# Patient Record
Sex: MALE | Race: WHITE | NOT HISPANIC OR LATINO | Employment: OTHER | URBAN - METROPOLITAN AREA
[De-identification: names, ages, dates, MRNs, and addresses within clinical notes are randomized per-mention and may not be internally consistent; named-entity substitution may affect disease eponyms.]

---

## 2017-05-02 ENCOUNTER — LAB (OUTPATIENT)
Dept: FAMILY MEDICINE CLINIC | Facility: CLINIC | Age: 82
End: 2017-05-02

## 2017-05-02 DIAGNOSIS — I67.82 TEMPORARY CEREBRAL VASCULAR DYSFUNCTION: ICD-10-CM

## 2017-05-02 DIAGNOSIS — K22.70 BARRETT'S ESOPHAGUS WITHOUT DYSPLASIA: ICD-10-CM

## 2017-05-02 DIAGNOSIS — E03.9 ACQUIRED HYPOTHYROIDISM: ICD-10-CM

## 2017-05-02 DIAGNOSIS — R32 URINARY INCONTINENCE, UNSPECIFIED TYPE: ICD-10-CM

## 2017-05-02 DIAGNOSIS — I65.29 OBSTRUCTION OF CAROTID ARTERY, UNSPECIFIED LATERALITY: Primary | ICD-10-CM

## 2017-05-02 DIAGNOSIS — I73.9 PAD (PERIPHERAL ARTERY DISEASE) (HCC): ICD-10-CM

## 2017-05-02 DIAGNOSIS — Z12.5 SCREENING FOR PROSTATE CANCER: ICD-10-CM

## 2017-05-02 DIAGNOSIS — E78.2 MIXED HYPERLIPIDEMIA: ICD-10-CM

## 2017-05-02 DIAGNOSIS — K21.9 GASTROESOPHAGEAL REFLUX DISEASE WITHOUT ESOPHAGITIS: ICD-10-CM

## 2017-05-02 DIAGNOSIS — I65.29 STENOSIS OF CAROTID ARTERY, UNSPECIFIED LATERALITY: ICD-10-CM

## 2017-05-02 LAB
ALBUMIN SERPL-MCNC: 3.9 G/DL (ref 3.5–5.2)
ALBUMIN/GLOB SERPL: 1.3 G/DL
ALP SERPL-CCNC: 47 U/L (ref 40–129)
ALT SERPL-CCNC: 18 U/L (ref 5–41)
AST SERPL-CCNC: 21 U/L (ref 5–40)
BASOPHILS # BLD AUTO: 0.04 10*3/MM3 (ref 0–0.2)
BASOPHILS NFR BLD AUTO: 0.5 % (ref 0–2)
BILIRUB SERPL-MCNC: 0.4 MG/DL (ref 0.2–1.2)
BUN SERPL-MCNC: 18 MG/DL (ref 8–23)
BUN/CREAT SERPL: 19.8 (ref 7–25)
CALCIUM SERPL-MCNC: 9 MG/DL (ref 8.8–10.5)
CHLORIDE SERPL-SCNC: 97 MMOL/L (ref 98–107)
CHOLEST SERPL-MCNC: 137 MG/DL (ref 0–200)
CO2 SERPL-SCNC: 28.2 MMOL/L (ref 22–29)
CREAT SERPL-MCNC: 0.91 MG/DL (ref 0.76–1.27)
EOSINOPHIL # BLD AUTO: 0.43 10*3/MM3 (ref 0.1–0.3)
EOSINOPHIL NFR BLD AUTO: 5.7 % (ref 0–4)
ERYTHROCYTE [DISTWIDTH] IN BLOOD BY AUTOMATED COUNT: 13.9 % (ref 11.5–14.5)
GLOBULIN SER CALC-MCNC: 2.9 GM/DL
GLUCOSE SERPL-MCNC: 91 MG/DL (ref 65–99)
HCT VFR BLD AUTO: 41.8 % (ref 42–52)
HDLC SERPL-MCNC: 42 MG/DL (ref 40–60)
HGB BLD-MCNC: 13.5 G/DL (ref 14–18)
IMM GRANULOCYTES # BLD: 0.02 10*3/MM3 (ref 0–0.03)
IMM GRANULOCYTES NFR BLD: 0.3 % (ref 0–0.5)
LDLC SERPL CALC-MCNC: 68 MG/DL (ref 0–100)
LYMPHOCYTES # BLD AUTO: 1.84 10*3/MM3 (ref 0.6–4.8)
LYMPHOCYTES NFR BLD AUTO: 24.6 % (ref 20–45)
MCH RBC QN AUTO: 30.1 PG (ref 27–31)
MCHC RBC AUTO-ENTMCNC: 32.3 G/DL (ref 31–37)
MCV RBC AUTO: 93.3 FL (ref 80–94)
MONOCYTES # BLD AUTO: 0.6 10*3/MM3 (ref 0–1)
MONOCYTES NFR BLD AUTO: 8 % (ref 3–8)
NEUTROPHILS # BLD AUTO: 4.56 10*3/MM3 (ref 1.5–8.3)
NEUTROPHILS NFR BLD AUTO: 60.9 % (ref 45–70)
NRBC BLD AUTO-RTO: 0 /100 WBC (ref 0–0)
PLATELET # BLD AUTO: 230 10*3/MM3 (ref 140–500)
POTASSIUM SERPL-SCNC: 4.6 MMOL/L (ref 3.5–5.2)
PROT SERPL-MCNC: 6.8 G/DL (ref 6–8.5)
PSA SERPL-MCNC: <0.014 NG/ML (ref 0–4)
RBC # BLD AUTO: 4.48 10*6/MM3 (ref 4.7–6.1)
SODIUM SERPL-SCNC: 138 MMOL/L (ref 136–145)
TRIGL SERPL-MCNC: 137 MG/DL (ref 0–150)
TSH SERPL DL<=0.005 MIU/L-ACNC: 3.82 MIU/ML (ref 0.27–4.2)
VLDLC SERPL CALC-MCNC: 27.4 MG/DL (ref 8–32)
WBC # BLD AUTO: 7.49 10*3/MM3 (ref 4.8–10.8)

## 2017-05-05 RX ORDER — LEVOTHYROXINE SODIUM 0.05 MG/1
TABLET ORAL
Qty: 90 TABLET | Refills: 0 | Status: SHIPPED | OUTPATIENT
Start: 2017-05-05 | End: 2017-05-11 | Stop reason: SDUPTHER

## 2017-05-11 ENCOUNTER — OFFICE VISIT (OUTPATIENT)
Dept: FAMILY MEDICINE CLINIC | Facility: CLINIC | Age: 82
End: 2017-05-11

## 2017-05-11 VITALS
OXYGEN SATURATION: 90 % | WEIGHT: 195.7 LBS | DIASTOLIC BLOOD PRESSURE: 60 MMHG | TEMPERATURE: 98.6 F | HEART RATE: 85 BPM | BODY MASS INDEX: 28.99 KG/M2 | SYSTOLIC BLOOD PRESSURE: 110 MMHG | HEIGHT: 69 IN

## 2017-05-11 DIAGNOSIS — E78.2 MIXED HYPERLIPIDEMIA: ICD-10-CM

## 2017-05-11 DIAGNOSIS — Z00.00 MEDICARE ANNUAL WELLNESS VISIT, SUBSEQUENT: Primary | ICD-10-CM

## 2017-05-11 DIAGNOSIS — K22.70 BARRETT'S ESOPHAGUS WITHOUT DYSPLASIA: ICD-10-CM

## 2017-05-11 DIAGNOSIS — E03.9 ACQUIRED HYPOTHYROIDISM: ICD-10-CM

## 2017-05-11 DIAGNOSIS — J40 BRONCHITIS: ICD-10-CM

## 2017-05-11 DIAGNOSIS — F02.80 DEMENTIA ASSOCIATED WITH OTHER UNDERLYING DISEASE WITHOUT BEHAVIORAL DISTURBANCE (HCC): ICD-10-CM

## 2017-05-11 PROCEDURE — G0439 PPPS, SUBSEQ VISIT: HCPCS | Performed by: FAMILY MEDICINE

## 2017-05-11 PROCEDURE — 99213 OFFICE O/P EST LOW 20 MIN: CPT | Performed by: FAMILY MEDICINE

## 2017-05-11 RX ORDER — LEVOTHYROXINE SODIUM 0.05 MG/1
50 TABLET ORAL DAILY
Qty: 90 TABLET | Refills: 3 | Status: SHIPPED | OUTPATIENT
Start: 2017-05-11 | End: 2018-05-17 | Stop reason: SDUPTHER

## 2017-05-11 RX ORDER — PANTOPRAZOLE SODIUM 40 MG/1
40 TABLET, DELAYED RELEASE ORAL DAILY
Qty: 90 TABLET | Refills: 3 | Status: SHIPPED | OUTPATIENT
Start: 2017-05-11 | End: 2018-05-10 | Stop reason: SDUPTHER

## 2017-05-11 RX ORDER — AZITHROMYCIN 250 MG/1
TABLET, FILM COATED ORAL
Qty: 6 TABLET | Refills: 0 | Status: SHIPPED | OUTPATIENT
Start: 2017-05-11 | End: 2017-10-06

## 2017-05-11 RX ORDER — ALBUTEROL SULFATE 90 UG/1
2 AEROSOL, METERED RESPIRATORY (INHALATION) EVERY 4 HOURS PRN
Qty: 1 INHALER | Refills: 0 | Status: SHIPPED | OUTPATIENT
Start: 2017-05-11 | End: 2017-08-15 | Stop reason: SDUPTHER

## 2017-05-18 ENCOUNTER — TELEPHONE (OUTPATIENT)
Dept: FAMILY MEDICINE CLINIC | Facility: CLINIC | Age: 82
End: 2017-05-18

## 2017-08-10 ENCOUNTER — TRANSCRIBE ORDERS (OUTPATIENT)
Dept: ADMINISTRATIVE | Facility: HOSPITAL | Age: 82
End: 2017-08-10

## 2017-08-10 ENCOUNTER — LAB (OUTPATIENT)
Dept: LAB | Facility: HOSPITAL | Age: 82
End: 2017-08-10

## 2017-08-10 ENCOUNTER — HOSPITAL ENCOUNTER (OUTPATIENT)
Dept: CARDIOLOGY | Facility: HOSPITAL | Age: 82
Discharge: HOME | End: 2017-08-10
Admitting: OPHTHALMOLOGY

## 2017-08-10 DIAGNOSIS — I10 ESSENTIAL HYPERTENSION: Primary | ICD-10-CM

## 2017-08-10 DIAGNOSIS — I10 ESSENTIAL HYPERTENSION: ICD-10-CM

## 2017-08-10 LAB
ANION GAP SERPL CALCULATED.3IONS-SCNC: 10.7 MMOL/L
BUN BLD-MCNC: 20 MG/DL (ref 8–23)
BUN/CREAT SERPL: 20.6 (ref 7–25)
CALCIUM SPEC-SCNC: 8.9 MG/DL (ref 8.8–10.5)
CHLORIDE SERPL-SCNC: 99 MMOL/L (ref 98–107)
CO2 SERPL-SCNC: 26.3 MMOL/L (ref 22–29)
CREAT BLD-MCNC: 0.97 MG/DL (ref 0.76–1.27)
GFR SERPL CREATININE-BSD FRML MDRD: 74 ML/MIN/1.73
GLUCOSE BLD-MCNC: 98 MG/DL (ref 65–99)
POTASSIUM BLD-SCNC: 4.5 MMOL/L (ref 3.5–5.2)
SODIUM BLD-SCNC: 136 MMOL/L (ref 136–145)

## 2017-08-10 PROCEDURE — 93005 ELECTROCARDIOGRAM TRACING: CPT | Performed by: OPHTHALMOLOGY

## 2017-08-10 PROCEDURE — 93010 ELECTROCARDIOGRAM REPORT: CPT | Performed by: INTERNAL MEDICINE

## 2017-08-10 PROCEDURE — 36415 COLL VENOUS BLD VENIPUNCTURE: CPT

## 2017-08-10 PROCEDURE — 80048 BASIC METABOLIC PNL TOTAL CA: CPT

## 2017-08-15 DIAGNOSIS — J40 BRONCHITIS: ICD-10-CM

## 2017-08-15 RX ORDER — ALBUTEROL SULFATE 90 UG/1
AEROSOL, METERED RESPIRATORY (INHALATION)
Qty: 1 INHALER | Refills: 0 | Status: SHIPPED | OUTPATIENT
Start: 2017-08-15 | End: 2018-01-25 | Stop reason: SDUPTHER

## 2017-10-06 ENCOUNTER — OFFICE VISIT (OUTPATIENT)
Dept: FAMILY MEDICINE CLINIC | Facility: CLINIC | Age: 82
End: 2017-10-06

## 2017-10-06 VITALS
TEMPERATURE: 97.5 F | OXYGEN SATURATION: 92 % | HEIGHT: 69 IN | BODY MASS INDEX: 28.66 KG/M2 | WEIGHT: 193.5 LBS | HEART RATE: 94 BPM | SYSTOLIC BLOOD PRESSURE: 140 MMHG | DIASTOLIC BLOOD PRESSURE: 88 MMHG

## 2017-10-06 DIAGNOSIS — Z51.81 MEDICATION MONITORING ENCOUNTER: ICD-10-CM

## 2017-10-06 DIAGNOSIS — E03.9 ACQUIRED HYPOTHYROIDISM: ICD-10-CM

## 2017-10-06 DIAGNOSIS — I10 ESSENTIAL HYPERTENSION: ICD-10-CM

## 2017-10-06 DIAGNOSIS — Z23 NEED FOR INFLUENZA VACCINATION: Primary | ICD-10-CM

## 2017-10-06 DIAGNOSIS — I73.9 PAD (PERIPHERAL ARTERY DISEASE) (HCC): ICD-10-CM

## 2017-10-06 DIAGNOSIS — R41.3 MEMORY LOSS: ICD-10-CM

## 2017-10-06 DIAGNOSIS — E78.2 MIXED HYPERLIPIDEMIA: ICD-10-CM

## 2017-10-06 DIAGNOSIS — F02.80 DEMENTIA ASSOCIATED WITH OTHER UNDERLYING DISEASE WITHOUT BEHAVIORAL DISTURBANCE (HCC): ICD-10-CM

## 2017-10-06 DIAGNOSIS — C61 PROSTATE CANCER (HCC): ICD-10-CM

## 2017-10-06 PROCEDURE — 99213 OFFICE O/P EST LOW 20 MIN: CPT | Performed by: FAMILY MEDICINE

## 2017-10-06 PROCEDURE — 90662 IIV NO PRSV INCREASED AG IM: CPT | Performed by: FAMILY MEDICINE

## 2017-10-06 PROCEDURE — G0008 ADMIN INFLUENZA VIRUS VAC: HCPCS | Performed by: FAMILY MEDICINE

## 2017-10-06 RX ORDER — SILDENAFIL CITRATE 20 MG/1
20 TABLET ORAL DAILY PRN
Qty: 30 TABLET | Refills: 5 | Status: SHIPPED | OUTPATIENT
Start: 2017-10-06 | End: 2018-09-24

## 2017-10-06 RX ORDER — MELOXICAM 15 MG/1
15 TABLET ORAL DAILY
COMMUNITY
End: 2018-04-11

## 2017-10-06 RX ORDER — LOSARTAN POTASSIUM 50 MG/1
50 TABLET ORAL DAILY
Qty: 90 TABLET | Refills: 3 | Status: SHIPPED | OUTPATIENT
Start: 2017-10-06 | End: 2018-10-04 | Stop reason: SDUPTHER

## 2017-10-06 RX ORDER — ATORVASTATIN CALCIUM 20 MG/1
20 TABLET, FILM COATED ORAL DAILY
Qty: 90 TABLET | Refills: 3 | Status: SHIPPED | OUTPATIENT
Start: 2017-10-06 | End: 2018-10-04 | Stop reason: SDUPTHER

## 2017-10-06 RX ORDER — RIVASTIGMINE 9.5 MG/24H
1 PATCH, EXTENDED RELEASE TRANSDERMAL DAILY
Qty: 90 PATCH | Refills: 3 | Status: SHIPPED | OUTPATIENT
Start: 2017-10-06 | End: 2018-10-04 | Stop reason: SDUPTHER

## 2017-10-06 NOTE — PROGRESS NOTES
Chief Complaint   Patient presents with   • Follow-up   • Hyperlipidemia   • Hypertension   • Hypothyroidism       Subjective     Patient here for follow-up of elevated blood pressure.    He is not exercising and is adherent to a low-salt diet.    Blood pressure is well controlled at home.   Cardiac symptoms: none.   Patient denies: chest pressure/discomfort, claudication, cough, dyspnea, exertional chest pressure/discomfort, fatigue, irregular heart beat, lower extremity edema and near-syncope.   Cardiovascular risk factors: advanced age (older than 55 for men, 65 for women), dyslipidemia, hypertension and male gender.   Use of agents associated with hypertension: none.   History of target organ damage: none.  Patient is taking prescribed hypertension medications as prescribed without side effects.    Dementia is stable  Utd on labs and these were reviewed    chronic ED since prostate cancer  Needs refill on Viagra    The following portions of the patient's history were reviewed and updated as appropriate: allergies, current medications, past medical history, past social history, past surgical history and problem list.    Review of Systems  A comprehensive review of systems was negative except for: Genitourinary: positive for sexual problems  Neurological: positive for memory problems    Results for orders placed or performed in visit on 08/10/17   Basic Metabolic Panel   Result Value Ref Range    Glucose 98 65 - 99 mg/dL    BUN 20 8 - 23 mg/dL    Creatinine 0.97 0.76 - 1.27 mg/dL    Sodium 136 136 - 145 mmol/L    Potassium 4.5 3.5 - 5.2 mmol/L    Chloride 99 98 - 107 mmol/L    CO2 26.3 22.0 - 29.0 mmol/L    Calcium 8.9 8.8 - 10.5 mg/dL    eGFR Non African Amer 74 >60 mL/min/1.73    BUN/Creatinine Ratio 20.6 7.0 - 25.0    Anion Gap 10.7 mmol/L        Vitals:    10/06/17 1051   BP: 140/88   BP Location: Left arm   Patient Position: Sitting   Pulse: 94   Temp: 97.5 °F (36.4 °C)   SpO2: 92%   Weight: 193 lb 8 oz  "(87.8 kg)   Height: 68.5\" (174 cm)     Objective    Gen: alert, pleasant.  HEENT: PERRL, EOMI intact, lids ok, ear canals clear, TMs normal, throat clear, nostrils normal  Neck: no bruit, no enlarged thyroid  Lungs: CTA  Heart: RR no murmur  ABD: soft , + BS  Pulses: intact  Mood: stable     Assessment/Plan   Hypertension, prehypertension Evidence of target organ damage: none.    Pawel was seen today for follow-up, hyperlipidemia, hypertension and hypothyroidism.    Diagnoses and all orders for this visit:    Need for influenza vaccination  -     Flu Vaccine High Dose PF 65YR+    Acquired hypothyroidism  -     TSH; Future    Mixed hyperlipidemia  -     atorvastatin (LIPITOR) 20 MG tablet; Take 1 tablet by mouth Daily for 90 days.  -     Lipid Panel; Future    Dementia associated with other underlying disease without behavioral disturbance    Essential hypertension  -     Basic Metabolic Panel; Future  -     CBC & Differential; Future    Prostate cancer  -     sildenafil (REVATIO) 20 MG tablet; Take 1 tablet by mouth Daily As Needed (ED).  -     PSA; Future    PAD (peripheral artery disease)  -     losartan (COZAAR) 50 MG tablet; Take 1 tablet by mouth Daily for 90 days. Indications: High Blood Pressure Disorder  -     atorvastatin (LIPITOR) 20 MG tablet; Take 1 tablet by mouth Daily for 90 days.    Memory loss  -     rivastigmine (EXELON) 9.5 MG/24HR patch; Place 1 patch on the skin Daily for 90 days. Indications: Alzheimer's Disease    Medication monitoring encounter  -     ALT; Future      Medication: no change.  Follow up: 6 months and as needed.    There are no Patient Instructions on file for this visit.  Medications Discontinued During This Encounter   Medication Reason   • azithromycin (ZITHROMAX) 250 MG tablet Therapy completed   • losartan (COZAAR) 50 MG tablet Reorder   • atorvastatin (LIPITOR) 20 MG tablet Reorder   • rivastigmine (EXELON) 9.5 MG/24HR patch Reorder        Return in about 6 months (around " 4/6/2018) for Medicare Wellness visit.    Limit salt  Limit alcoholic drinks to 1 a day  Limit caffeine to 1-2 servings a day    Dr. Charlie Trinh MD  [place], [place]  [facility].

## 2017-11-07 ENCOUNTER — OFFICE VISIT (OUTPATIENT)
Dept: FAMILY MEDICINE CLINIC | Facility: CLINIC | Age: 82
End: 2017-11-07

## 2017-11-07 VITALS
HEIGHT: 69 IN | DIASTOLIC BLOOD PRESSURE: 100 MMHG | WEIGHT: 196 LBS | SYSTOLIC BLOOD PRESSURE: 154 MMHG | OXYGEN SATURATION: 92 % | TEMPERATURE: 97.5 F | HEART RATE: 100 BPM | BODY MASS INDEX: 29.03 KG/M2

## 2017-11-07 DIAGNOSIS — Z85.46 HISTORY OF PROSTATE CANCER: Primary | ICD-10-CM

## 2017-11-07 LAB
BILIRUB BLD-MCNC: ABNORMAL MG/DL
CLARITY, POC: CLEAR
COLOR UR: YELLOW
GLUCOSE UR STRIP-MCNC: NEGATIVE MG/DL
KETONES UR QL: NEGATIVE
LEUKOCYTE EST, POC: NEGATIVE
NITRITE UR-MCNC: NEGATIVE MG/ML
PH UR: 6 [PH] (ref 5–8)
PROT UR STRIP-MCNC: ABNORMAL MG/DL
RBC # UR STRIP: NEGATIVE /UL
SP GR UR: 1.02 (ref 1–1.03)
UROBILINOGEN UR QL: NORMAL

## 2017-11-07 PROCEDURE — 99213 OFFICE O/P EST LOW 20 MIN: CPT | Performed by: FAMILY MEDICINE

## 2017-11-07 PROCEDURE — 81002 URINALYSIS NONAUTO W/O SCOPE: CPT | Performed by: FAMILY MEDICINE

## 2017-11-07 RX ORDER — TOLTERODINE 4 MG/1
4 CAPSULE, EXTENDED RELEASE ORAL
Qty: 30 CAPSULE | Refills: 5 | Status: SHIPPED | OUTPATIENT
Start: 2017-11-07 | End: 2017-11-27 | Stop reason: SDUPTHER

## 2017-11-07 NOTE — PROGRESS NOTES
Subjective   Pawel Goldberg is a 84 y.o. male who is here for   Chief Complaint   Patient presents with   • Urinary Incontinence     x 1 month    • Bladder Problem   .     History of Present Illness   Urinary Tract Infection: Patient complains of frequency, inability to void, incomplete bladder emptying, nocturia and urgency He has had symptoms for several years. Patient also complains of none. Patient denies back pain and fever. Patient does not have a history of recurrent UTI.  Patient does not have a history of pyelonephritis.   Is a prostate cancer survivor. Has an artificial urine sphincter.  Gets up every 2 h at night to urinate  PSA is 0.  Pepe worked great but denied by insurance.    Also hit left elbow last week, has a swollen olecranon bursa.    Daily productive cough.      The following portions of the patient's history were reviewed and updated as appropriate: allergies, current medications, past family history, past medical history, past social history, past surgical history and problem list.    Review of Systems    Objective   Physical Exam   Cardiovascular: Normal rate.    Pulmonary/Chest: Effort normal.   Musculoskeletal:        Left elbow: He exhibits swelling. Tenderness found. Olecranon process tenderness noted.   Nursing note and vitals reviewed.    Results for orders placed or performed in visit on 11/07/17   POCT urinalysis dipstick, manual   Result Value Ref Range    Color Yellow Yellow, Straw, Dark Yellow, Laurie    Clarity, UA Clear Clear    Glucose, UA Negative Negative, 1000 mg/dL (3+) mg/dL    Bilirubin Small (1+) (A) Negative    Ketones, UA Negative Negative    Specific Gravity  1.020 1.005 - 1.030    Blood, UA Negative Negative    pH, Urine 6.0 5.0 - 8.0    Protein, POC Trace (A) Negative mg/dL    Urobilinogen, UA Normal Normal    Leukocytes Negative Negative    Nitrite, UA Negative Negative     Results for orders placed or performed in visit on 11/07/17   POCT urinalysis  dipstick, manual   Result Value Ref Range    Color Yellow Yellow, Straw, Dark Yellow, Laurie    Clarity, UA Clear Clear    Glucose, UA Negative Negative, 1000 mg/dL (3+) mg/dL    Bilirubin Small (1+) (A) Negative    Ketones, UA Negative Negative    Specific Gravity  1.020 1.005 - 1.030    Blood, UA Negative Negative    pH, Urine 6.0 5.0 - 8.0    Protein, POC Trace (A) Negative mg/dL    Urobilinogen, UA Normal Normal    Leukocytes Negative Negative    Nitrite, UA Negative Negative       Assessment/Plan   Pawel was seen today for urinary incontinence and bladder problem.    Diagnoses and all orders for this visit:    History of prostate cancer  -     tolterodine LA (DETROL LA) 4 MG 24 hr capsule; Take 1 capsule by mouth every night at bedtime. Indications: Overactive Bladder  -     POCT urinalysis dipstick, manual      Patient Instructions   Ice the elbow.  Keep coughing up the mucous  No UTI  Will try Detrol for OAB.  Could try to PA the Myrebetic in future.        There are no discontinued medications.     No Follow-up on file.    Dr. Charlie Trinh  [place], [place]

## 2017-11-07 NOTE — PATIENT INSTRUCTIONS
Ice the elbow.  Keep coughing up the mucous  No UTI  Will try Detrol for OAB.  Could try to PA the Myrebetic in future.

## 2017-11-27 DIAGNOSIS — Z85.46 HISTORY OF PROSTATE CANCER: ICD-10-CM

## 2017-11-27 RX ORDER — TOLTERODINE 4 MG/1
4 CAPSULE, EXTENDED RELEASE ORAL
Qty: 90 CAPSULE | Refills: 3 | Status: SHIPPED | OUTPATIENT
Start: 2017-11-27 | End: 2018-04-11

## 2018-01-25 DIAGNOSIS — J40 BRONCHITIS: ICD-10-CM

## 2018-01-25 RX ORDER — ALBUTEROL SULFATE 90 UG/1
AEROSOL, METERED RESPIRATORY (INHALATION)
Qty: 18 G | Refills: 0 | Status: SHIPPED | OUTPATIENT
Start: 2018-01-25 | End: 2020-03-31 | Stop reason: SDUPTHER

## 2018-04-05 DIAGNOSIS — J40 BRONCHITIS: ICD-10-CM

## 2018-04-06 ENCOUNTER — RESULTS ENCOUNTER (OUTPATIENT)
Dept: FAMILY MEDICINE CLINIC | Facility: CLINIC | Age: 83
End: 2018-04-06

## 2018-04-06 DIAGNOSIS — I10 ESSENTIAL HYPERTENSION: ICD-10-CM

## 2018-04-06 DIAGNOSIS — E03.9 ACQUIRED HYPOTHYROIDISM: ICD-10-CM

## 2018-04-06 DIAGNOSIS — Z51.81 MEDICATION MONITORING ENCOUNTER: ICD-10-CM

## 2018-04-06 DIAGNOSIS — C61 PROSTATE CANCER (HCC): ICD-10-CM

## 2018-04-06 DIAGNOSIS — E78.2 MIXED HYPERLIPIDEMIA: ICD-10-CM

## 2018-04-06 RX ORDER — ALBUTEROL SULFATE 90 UG/1
AEROSOL, METERED RESPIRATORY (INHALATION)
Qty: 16 G | Refills: 0 | Status: SHIPPED | OUTPATIENT
Start: 2018-04-06 | End: 2018-04-11 | Stop reason: SDUPTHER

## 2018-04-11 ENCOUNTER — HOSPITAL ENCOUNTER (OUTPATIENT)
Dept: GENERAL RADIOLOGY | Facility: HOSPITAL | Age: 83
Discharge: HOME | End: 2018-04-11
Attending: FAMILY MEDICINE | Admitting: FAMILY MEDICINE

## 2018-04-11 ENCOUNTER — OFFICE VISIT (OUTPATIENT)
Dept: FAMILY MEDICINE CLINIC | Facility: CLINIC | Age: 83
End: 2018-04-11

## 2018-04-11 VITALS
BODY MASS INDEX: 29.62 KG/M2 | HEIGHT: 69 IN | WEIGHT: 200 LBS | SYSTOLIC BLOOD PRESSURE: 140 MMHG | HEART RATE: 77 BPM | TEMPERATURE: 97.9 F | OXYGEN SATURATION: 92 % | DIASTOLIC BLOOD PRESSURE: 86 MMHG

## 2018-04-11 DIAGNOSIS — R06.02 SHORTNESS OF BREATH: Primary | ICD-10-CM

## 2018-04-11 DIAGNOSIS — R06.02 SHORTNESS OF BREATH: ICD-10-CM

## 2018-04-11 PROCEDURE — 71046 X-RAY EXAM CHEST 2 VIEWS: CPT

## 2018-04-11 PROCEDURE — 99213 OFFICE O/P EST LOW 20 MIN: CPT | Performed by: FAMILY MEDICINE

## 2018-04-11 RX ORDER — FLUTICASONE PROPIONATE 110 UG/1
1 AEROSOL, METERED RESPIRATORY (INHALATION) 2 TIMES DAILY
Qty: 1 INHALER | Refills: 11 | Status: SHIPPED | OUTPATIENT
Start: 2018-04-11 | End: 2019-05-22

## 2018-04-11 NOTE — PROGRESS NOTES
"  Chief Complaint   Patient presents with   • Wheezing   • Cough       Upper Respiratory Infection: Patient complains of wheezing. Symptoms include congestion. Onset of symptoms was 3 months ago, unchanged since that time. He also c/o congestion, shortness of breath and wheezing for the past 3 months .  He is drinking plenty of fluids. Evaluation to date: none. Treatment to date: none.  Ill contacts at home  Discussed.  No fever.  No CP  Non productive cough  Non smoker  No h/o asthma nor copd  Ventolin becoming unhelpful      Vitals:    04/11/18 1407   BP: 140/86   BP Location: Left arm   Patient Position: Sitting   Pulse: 77   Temp: 97.9 °F (36.6 °C)   SpO2: 92%   Weight: 90.7 kg (200 lb)   Height: 174 cm (68.5\")     Gen: mildly ill appearing, alert  Ears:  without redness  Nose:  Clear  Throat:  without exudate, some drainage, tonsils okay  Neck: no LAD  Lung: mild rales, limited air movement, regular RR, wheezing with forced expiration  Heart: RR without murmur  Skin: no rash.      Assessment/Plan   Pawel was seen today for wheezing and cough.    Diagnoses and all orders for this visit:    Shortness of breath  -     fluticasone (FLOVENT HFA) 110 MCG/ACT inhaler; Inhale 1 puff 2 (Two) Times a Day.  -     XR Chest 2 View; Future             There are no Patient Instructions on file for this visit.    Dr. Charlie Trinh MD  [place], [place]  [facility]  "

## 2018-04-16 ENCOUNTER — TELEPHONE (OUTPATIENT)
Dept: FAMILY MEDICINE CLINIC | Facility: CLINIC | Age: 83
End: 2018-04-16

## 2018-04-16 DIAGNOSIS — R06.02 SHORTNESS OF BREATH: Primary | ICD-10-CM

## 2018-04-16 NOTE — TELEPHONE ENCOUNTER
Daughter called and wants to know since his CXR was normal, should pt be referred to a Lung specialist? His inhaler isnt working very well.

## 2018-04-16 NOTE — TELEPHONE ENCOUNTER
Ok  I made referral to pulm MD  I also went ahead and ordered the Ct of lungs  And PFTs  To speed up eval.    Charlie Trinh MD

## 2018-04-19 ENCOUNTER — HOSPITAL ENCOUNTER (OUTPATIENT)
Dept: CT IMAGING | Facility: HOSPITAL | Age: 83
Discharge: HOME | End: 2018-04-19
Attending: FAMILY MEDICINE

## 2018-04-19 ENCOUNTER — HOSPITAL ENCOUNTER (OUTPATIENT)
Dept: PULMONOLOGY | Facility: HOSPITAL | Age: 83
Discharge: HOME | End: 2018-04-19
Attending: FAMILY MEDICINE | Admitting: FAMILY MEDICINE

## 2018-04-19 DIAGNOSIS — R06.02 SHORTNESS OF BREATH: ICD-10-CM

## 2018-04-19 PROCEDURE — 0 IOPAMIDOL PER 1 ML: Performed by: FAMILY MEDICINE

## 2018-04-19 PROCEDURE — 94729 DIFFUSING CAPACITY: CPT

## 2018-04-19 PROCEDURE — 71260 CT THORAX DX C+: CPT

## 2018-04-19 PROCEDURE — 94726 PLETHYSMOGRAPHY LUNG VOLUMES: CPT

## 2018-04-19 PROCEDURE — 94060 EVALUATION OF WHEEZING: CPT

## 2018-04-19 RX ORDER — ALBUTEROL SULFATE 2.5 MG/3ML
2.5 SOLUTION RESPIRATORY (INHALATION) ONCE
Status: COMPLETED | OUTPATIENT
Start: 2018-04-19 | End: 2018-04-19

## 2018-04-19 RX ADMIN — ALBUTEROL SULFATE 2.5 MG: 2.5 SOLUTION RESPIRATORY (INHALATION) at 13:44

## 2018-04-19 RX ADMIN — IOPAMIDOL 100 ML: 755 INJECTION, SOLUTION INTRAVENOUS at 15:00

## 2018-05-09 ENCOUNTER — TRANSCRIBE ORDERS (OUTPATIENT)
Dept: ADMINISTRATIVE | Facility: HOSPITAL | Age: 83
End: 2018-05-09

## 2018-05-09 DIAGNOSIS — R06.00 DYSPNEA, UNSPECIFIED TYPE: Primary | ICD-10-CM

## 2018-05-09 DIAGNOSIS — J84.9 ILD (INTERSTITIAL LUNG DISEASE) (HCC): Primary | ICD-10-CM

## 2018-05-09 DIAGNOSIS — R06.00 DYSPNEA, UNSPECIFIED TYPE: ICD-10-CM

## 2018-05-09 DIAGNOSIS — R91.8 ABNORMAL CT LUNG SCREENING: ICD-10-CM

## 2018-05-10 DIAGNOSIS — K22.70 BARRETT'S ESOPHAGUS WITHOUT DYSPLASIA: ICD-10-CM

## 2018-05-10 LAB
ALT SERPL-CCNC: 16 U/L (ref 5–41)
BASOPHILS # BLD AUTO: 0.04 10*3/MM3 (ref 0–0.2)
BASOPHILS NFR BLD AUTO: 0.5 % (ref 0–2)
BUN SERPL-MCNC: 19 MG/DL (ref 8–23)
BUN/CREAT SERPL: 23.5 (ref 7–25)
CALCIUM SERPL-MCNC: 9.4 MG/DL (ref 8.8–10.5)
CHLORIDE SERPL-SCNC: 97 MMOL/L (ref 98–107)
CHOLEST SERPL-MCNC: 112 MG/DL (ref 0–200)
CO2 SERPL-SCNC: 26.6 MMOL/L (ref 22–29)
CREAT SERPL-MCNC: 0.81 MG/DL (ref 0.76–1.27)
EOSINOPHIL # BLD AUTO: 0.34 10*3/MM3 (ref 0.1–0.3)
EOSINOPHIL NFR BLD AUTO: 4.4 % (ref 0–4)
ERYTHROCYTE [DISTWIDTH] IN BLOOD BY AUTOMATED COUNT: 13.5 % (ref 11.5–14.5)
GFR SERPLBLD CREATININE-BSD FMLA CKD-EPI: 110 ML/MIN/1.73
GFR SERPLBLD CREATININE-BSD FMLA CKD-EPI: 91 ML/MIN/1.73
GLUCOSE SERPL-MCNC: 95 MG/DL (ref 65–99)
HCT VFR BLD AUTO: 40.4 % (ref 42–52)
HDLC SERPL-MCNC: 45 MG/DL (ref 40–60)
HGB BLD-MCNC: 13.5 G/DL (ref 14–18)
IMM GRANULOCYTES # BLD: 0.02 10*3/MM3 (ref 0–0.03)
IMM GRANULOCYTES NFR BLD: 0.3 % (ref 0–0.5)
LDLC SERPL CALC-MCNC: 46 MG/DL (ref 0–100)
LYMPHOCYTES # BLD AUTO: 1.61 10*3/MM3 (ref 0.6–4.8)
LYMPHOCYTES NFR BLD AUTO: 21 % (ref 20–45)
MCH RBC QN AUTO: 31.8 PG (ref 27–31)
MCHC RBC AUTO-ENTMCNC: 33.4 G/DL (ref 31–37)
MCV RBC AUTO: 95.1 FL (ref 80–94)
MONOCYTES # BLD AUTO: 0.7 10*3/MM3 (ref 0–1)
MONOCYTES NFR BLD AUTO: 9.2 % (ref 3–8)
NEUTROPHILS # BLD AUTO: 4.94 10*3/MM3 (ref 1.5–8.3)
NEUTROPHILS NFR BLD AUTO: 64.6 % (ref 45–70)
NRBC BLD AUTO-RTO: 0 /100 WBC (ref 0–0)
PLATELET # BLD AUTO: 229 10*3/MM3 (ref 140–500)
POTASSIUM SERPL-SCNC: 4.4 MMOL/L (ref 3.5–5.2)
PSA SERPL-MCNC: <0.014 NG/ML (ref 0–4)
RBC # BLD AUTO: 4.25 10*6/MM3 (ref 4.7–6.1)
SODIUM SERPL-SCNC: 135 MMOL/L (ref 136–145)
TRIGL SERPL-MCNC: 105 MG/DL (ref 0–150)
TSH SERPL DL<=0.005 MIU/L-ACNC: 2.23 MIU/ML (ref 0.27–4.2)
VLDLC SERPL CALC-MCNC: 21 MG/DL (ref 8–32)
WBC # BLD AUTO: 7.65 10*3/MM3 (ref 4.8–10.8)

## 2018-05-10 RX ORDER — PANTOPRAZOLE SODIUM 40 MG/1
TABLET, DELAYED RELEASE ORAL
Qty: 90 TABLET | Refills: 3 | Status: SHIPPED | OUTPATIENT
Start: 2018-05-10 | End: 2018-10-15 | Stop reason: SDUPTHER

## 2018-05-15 ENCOUNTER — HOSPITAL ENCOUNTER (OUTPATIENT)
Dept: CARDIOLOGY | Facility: HOSPITAL | Age: 83
Discharge: HOME | End: 2018-05-15
Attending: INTERNAL MEDICINE | Admitting: INTERNAL MEDICINE

## 2018-05-15 VITALS
OXYGEN SATURATION: 94 % | WEIGHT: 193 LBS | HEIGHT: 67 IN | SYSTOLIC BLOOD PRESSURE: 118 MMHG | HEART RATE: 70 BPM | DIASTOLIC BLOOD PRESSURE: 65 MMHG | BODY MASS INDEX: 30.29 KG/M2

## 2018-05-15 DIAGNOSIS — R06.00 DYSPNEA, UNSPECIFIED TYPE: ICD-10-CM

## 2018-05-15 LAB
AORTIC DIMENSIONLESS INDEX: 1 (DI)
BH CV ECHO MEAS - ACS: 1.8 CM
BH CV ECHO MEAS - AI DEC SLOPE: 131 CM/SEC^2
BH CV ECHO MEAS - AI MAX PG: 28.5 MMHG
BH CV ECHO MEAS - AI MAX VEL: 267 CM/SEC
BH CV ECHO MEAS - AI P1/2T: 597 MSEC
BH CV ECHO MEAS - AO MAX PG (FULL): 0.39 MMHG
BH CV ECHO MEAS - AO MAX PG: 6.3 MMHG
BH CV ECHO MEAS - AO MEAN PG (FULL): 0 MMHG
BH CV ECHO MEAS - AO MEAN PG: 3 MMHG
BH CV ECHO MEAS - AO ROOT AREA (BSA CORRECTED): 1.9
BH CV ECHO MEAS - AO ROOT AREA: 10.8 CM^2
BH CV ECHO MEAS - AO ROOT DIAM: 3.7 CM
BH CV ECHO MEAS - AO V2 MAX: 125 CM/SEC
BH CV ECHO MEAS - AO V2 MEAN: 75.4 CM/SEC
BH CV ECHO MEAS - AO V2 VTI: 22.5 CM
BH CV ECHO MEAS - AVA(I,A): 3.1 CM^2
BH CV ECHO MEAS - AVA(I,D): 3.1 CM^2
BH CV ECHO MEAS - AVA(V,A): 2.7 CM^2
BH CV ECHO MEAS - AVA(V,D): 2.7 CM^2
BH CV ECHO MEAS - BSA(HAYCOCK): 2.1 M^2
BH CV ECHO MEAS - BSA: 2 M^2
BH CV ECHO MEAS - BZI_BMI: 30.2 KILOGRAMS/M^2
BH CV ECHO MEAS - BZI_METRIC_HEIGHT: 170.2 CM
BH CV ECHO MEAS - BZI_METRIC_WEIGHT: 87.5 KG
BH CV ECHO MEAS - CONTRAST EF (2CH): 61.9 ML/M^2
BH CV ECHO MEAS - CONTRAST EF 4CH: 64.1 ML/M^2
BH CV ECHO MEAS - EDV(CUBED): 98.6 ML
BH CV ECHO MEAS - EDV(MOD-SP2): 118 ML
BH CV ECHO MEAS - EDV(MOD-SP4): 114 ML
BH CV ECHO MEAS - EDV(TEICH): 98.3 ML
BH CV ECHO MEAS - EF(CUBED): 58 %
BH CV ECHO MEAS - EF(MOD-BP): 61 %
BH CV ECHO MEAS - EF(MOD-SP2): 61.9 %
BH CV ECHO MEAS - EF(MOD-SP4): 64.1 %
BH CV ECHO MEAS - EF(TEICH): 49.7 %
BH CV ECHO MEAS - ESV(CUBED): 41.4 ML
BH CV ECHO MEAS - ESV(MOD-SP2): 44.9 ML
BH CV ECHO MEAS - ESV(MOD-SP4): 40.9 ML
BH CV ECHO MEAS - ESV(TEICH): 49.5 ML
BH CV ECHO MEAS - FS: 25.1 %
BH CV ECHO MEAS - IVS/LVPW: 1.2
BH CV ECHO MEAS - IVSD: 1.1 CM
BH CV ECHO MEAS - LA DIMENSION: 3 CM
BH CV ECHO MEAS - LA/AO: 0.81
BH CV ECHO MEAS - LAT PEAK E' VEL: 11 CM/SEC
BH CV ECHO MEAS - LV DIASTOLIC VOL/BSA (35-75): 57.2 ML/M^2
BH CV ECHO MEAS - LV MASS(C)D: 156.8 GRAMS
BH CV ECHO MEAS - LV MASS(C)DI: 78.7 GRAMS/M^2
BH CV ECHO MEAS - LV MAX PG: 5.9 MMHG
BH CV ECHO MEAS - LV MEAN PG: 3 MMHG
BH CV ECHO MEAS - LV SYSTOLIC VOL/BSA (12-30): 20.5 ML/M^2
BH CV ECHO MEAS - LV V1 MAX: 121 CM/SEC
BH CV ECHO MEAS - LV V1 MEAN: 79 CM/SEC
BH CV ECHO MEAS - LV V1 VTI: 24.3 CM
BH CV ECHO MEAS - LVIDD: 4.6 CM
BH CV ECHO MEAS - LVIDS: 3.5 CM
BH CV ECHO MEAS - LVLD AP2: 8.9 CM
BH CV ECHO MEAS - LVLD AP4: 8.7 CM
BH CV ECHO MEAS - LVLS AP2: 8.1 CM
BH CV ECHO MEAS - LVLS AP4: 7.2 CM
BH CV ECHO MEAS - LVOT AREA (M): 2.8 CM^2
BH CV ECHO MEAS - LVOT AREA: 2.8 CM^2
BH CV ECHO MEAS - LVOT DIAM: 1.9 CM
BH CV ECHO MEAS - LVPWD: 0.9 CM
BH CV ECHO MEAS - MED PEAK E' VEL: 7 CM/SEC
BH CV ECHO MEAS - MV A DUR: 0.17 SEC
BH CV ECHO MEAS - MV A MAX VEL: 93.1 CM/SEC
BH CV ECHO MEAS - MV DEC SLOPE: 270 CM/SEC^2
BH CV ECHO MEAS - MV DEC TIME: 0.3 SEC
BH CV ECHO MEAS - MV E MAX VEL: 59.7 CM/SEC
BH CV ECHO MEAS - MV E/A: 0.64
BH CV ECHO MEAS - MV MAX PG: 4.2 MMHG
BH CV ECHO MEAS - MV MEAN PG: 1 MMHG
BH CV ECHO MEAS - MV P1/2T MAX VEL: 72.4 CM/SEC
BH CV ECHO MEAS - MV P1/2T: 78.5 MSEC
BH CV ECHO MEAS - MV V2 MAX: 102 CM/SEC
BH CV ECHO MEAS - MV V2 MEAN: 51.5 CM/SEC
BH CV ECHO MEAS - MV V2 VTI: 28.4 CM
BH CV ECHO MEAS - MVA P1/2T LCG: 3 CM^2
BH CV ECHO MEAS - MVA(P1/2T): 2.8 CM^2
BH CV ECHO MEAS - MVA(VTI): 2.4 CM^2
BH CV ECHO MEAS - PA ACC TIME: 0.11 SEC
BH CV ECHO MEAS - PA MAX PG (FULL): 1.4 MMHG
BH CV ECHO MEAS - PA MAX PG: 2.8 MMHG
BH CV ECHO MEAS - PA PR(ACCEL): 28.2 MMHG
BH CV ECHO MEAS - PA V2 MAX: 84.3 CM/SEC
BH CV ECHO MEAS - PULM A REVS DUR: 0.18 SEC
BH CV ECHO MEAS - PULM A REVS VEL: 38.3 CM/SEC
BH CV ECHO MEAS - PULM DIAS VEL: 27.5 CM/SEC
BH CV ECHO MEAS - PULM S/D: 2
BH CV ECHO MEAS - PULM SYS VEL: 54.8 CM/SEC
BH CV ECHO MEAS - PVA(V,A): 2.7 CM^2
BH CV ECHO MEAS - PVA(V,D): 2.7 CM^2
BH CV ECHO MEAS - QP/QS: 0.72
BH CV ECHO MEAS - RV MAX PG: 1.5 MMHG
BH CV ECHO MEAS - RV MEAN PG: 1 MMHG
BH CV ECHO MEAS - RV V1 MAX: 60.6 CM/SEC
BH CV ECHO MEAS - RV V1 MEAN: 39.9 CM/SEC
BH CV ECHO MEAS - RV V1 VTI: 13 CM
BH CV ECHO MEAS - RVOT AREA: 3.8 CM^2
BH CV ECHO MEAS - RVOT DIAM: 2.2 CM
BH CV ECHO MEAS - SI(AO): 121.5 ML/M^2
BH CV ECHO MEAS - SI(CUBED): 28.7 ML/M^2
BH CV ECHO MEAS - SI(LVOT): 34.6 ML/M^2
BH CV ECHO MEAS - SI(MOD-SP2): 36.7 ML/M^2
BH CV ECHO MEAS - SI(MOD-SP4): 36.7 ML/M^2
BH CV ECHO MEAS - SI(TEICH): 24.5 ML/M^2
BH CV ECHO MEAS - SV(AO): 241.9 ML
BH CV ECHO MEAS - SV(CUBED): 57.2 ML
BH CV ECHO MEAS - SV(LVOT): 68.9 ML
BH CV ECHO MEAS - SV(MOD-SP2): 73.1 ML
BH CV ECHO MEAS - SV(MOD-SP4): 73.1 ML
BH CV ECHO MEAS - SV(RVOT): 49.4 ML
BH CV ECHO MEAS - SV(TEICH): 48.9 ML
BH CV ECHO MEAS - TAPSE (>1.6): 1.9 CM2
BH CV ECHO MEASUREMENTS AVERAGE E/E' RATIO: 6.63
BH CV VAS BP RIGHT ARM: NORMAL MMHG
BH CV XLRA - RV BASE: 2.6 CM
BH CV XLRA - TDI S': 11 CM/SEC
LEFT ATRIUM VOLUME INDEX: 20 ML/M2
LEFT ATRIUM VOLUME: 40 CM3
MAXIMAL PREDICTED HEART RATE: 135 BPM
STRESS TARGET HR: 115 BPM

## 2018-05-15 PROCEDURE — 93306 TTE W/DOPPLER COMPLETE: CPT

## 2018-05-15 PROCEDURE — 93306 TTE W/DOPPLER COMPLETE: CPT | Performed by: INTERNAL MEDICINE

## 2018-05-17 ENCOUNTER — OFFICE VISIT (OUTPATIENT)
Dept: FAMILY MEDICINE CLINIC | Facility: CLINIC | Age: 83
End: 2018-05-17

## 2018-05-17 VITALS
OXYGEN SATURATION: 92 % | DIASTOLIC BLOOD PRESSURE: 72 MMHG | BODY MASS INDEX: 30.13 KG/M2 | TEMPERATURE: 97.7 F | HEART RATE: 68 BPM | SYSTOLIC BLOOD PRESSURE: 138 MMHG | WEIGHT: 192 LBS | HEIGHT: 67 IN

## 2018-05-17 DIAGNOSIS — F02.80 DEMENTIA ASSOCIATED WITH OTHER UNDERLYING DISEASE WITHOUT BEHAVIORAL DISTURBANCE (HCC): ICD-10-CM

## 2018-05-17 DIAGNOSIS — Z85.46 HISTORY OF PROSTATE CANCER: ICD-10-CM

## 2018-05-17 DIAGNOSIS — Z00.00 MEDICARE ANNUAL WELLNESS VISIT, SUBSEQUENT: ICD-10-CM

## 2018-05-17 DIAGNOSIS — K22.70 BARRETT'S ESOPHAGUS WITHOUT DYSPLASIA: ICD-10-CM

## 2018-05-17 DIAGNOSIS — E78.2 MIXED HYPERLIPIDEMIA: ICD-10-CM

## 2018-05-17 DIAGNOSIS — E03.9 ACQUIRED HYPOTHYROIDISM: Primary | ICD-10-CM

## 2018-05-17 PROBLEM — R06.02 SHORTNESS OF BREATH: Status: RESOLVED | Noted: 2018-04-11 | Resolved: 2018-05-17

## 2018-05-17 PROCEDURE — G0439 PPPS, SUBSEQ VISIT: HCPCS | Performed by: FAMILY MEDICINE

## 2018-05-17 RX ORDER — MONTELUKAST SODIUM 10 MG/1
TABLET ORAL
COMMUNITY
Start: 2018-05-09 | End: 2018-10-15

## 2018-05-17 RX ORDER — TOLTERODINE 4 MG/1
CAPSULE, EXTENDED RELEASE ORAL
COMMUNITY
Start: 2018-05-12 | End: 2019-05-22

## 2018-05-17 RX ORDER — LEVOTHYROXINE SODIUM 0.05 MG/1
50 TABLET ORAL DAILY
Qty: 90 TABLET | Refills: 3 | Status: SHIPPED | OUTPATIENT
Start: 2018-05-17 | End: 2019-05-22 | Stop reason: SDUPTHER

## 2018-05-17 NOTE — PROGRESS NOTES
"QUICK REFERENCE INFORMATION:  The ABCs of the Annual Wellness Visit    Subsequent Medicare Wellness Visit    HEALTH RISK ASSESSMENT    12/31/1932    Recent Hospitalizations:  No hospitalization(s) within the last year..    \"well Doc , Im just glad to be here and alive.\"      Current Medical Providers:  Patient Care Team:  Charlie Trinh MD as PCP - General (Family Medicine)  Charlie Trinh MD as PCP - Claims Attributed  Hardik Senior MD as Consulting Physician (Urology)  MAURO Peters MD as Consulting Physician (Ophthalmology)  Brant Polanco MD as Consulting Physician (Pulmonary Disease)        Smoking Status:  History   Smoking Status   • Never Smoker   Smokeless Tobacco   • Never Used       Alcohol Consumption:  History   Alcohol Use No       Depression Screen:   PHQ-2/PHQ-9 Depression Screening 5/17/2018   Little interest or pleasure in doing things 0   Feeling down, depressed, or hopeless 0   Trouble falling or staying asleep, or sleeping too much -   Feeling tired or having little energy -   Poor appetite or overeating -   Feeling bad about yourself - or that you are a failure or have let yourself or your family down -   Trouble concentrating on things, such as reading the newspaper or watching television -   Moving or speaking so slowly that other people could have noticed. Or the opposite - being so fidgety or restless that you have been moving around a lot more than usual -   Thoughts that you would be better off dead, or of hurting yourself in some way -   Total Score 0   If you checked off any problems, how difficult have these problems made it for you to do your work, take care of things at home, or get along with other people? -       Health Habits and Functional and Cognitive Screening:  Functional & Cognitive Status 5/17/2018   Do you have difficulty preparing food and eating? No   Do you have difficulty bathing yourself, getting dressed or grooming yourself? No   Do you have " difficulty using the toilet? No   Do you have difficulty moving around from place to place? No   Do you have trouble with steps or getting out of a bed or a chair? No   In the past year have you fallen or experienced a near fall? Yes   Current Diet Well Balanced Diet   Dental Exam Not up to date   Eye Exam Not up to date   Exercise (times per week) 4 times per week   Current Exercise Activities Include Walking   Do you need help using the phone?  No   Are you deaf or do you have serious difficulty hearing?  Yes   Do you need help with transportation? No   Do you need help shopping? No   Do you need help preparing meals?  No   Do you need help with housework?  No   Do you need help with laundry? No   Do you need help taking your medications? Yes   Do you need help managing money? No   Do you ever drive or ride in a car without wearing a seat belt? No   Have you felt unusual stress, anger or loneliness in the last month? No   Who do you live with? Spouse   If you need help, do you have trouble finding someone available to you? No   Have you been bothered in the last four weeks by sexual problems? No   Do you have difficulty concentrating, remembering or making decisions? Yes           Does the patient have evidence of cognitive impairment? Yes    Aspirin use counseling: Taking ASA appropriately as indicated      Recent Lab Results:  CMP:  Lab Results   Component Value Date    GLU 95 05/10/2018    BUN 19 05/10/2018    CREATININE 0.81 05/10/2018    EGFRIFNONA 91 05/10/2018    EGFRIFAFRI 110 05/10/2018    BCR 23.5 05/10/2018     (L) 05/10/2018    K 4.4 05/10/2018    CO2 26.6 05/10/2018    CALCIUM 9.4 05/10/2018    PROTENTOTREF 6.8 05/02/2017    ALBUMIN 3.90 05/02/2017    LABGLOBREF 2.9 05/02/2017    LABIL2 1.3 05/02/2017    BILITOT 0.4 05/02/2017    ALKPHOS 47 05/02/2017    AST 21 05/02/2017    ALT 16 05/10/2018     Lipid Panel:  Lab Results   Component Value Date    CHOL 125 04/15/2016    TRIG 105 05/10/2018     HDL 45 05/10/2018    VLDL 21 05/10/2018    LDLHDL 1.20 04/15/2016     HbA1c:       Visual Acuity:  No exam data present    Age-appropriate Screening Schedule:  Refer to the list below for future screening recommendations based on patient's age, sex and/or medical conditions. Orders for these recommended tests are listed in the plan section. The patient has been provided with a written plan.    Health Maintenance   Topic Date Due   • TDAP/TD VACCINES (1 - Tdap) 12/31/1951   • ZOSTER VACCINE (2 of 3) 07/06/2016   • INFLUENZA VACCINE  08/01/2018   • LIPID PANEL  05/10/2019   • PNEUMOCOCCAL VACCINES (65+ LOW/MEDIUM RISK)  Completed        Subjective   History of Present Illness    Pawel Goldberg is a 85 y.o. male who presents for an Subsequent Wellness Visit.    The following portions of the patient's history were reviewed and updated as appropriate: allergies, current medications, past medical history, past social history, past surgical history and problem list.    Outpatient Medications Prior to Visit   Medication Sig Dispense Refill   • aspirin  MG tablet Take 325 mg by mouth Every 6 (Six) Hours As Needed.     • atorvastatin (LIPITOR) 20 MG tablet Take 1 tablet by mouth Daily for 90 days. 90 tablet 3   • Cholecalciferol (VITAMIN D3) 2000 UNITS tablet Take  by mouth.     • Chondroitin Sulfate 400 MG capsule Take 1,200 mg by mouth.     • Flaxseed, Linseed, (FLAXSEED OIL) 1000 MG capsule Take  by mouth.     • fluticasone (FLOVENT HFA) 110 MCG/ACT inhaler Inhale 1 puff 2 (Two) Times a Day. 1 inhaler 11   • Glucosamine HCl 1000 MG tablet Take 2,000 mg by mouth.     • losartan (COZAAR) 50 MG tablet Take 1 tablet by mouth Daily for 90 days. Indications: High Blood Pressure Disorder 90 tablet 3   • Magnesium 250 MG tablet Take  by mouth.     • Mirabegron ER (MYRBETRIQ) 25 MG tablet sustained-release 24 hour 24 hr tablet Take 25 mg by mouth Daily.     • Multiple Vitamins-Minerals (MULTIVITAMIN ADULT PO) Take  by  mouth.     • Omega-3 Fatty Acids (FISH OIL) 1000 MG capsule capsule Take 2,000 mg by mouth Daily With Breakfast.     • pantoprazole (PROTONIX) 40 MG EC tablet TAKE 1 TABLET DAILY FOR BARRETTS 90 tablet 3   • Probiotic Product (PROBIOTIC COLON SUPPORT PO) Take  by mouth.     • rivastigmine (EXELON) 9.5 MG/24HR patch Place 1 patch on the skin Daily for 90 days. Indications: Alzheimer's Disease 90 patch 3   • sildenafil (REVATIO) 20 MG tablet Take 1 tablet by mouth Daily As Needed (ED). 30 tablet 5   • VENTOLIN  (90 Base) MCG/ACT inhaler INHALE TWO PUFFS BY MOUTH EVERY 4 HOURS AS NEEDED FOR WHEEZING 18 g 0   • levothyroxine (SYNTHROID, LEVOTHROID) 50 MCG tablet Take 1 tablet by mouth Daily. Indications: Underactive Thyroid 90 tablet 3     No facility-administered medications prior to visit.        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   • Obstruction of carotid artery   • Stenosis of carotid artery   • Intracranial swelling   • EEG abnormality without seizure   • Hereditary and idiopathic peripheral neuropathy   • Mixed hyperlipidemia   • Acquired hypothyroidism   • Low back pain with sciatica   • Lower urinary tract infectious disease   • Lumbar canal stenosis   • Lumbar radiculopathy   • Degenerative arthritis of lumbar spine   • Osteoarthritis of hip   • History of repair of hip joint   • Syncope   • Temporary cerebral vascular dysfunction   • History of prostate cancer   • Dementia 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    Review of Systems    Compared to one year ago, the patient feels his physical health is worse.  Compared to one year ago, the patient feels his mental health is worse.    Objective     Physical Exam   Constitutional: He appears well-developed and well-nourished.   Cardiovascular: Normal rate.    Pulmonary/Chest: Effort normal.   Psychiatric: His behavior is normal. Cognition and memory are impaired. He exhibits abnormal recent memory. He exhibits normal remote memory.   Nursing note and vitals reviewed.      Vitals:    05/17/18 1043   BP: 138/72   BP Location: Left arm   Patient Position: Sitting   Pulse: 68   Temp: 97.7 °F (36.5 °C)   SpO2: 92%   Weight: 87.1 kg (192 lb)   Height: 170.2 cm (67\")   PainSc: 0-No pain       Patient's Body mass index is 30.07 kg/m². BMI is within normal parameters. No follow-up required.      Assessment/Plan   Patient Self-Management and Personalized Health Advice  The patient has been provided with information about: fall prevention and mental health concerns and preventive services including:   · Diabetes screening, see lab orders, Exercise counseling provided, Nutrition counseling provided, Prostate cancer screening discussed.    Visit Diagnoses:    ICD-10-CM ICD-9-CM   1. Acquired hypothyroidism E03.9 244.9   2. 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 Medicare annual wellness visit, subsequent Z00.00 V70.0   4. Mixed hyperlipidemia E78.2 272.2   5. 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 History of prostate cancer Z85.46 V10.46       No orders of the defined types were placed in this encounter.      Outpatient Encounter Prescriptions as of 5/17/2018   Medication Sig Dispense Refill   • aspirin  MG tablet Take 325 mg by mouth Every 6 (Six) Hours As Needed.     • atorvastatin (LIPITOR) 20 MG tablet Take 1 tablet " by mouth Daily for 90 days. 90 tablet 3   • Cholecalciferol (VITAMIN D3) 2000 UNITS tablet Take  by mouth.     • Chondroitin Sulfate 400 MG capsule Take 1,200 mg by mouth.     • Flaxseed, Linseed, (FLAXSEED OIL) 1000 MG capsule Take  by mouth.     • fluticasone (FLOVENT HFA) 110 MCG/ACT inhaler Inhale 1 puff 2 (Two) Times a Day. 1 inhaler 11   • Glucosamine HCl 1000 MG tablet Take 2,000 mg by mouth.     • levothyroxine (SYNTHROID, LEVOTHROID) 50 MCG tablet Take 1 tablet by mouth Daily. 90 tablet 3   • losartan (COZAAR) 50 MG tablet Take 1 tablet by mouth Daily for 90 days. Indications: High Blood Pressure Disorder 90 tablet 3   • Magnesium 250 MG tablet Take  by mouth.     • Mirabegron ER (MYRBETRIQ) 25 MG tablet sustained-release 24 hour 24 hr tablet Take 25 mg by mouth Daily.     • montelukast (SINGULAIR) 10 MG tablet      • Multiple Vitamins-Minerals (MULTIVITAMIN ADULT PO) Take  by mouth.     • Omega-3 Fatty Acids (FISH OIL) 1000 MG capsule capsule Take 2,000 mg by mouth Daily With Breakfast.     • pantoprazole (PROTONIX) 40 MG EC tablet TAKE 1 TABLET DAILY FOR BARRETTS 90 tablet 3   • Probiotic Product (PROBIOTIC COLON SUPPORT PO) Take  by mouth.     • rivastigmine (EXELON) 9.5 MG/24HR patch Place 1 patch on the skin Daily for 90 days. Indications: Alzheimer's Disease 90 patch 3   • sildenafil (REVATIO) 20 MG tablet Take 1 tablet by mouth Daily As Needed (ED). 30 tablet 5   • tolterodine LA (DETROL LA) 4 MG 24 hr capsule      • VENTOLIN  (90 Base) MCG/ACT inhaler INHALE TWO PUFFS BY MOUTH EVERY 4 HOURS AS NEEDED FOR WHEEZING 18 g 0   • [DISCONTINUED] levothyroxine (SYNTHROID, LEVOTHROID) 50 MCG tablet Take 1 tablet by mouth Daily. Indications: Underactive Thyroid 90 tablet 3     No facility-administered encounter medications on file as of 5/17/2018.        Reviewed use of high risk medication in the elderly: yes  Reviewed for potential of harmful drug interactions in the elderly: yes   Here today with  "wife and daughter  Dementia is stable  Reviewed med list  Went over labs  Double check on Detrol vs Myrbetriq at home  UTD or has aged out\" of screening measures.    Follow Up:  Return in about 6 months (around 11/17/2018) for blood pressure.     An After Visit Summary and PPPS with all of these plans were given to the patient.         "

## 2018-06-15 ENCOUNTER — TELEPHONE (OUTPATIENT)
Dept: FAMILY MEDICINE CLINIC | Facility: CLINIC | Age: 83
End: 2018-06-15

## 2018-06-15 NOTE — TELEPHONE ENCOUNTER
Pts daughter called and said that their dermatologist was going to put pt on a antifungal for his fungus on toes. She Is wanting to know if he can get this med from you so they can do the lab work f/u here instead at the derm office?

## 2018-07-20 DIAGNOSIS — J40 BRONCHITIS: ICD-10-CM

## 2018-07-20 RX ORDER — ALBUTEROL SULFATE 90 UG/1
AEROSOL, METERED RESPIRATORY (INHALATION)
Qty: 18 G | Refills: 0 | Status: SHIPPED | OUTPATIENT
Start: 2018-07-20 | End: 2018-09-24

## 2018-07-30 ENCOUNTER — HOSPITAL ENCOUNTER (OUTPATIENT)
Dept: CT IMAGING | Facility: HOSPITAL | Age: 83
Discharge: HOME | End: 2018-07-30
Attending: INTERNAL MEDICINE | Admitting: INTERNAL MEDICINE

## 2018-07-30 DIAGNOSIS — J84.9 ILD (INTERSTITIAL LUNG DISEASE) (HCC): ICD-10-CM

## 2018-07-30 DIAGNOSIS — R91.8 ABNORMAL CT LUNG SCREENING: ICD-10-CM

## 2018-07-30 DIAGNOSIS — R06.00 DYSPNEA, UNSPECIFIED TYPE: ICD-10-CM

## 2018-07-30 PROCEDURE — 71250 CT THORAX DX C-: CPT

## 2018-08-14 ENCOUNTER — TELEPHONE (OUTPATIENT)
Dept: FAMILY MEDICINE CLINIC | Facility: CLINIC | Age: 83
End: 2018-08-14

## 2018-08-14 NOTE — TELEPHONE ENCOUNTER
Pts daughter called and said they lost pts SS card. They are trying to get another card but they are needing a letter on letter head saying he is who he says he is. Is this ok?

## 2018-08-15 NOTE — TELEPHONE ENCOUNTER
Not sure  Rosario can you call his family and ask why a letter from me is needed.  May I suggest a notarized letter from the court house/drivers license dept/county ?    Charlie Trinh MD

## 2018-09-19 ENCOUNTER — OFFICE VISIT (OUTPATIENT)
Dept: CARDIOLOGY | Facility: CLINIC | Age: 83
End: 2018-09-19

## 2018-09-19 VITALS
DIASTOLIC BLOOD PRESSURE: 58 MMHG | HEART RATE: 80 BPM | BODY MASS INDEX: 28.88 KG/M2 | WEIGHT: 184 LBS | HEIGHT: 67 IN | SYSTOLIC BLOOD PRESSURE: 110 MMHG

## 2018-09-19 DIAGNOSIS — I25.10 CORONARY ARTERY CALCIFICATION SEEN ON CAT SCAN: ICD-10-CM

## 2018-09-19 DIAGNOSIS — R06.02 SHORTNESS OF BREATH: Primary | ICD-10-CM

## 2018-09-19 PROCEDURE — 99204 OFFICE O/P NEW MOD 45 MIN: CPT | Performed by: INTERNAL MEDICINE

## 2018-09-19 PROCEDURE — 93000 ELECTROCARDIOGRAM COMPLETE: CPT | Performed by: INTERNAL MEDICINE

## 2018-09-24 ENCOUNTER — OFFICE VISIT (OUTPATIENT)
Dept: FAMILY MEDICINE CLINIC | Facility: CLINIC | Age: 83
End: 2018-09-24

## 2018-09-24 VITALS
SYSTOLIC BLOOD PRESSURE: 130 MMHG | OXYGEN SATURATION: 90 % | HEIGHT: 67 IN | DIASTOLIC BLOOD PRESSURE: 82 MMHG | BODY MASS INDEX: 30.76 KG/M2 | WEIGHT: 196 LBS | HEART RATE: 81 BPM

## 2018-09-24 DIAGNOSIS — N30.00 ACUTE CYSTITIS WITHOUT HEMATURIA: ICD-10-CM

## 2018-09-24 DIAGNOSIS — Z96.0 STATUS POST IMPLANTATION OF ARTIFICIAL URINARY SPHINCTER: ICD-10-CM

## 2018-09-24 DIAGNOSIS — N39.0 LOWER URINARY TRACT INFECTIOUS DISEASE: ICD-10-CM

## 2018-09-24 DIAGNOSIS — R30.0 DYSURIA: Primary | ICD-10-CM

## 2018-09-24 PROCEDURE — 99213 OFFICE O/P EST LOW 20 MIN: CPT | Performed by: FAMILY MEDICINE

## 2018-09-24 RX ORDER — CIPROFLOXACIN 500 MG/1
500 TABLET, FILM COATED ORAL EVERY 12 HOURS SCHEDULED
Qty: 14 TABLET | Refills: 0 | Status: SHIPPED | OUTPATIENT
Start: 2018-09-24 | End: 2018-10-15

## 2018-09-24 NOTE — PROGRESS NOTES
"Chief Complaint   Patient presents with   • Difficulty Urinating     x 4-5 days        Urinary Tract Infection: Patient complains of burning with urination He has had symptoms for several days. Patient also complains of x. Patient denies back pain and fever. Patient does have a history of recurrent UTI.  Patient does not have a history of pyelonephritis.     Vitals:    09/24/18 1614   BP: 130/82   BP Location: Left arm   Patient Position: Sitting   Pulse: 81   SpO2: 90%   Weight: 88.9 kg (196 lb)   Height: 170.2 cm (67\")     Gen: well appearing, alert  HEENT: WNL  Lung: regular RR, + audible wheeze  Heart: RR without murmur  Skin: no rash.  Abd: non tender  Flank: no CVA, no rash    In office urine dipstick results:  None today  orange from [facility]    Will treat and send for culture    Assessment/Plan   Pawel was seen today for difficulty urinating.    Diagnoses and all orders for this visit:    Dysuria  -     Cancel: POCT urinalysis dipstick, manual    Lower urinary tract infectious disease    Acute cystitis without hematuria  -     ciprofloxacin (CIPRO) 500 MG tablet; Take 1 tablet by mouth Every 12 (Twelve) Hours.  -     Cancel: Urine Culture - Urine, Urine, Clean Catch  -     Urine Culture - Urine, Urine, Clean Catch    Status post implantation of artificial urinary sphincter             Dr. Charlie Trinh MD  [place], [place]  [facility]  "

## 2018-09-26 LAB
BACTERIA UR CULT: NO GROWTH
BACTERIA UR CULT: NORMAL

## 2018-10-02 ENCOUNTER — HOSPITAL ENCOUNTER (OUTPATIENT)
Dept: CARDIOLOGY | Facility: HOSPITAL | Age: 83
Discharge: HOME | End: 2018-10-02
Attending: INTERNAL MEDICINE | Admitting: INTERNAL MEDICINE

## 2018-10-02 VITALS — WEIGHT: 196 LBS | HEIGHT: 67 IN | BODY MASS INDEX: 30.76 KG/M2

## 2018-10-02 DIAGNOSIS — R06.02 SHORTNESS OF BREATH: ICD-10-CM

## 2018-10-02 DIAGNOSIS — I25.10 CORONARY ARTERY CALCIFICATION SEEN ON CAT SCAN: ICD-10-CM

## 2018-10-02 LAB
BH CV NUCLEAR PRIOR STUDY: 2
BH CV STRESS BP STAGE 1: NORMAL
BH CV STRESS COMMENTS STAGE 1: NORMAL
BH CV STRESS DOSE REGADENOSON STAGE 1: 0.4
BH CV STRESS DURATION MIN STAGE 1: 0
BH CV STRESS DURATION SEC STAGE 1: 10
BH CV STRESS HR STAGE 1: 126
BH CV STRESS PROTOCOL 1: NORMAL
BH CV STRESS RECOVERY BP: NORMAL MMHG
BH CV STRESS RECOVERY HR: 99 BPM
BH CV STRESS STAGE 1: 1
LV EF NUC BP: 64 %
MAXIMAL PREDICTED HEART RATE: 135 BPM
PERCENT MAX PREDICTED HR: 93.33 %
STRESS BASELINE BP: NORMAL MMHG
STRESS BASELINE HR: 76 BPM
STRESS PERCENT HR: 110 %
STRESS POST EXERCISE DUR SEC: 10 SEC
STRESS POST PEAK BP: NORMAL MMHG
STRESS POST PEAK HR: 126 BPM
STRESS TARGET HR: 115 BPM

## 2018-10-02 PROCEDURE — A9502 TC99M TETROFOSMIN: HCPCS | Performed by: INTERNAL MEDICINE

## 2018-10-02 PROCEDURE — 78452 HT MUSCLE IMAGE SPECT MULT: CPT

## 2018-10-02 PROCEDURE — 78452 HT MUSCLE IMAGE SPECT MULT: CPT | Performed by: INTERNAL MEDICINE

## 2018-10-02 PROCEDURE — 0 TECHNETIUM TETROFOSMIN KIT: Performed by: INTERNAL MEDICINE

## 2018-10-02 PROCEDURE — 93016 CV STRESS TEST SUPVJ ONLY: CPT | Performed by: INTERNAL MEDICINE

## 2018-10-02 PROCEDURE — 25010000002 REGADENOSON 0.4 MG/5ML SOLUTION: Performed by: INTERNAL MEDICINE

## 2018-10-02 PROCEDURE — 93018 CV STRESS TEST I&R ONLY: CPT | Performed by: INTERNAL MEDICINE

## 2018-10-02 PROCEDURE — 93017 CV STRESS TEST TRACING ONLY: CPT

## 2018-10-02 RX ADMIN — TETROFOSMIN 1 DOSE: 1.38 INJECTION, POWDER, LYOPHILIZED, FOR SOLUTION INTRAVENOUS at 12:40

## 2018-10-02 RX ADMIN — TETROFOSMIN 1 DOSE: 1.38 INJECTION, POWDER, LYOPHILIZED, FOR SOLUTION INTRAVENOUS at 11:37

## 2018-10-02 RX ADMIN — REGADENOSON 0.4 MG: 0.08 INJECTION, SOLUTION INTRAVENOUS at 12:40

## 2018-10-04 DIAGNOSIS — I73.9 PAD (PERIPHERAL ARTERY DISEASE) (HCC): ICD-10-CM

## 2018-10-04 DIAGNOSIS — E78.2 MIXED HYPERLIPIDEMIA: ICD-10-CM

## 2018-10-04 DIAGNOSIS — R41.3 MEMORY LOSS: ICD-10-CM

## 2018-10-04 RX ORDER — RIVASTIGMINE 9.5 MG/24H
PATCH, EXTENDED RELEASE TRANSDERMAL
Qty: 90 PATCH | Refills: 0 | Status: SHIPPED | OUTPATIENT
Start: 2018-10-04 | End: 2019-01-03 | Stop reason: SDUPTHER

## 2018-10-04 RX ORDER — ATORVASTATIN CALCIUM 20 MG/1
TABLET, FILM COATED ORAL
Qty: 90 TABLET | Refills: 0 | Status: SHIPPED | OUTPATIENT
Start: 2018-10-04 | End: 2019-01-02 | Stop reason: SDUPTHER

## 2018-10-04 RX ORDER — LOSARTAN POTASSIUM 50 MG/1
TABLET ORAL
Qty: 90 TABLET | Refills: 0 | Status: SHIPPED | OUTPATIENT
Start: 2018-10-04 | End: 2018-12-10

## 2018-10-06 NOTE — PROGRESS NOTES
Date of Office Visit: 2018  Encounter Provider: Rowdy Moore MD  Place of Service: [facility] CARDIOLOGY  Patient Name: Pawel Goldberg  :1932    Chief complaint: Shortness of breath, coronary artery disease noted on CT scan.    History of Present Illness:    I had the pleasure of seeing the patient in cardiology office on 2018.  He is a very   pleasant 85 year-old white male with a past history significant for carotid artery disease,   mild dementia, interstitial lung disease, and prior stroke who presents for evaluation.    The patient sees Dr. Polanco in pulmonology.  He has had worsening shortness of breath   and fatigue recently over the last year, but more so in the last several months.  A CT   scan of his chest with contrast on 2018 showed no evidence of pulmonary emboli,   although there was possible interstitial lung disease.  He was also incidentally noted to   have coronary artery disease and atherosclerotic disease of the aorta.  A high resolution   CT scan of the chest on 2018 showed mild fibrosis in the left mid and lower lobe,   as well as mild fibrotic scarring and bronchiectasis in the right middle lobe, lingula, and   central left lower lobe.  He did have an echocardiogram performed on 5/15/2018 which   showed an ejection fraction of 61%, grade 1 diastolic dysfunction, and no significant   valvular disease.  He has not had any chest discomfort.  He is here mainly as Dr. Polanco   has felt that the degree of his shortness of breath is not consistent with the degree of   his lung disease.  Of note, he has had a stroke on 3/29/2014.  An MRI of his brain at   that time showed multiple small infarcts in the right MCA distribution.  He was found to   have right carotid artery stenosis, and underwent a right carotid endarterectomy on   2014.    Past Medical History:   Diagnosis Date   • Allergic     Seasonal   • Allergic rhinitis    •  Arthritis    • Asthma    • Brito esophagus    • Benign prostatic hyperplasia    • Carotid arterial disease (CMS/HCC)    • Dementia    • Dyspnea    • Erectile dysfunction    • GERD (gastroesophageal reflux disease)    • HL (hearing loss)    • Hyperlipidemia    • Hypertension    • Hypothyroidism    • Low back pain    • Memory loss    • PAD (peripheral artery disease) (CMS/HCC)    • Prostate cancer (CMS/HCC)    • Stroke (CMS/HCC)    • Urinary tract infection        Past Surgical History:   Procedure Laterality Date   • CAROTID ARTERY ANGIOPLASTY  04/01/2014   • COLONOSCOPY  2009   • ENDOSCOPY  2009    2001,2003,2006   • FRACTURE SURGERY     • HERNIA REPAIR  2000   • HERNIA REPAIR  2009   • INGUINAL HERNIA REPAIR     • PARTIAL HIP ARTHROPLASTY Left 07/02/2014   • PROSTATECTOMY  2008   • SHOULDER SURGERY  2005   • SHOULDER SURGERY  2006   • SHOULDER SURGERY  2008       Current Outpatient Prescriptions on File Prior to Visit   Medication Sig Dispense Refill   • aspirin  MG tablet Take 325 mg by mouth Every 6 (Six) Hours As Needed.     • Cholecalciferol (VITAMIN D3) 2000 UNITS tablet Take  by mouth.     • Chondroitin Sulfate 400 MG capsule Take 1,200 mg by mouth.     • Flaxseed, Linseed, (FLAXSEED OIL) 1000 MG capsule Take  by mouth.     • fluticasone (FLOVENT HFA) 110 MCG/ACT inhaler Inhale 1 puff 2 (Two) Times a Day. 1 inhaler 11   • Glucosamine HCl 1000 MG tablet Take 2,000 mg by mouth.     • levothyroxine (SYNTHROID, LEVOTHROID) 50 MCG tablet Take 1 tablet by mouth Daily. 90 tablet 3   • Magnesium 250 MG tablet Take  by mouth.     • Mirabegron ER (MYRBETRIQ) 25 MG tablet sustained-release 24 hour 24 hr tablet Take 25 mg by mouth Daily.     • montelukast (SINGULAIR) 10 MG tablet      • Multiple Vitamins-Minerals (MULTIVITAMIN ADULT PO) Take  by mouth.     • Omega-3 Fatty Acids (FISH OIL) 1000 MG capsule capsule Take 2,000 mg by mouth Daily With Breakfast.     • pantoprazole (PROTONIX) 40 MG EC tablet TAKE 1  "TABLET DAILY FOR BARRETTS 90 tablet 3   • Probiotic Product (PROBIOTIC COLON SUPPORT PO) Take  by mouth.     • tolterodine LA (DETROL LA) 4 MG 24 hr capsule      • VENTOLIN  (90 Base) MCG/ACT inhaler INHALE TWO PUFFS BY MOUTH EVERY 4 HOURS AS NEEDED FOR WHEEZING 18 g 0     No current facility-administered medications on file prior to visit.      Allergies as of 09/19/2018   • (No Known Allergies)     Social History     Social History   • Marital status:      Spouse name: N/A   • Number of children: 3   • Years of education: N/A     Occupational History   • Retired-      Social History Main Topics   • Smoking status: Former Smoker     Packs/day: 0.50     Years: 15.00   • Smokeless tobacco: Never Used      Comment: quit 1960's   1ppd for 15 years     • Alcohol use No   • Drug use: No   • Sexual activity: Not Currently     Partners: Female     Birth control/ protection: None     Other Topics Concern   • Not on file     Social History Narrative   • No narrative on file     Family History   Problem Relation Age of Onset   • Hypertension Sister    • Thyroid disease Sister    • Stroke Sister    • Depression Sister    • Hypertension Brother    • Lung cancer Brother    • COPD Brother    • Alcohol abuse Brother        Review of Systems   Constitution: Positive for malaise/fatigue.   Eyes: Positive for pain.   Cardiovascular: Positive for dyspnea on exertion.   Respiratory: Positive for cough and wheezing.    Neurological: Positive for excessive daytime sleepiness and headaches.   All other systems reviewed and are negative.     Objective:     Vitals:    09/19/18 1207   BP: 110/58   Pulse: 80   Weight: 83.5 kg (184 lb)   Height: 170.2 cm (67.01\")     Body mass index is 28.81 kg/m².    Physical Exam   Constitutional: He is oriented to person, place, and time. He appears well-developed and well-nourished.   HENT:   Head: Normocephalic and atraumatic.   Eyes: Conjunctivae are normal.   Neck: Neck supple. "   Cardiovascular: Normal rate and regular rhythm.  Exam reveals no gallop and no friction rub.    No murmur heard.  Pulmonary/Chest: Effort normal. He has decreased breath sounds.   Abdominal: Soft. There is no tenderness.   Musculoskeletal: He exhibits no edema.   Neurological: He is alert and oriented to person, place, and time.   Skin: Skin is warm.   Psychiatric: He has a normal mood and affect. His behavior is normal.     Lab Review:     ECG 12 Lead  Date/Time: 9/19/2018 12:09 PM  Performed by: ANTIONETTE PORTILLO  Authorized by: ANTIONETTE PORTILLO   Comparison: compared with previous ECG from 10/10/2017  Similar to previous ECG  Rhythm: sinus rhythm  Rate: normal  BPM: 80  Clinical impression: normal ECG          Cardiac Procedures:  1.  Echocardiogram on 5/15/2018: Ejection fraction was 61%.  There was mild LVH.    There was grade 1 diastolic dysfunction.  Right ventricle was normal.  There was mild   aortic insufficiency.  There was moderate mitral annular calcification.  2.  CT scan of the chest with contrast on 4/19/2018: There was questionable interstitial   lung disease.  There was coronary artery disease and atherosclerotic disease of the   aorta noted.    Assessment:       Diagnosis Plan   1. Shortness of breath  Stress Test With Myocardial Perfusion One Day   2. Coronary artery calcification seen on CAT scan  Stress Test With Myocardial Perfusion One Day     Plan:       Again, the patient does have lung disease as noted on his high resolution CT scan of the   chest.  However, it is felt that his degree of shortness of breath is out of proportion to his   degree of lung disease.  He has had a right carotid endarterectomy on 4/1/2014, and had   a stroke related to carotid stenosis prior to that.  He also has coronary artery disease and   atherosclerosis of the aorta noted on a CT scan recently.  He has not had any chest   discomfort.  He also had an echocardiogram on 5/15/2018 which showed no  significant   issues other than grade 1 diastolic dysfunction.    For now, I am going to recommend a Lexiscan Myoview stress test to evaluate for any   potential significant ischemia.  I did discuss this in detail with the patient and his family   today, and they are in agreement.  He already takes aspirin 325 mg per day, along with   Lipitor 20 mg per day.  His blood pressure is excellent today at 110/58.  For now, I will   make further plans after the results of his nuclear stress test are known.

## 2018-10-15 ENCOUNTER — OFFICE VISIT (OUTPATIENT)
Dept: FAMILY MEDICINE CLINIC | Facility: CLINIC | Age: 83
End: 2018-10-15

## 2018-10-15 VITALS
BODY MASS INDEX: 30.45 KG/M2 | WEIGHT: 194 LBS | SYSTOLIC BLOOD PRESSURE: 124 MMHG | HEART RATE: 88 BPM | DIASTOLIC BLOOD PRESSURE: 78 MMHG | HEIGHT: 67 IN | OXYGEN SATURATION: 90 %

## 2018-10-15 DIAGNOSIS — E03.9 ACQUIRED HYPOTHYROIDISM: Primary | ICD-10-CM

## 2018-10-15 DIAGNOSIS — F02.80 DEMENTIA ASSOCIATED WITH OTHER UNDERLYING DISEASE WITHOUT BEHAVIORAL DISTURBANCE (HCC): ICD-10-CM

## 2018-10-15 DIAGNOSIS — I10 ESSENTIAL HYPERTENSION: ICD-10-CM

## 2018-10-15 DIAGNOSIS — E78.2 MIXED HYPERLIPIDEMIA: ICD-10-CM

## 2018-10-15 DIAGNOSIS — K22.70 BARRETT'S ESOPHAGUS WITHOUT DYSPLASIA: ICD-10-CM

## 2018-10-15 PROCEDURE — 99213 OFFICE O/P EST LOW 20 MIN: CPT | Performed by: FAMILY MEDICINE

## 2018-10-15 RX ORDER — PANTOPRAZOLE SODIUM 40 MG/1
40 TABLET, DELAYED RELEASE ORAL 2 TIMES DAILY
Qty: 180 TABLET | Refills: 3 | Status: SHIPPED | OUTPATIENT
Start: 2018-10-15 | End: 2019-10-11 | Stop reason: SDUPTHER

## 2018-10-15 RX ORDER — ASPIRIN 325 MG
325 TABLET, DELAYED RELEASE (ENTERIC COATED) ORAL DAILY
Start: 2018-10-15 | End: 2019-09-30

## 2018-10-15 NOTE — PROGRESS NOTES
"  Chief Complaint   Patient presents with   • Follow-up   • Hypothyroidism   • Hypertension   • Hyperlipidemia       Subjective     Patient here for follow-up of elevated blood pressure.    He is not exercising and is adherent to a low-salt diet.    Blood pressure is well controlled at home.   Cardiac symptoms: cough, dyspnea and fatigue.   Patient denies: chest pain.   Cardiovascular risk factors: advanced age (older than 55 for men, 65 for women), dyslipidemia, family history of premature cardiovascular disease, hypertension, male gender, obesity (BMI >= 30 kg/m2) and sedentary lifestyle.   Use of agents associated with hypertension: none.   History of target organ damage: peripheral artery disease.  Patient is taking prescribed hypertension medications as prescribed without side effects.  Seeing Pullionel PATEL now. On nebulizer, makes him cough, i told him this is to be expected and to keep using it to help clear his lungs for mucous.      The following portions of the patient's history were reviewed and updated as appropriate: allergies, current medications, past medical history, past social history, past surgical history and problem list.    Review of Systems  A comprehensive review of systems was negative except for: Respiratory: positive for chronic bronchitis, cough, dyspnea on exertion and wheezing  Gastrointestinal: positive for reflux symptoms  Musculoskeletal: positive for arthralgias and stiff joints         Vitals:    10/15/18 1037   BP: 124/78   BP Location: Left arm   Patient Position: Sitting   Pulse: 88   SpO2: 90%   Weight: 88 kg (194 lb)   Height: 170.2 cm (67\")     BP Readings from Last 3 Encounters:   10/15/18 124/78   09/24/18 130/82   09/19/18 110/58     Objective      Gen: alert, pleasant.  HEENT: PERRL, EOMI intact, lids ok, ear canals clear, TMs normal, throat clear, nostrils normal  Neck: no bruit, no enlarged thyroid  Lungs: wheeze  Heart: RR no murmur  ABD: soft , + BS  Pulses: intact  Mood: " stable     Assessment/Plan   Hypertension, normal blood pressure Evidence of target organ damage: peripheral artery disease.    Pawel was seen today for follow-up, hypothyroidism, hypertension and hyperlipidemia.    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    Other orders  -     aspirin  MG tablet; Take 1 tablet by mouth Daily.      Medication: no change.  Follow up: 6 months and as needed.    There are no Patient Instructions on file for this visit.  Medications Discontinued During This Encounter   Medication Reason   • ciprofloxacin (CIPRO) 500 MG tablet *Therapy completed   • Mirabegron ER (MYRBETRIQ) 25 MG tablet sustained-release 24 hour 24 hr tablet *Therapy completed   • montelukast (SINGULAIR) 10 MG tablet *Therapy completed   • aspirin  MG tablet    • pantoprazole (PROTONIX) 40 MG EC tablet Reorder        No Follow-up on file.    Limit salt  Limit alcoholic drinks to 1 a day  Limit caffeine to 1-2 servings a day    Dr. Charlie Trinh MD  [place], [place]  [facility].

## 2018-10-16 ENCOUNTER — TELEPHONE (OUTPATIENT)
Dept: FAMILY MEDICINE CLINIC | Facility: CLINIC | Age: 83
End: 2018-10-16

## 2018-10-16 DIAGNOSIS — F02.80 DEMENTIA ASSOCIATED WITH OTHER UNDERLYING DISEASE WITHOUT BEHAVIORAL DISTURBANCE (HCC): Primary | ICD-10-CM

## 2018-10-16 DIAGNOSIS — I67.82 TEMPORARY CEREBRAL VASCULAR DYSFUNCTION: ICD-10-CM

## 2018-10-16 DIAGNOSIS — G60.9 HEREDITARY AND IDIOPATHIC PERIPHERAL NEUROPATHY: ICD-10-CM

## 2018-10-16 DIAGNOSIS — R94.01 EEG ABNORMALITY WITHOUT SEIZURE: ICD-10-CM

## 2018-10-16 NOTE — TELEPHONE ENCOUNTER
Pt had an apt with neurologist yesterday when they got their they wouldn't take his insurance. So he needs a referral to the [facility] neurologist since he hasn't been there in 3 years.     Ok new neuro referral has been made    Charlie Trinh MD

## 2018-11-27 RX ORDER — IPRATROPIUM BROMIDE AND ALBUTEROL SULFATE 2.5; .5 MG/3ML; MG/3ML
3 SOLUTION RESPIRATORY (INHALATION)
Qty: 300 ML | Refills: 2 | Status: SHIPPED | OUTPATIENT
Start: 2018-11-27 | End: 2019-01-25 | Stop reason: SDUPTHER

## 2018-11-27 RX ORDER — IPRATROPIUM BROMIDE AND ALBUTEROL SULFATE 2.5; .5 MG/3ML; MG/3ML
3 SOLUTION RESPIRATORY (INHALATION)
COMMUNITY
Start: 2018-11-23 | End: 2018-11-27 | Stop reason: SDUPTHER

## 2018-12-10 ENCOUNTER — OFFICE VISIT (OUTPATIENT)
Dept: FAMILY MEDICINE CLINIC | Facility: CLINIC | Age: 83
End: 2018-12-10

## 2018-12-10 VITALS
DIASTOLIC BLOOD PRESSURE: 80 MMHG | HEART RATE: 78 BPM | OXYGEN SATURATION: 93 % | SYSTOLIC BLOOD PRESSURE: 128 MMHG | BODY MASS INDEX: 30.76 KG/M2 | HEIGHT: 67 IN | WEIGHT: 196 LBS

## 2018-12-10 DIAGNOSIS — I73.9 PAD (PERIPHERAL ARTERY DISEASE) (HCC): ICD-10-CM

## 2018-12-10 DIAGNOSIS — I10 ESSENTIAL HYPERTENSION: ICD-10-CM

## 2018-12-10 DIAGNOSIS — E03.9 ACQUIRED HYPOTHYROIDISM: Primary | ICD-10-CM

## 2018-12-10 DIAGNOSIS — I63.542 CEREBROVASCULAR ACCIDENT (CVA) DUE TO OCCLUSION OF LEFT CEREBELLAR ARTERY (HCC): ICD-10-CM

## 2018-12-10 PROBLEM — F01.50 VASCULAR DEMENTIA WITHOUT BEHAVIORAL DISTURBANCE: Status: ACTIVE | Noted: 2018-11-23

## 2018-12-10 PROBLEM — J45.901 MODERATE ASTHMA WITH ACUTE EXACERBATION: Status: ACTIVE | Noted: 2018-11-21

## 2018-12-10 PROCEDURE — 99214 OFFICE O/P EST MOD 30 MIN: CPT | Performed by: FAMILY MEDICINE

## 2018-12-10 RX ORDER — MONTELUKAST SODIUM 10 MG/1
TABLET ORAL
COMMUNITY
Start: 2018-10-29 | End: 2019-05-22

## 2018-12-10 RX ORDER — CLOPIDOGREL BISULFATE 75 MG/1
75 TABLET ORAL DAILY
Qty: 90 TABLET | Refills: 3 | Status: SHIPPED | OUTPATIENT
Start: 2018-12-10 | End: 2019-11-19 | Stop reason: SDUPTHER

## 2018-12-10 RX ORDER — LOSARTAN POTASSIUM 50 MG/1
TABLET ORAL
Qty: 90 TABLET | Refills: 0
Start: 2018-12-10 | End: 2019-01-02 | Stop reason: SDUPTHER

## 2018-12-10 RX ORDER — CLOPIDOGREL BISULFATE 75 MG/1
TABLET ORAL
COMMUNITY
Start: 2018-11-23 | End: 2018-12-10 | Stop reason: SDUPTHER

## 2018-12-10 NOTE — PROGRESS NOTES
"  Chief Complaint   Patient presents with   • Follow-up   • Hypertension       Subjective     Patient here for follow-up of elevated blood pressure.    He is not exercising and is adherent to a low-salt diet.    Blood pressure is well controlled at home.   Cardiac symptoms: fatigue.   Patient denies: chest pain.   Cardiovascular risk factors: advanced age (older than 55 for men, 65 for women) and hypertension.   Use of agents associated with hypertension: none.   History of target organ damage: stroke.  Patient is taking prescribed hypertension medications as prescribed without side effects.    Recently admitted to [facility]  Sub acute left cerebellar CVA  Plavix added  No facial or extremity weakness  Driving privileges have been suspended for now.    The following portions of the patient's history were reviewed and updated as appropriate: allergies, current medications, past medical history, past social history, past surgical history and problem list.    Review of Systems  Pertinent items are noted in HPI.         Vitals:    12/10/18 1254 12/10/18 1315   BP: 132/78 128/80   BP Location: Left arm Right arm   Patient Position: Sitting Sitting   Cuff Size:  Adult   Pulse: 78    SpO2: 93%    Weight: 88.9 kg (196 lb)    Height: 170.2 cm (67\")      BP Readings from Last 3 Encounters:   12/10/18 128/80   10/15/18 124/78   09/24/18 130/82     Objective      Gen: alert, pleasant.  HEENT: PERRL, EOMI intact, lids ok, ear canals clear, TMs normal, throat clear, nostrils normal  Neck: no bruit, no enlarged thyroid  Lungs: CTA  Heart: RR no murmur  ABD: soft , + BS  Pulses: intact  Mood: stable     Assessment/Plan   Hypertension, normal blood pressure Evidence of target organ damage: stroke and transient ischemic attack.    Pawel was seen today for follow-up and hypertension.    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    PAD (peripheral artery disease) (CMS/HCC)  -     losartan (COZAAR) 50 MG tablet; 25 mg or 50 mg a day    BP has been running low 100-120 at home.    Medication: dosage change losartan to either 25 or 50 mg a day to target -140.  Follow up: 6 months and as needed.    There are no Patient Instructions on file for this visit.  Medications Discontinued During This Encounter   Medication Reason   • Cholecalciferol (VITAMIN D3) 2000 UNITS tablet *Therapy completed   • Chondroitin Sulfate 400 MG capsule *Therapy completed   • Glucosamine HCl 1000 MG tablet *Therapy completed   • losartan (COZAAR) 50 MG tablet    • clopidogrel (PLAVIX) 75 MG tablet Reorder        Return in about 4 months (around 4/10/2019) for blood pressure.    They are headed to their [place] home for a few months.  Limit salt  Limit alcoholic drinks to 1 a day  Limit caffeine to 1-2 servings a day    Dr. Charlie Trinh MD  [place], [place]  [facility].

## 2019-01-02 DIAGNOSIS — I73.9 PAD (PERIPHERAL ARTERY DISEASE) (HCC): ICD-10-CM

## 2019-01-02 DIAGNOSIS — E78.2 MIXED HYPERLIPIDEMIA: ICD-10-CM

## 2019-01-02 RX ORDER — LOSARTAN POTASSIUM 50 MG/1
TABLET ORAL
Qty: 90 TABLET | Refills: 0 | Status: SHIPPED | OUTPATIENT
Start: 2019-01-02 | End: 2019-04-02 | Stop reason: SDUPTHER

## 2019-01-02 RX ORDER — ATORVASTATIN CALCIUM 20 MG/1
TABLET, FILM COATED ORAL
Qty: 90 TABLET | Refills: 0 | Status: SHIPPED | OUTPATIENT
Start: 2019-01-02 | End: 2019-04-02 | Stop reason: SDUPTHER

## 2019-01-03 DIAGNOSIS — R41.3 MEMORY LOSS: ICD-10-CM

## 2019-01-03 RX ORDER — RIVASTIGMINE 9.5 MG/24H
PATCH, EXTENDED RELEASE TRANSDERMAL
Qty: 30 EACH | Refills: 0 | Status: SHIPPED | OUTPATIENT
Start: 2019-01-03 | End: 2019-05-22 | Stop reason: SDUPTHER

## 2019-01-25 RX ORDER — IPRATROPIUM BROMIDE AND ALBUTEROL SULFATE 2.5; .5 MG/3ML; MG/3ML
3 SOLUTION RESPIRATORY (INHALATION)
Qty: 120 VIAL | Refills: 0 | Status: SHIPPED | OUTPATIENT
Start: 2019-01-25 | End: 2019-05-22

## 2019-02-17 DIAGNOSIS — Z85.46 HISTORY OF PROSTATE CANCER: ICD-10-CM

## 2019-02-18 RX ORDER — TOLTERODINE 4 MG/1
CAPSULE, EXTENDED RELEASE ORAL
Qty: 90 CAPSULE | Refills: 0 | Status: SHIPPED | OUTPATIENT
Start: 2019-02-18 | End: 2019-05-22

## 2019-04-02 DIAGNOSIS — E78.2 MIXED HYPERLIPIDEMIA: ICD-10-CM

## 2019-04-02 DIAGNOSIS — I73.9 PAD (PERIPHERAL ARTERY DISEASE) (HCC): ICD-10-CM

## 2019-04-02 RX ORDER — LOSARTAN POTASSIUM 50 MG/1
TABLET ORAL
Qty: 90 TABLET | Refills: 0 | Status: SHIPPED | OUTPATIENT
Start: 2019-04-02 | End: 2019-05-22 | Stop reason: SINTOL

## 2019-04-02 RX ORDER — ATORVASTATIN CALCIUM 20 MG/1
TABLET, FILM COATED ORAL
Qty: 90 TABLET | Refills: 0 | Status: SHIPPED | OUTPATIENT
Start: 2019-04-02 | End: 2019-05-22 | Stop reason: SDUPTHER

## 2019-04-17 ENCOUNTER — OFFICE VISIT (OUTPATIENT)
Dept: CARDIOLOGY | Facility: CLINIC | Age: 84
End: 2019-04-17

## 2019-04-17 VITALS
WEIGHT: 193.4 LBS | DIASTOLIC BLOOD PRESSURE: 62 MMHG | HEIGHT: 67 IN | OXYGEN SATURATION: 95 % | HEART RATE: 88 BPM | SYSTOLIC BLOOD PRESSURE: 98 MMHG | BODY MASS INDEX: 30.35 KG/M2

## 2019-04-17 DIAGNOSIS — R06.02 SHORTNESS OF BREATH: ICD-10-CM

## 2019-04-17 DIAGNOSIS — I95.9 HYPOTENSION, UNSPECIFIED HYPOTENSION TYPE: Primary | ICD-10-CM

## 2019-04-17 DIAGNOSIS — I25.10 CORONARY ARTERY CALCIFICATION SEEN ON CT SCAN: ICD-10-CM

## 2019-04-17 PROCEDURE — 99214 OFFICE O/P EST MOD 30 MIN: CPT | Performed by: INTERNAL MEDICINE

## 2019-04-17 RX ORDER — MIDODRINE HYDROCHLORIDE 2.5 MG/1
2.5 TABLET ORAL EVERY 12 HOURS
Qty: 60 TABLET | Refills: 6 | Status: SHIPPED | OUTPATIENT
Start: 2019-04-17 | End: 2019-09-30

## 2019-05-05 NOTE — PROGRESS NOTES
Date of Office Visit: 2019  Encounter Provider: Rowdy Moore MD  Place of Service: [facility] CARDIOLOGY  Patient Name: Pawel Goldberg  :1932    Chief complaint: Follow-up for shortness of breath, coronary artery calcification   on CT scan.  Hypotension.     History of Present Illness:    I again had the pleasure of seeing the patient in cardiology office on 2019.  He is a very pleasant 86 year-old white male with a history of carotid artery disease, mild dementia, interstitial lung disease, prior stroke, and coronary artery disease on CT scan who presents for follow-up.  I initially saw the patient in the office on 2018.  He had seen Dr. Polanco in pulmonology, and had been getting more short of breath over the previous several months.  A CT scan of his chest with contrast on 2018 showed no evidence of pulmonary emboli, although there was possible interstitial lung disease.  He was also incidentally noted to have coronary artery disease and atherosclerotic disease of the aorta.  A high-resolution CT scan of the chest on 2018 showed mild fibrosis in the left mid and lower lobe, as well as mild fibrotic scarring and bronchiectasis in the right middle lobe, lingula, and central left lower lobe.  He did have an echocardiogram performed on 5/15/2018, which showed an ejection fraction of 61%, grade 1 diastolic dysfunction, and no significant valvular disease.  He subsequently underwent a Lexiscan nuclear stress test on 10/2/2018 which was normal.  Of note, he has had a stroke on 3/29/2014.  An MRI of his brain at that time showed multiple small infarcts in the right MCA distribution.  He was found to have right carotid artery stenosis, and underwent a right carotid endarterectomy on 2014.    The patient presents today for follow-up.  He has been placed on a new inhaler, and he does state that he has been slightly off balance for the past 2  weeks.  His blood pressure has actually been running low, and he is at 98/62 currently.  He only takes losartan if his blood pressure is greater than 150 systolic.  He is only done this once in the last 3 months.  He has been lightheaded on multiple occasions.  His shortness of breath is about the same, although he does state that the new inhaler may be helping minimally.  He has had no chest pain.    Past Medical History:   Diagnosis Date   • Allergic     Seasonal   • Allergic rhinitis    • Arthritis    • Asthma    • Brito esophagus    • Benign prostatic hyperplasia    • Carotid arterial disease (CMS/HCC)     Status post right CEA 4/1/14   • Dementia    • Dyspnea    • Erectile dysfunction    • GERD (gastroesophageal reflux disease)    • HL (hearing loss)    • Hyperlipidemia    • Hypertension    • Hypothyroidism    • Low back pain    • Memory loss    • PAD (peripheral artery disease) (CMS/HCC)    • Prostate cancer (CMS/HCC)    • Stroke (CMS/HCC)     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   • Urinary tract infection        Past Surgical History:   Procedure Laterality Date   • CAROTID ARTERY ANGIOPLASTY  04/01/2014   • COLONOSCOPY  2009   • ENDOSCOPY  2009 2001,2003,2006   • FRACTURE SURGERY     • HERNIA REPAIR  2000   • HERNIA REPAIR  2009   • INGUINAL HERNIA REPAIR     • PARTIAL HIP ARTHROPLASTY Left 07/02/2014   • PROSTATECTOMY  2008   • SHOULDER SURGERY  2005   • SHOULDER SURGERY  2006   • SHOULDER SURGERY  2008       Current Outpatient Medications on File Prior to Visit   Medication Sig Dispense Refill   • aspirin  MG tablet Take 1 tablet by mouth Daily.     • atorvastatin (LIPITOR) 20 MG tablet TAKE 1 TABLET DAILY 90 tablet 0   • clopidogrel (PLAVIX) 75 MG tablet Take 1 tablet by mouth Daily. 90 tablet 3   • Flaxseed, Linseed, (FLAXSEED OIL) 1000 MG capsule Take  by mouth.     • fluticasone (FLOVENT HFA) 110 MCG/ACT inhaler Inhale 1 puff 2 (Two) Times a Day. 1 inhaler 11   •  Fluticasone-Umeclidin-Vilant (TRELEGY ELLIPTA) 100-62.5-25 MCG/INH aerosol powder  Inhale.     • ipratropium-albuterol (DUO-NEB) 0.5-2.5 mg/3 ml nebulizer Take 3 mL by nebulization 4 (Four) Times a Day. DX code: J44.9 120 vial 0   • levothyroxine (SYNTHROID, LEVOTHROID) 50 MCG tablet Take 1 tablet by mouth Daily. 90 tablet 3   • losartan (COZAAR) 50 MG tablet TAKE 1 TABLET DAILY FOR HIGH BLOOD PRESSURE DISORDER 90 tablet 0   • Magnesium 250 MG tablet Take  by mouth.     • montelukast (SINGULAIR) 10 MG tablet      • Multiple Vitamins-Minerals (MULTIVITAMIN ADULT PO) Take  by mouth.     • Omega-3 Fatty Acids (FISH OIL) 1000 MG capsule capsule Take 2,000 mg by mouth Daily With Breakfast.     • pantoprazole (PROTONIX) 40 MG EC tablet Take 1 tablet by mouth 2 (Two) Times a Day. 180 tablet 3   • Probiotic Product (PROBIOTIC COLON SUPPORT PO) Take  by mouth.     • rivastigmine (EXELON) 9.5 MG/24HR patch APPLY 1 PATCH ON THE SKIN DAILY (FOR ALZHEIMERS DISEASE) 30 each 0   • tolterodine LA (DETROL LA) 4 MG 24 hr capsule      • tolterodine LA (DETROL LA) 4 MG 24 hr capsule TAKE 1 CAPSULE EVERY NIGHT AT BEDTIME FOR OVERACTIVE BLADDER 90 capsule 0   • VENTOLIN  (90 Base) MCG/ACT inhaler INHALE TWO PUFFS BY MOUTH EVERY 4 HOURS AS NEEDED FOR WHEEZING 18 g 0     No current facility-administered medications on file prior to visit.      Allergies as of 04/17/2019   • (Not on File)     Social History     Socioeconomic History   • Marital status:      Spouse name: Not on file   • Number of children: 3   • Years of education: Not on file   • Highest education level: Not on file   Occupational History   • Occupation: Retired-GM   Tobacco Use   • Smoking status: Former Smoker     Packs/day: 0.50     Years: 15.00     Pack years: 7.50   • Smokeless tobacco: Never Used   • Tobacco comment: Quit 1960's   1 ppd for 15 years     Substance and Sexual Activity   • Alcohol use: No   • Drug use: No   • Sexual activity: Not Currently  "    Partners: Female     Birth control/protection: None     Family History   Problem Relation Age of Onset   • Hypertension Sister    • Thyroid disease Sister    • Stroke Sister    • Depression Sister    • Heart failure Sister    • Hypertension Brother    • Lung cancer Brother    • COPD Brother    • Alcohol abuse Brother    • Coronary artery disease Son         Son  from MI at age 59        Review of Systems   Constitution: Positive for malaise/fatigue.   HENT: Positive for hearing loss.    Cardiovascular: Positive for dyspnea on exertion.   Respiratory: Positive for cough, shortness of breath and wheezing.    Genitourinary: Positive for decreased libido, frequency and hesitancy.   Neurological: Positive for dizziness and light-headedness.   All other systems reviewed and are negative.     Objective:     Vitals:    19 1155   BP: 98/62   Pulse: 88   SpO2: 95%   Weight: 87.7 kg (193 lb 6.4 oz)   Height: 170.2 cm (67.01\")     Body mass index is 30.28 kg/m².    Physical Exam   Constitutional: He is oriented to person, place, and time. He appears well-developed and well-nourished.   HENT:   Head: Normocephalic and atraumatic.   Eyes: Conjunctivae are normal.   Neck: Neck supple.   Cardiovascular: Normal rate and regular rhythm. Exam reveals no gallop and no friction rub.   No murmur heard.  Pulmonary/Chest: Effort normal. He has decreased breath sounds.   Abdominal: Soft. There is no tenderness.   Musculoskeletal: He exhibits no edema.   Neurological: He is alert and oriented to person, place, and time.   Skin: Skin is warm.   Psychiatric: He has a normal mood and affect. His behavior is normal.     Lab Review:   Procedures    Cardiac Procedures:  1.  Echocardiogram on 5/15/2018: Ejection fraction was 61%.  There was mild LVH.    There was grade 1 diastolic dysfunction.  Right ventricle was normal.  There was mild   aortic insufficiency.  There was moderate mitral annular calcification.  2.  CT scan of the " chest with contrast on 4/19/2018: There was questionable interstitial   lung disease.  There was coronary artery disease and atherosclerotic disease of the   aorta noted.  3.  Lexiscan Myoview stress test on 10/2/2018: There was no ischemia.  The ejection   fraction was 64%.    Assessment:       Diagnosis Plan   1. Hypotension, unspecified hypotension type     2. Shortness of breath     3. Coronary artery calcification seen on CT scan       Plan:       The patient has had multiple episodes of lightheadedness, and he has had some low   blood pressure.  Today, he is a 98/62.  I have suggested that he might want to try low   dose midodrine at 2.5 mg bid to see if this helps his blood pressure.  His family can   watch his blood pressure at home.  His echocardiogram and Myoview stress test both   showed no significant issues.  He is on Plavix for his history of stroke, and has not had   any bleeding issues.  He will continue on the Lipitor given his history of stroke and   possible coronary artery disease noted on the CT scan of the chest.  For now, I will plan   on seeing him back in the office in 6 months.

## 2019-05-08 DIAGNOSIS — E03.9 ACQUIRED HYPOTHYROIDISM: Primary | ICD-10-CM

## 2019-05-08 DIAGNOSIS — I10 BENIGN HYPERTENSION: ICD-10-CM

## 2019-05-08 DIAGNOSIS — Z51.81 ENCOUNTER FOR MEDICATION MONITORING: ICD-10-CM

## 2019-05-08 DIAGNOSIS — E78.2 MIXED HYPERLIPIDEMIA: ICD-10-CM

## 2019-05-09 LAB
ALT SERPL-CCNC: 18 U/L (ref 1–41)
BUN SERPL-MCNC: 21 MG/DL (ref 8–23)
BUN/CREAT SERPL: 21 (ref 7–25)
CALCIUM SERPL-MCNC: 9.8 MG/DL (ref 8.6–10.5)
CHLORIDE SERPL-SCNC: 99 MMOL/L (ref 98–107)
CHOLEST SERPL-MCNC: 139 MG/DL (ref 0–200)
CO2 SERPL-SCNC: 27.2 MMOL/L (ref 22–29)
CREAT SERPL-MCNC: 1 MG/DL (ref 0.76–1.27)
ERYTHROCYTE [DISTWIDTH] IN BLOOD BY AUTOMATED COUNT: 14.4 % (ref 12.3–15.4)
GLUCOSE SERPL-MCNC: 100 MG/DL (ref 65–99)
HCT VFR BLD AUTO: 42.7 % (ref 37.5–51)
HDLC SERPL-MCNC: 47 MG/DL (ref 40–60)
HGB BLD-MCNC: 13.6 G/DL (ref 13–17.7)
LDLC SERPL CALC-MCNC: 72 MG/DL (ref 0–100)
MCH RBC QN AUTO: 30.2 PG (ref 26.6–33)
MCHC RBC AUTO-ENTMCNC: 31.9 G/DL (ref 31.5–35.7)
MCV RBC AUTO: 94.7 FL (ref 79–97)
PLATELET # BLD AUTO: 244 10*3/MM3 (ref 140–450)
POTASSIUM SERPL-SCNC: 4.5 MMOL/L (ref 3.5–5.2)
RBC # BLD AUTO: 4.51 10*6/MM3 (ref 4.14–5.8)
SODIUM SERPL-SCNC: 138 MMOL/L (ref 136–145)
TRIGL SERPL-MCNC: 99 MG/DL (ref 0–150)
TSH SERPL DL<=0.005 MIU/L-ACNC: 3.49 MIU/ML (ref 0.27–4.2)
VLDLC SERPL CALC-MCNC: 19.8 MG/DL
WBC # BLD AUTO: 7.41 10*3/MM3 (ref 3.4–10.8)

## 2019-05-20 RX ORDER — TOLTERODINE 4 MG/1
CAPSULE, EXTENDED RELEASE ORAL
Qty: 90 CAPSULE | Refills: 0 | Status: SHIPPED | OUTPATIENT
Start: 2019-05-20 | End: 2019-08-17 | Stop reason: SDUPTHER

## 2019-05-22 ENCOUNTER — OFFICE VISIT (OUTPATIENT)
Dept: FAMILY MEDICINE CLINIC | Facility: CLINIC | Age: 84
End: 2019-05-22

## 2019-05-22 VITALS
TEMPERATURE: 98.1 F | HEIGHT: 67 IN | BODY MASS INDEX: 29.98 KG/M2 | WEIGHT: 191 LBS | DIASTOLIC BLOOD PRESSURE: 76 MMHG | OXYGEN SATURATION: 94 % | HEART RATE: 60 BPM | SYSTOLIC BLOOD PRESSURE: 114 MMHG

## 2019-05-22 DIAGNOSIS — Z00.00 MEDICARE ANNUAL WELLNESS VISIT, SUBSEQUENT: ICD-10-CM

## 2019-05-22 DIAGNOSIS — E03.9 ACQUIRED HYPOTHYROIDISM: Primary | ICD-10-CM

## 2019-05-22 DIAGNOSIS — K22.70 BARRETT'S ESOPHAGUS WITHOUT DYSPLASIA: ICD-10-CM

## 2019-05-22 DIAGNOSIS — I10 ESSENTIAL HYPERTENSION: ICD-10-CM

## 2019-05-22 DIAGNOSIS — I73.9 PAD (PERIPHERAL ARTERY DISEASE) (HCC): ICD-10-CM

## 2019-05-22 DIAGNOSIS — R41.3 MEMORY LOSS: ICD-10-CM

## 2019-05-22 DIAGNOSIS — E78.2 MIXED HYPERLIPIDEMIA: ICD-10-CM

## 2019-05-22 DIAGNOSIS — F01.50 VASCULAR DEMENTIA WITHOUT BEHAVIORAL DISTURBANCE (HCC): ICD-10-CM

## 2019-05-22 DIAGNOSIS — I63.542 CEREBROVASCULAR ACCIDENT (CVA) DUE TO OCCLUSION OF LEFT CEREBELLAR ARTERY (HCC): ICD-10-CM

## 2019-05-22 DIAGNOSIS — Z51.81 MEDICATION MONITORING ENCOUNTER: ICD-10-CM

## 2019-05-22 PROBLEM — Z86.73 HISTORY OF STROKE: Status: RESOLVED | Noted: 2018-12-12 | Resolved: 2019-05-22

## 2019-05-22 PROBLEM — Z86.73 HISTORY OF STROKE: Status: ACTIVE | Noted: 2018-12-12

## 2019-05-22 PROCEDURE — G0439 PPPS, SUBSEQ VISIT: HCPCS | Performed by: FAMILY MEDICINE

## 2019-05-22 RX ORDER — LEVOTHYROXINE SODIUM 0.05 MG/1
50 TABLET ORAL DAILY
Qty: 90 TABLET | Refills: 3 | Status: SHIPPED | OUTPATIENT
Start: 2019-05-22 | End: 2020-05-18

## 2019-05-22 RX ORDER — ATORVASTATIN CALCIUM 20 MG/1
20 TABLET, FILM COATED ORAL DAILY
Qty: 90 TABLET | Refills: 3 | Status: SHIPPED | OUTPATIENT
Start: 2019-05-22 | End: 2020-05-27 | Stop reason: SDUPTHER

## 2019-05-22 RX ORDER — RIVASTIGMINE 9.5 MG/24H
1 PATCH, EXTENDED RELEASE TRANSDERMAL DAILY
Qty: 90 PATCH | Refills: 3 | Status: SHIPPED | OUTPATIENT
Start: 2019-05-22 | End: 2020-05-26

## 2019-05-22 NOTE — PROGRESS NOTES
"QUICK REFERENCE INFORMATION:  The ABCs of the Annual Wellness Visit    Subsequent Medicare Wellness Visit     HEALTH RISK ASSESSMENT    : 1932    Recent Hospitalizations:  {Hospitalization history:[id number]::\"No hospitalization(s) within the last year.\"}.  ccc      Current Medical Providers:  Patient Care Team:  Charlie Trinh MD as PCP - General (Family Medicine)  Charlie Trinh MD as PCP - Claims Attributed  Hardik Senior MD as Consulting Physician (Urology)  MAURO Peters MD as Consulting Physician (Ophthalmology)  Brant Polanco MD as Consulting Physician (Pulmonary Disease)  Charlie Goldstein DO as Consulting Physician (Neurology)        Smoking Status:  Social History     Tobacco Use   Smoking Status Former Smoker   • Packs/day: 0.50   • Years: 15.00   • Pack years: 7.50   Smokeless Tobacco Never Used   Tobacco Comment    Quit 's   1 ppd for 15 years         Alcohol Consumption:  Social History     Substance and Sexual Activity   Alcohol Use No       Depression Screen:   PHQ-2/PHQ-9 Depression Screening 2019   Little interest or pleasure in doing things 0   Feeling down, depressed, or hopeless 0   Trouble falling or staying asleep, or sleeping too much -   Feeling tired or having little energy -   Poor appetite or overeating -   Feeling bad about yourself - or that you are a failure or have let yourself or your family down -   Trouble concentrating on things, such as reading the newspaper or watching television -   Moving or speaking so slowly that other people could have noticed. Or the opposite - being so fidgety or restless that you have been moving around a lot more than usual -   Thoughts that you would be better off dead, or of hurting yourself in some way -   Total Score 0   If you checked off any problems, how difficult have these problems made it for you to do your work, take care of things at home, or get along with other people? -       Health Habits and " "Functional and Cognitive Screening:  Functional & Cognitive Status 5/17/2018   Do you have difficulty preparing food and eating? No   Do you have difficulty bathing yourself, getting dressed or grooming yourself? No   Do you have difficulty using the toilet? No   Do you have difficulty moving around from place to place? No   Do you have trouble with steps or getting out of a bed or a chair? No   In the past year have you fallen or experienced a near fall? Yes   Current Diet Well Balanced Diet   Dental Exam Not up to date   Eye Exam Not up to date   Exercise (times per week) 4 times per week   Current Exercise Activities Include Walking   Do you need help using the phone?  No   Are you deaf or do you have serious difficulty hearing?  Yes   Do you need help with transportation? No   Do you need help shopping? No   Do you need help preparing meals?  No   Do you need help with housework?  No   Do you need help with laundry? No   Do you need help taking your medications? Yes   Do you need help managing money? No   Do you ever drive or ride in a car without wearing a seat belt? No   Have you felt unusual stress, anger or loneliness in the last month? No   Who do you live with? Spouse   If you need help, do you have trouble finding someone available to you? No   Have you been bothered in the last four weeks by sexual problems? No   Do you have difficulty concentrating, remembering or making decisions? Yes           Does the patient have evidence of cognitive impairment? {Yes/No w/ pre-defaulted No:02479::\"No\"}    Asiprin use counseling: {Aspirin :38221}      Recent Lab Results:    Lab Results   Component Value Date     (H) 05/08/2019        Lab Results   Component Value Date    CHOL 125 04/15/2016    TRIG 99 05/08/2019    HDL 47 05/08/2019    VLDL 19.8 05/08/2019    LDLHDL 1.20 04/15/2016           Age-appropriate Screening Schedule:  Refer to the list below for future screening recommendations based on " "patient's age, sex and/or medical conditions. Orders for these recommended tests are listed in the plan section. The patient has been provided with a written plan.    Health Maintenance   Topic Date Due   • TDAP/TD VACCINES (1 - Tdap) 12/31/1951   • ZOSTER VACCINE (2 of 3) 07/06/2016   • INFLUENZA VACCINE  08/01/2019   • LIPID PANEL  05/08/2020   • PNEUMOCOCCAL VACCINES (65+ LOW/MEDIUM RISK)  Completed        Subjective   History of Present Illness    Pawel Goldberg is a 86 y.o. male who presents for an Annual Wellness Visit.    The following portions of the patient's history were reviewed and updated as appropriate: {history reviewed:20406::\"allergies\",\"current medications\",\"past family history\",\"past medical history\",\"past social history\",\"past surgical history\",\"problem list\"}.    Outpatient Medications Prior to Visit   Medication Sig Dispense Refill   • aspirin  MG tablet Take 1 tablet by mouth Daily.     • atorvastatin (LIPITOR) 20 MG tablet TAKE 1 TABLET DAILY 90 tablet 0   • clopidogrel (PLAVIX) 75 MG tablet Take 1 tablet by mouth Daily. 90 tablet 3   • Flaxseed, Linseed, (FLAXSEED OIL) 1000 MG capsule Take  by mouth.     • levothyroxine (SYNTHROID, LEVOTHROID) 50 MCG tablet Take 1 tablet by mouth Daily. 90 tablet 3   • losartan (COZAAR) 50 MG tablet TAKE 1 TABLET DAILY FOR HIGH BLOOD PRESSURE DISORDER 90 tablet 0   • Magnesium 250 MG tablet Take  by mouth.     • midodrine (PROAMATINE) 2.5 MG tablet Take 1 tablet by mouth Every 12 (Twelve) Hours. 60 tablet 6   • Multiple Vitamins-Minerals (MULTIVITAMIN ADULT PO) Take  by mouth.     • Omega-3 Fatty Acids (FISH OIL) 1000 MG capsule capsule Take 2,000 mg by mouth Daily With Breakfast.     • pantoprazole (PROTONIX) 40 MG EC tablet Take 1 tablet by mouth 2 (Two) Times a Day. 180 tablet 3   • Probiotic Product (PROBIOTIC COLON SUPPORT PO) Take  by mouth.     • rivastigmine (EXELON) 9.5 MG/24HR patch APPLY 1 PATCH ON THE SKIN DAILY (FOR ALZHEIMERS " DISEASE) 30 each 0   • tolterodine LA (DETROL LA) 4 MG 24 hr capsule TAKE 1 CAPSULE EVERY NIGHT AT BEDTIME FOR OVERACTIVE BLADDER 90 capsule 0   • VENTOLIN  (90 Base) MCG/ACT inhaler INHALE TWO PUFFS BY MOUTH EVERY 4 HOURS AS NEEDED FOR WHEEZING 18 g 0   • fluticasone (FLOVENT HFA) 110 MCG/ACT inhaler Inhale 1 puff 2 (Two) Times a Day. 1 inhaler 11   • Fluticasone-Umeclidin-Vilant (TRELEGY ELLIPTA) 100-62.5-25 MCG/INH aerosol powder  Inhale.     • ipratropium-albuterol (DUO-NEB) 0.5-2.5 mg/3 ml nebulizer Take 3 mL by nebulization 4 (Four) Times a Day. DX code: J44.9 120 vial 0   • montelukast (SINGULAIR) 10 MG tablet      • tolterodine LA (DETROL LA) 4 MG 24 hr capsule      • tolterodine LA (DETROL LA) 4 MG 24 hr capsule TAKE 1 CAPSULE EVERY NIGHT AT BEDTIME FOR OVERACTIVE BLADDER 90 capsule 0     No facility-administered medications prior to visit.        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   • Obstruction of carotid artery   • Stenosis of carotid artery   • EEG abnormality without seizure   • Hereditary and idiopathic peripheral neuropathy   • Mixed hyperlipidemia   • Acquired hypothyroidism   • Low back pain with sciatica   • Lower urinary tract infectious disease   • Lumbar canal stenosis   • Lumbar radiculopathy   • Degenerative arthritis of lumbar spine   • Osteoarthritis of hip   • History of repair of hip joint   • Syncope   • Temporary cerebral vascular dysfunction   • History of prostate cancer   • Dementia associated with other underlying disease without behavioral disturbance   • Arthritis   • PAD (peripheral artery disease) (CMS/[facility])   • Routine adult health maintenance   • Medicare annual wellness visit, subsequent   • Essential hypertension   • Medication monitoring encounter   • Status post implantation of artificial urinary sphincter   • Paralysis (CMS/[facility])   • Moderate asthma with acute exacerbation   • Vascular dementia without behavioral  "disturbance   • 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    Review of Systems    Compared to one year ago, the patient feels his physical health is {better worse same:03749}.  Compared to one year ago, the patient feels his mental health is {better worse same:80630}.    Objective     Physical Exam    Vitals:    05/22/19 1519   BP: 114/76   Pulse: 60   Temp: 98.1 °F (36.7 °C)   TempSrc: Oral   SpO2: 94%   Weight: 86.6 kg (191 lb)   Height: 170.2 cm (67\")       Patient's Body mass index is 29.91 kg/m². BMI is {BMI range:86240}.      Assessment/Plan   Patient Self-Management and Personalized Health Advice  The patient has been provided with information about: {; PERSONALIZED HEALTH ADVICE:46587} and preventive services including:   · {plan:76088}.    Visit Diagnoses:  No diagnosis found.    No orders of the defined types were placed in this encounter.      Outpatient Encounter Medications as of 5/22/2019   Medication Sig Dispense Refill   • aspirin  MG tablet Take 1 tablet by mouth Daily.     • atorvastatin (LIPITOR) 20 MG tablet TAKE 1 TABLET DAILY 90 tablet 0   • clopidogrel (PLAVIX) 75 MG tablet Take 1 tablet by mouth Daily. 90 tablet 3   • Flaxseed, Linseed, (FLAXSEED OIL) 1000 MG capsule Take  by mouth.     • levothyroxine (SYNTHROID, LEVOTHROID) 50 MCG tablet Take 1 tablet by mouth Daily. 90 tablet 3   • losartan (COZAAR) 50 MG tablet TAKE 1 TABLET DAILY FOR HIGH BLOOD PRESSURE DISORDER 90 tablet 0   • Magnesium 250 MG tablet Take  by mouth.     • midodrine (PROAMATINE) 2.5 MG tablet Take 1 tablet by mouth Every 12 (Twelve) Hours. 60 tablet 6   • Multiple Vitamins-Minerals (MULTIVITAMIN ADULT PO) Take  by mouth.     • Omega-3 Fatty Acids (FISH OIL) 1000 MG capsule capsule Take 2,000 mg by mouth Daily With " Breakfast.     • pantoprazole (PROTONIX) 40 MG EC tablet Take 1 tablet by mouth 2 (Two) Times a Day. 180 tablet 3   • Probiotic Product (PROBIOTIC COLON SUPPORT PO) Take  by mouth.     • rivastigmine (EXELON) 9.5 MG/24HR patch APPLY 1 PATCH ON THE SKIN DAILY (FOR ALZHEIMERS DISEASE) 30 each 0   • tolterodine LA (DETROL LA) 4 MG 24 hr capsule TAKE 1 CAPSULE EVERY NIGHT AT BEDTIME FOR OVERACTIVE BLADDER 90 capsule 0   • VENTOLIN  (90 Base) MCG/ACT inhaler INHALE TWO PUFFS BY MOUTH EVERY 4 HOURS AS NEEDED FOR WHEEZING 18 g 0   • [DISCONTINUED] fluticasone (FLOVENT HFA) 110 MCG/ACT inhaler Inhale 1 puff 2 (Two) Times a Day. 1 inhaler 11   • [DISCONTINUED] Fluticasone-Umeclidin-Vilant (TRELEGY ELLIPTA) 100-62.5-25 MCG/INH aerosol powder  Inhale.     • [DISCONTINUED] ipratropium-albuterol (DUO-NEB) 0.5-2.5 mg/3 ml nebulizer Take 3 mL by nebulization 4 (Four) Times a Day. DX code: J44.9 120 vial 0   • [DISCONTINUED] montelukast (SINGULAIR) 10 MG tablet      • [DISCONTINUED] tolterodine LA (DETROL LA) 4 MG 24 hr capsule      • [DISCONTINUED] tolterodine LA (DETROL LA) 4 MG 24 hr capsule TAKE 1 CAPSULE EVERY NIGHT AT BEDTIME FOR OVERACTIVE BLADDER 90 capsule 0     No facility-administered encounter medications on file as of 5/22/2019.        Reviewed use of high risk medication in the elderly: {Response; yes/no/na:63}  Reviewed for potential of harmful drug interactions in the elderly: {Response; yes/no/na:63}    Follow Up:  No Follow-up on file.     An After Visit Summary and PPPS with all of these plans were given to the patient.

## 2019-05-22 NOTE — PROGRESS NOTES
QUICK REFERENCE INFORMATION:  The ABCs of the Annual Wellness Visit    Subsequent Medicare Wellness Visit     HEALTH RISK ASSESSMENT    : 1932    Recent Hospitalizations:  Recently treated at the following:  Other: [facility] for cellebellar cva.  [facility]      Current Medical Providers:  Patient Care Team:  Charlie Trinh MD as PCP - General (Family Medicine)  Charlie Trinh MD as PCP - Claims Attributed  Hardik Senior MD as Consulting Physician (Urology)  MAURO Peters MD as Consulting Physician (Ophthalmology)  Brant Polanco MD as Consulting Physician (Pulmonary Disease)  Charlie Goldstein DO as Consulting Physician (Neurology)        Smoking Status:  Social History     Tobacco Use   Smoking Status Former Smoker   • Packs/day: 0.50   • Years: 15.00   • Pack years: 7.50   Smokeless Tobacco Never Used   Tobacco Comment    Quit 's   1 ppd for 15 years         Alcohol Consumption:  Social History     Substance and Sexual Activity   Alcohol Use No       Depression Screen:   PHQ-2/PHQ-9 Depression Screening 2019   Little interest or pleasure in doing things 0   Feeling down, depressed, or hopeless 0   Trouble falling or staying asleep, or sleeping too much -   Feeling tired or having little energy -   Poor appetite or overeating -   Feeling bad about yourself - or that you are a failure or have let yourself or your family down -   Trouble concentrating on things, such as reading the newspaper or watching television -   Moving or speaking so slowly that other people could have noticed. Or the opposite - being so fidgety or restless that you have been moving around a lot more than usual -   Thoughts that you would be better off dead, or of hurting yourself in some way -   Total Score 0   If you checked off any problems, how difficult have these problems made it for you to do your work, take care of things at home, or get along with other people? -       Health Habits and Functional and  Cognitive Screening:  Functional & Cognitive Status 5/17/2018   Do you have difficulty preparing food and eating? No   Do you have difficulty bathing yourself, getting dressed or grooming yourself? No   Do you have difficulty using the toilet? No   Do you have difficulty moving around from place to place? No   Do you have trouble with steps or getting out of a bed or a chair? No   In the past year have you fallen or experienced a near fall? Yes   Current Diet Well Balanced Diet   Dental Exam Not up to date   Eye Exam Not up to date   Exercise (times per week) 4 times per week   Current Exercise Activities Include Walking   Do you need help using the phone?  No   Are you deaf or do you have serious difficulty hearing?  Yes   Do you need help with transportation? No   Do you need help shopping? No   Do you need help preparing meals?  No   Do you need help with housework?  No   Do you need help with laundry? No   Do you need help taking your medications? Yes   Do you need help managing money? No   Do you ever drive or ride in a car without wearing a seat belt? No   Have you felt unusual stress, anger or loneliness in the last month? No   Who do you live with? Spouse   If you need help, do you have trouble finding someone available to you? No   Have you been bothered in the last four weeks by sexual problems? No   Do you have difficulty concentrating, remembering or making decisions? Yes           Does the patient have evidence of cognitive impairment? Yes    Asiprin use counseling: Taking ASA appropriately as indicated      Recent Lab Results:    Lab Results   Component Value Date     (H) 05/08/2019        Lab Results   Component Value Date    CHOL 125 04/15/2016    TRIG 99 05/08/2019    HDL 47 05/08/2019    VLDL 19.8 05/08/2019    LDLHDL 1.20 04/15/2016           Age-appropriate Screening Schedule:  Refer to the list below for future screening recommendations based on patient's age, sex and/or medical  conditions. Orders for these recommended tests are listed in the plan section. The patient has been provided with a written plan.    Health Maintenance   Topic Date Due   • TDAP/TD VACCINES (1 - Tdap) 12/31/1951   • ZOSTER VACCINE (2 of 3) 07/06/2016   • INFLUENZA VACCINE  08/01/2019   • LIPID PANEL  05/08/2020   • PNEUMOCOCCAL VACCINES (65+ LOW/MEDIUM RISK)  Completed        Subjective   History of Present Illness    Pawel Goldberg is a 86 y.o. male who presents for an Annual Wellness Visit.    The following portions of the patient's history were reviewed and updated as appropriate: allergies, current medications, past family history, past medical history, past social history, past surgical history and problem list.    Outpatient Medications Prior to Visit   Medication Sig Dispense Refill   • aspirin  MG tablet Take 1 tablet by mouth Daily.     • clopidogrel (PLAVIX) 75 MG tablet Take 1 tablet by mouth Daily. 90 tablet 3   • Magnesium 250 MG tablet Take  by mouth.     • midodrine (PROAMATINE) 2.5 MG tablet Take 1 tablet by mouth Every 12 (Twelve) Hours. 60 tablet 6   • Multiple Vitamins-Minerals (MULTIVITAMIN ADULT PO) Take  by mouth.     • pantoprazole (PROTONIX) 40 MG EC tablet Take 1 tablet by mouth 2 (Two) Times a Day. 180 tablet 3   • Probiotic Product (PROBIOTIC COLON SUPPORT PO) Take  by mouth.     • tolterodine LA (DETROL LA) 4 MG 24 hr capsule TAKE 1 CAPSULE EVERY NIGHT AT BEDTIME FOR OVERACTIVE BLADDER 90 capsule 0   • VENTOLIN  (90 Base) MCG/ACT inhaler INHALE TWO PUFFS BY MOUTH EVERY 4 HOURS AS NEEDED FOR WHEEZING 18 g 0   • atorvastatin (LIPITOR) 20 MG tablet TAKE 1 TABLET DAILY 90 tablet 0   • Flaxseed, Linseed, (FLAXSEED OIL) 1000 MG capsule Take  by mouth.     • levothyroxine (SYNTHROID, LEVOTHROID) 50 MCG tablet Take 1 tablet by mouth Daily. 90 tablet 3   • losartan (COZAAR) 50 MG tablet TAKE 1 TABLET DAILY FOR HIGH BLOOD PRESSURE DISORDER 90 tablet 0   • Omega-3 Fatty Acids (FISH  OIL) 1000 MG capsule capsule Take 2,000 mg by mouth Daily With Breakfast.     • rivastigmine (EXELON) 9.5 MG/24HR patch APPLY 1 PATCH ON THE SKIN DAILY (FOR ALZHEIMERS DISEASE) 30 each 0   • fluticasone (FLOVENT HFA) 110 MCG/ACT inhaler Inhale 1 puff 2 (Two) Times a Day. 1 inhaler 11   • Fluticasone-Umeclidin-Vilant (TRELEGY ELLIPTA) 100-62.5-25 MCG/INH aerosol powder  Inhale.     • ipratropium-albuterol (DUO-NEB) 0.5-2.5 mg/3 ml nebulizer Take 3 mL by nebulization 4 (Four) Times a Day. DX code: J44.9 120 vial 0   • montelukast (SINGULAIR) 10 MG tablet      • tolterodine LA (DETROL LA) 4 MG 24 hr capsule      • tolterodine LA (DETROL LA) 4 MG 24 hr capsule TAKE 1 CAPSULE EVERY NIGHT AT BEDTIME FOR OVERACTIVE BLADDER 90 capsule 0     No facility-administered medications prior to visit.        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   • Obstruction of carotid artery   • Stenosis of carotid artery   • EEG abnormality without seizure   • Hereditary and idiopathic peripheral neuropathy   • Mixed hyperlipidemia   • Acquired hypothyroidism   • Low back pain with sciatica   • Lumbar canal stenosis   • Lumbar radiculopathy   • Degenerative arthritis of lumbar spine   • Osteoarthritis of hip   • History of repair of hip joint   • Syncope   • Temporary cerebral vascular dysfunction   • History of prostate cancer   • Dementia associated with other underlying disease without behavioral disturbance   • Arthritis   • PAD (peripheral artery disease) (CMS/[facility])   • Routine adult health maintenance   • Medicare annual wellness visit, subsequent   • Essential hypertension   • Medication monitoring encounter   • Status post implantation of artificial urinary sphincter   • Paralysis (CMS/[facility])   • Moderate asthma with acute exacerbation   • Vascular dementia without behavioral disturbance   • 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 Have asked the patient to send this to us to add to record    Identification of Risk Factors:  Risk factors include: cardiovascular risk, increased fall risk, cognitive impairment and polypharmacy.    Review of Systems    Compared to one year ago, the patient feels his physical health is worse.  Compared to one year ago, the patient feels his mental health is worse.    Objective     Physical Exam   Constitutional: No distress.   Cardiovascular: Normal rate.   Pulmonary/Chest: Effort normal.   Neurological: Coordination abnormal.   Psychiatric: He exhibits abnormal recent memory.   Nursing note and vitals reviewed.      Vitals:    05/22/19 1519   BP: 114/76   Pulse: 60   Temp: 98.1 °F (36.7 °C)   TempSrc: Oral   SpO2: 94%   Weight: 86.6 kg (191 lb)   Height: 170.2 cm (67\")       Patient's Body mass index is 29.91 kg/m². BMI is within normal parameters. No follow-up required..      Assessment/Plan   Patient Self-Management and Personalized Health Advice  The patient has been provided with information about: fall prevention and preventive services including:   · Diabetes screening, see lab orders, Fall Risk assessment done.    Visit Diagnoses:    ICD-10-CM ICD-9-CM   1. Acquired hypothyroidism E03.9 244.9   2. 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 Essential hypertension I10 401.9   5. Medicare annual wellness visit, subsequent Z00.00 V70.0   6. Medication monitoring encounter Z51.81 V58.83   7. Vascular dementia without behavioral disturbance F01.50 290.40   8. Mixed hyperlipidemia E78.2 272.2   9. PAD (peripheral artery disease) (CMS/[facility]) I73.9 443.9   10. Memory loss R41.3 780.93       No orders of the defined types were placed in this encounter.      Outpatient Encounter Medications as of 5/22/2019   Medication Sig " Dispense Refill   • aspirin  MG tablet Take 1 tablet by mouth Daily.     • atorvastatin (LIPITOR) 20 MG tablet Take 1 tablet by mouth Daily. 90 tablet 3   • clopidogrel (PLAVIX) 75 MG tablet Take 1 tablet by mouth Daily. 90 tablet 3   • levothyroxine (SYNTHROID, LEVOTHROID) 50 MCG tablet Take 1 tablet by mouth Daily. 90 tablet 3   • Magnesium 250 MG tablet Take  by mouth.     • midodrine (PROAMATINE) 2.5 MG tablet Take 1 tablet by mouth Every 12 (Twelve) Hours. 60 tablet 6   • Multiple Vitamins-Minerals (MULTIVITAMIN ADULT PO) Take  by mouth.     • pantoprazole (PROTONIX) 40 MG EC tablet Take 1 tablet by mouth 2 (Two) Times a Day. 180 tablet 3   • Probiotic Product (PROBIOTIC COLON SUPPORT PO) Take  by mouth.     • rivastigmine (EXELON) 9.5 MG/24HR patch Place 1 patch on the skin as directed by provider Daily. 90 patch 3   • tolterodine LA (DETROL LA) 4 MG 24 hr capsule TAKE 1 CAPSULE EVERY NIGHT AT BEDTIME FOR OVERACTIVE BLADDER 90 capsule 0   • VENTOLIN  (90 Base) MCG/ACT inhaler INHALE TWO PUFFS BY MOUTH EVERY 4 HOURS AS NEEDED FOR WHEEZING 18 g 0   • [DISCONTINUED] atorvastatin (LIPITOR) 20 MG tablet TAKE 1 TABLET DAILY 90 tablet 0   • [DISCONTINUED] Flaxseed, Linseed, (FLAXSEED OIL) 1000 MG capsule Take  by mouth.     • [DISCONTINUED] levothyroxine (SYNTHROID, LEVOTHROID) 50 MCG tablet Take 1 tablet by mouth Daily. 90 tablet 3   • [DISCONTINUED] losartan (COZAAR) 50 MG tablet TAKE 1 TABLET DAILY FOR HIGH BLOOD PRESSURE DISORDER 90 tablet 0   • [DISCONTINUED] Omega-3 Fatty Acids (FISH OIL) 1000 MG capsule capsule Take 2,000 mg by mouth Daily With Breakfast.     • [DISCONTINUED] rivastigmine (EXELON) 9.5 MG/24HR patch APPLY 1 PATCH ON THE SKIN DAILY (FOR ALZHEIMERS DISEASE) 30 each 0   • [DISCONTINUED] fluticasone (FLOVENT HFA) 110 MCG/ACT inhaler Inhale 1 puff 2 (Two) Times a Day. 1 inhaler 11   • [DISCONTINUED] Fluticasone-Umeclidin-Vilant (TRELEGY ELLIPTA) 100-62.5-25 MCG/INH aerosol powder   Inhale.     • [DISCONTINUED] ipratropium-albuterol (DUO-NEB) 0.5-2.5 mg/3 ml nebulizer Take 3 mL by nebulization 4 (Four) Times a Day. DX code: J44.9 120 vial 0   • [DISCONTINUED] montelukast (SINGULAIR) 10 MG tablet      • [DISCONTINUED] tolterodine LA (DETROL LA) 4 MG 24 hr capsule      • [DISCONTINUED] tolterodine LA (DETROL LA) 4 MG 24 hr capsule TAKE 1 CAPSULE EVERY NIGHT AT BEDTIME FOR OVERACTIVE BLADDER 90 capsule 0     No facility-administered encounter medications on file as of 5/22/2019.        Reviewed use of high risk medication in the elderly: yes  Reviewed for potential of harmful drug interactions in the elderly: yes     Lets just stop the losartan for bp  As he has been having presyncope and syncope spells  Dr sanchez has already placed him on Proamatine for hypotension  Labs ok      Follow Up:  Return in about 6 months (around 11/22/2019).     An After Visit Summary and PPPS with all of these plans were given to the patient.

## 2019-07-01 RX ORDER — LOSARTAN POTASSIUM 50 MG/1
TABLET ORAL
Qty: 90 TABLET | Refills: 0 | Status: SHIPPED | OUTPATIENT
Start: 2019-07-01 | End: 2019-09-30

## 2019-07-10 ENCOUNTER — HOSPITAL ENCOUNTER (OUTPATIENT)
Dept: PHYSICAL THERAPY | Facility: HOSPITAL | Age: 84
Setting detail: THERAPIES SERIES
Discharge: HOME | End: 2019-07-10

## 2019-07-10 DIAGNOSIS — F01.50 VASCULAR DEMENTIA WITHOUT BEHAVIORAL DISTURBANCE (HCC): Primary | ICD-10-CM

## 2019-07-10 DIAGNOSIS — R29.6 FALLS FREQUENTLY: ICD-10-CM

## 2019-07-10 DIAGNOSIS — R55 SYNCOPE AND COLLAPSE: ICD-10-CM

## 2019-07-10 DIAGNOSIS — Z86.73 HISTORY OF CVA (CEREBROVASCULAR ACCIDENT): ICD-10-CM

## 2019-07-10 PROCEDURE — 97162 PT EVAL MOD COMPLEX 30 MIN: CPT

## 2019-07-10 NOTE — THERAPY EVALUATION
.Outpatient Physical Therapy Neuro Initial Evaluation  [facility]     Patient Name: Pawel Goldberg  : 1932  MRN: [id number]  Today's Date: 7/10/2019      Visit Date: 07/10/2019    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   • Obstruction of carotid artery   • Stenosis of carotid artery   • EEG abnormality without seizure   • Hereditary and idiopathic peripheral neuropathy   • Mixed hyperlipidemia   • Acquired hypothyroidism   • Low back pain with sciatica   • Lumbar canal stenosis   • Lumbar radiculopathy   • Degenerative arthritis of lumbar spine   • Osteoarthritis of hip   • History of repair of hip joint   • Syncope   • Temporary cerebral vascular dysfunction   • History of prostate cancer   • Dementia associated with other underlying disease without behavioral disturbance   • Arthritis   • PAD (peripheral artery disease) (CMS/HCC)   • Routine adult health maintenance   • Medicare annual wellness visit, subsequent   • Essential hypertension   • Medication monitoring encounter   • Status post implantation of artificial urinary sphincter   • Moderate asthma with acute exacerbation   • Vascular dementia without behavioral disturbance   • 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 (CMS/HCC)        Past Medical History:   Diagnosis Date   • Allergic     Seasonal   • Allergic rhinitis    • Arthritis    • Asthma    • Brito esophagus    • Benign prostatic hyperplasia    • Carotid arterial disease (CMS/HCC)     Status post right CEA 14   • Dementia    • Dyspnea    • Erectile dysfunction    • GERD (gastroesophageal reflux disease)    • HL (hearing loss)    • Hyperlipidemia    • Hypertension    • Hypothyroidism    • Low back pain    • Memory loss    • PAD (peripheral artery disease) (CMS/HCC)    • Prostate cancer (CMS/HCC)    • Stroke (CMS/HCC)     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   • Urinary tract  "infection         Past Surgical History:   Procedure Laterality Date   • CAROTID ARTERY ANGIOPLASTY  04/01/2014   • COLONOSCOPY  2009   • ENDOSCOPY  2009    2001,2003,2006   • FRACTURE SURGERY     • HERNIA REPAIR  2000   • HERNIA REPAIR  2009   • INGUINAL HERNIA REPAIR     • PARTIAL HIP ARTHROPLASTY Left 07/02/2014   • PROSTATECTOMY  2008   • SHOULDER SURGERY  2005   • SHOULDER SURGERY  2006   • SHOULDER SURGERY  2008         Visit Dx:     ICD-10-CM ICD-9-CM   1. Vascular dementia without behavioral disturbance F01.50 290.40   2. Falls frequently R29.6 V15.88   3. Syncope and collapse R55 780.2   4. History of CVA (cerebrovascular accident) Z86.73 V12.54       Patient History     Row Name 07/10/19 1600             History    Chief Complaint  Balance Problems;Falls/history of falls  -      Date Current Problem(s) Began  01/10/19 \"at least 6 months ago\"  -      Brief Description of Current Complaint  Patient and daughter present, providing history.  Patient has been experiencing increased falls over last 6-7 months, with several caused by passing out.  Daughter reports he has been to cardiologist and has been told to avoid hot showers.  Does have history of multiple TIA/CVA's, not all symptomatic, some only identified by MRI.  Patient reports he does occasionally trip on items.  They are monitoring BP and trying to keep it higher (130-140 per daughter).  Patient rides mower and cares for 2 acre yard, enjoys walking to daughters house down the yard but is scared of falling.  -      Patient/Caregiver Goals  Improve mobility  -      Patient's Rating of General Health  Good  -      Hand Dominance  right-handed  -      Occupation/sports/leisure activities  retired  worker  -         Pain     Pain Location  -- reports no pain  -         Fall Risk Assessment    Any falls in the past year:  Yes  -      Number of falls reported in the last 12 months  6-7  -      Factors that contributed to the fall:  " Tripped;Other (comment) passed out  -      Does patient have a fear of falling  Yes (comment)  -         Daily Activities    Primary Language  English  -      How does patient learn best?  Listening  -      Teaching needs identified  Home Exercise Program;Falls Prevention  -      Patient is concerned about/has problems with  Walking  -      Does patient have problems with the following?  None  -      Barriers to learning  Cognitive history of dementia  -      Action taken for identified issues  family involvement  -      Pt Participated in POC and Goals  Yes  -         Safety    Are you being hurt, hit, or frightened by anyone at home or in your life?  No  -LH      Are you being neglected by a caregiver  No  -        User Key  (r) = Recorded By, (t) = Taken By, (c) = Cosigned By    Initials Name Provider Type     Carine Crowder, PT Physical Therapist              PT Neuro     Row Name 07/10/19 1600             Precautions and Contraindications    Contraindications  not allowed to ride bicycle, per daughter  -         Home Living    Living Arrangements  house  -      Home Accessibility  stairs to enter home  -      Home Equipment  Rolling walker;Cane but does not use  -      Living Environment Comment  lives with wife.  3-4 steps to enter with rail which patient reports are no issue.  -         Cognition    Memory  Decreased short term memory per daughter - history of dementia  -      Orientation Level  Oriented to place;Oriented to situation;Oriented to person  -      Safety Judgment  Decreased awareness of need for safety  -      Deficits  Fully aware of deficits  -      Comments  Patient declined PT recommendations to remove throw rugs, use of life alert or keeping cell phone with him at all times.  -         Sensation    Sensation WNL?  WNL per patient no numbness/tingling  -      Light Touch  No apparent deficits  -         Posture/Observations    Alignment  Options  Forward head;Rounded shoulders  -LH      Forward Head  Moderate  -LH      Rounded Shoulders  Bilateral:;Moderate  -LH      Posture/Observations Comments  preference for wide base of support, verbal cues to look forward, not down.  -LH         General ROM    GENERAL ROM COMMENTS  AROM B LE WFL, no deficits noted  -         MMT (Manual Muscle Testing)    Rt Lower Ext  Rt Hip Flexion;Rt Hip Extension;Rt Hip ABduction;Rt Hip ADduction;Rt Knee Extension;Rt Knee Flexion;Rt Ankle Plantarflexion;Rt Ankle Dorsiflexion  -LH      Lt Lower Ext  Lt Hip Flexion;Lt Hip Extension;Lt Hip ABduction;Lt Hip ADduction;Lt Knee Extension;Lt Knee Flexion;Lt Ankle Plantarflexion;Lt Ankle Dorsiflexion  -         MMT Right Lower Ext    Rt Hip Flexion MMT, Gross Movement  (4+/5) good plus  -LH      Rt Hip Extension MMT, Gross Movement  (4+/5) good plus  -LH      Rt Hip ABduction MMT, Gross Movement  (4+/5) good plus  -LH      Rt Hip ADduction MMT, Gross Movement  (5/5) normal  -LH      Rt Knee Extension MMT, Gross Movement  (5/5) normal  -LH      Rt Knee Flexion MMT, Gross Movement  (4+/5) good plus  -LH      Rt Ankle Plantarflexion MMT, Gross Movement  (4-/5) good minus  -LH      Rt Ankle Dorsiflexion MMT, Gross Movement  (5/5) normal  -         MMT Left Lower Ext    Lt Hip Flexion MMT, Gross Movement  (4+/5) good plus  -LH      Lt Hip Extension MMT, Gross Movement  (4+/5) good plus  -LH      Lt Hip ABduction MMT, Gross Movement  (4+/5) good plus  -LH      Lt Hip ADduction MMT, Gross Movement  (5/5) normal  -LH      Lt Knee Extension MMT, Gross Movement  (5/5) normal  -LH      Lt Knee Flexion MMT, Gross Movement  (4+/5) good plus  -LH      Lt Ankle Plantarflexion MMT, Gross Movement  (4-/5) good minus  -LH      Lt Ankle Dorsiflexion MMT, Gross Movement  (5/5) normal  -         Transfers    Sit-Stand [place] (Transfers)  independent  -      Stand-Sit [place] (Transfers)  independent  -      Comment (Transfers)   Patient performed sit to stand quickly with no UE support - daughter surprised by this performance, reporting patient normally rocks for momentum.  -         Gait/Stairs Assessment/Training    [place] Level (Gait)  supervision  -      Deviations/Abnormal Patterns (Gait)  base of support, wide;gait speed decreased  -      Bilateral Gait Deviations  forward flexed posture  -         Balance Skills Training    SLS  7 seconds right LE, 9 seconds left LE  -      [place]  feet together on firm surface:  eyes open x 60 seconds, eyes closed x 30 seconds.  Foam pad:  eyes open x 60 seconds, eyes closed x 13 seconds with LOB posterior  -      Sharpened [place]  UE assist to get in position, maintained 15 seconds  -        User Key  (r) = Recorded By, (t) = Taken By, (c) = Cosigned By    Initials Name Provider Type     Carine Crowder, PT Physical Therapist                  Therapy Education  Education Details: Issued written HEP for heel raises, standing hip abduction, and supported squats.  To perform 20 reps, twice daily.  Given: HEP  Program: New  How Provided: Verbal, Demonstration, Written  Provided to: Patient, Caregiver  Level of Understanding: Teach back education performed, Verbalized, Demonstrated    PT OP Goals     Row Name 07/10/19 1600          PT Short Term Goals    STG Date to Achieve  07/24/19  -     STG 1  Patient/caregiver to be independent with HEP in order to maintain and improve upon gains made in therapy.  -     STG 1 Progress  New  [facility]     STG 2  Patient will perform 10 consecutive sit to stand transfers from standard height chair to demonstrate improved LE strength and safety with transfers.  -     STG 2 Progress  New  [facility]        Long Term Goals    LTG Date to Achieve  08/07/19  -     LTG 1  Patient will complete 6 minute walk test with no rest break to demonstrate adequate activity tolerance and safety with ambulation.  -     LTG 1 Progress  New  [facility]     LTG 2   Patient will improve score on Tinetti by at least 3 points to demonstrate improved safety with gait and balance activities.  -     LTG 2 Progress  New  -     LTG 3  Patient will ambulate over level surfaces, stepping over and around obstacles with no  loss of balance to demonstrate improved safety in the home and community.  -     LTG 3 Progress  New  -        Time Calculation    PT Goal Re-Cert Due Date  08/07/19  -       User Key  (r) = Recorded By, (t) = Taken By, (c) = Cosigned By    Initials Name Provider Type     Carine Crowder, PT Physical Therapist          PT Assessment/Plan     Row Name 07/10/19 1600          PT Assessment    Functional Limitations  Decreased safety during functional activities;Limitation in home management;Limitations in community activities  -     Impairments  Balance;Gait;Muscle strength  -     Assessment Comments  Mr. Goldberg presents today with c/o frequent falls.  He demonstrates mild LE weakness and scored within normal limits for his age in the Butcher Balance Test and the Timed Up and Go.  Due to patient and family reports of recent decline over last 6 months in balance, recommend skilled PT to develop and implement HEP, improve balance and safety with gait in order to decrease fall risk.  -     Please refer to paper survey for additional self-reported information  Yes  -     Rehab Potential  Good  -     Patient/caregiver participated in establishment of treatment plan and goals  Yes  -     Patient would benefit from skilled therapy intervention  Yes  -        PT Plan    PT Frequency  2x/week Patient requesting quick transition to HEP  -     Predicted Duration of Therapy Intervention (Therapy Eval)  4 weeks  -     Planned CPT's?  PT THER PROC EA 15 MIN: 20603;PT NEUROMUSC RE-EDUCATION EA 15 MIN: 98837;PT GAIT TRAINING EA 15 MIN: 29058  -     Physical Therapy Interventions (Optional Details)  balance training;gait training;home exercise  program;patient/family education;neuromuscular re-education;strengthening  -     PT Plan Comments  Patient to begin with HEP twice daily.  -       User Key  (r) = Recorded By, (t) = Taken By, (c) = Cosigned By    Initials Name Provider Type    Carine Beyer, PT Physical Therapist             Exercises     Row Name 07/10/19 1600             Exercise 1    Exercise Name 1  Heel raises with B UE support  -      Cueing 1  Verbal;Demo  -      Reps 1  20  -LH         Exercise 2    Exercise Name 2  Standing hip abduction with B UE support.  -      Cueing 2  Verbal;Demo  -      Reps 2  20 bilateral  -LH         Exercise 3    Exercise Name 3  squats with B UE support  -      Cueing 3  Verbal;Demo  -      Reps 3  20  -LH        User Key  (r) = Recorded By, (t) = Taken By, (c) = Cosigned By    Initials Name Provider Type     Carine Crowder, PT Physical Therapist          Outcome Measure Options: Butcher Balance, Tinetti, Timed Up and Go (TUG)  Butcher Balance Scale  Sitting to Standing: able to stand without using hands and stabilize independently  Standing Unsupported: able to stand 2 minutes with supervision  Sitting with Back Unsupported but Feet Supported on Floor or on Stool: able to sit safely and securely for 2 minutes  Standing to Sitting: sits safely with minimal use of hands  Transfers: able to transfer safely with minor use of hands  Standing Unsupported with Eyes Closed: able to stand 10 seconds with supervision  Standing Unsupported with Feet Together: able to place feet together independently and stand 1 minute with supervision  Reaching Forward with Outstretched Arm While Standing: can reach forward 12 cm (5 inches)   Object From the Floor From a Standing Position: able to  object but needs supervision  Turning to Look Behind Over Left and Right Shoulders While Standing: looks behind one side only other side shows less weight shift  Turn 360 Degrees: able to turn 360 degrees  "safely one side only 4 seconds or less  Place Alternate Foot on Step or Stool While Standing Unsupported: able to complete 4 steps without aid with supervision  Standing Unsupported with One Foot in Front: needs help to step but can hold 15 seconds  Standing on One Leg: able to lift leg independently and hold 5-10 seconds  Butcher Total Score: 43  Tinetti Assessment  Tinetti Assessment: yes  Sitting Balance: Steady,safe  Arises: Able in 1 attempt  Attempts to Rise: Able in 1 attempt  Immediate Standing Balance (first 5 sec): Steady without support  Standing Balance: Steady, stance > 4 inch ANDREW & requires support  Sternal Nudge (feet close together): Stagger, grabs, catches self  Eyes Closed (feet close together): Unsteady  Turning 360 Degrees- Steps: Discontinuous steps  Turning 360 Degrees- Steadiness: Unsteady (staggers, grabs)  Sitting Down: Safe, smooth motion  Tinetti Balance Score: 11  Gait Initiation (immediate after told \"go\"): No hesitancy  Step Length- Right Swing: Right swing foot passes Left stance leg  Step Length- Left Swing: Left swing foot passes Right  Foot Clearance- Right Foot: Right foot completely clears floor  Foot Clearance- Left Foot: Left foot completely clears floor  Step Symmetry: Right and Left step length equal  Step Continuity: Steps appear continuous  Path (excursion): Straight without device  Trunk: No sway but knee or trunk flexion or spread arms while walking  Base of Support: Heels apart  Gait Score: 10  Tinetti Total Score: 21  Timed Up and Go (TUG)  TUG Test 1: 12.45 seconds  TUG Test 2: 10.59 seconds    Time Calculation:   Start Time: 1404  Stop Time: 1500  Time Calculation (min): 56 min   Therapy Charges for Today     Code Description Service Date Service Provider Modifiers Qty    85216138833  PT EVAL MOD COMPLEXITY 4 7/10/2019 Carine Crowder, PT GP 1          PT G-Codes  Outcome Measure Options: Butcher Balance, Tinetti, Timed Up and Go (TUG)  Butcher Total Score: 43  Tinetti " Total Score: 21  TUG Test 1: 12.45 seconds  TUG Test 2: 10.59 seconds         Carine Crowder, PT  7/10/2019

## 2019-07-15 ENCOUNTER — HOSPITAL ENCOUNTER (OUTPATIENT)
Dept: PHYSICAL THERAPY | Facility: HOSPITAL | Age: 84
Setting detail: THERAPIES SERIES
Discharge: HOME | End: 2019-07-15

## 2019-07-15 DIAGNOSIS — F01.50 VASCULAR DEMENTIA WITHOUT BEHAVIORAL DISTURBANCE (HCC): Primary | ICD-10-CM

## 2019-07-15 DIAGNOSIS — R29.6 FALLS FREQUENTLY: ICD-10-CM

## 2019-07-15 DIAGNOSIS — R55 SYNCOPE AND COLLAPSE: ICD-10-CM

## 2019-07-15 DIAGNOSIS — Z86.73 HISTORY OF CVA (CEREBROVASCULAR ACCIDENT): ICD-10-CM

## 2019-07-15 PROCEDURE — 97110 THERAPEUTIC EXERCISES: CPT

## 2019-07-17 ENCOUNTER — HOSPITAL ENCOUNTER (OUTPATIENT)
Dept: PHYSICAL THERAPY | Facility: HOSPITAL | Age: 84
Setting detail: THERAPIES SERIES
Discharge: HOME | End: 2019-07-17

## 2019-07-17 DIAGNOSIS — F01.50 VASCULAR DEMENTIA WITHOUT BEHAVIORAL DISTURBANCE (HCC): Primary | ICD-10-CM

## 2019-07-17 DIAGNOSIS — R29.6 FALLS FREQUENTLY: ICD-10-CM

## 2019-07-17 PROCEDURE — 97110 THERAPEUTIC EXERCISES: CPT

## 2019-07-17 NOTE — THERAPY TREATMENT NOTE
Outpatient Physical Therapy Ortho Treatment Note  [facility]     Patient Name: Pawel Goldberg  : 1932  MRN: [id number]  Today's Date: 2019      Visit Date: 2019    Visit Dx:    ICD-10-CM ICD-9-CM   1. Vascular dementia without behavioral disturbance F01.50 290.40   2. Falls frequently R29.6 V15.88       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   • Obstruction of carotid artery   • Stenosis of carotid artery   • EEG abnormality without seizure   • Hereditary and idiopathic peripheral neuropathy   • Mixed hyperlipidemia   • Acquired hypothyroidism   • Low back pain with sciatica   • Lumbar canal stenosis   • Lumbar radiculopathy   • Degenerative arthritis of lumbar spine   • Osteoarthritis of hip   • History of repair of hip joint   • Syncope   • Temporary cerebral vascular dysfunction   • History of prostate cancer   • Dementia associated with other underlying disease without behavioral disturbance   • Arthritis   • PAD (peripheral artery disease) (CMS/HCC)   • Routine adult health maintenance   • Medicare annual wellness visit, subsequent   • Essential hypertension   • Medication monitoring encounter   • Status post implantation of artificial urinary sphincter   • Moderate asthma with acute exacerbation   • Vascular dementia without behavioral disturbance   • 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 (CMS/HCC)        Past Medical History:   Diagnosis Date   • Allergic     Seasonal   • Allergic rhinitis    • Arthritis    • Asthma    • Brito esophagus    • Benign prostatic hyperplasia    • Carotid arterial disease (CMS/HCC)     Status post right CEA 14   • Dementia    • Dyspnea    • Erectile dysfunction    • GERD (gastroesophageal reflux disease)    • HL (hearing loss)    • Hyperlipidemia    • Hypertension    • Hypothyroidism    • Low back pain    • Memory loss    • PAD (peripheral artery disease) (CMS/HCC)     • Prostate cancer (CMS/HCC)    • Stroke (CMS/HCC)     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   • Urinary tract infection         Past Surgical History:   Procedure Laterality Date   • CAROTID ARTERY ANGIOPLASTY  04/01/2014   • COLONOSCOPY  2009   • ENDOSCOPY  2009    2001,2003,2006   • FRACTURE SURGERY     • HERNIA REPAIR  2000   • HERNIA REPAIR  2009   • INGUINAL HERNIA REPAIR     • PARTIAL HIP ARTHROPLASTY Left 07/02/2014   • PROSTATECTOMY  2008   • SHOULDER SURGERY  2005   • SHOULDER SURGERY  2006   • SHOULDER SURGERY  2008       PT Ortho     Row Name 07/17/19 8088       Subjective Comments    Subjective Comments  pt reports he has been performing HEP.  States he is used to doing everything and can't do it now.  Reports he last fell in garage approx 3 weeks ago.  Is afraid he will fall again  -KM       Subjective Pain    Able to rate subjective pain?  yes  -KM    Pre-Treatment Pain Level  -- did not c/o pain  -KM      User Key  (r) = Recorded By, (t) = Taken By, (c) = Cosigned By    Initials Name Provider Type    Huong Sanchez PTA Physical Therapy Assistant                      PT Assessment/Plan     Row Name 07/17/19 2266          PT Assessment    Functional Limitations  Impaired gait;Impaired locomotion;Limitation in home management  -KM     Impairments  Balance;Gait;Impaired flexibility;Muscle strength  -KM     Assessment Comments  -- pt required rest breaks due to shortness of air.  Did  have inhaler with him but declined to use.  Rest periods in between ex.  Pt reports he liked cane with ambulation- is going to go home and look for his cane.  Pt to bring cane next visit if he finds  -KM       User Key  (r) = Recorded By, (t) = Taken By, (c) = Cosigned By    Initials Name Provider Type    Huong Sanchez PTA Physical Therapy Assistant            Exercises     Row Name 07/17/19 9203             Subjective Comments    Subjective Comments  pt reports he has been performing HEP.   States he is used to doing everything and can't do it now.  Reports he last fell in garage approx 3 weeks ago.  Is afraid he will fall again  -KM         Subjective Pain    Able to rate subjective pain?  yes  -KM      Pre-Treatment Pain Level  -- did not c/o pain  -KM         Exercise 1    Exercise Name 1  Heel raises with no UE support, CGA.  One loss of balance, stepping anterior to regain.  -KM      Cueing 1  Verbal;Demo  -KM      Reps 1  25  -KM         Exercise 2    Exercise Name 2  Standing hip abduction with one/two UE support.  -KM      Cueing 2  Verbal;Demo  -KM      Reps 2  30 bilateral  -KM      Additional Comments  2.5 #  -KM         Exercise 3    Exercise Name 3  squats with  UE support as needed- pt trying to squat low  -KM      Cueing 3  Verbal;Demo  -KM      Reps 3  20  -KM         Exercise 4    Exercise Name 4  sit to/from stand - no UE support.  SBA  -KM      Cueing 4  Verbal;Demo  -KM      Sets 4  2  -KM      Reps 4  10  -KM      Additional Comments  -- rest period due to wheezing  -KM         Exercise 5    Exercise Name 5  seated hip abduction vs. tband  -KM      Cueing 5  Verbal;Demo  -KM      Reps 5  30  -KM      Additional Comments  black  -KM         Exercise 6    Exercise Name 6  HS vs. tband - seated, bilateral  -KM      Cueing 6  Verbal;Tactile;Demo  -KM      Reps 6  30 each leg  -KM      Additional Comments  cues to slow down  -KM         Exercise 7    Exercise Name 7  Foam pad balance - eyes closed with CGA for balance  -KM      Cueing 7  Verbal;Tactile  -KM      Time 7  52 seconds then 47 seconds- after seated rest  -KM         Exercise 8    Exercise Name 8  foam pad balance:  march in place with min A.  Tendency to step forward.  -KM      Cueing 8  Verbal;Tactile;Demo  -KM      Time 8  54  -KM         Exercise 9    Exercise Name 9  -- pt agreed to use cane for 6 minute walk test  -KM        User Key  (r) = Recorded By, (t) = Taken By, (c) = Cosigned By    Initials Name Provider Type     Huong Sanchez PTA Physical Therapy Assistant            Pt states he is feeling better since he stopped using his inhaler for a couple of days.  States he found out he was doing more than he should and that is why he did not feel well.  Is going to restart it in a day or two.  Discussed pros and cons of using a walker/cane.  Pt reports he thinks it is pride not letting him do it.  Education provided and pt agreed to try cane with 6 minute walk test.  Pt during ambulation states he actually liked cane.  Discussed use of cane with pt and his daughter.  Pt thinks he has cane at home and is to bring it in next time if he finds it.  If not, explained to them MD can order cane or can possibly get on from [facility].                  Therapy Education  Education Details: (lengthy discussion about benefits of using an assistive device.  Encouraged pt to use cane for longer distances if won't use all the time)  Given: HEP, Symptoms/condition management, Fall prevention and home safety, Mobility training  Program: Reinforced, Progressed  How Provided: Verbal  Provided to: Patient  Level of Understanding: Teach back education performed, Verbalized    Outcome Measure Options: 6 Minute Walk Test  6 Minute Walk Test  Distance: 890  6 Minute Walk Comments: SOA but did with cane- pt liked cane he says         Time Calculation:   Start Time: 1357  Stop Time: 1455  Time Calculation (min): 58 min  Therapy Charges for Today     Code Description Service Date Service Provider Modifiers Qty    40626419461 HC PT THER PROC EA 15 MIN 7/17/2019 Huong Hernandez PTA GP 3          PT G-Codes  Outcome Measure Options: 6 Minute Walk Test         Huong Hernandez PTA  7/17/2019

## 2019-07-24 ENCOUNTER — HOSPITAL ENCOUNTER (OUTPATIENT)
Dept: PHYSICAL THERAPY | Facility: HOSPITAL | Age: 84
Setting detail: THERAPIES SERIES
Discharge: HOME | End: 2019-07-24

## 2019-07-24 DIAGNOSIS — R55 SYNCOPE AND COLLAPSE: ICD-10-CM

## 2019-07-24 DIAGNOSIS — Z86.73 HISTORY OF CVA (CEREBROVASCULAR ACCIDENT): ICD-10-CM

## 2019-07-24 DIAGNOSIS — F01.50 VASCULAR DEMENTIA WITHOUT BEHAVIORAL DISTURBANCE (HCC): Primary | ICD-10-CM

## 2019-07-24 DIAGNOSIS — R29.6 FALLS FREQUENTLY: ICD-10-CM

## 2019-07-24 PROCEDURE — 97110 THERAPEUTIC EXERCISES: CPT

## 2019-07-24 NOTE — THERAPY TREATMENT NOTE
Outpatient Physical Therapy Neuro Treatment Note  CORTNEY Childers     Patient Name: Pawel Goldberg  : 1932  MRN: [id number]  Today's Date: 2019      Visit Date: 2019    Visit Dx:    ICD-10-CM ICD-9-CM   1. Vascular dementia without behavioral disturbance F01.50 290.40   2. Falls frequently R29.6 V15.88   3. Syncope and collapse R55 780.2   4. History of CVA (cerebrovascular accident) Z86.73 V12.54       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   • Obstruction of carotid artery   • Stenosis of carotid artery   • EEG abnormality without seizure   • Hereditary and idiopathic peripheral neuropathy   • Mixed hyperlipidemia   • Acquired hypothyroidism   • Low back pain with sciatica   • Lumbar canal stenosis   • Lumbar radiculopathy   • Degenerative arthritis of lumbar spine   • Osteoarthritis of hip   • History of repair of hip joint   • Syncope   • Temporary cerebral vascular dysfunction   • History of prostate cancer   • Dementia associated with other underlying disease without behavioral disturbance   • Arthritis   • PAD (peripheral artery disease) (CMS/[facility])   • Routine adult health maintenance   • Medicare annual wellness visit, subsequent   • Essential hypertension   • Medication monitoring encounter   • Status post implantation of artificial urinary sphincter   • Moderate asthma with acute exacerbation   • Vascular dementia without behavioral disturbance   • 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  Good safety with floor to stand transfers with UE support however patient resistant to PT recommendations for LifeAlert or keeping phone with him to call for him in event of another fall.  -        PT Plan    PT Plan Comments  Returns Friday -  "repeat 6 minute walk test.  -       User Key  (r) = Recorded By, (t) = Taken By, (c) = Cosigned By    Initials Name Provider Type     Carine Crowder, PT Physical Therapist             Exercises     Row Name 07/24/19 1515 07/24/19 1400          Subjective Comments    Subjective Comments  --  Patient  reports he has been consistent with HEP, \"I need a longer band for home.\"  Patient expresses concern over being able to get off of floor if he ever falls again.  Feels like his legs are strong.    Arrived with cane - reports he feels safer when using it.  -        Total Minutes    65075 - PT Therapeutic Exercise Minutes  53  -LH  --        Exercise 1    Exercise Name 1  --  Heel raises with no UE support, CGA.  No loss of balance  -     Cueing 1  --  Verbal;Demo  -LH     Reps 1  --  30  -LH        Exercise 2    Exercise Name 2  --  Standing hip abduction with one/two UE support.  -LH     Cueing 2  --  Verbal;Demo  -LH     Reps 2  --  30 bilateral  -LH     Additional Comments  --  2.5#  -LH        Exercise 3    Exercise Name 3  --  squats with  no UE support  -LH     Cueing 3  --  Verbal;Demo  -LH     Reps 3  --  30  -LH        Exercise 4    Exercise Name 4  --  sit to/from stand - no UE support.  SBA  -LH     Cueing 4  --  Verbal;Demo  -LH     Sets 4  --  2  -LH     Reps 4  --  10  -LH        Exercise 5    Exercise Name 5  --  seated hip abduction vs. tband  -LH     Cueing 5  --  Verbal;Demo  -LH     Reps 5  --  30  -LH     Additional Comments  --  silver - issued silver band for HEP  -LH        Exercise 6    Exercise Name 6  --  HS vs. tband - seated, bilateral  -LH     Cueing 6  --  Verbal;Tactile;Demo  -LH     Reps 6  --  30 each leg  -LH     Additional Comments  --  silver tband  -LH        Exercise 7    Exercise Name 7  --  Foam pad balance - eyes closed with CGA for balance  -LH     Cueing 7  --  Verbal;Tactile  -LH     Time 7  --  60 seconds - increased sway in all directions but able to regain balance " without external assistance  -        Exercise 8    Exercise Name 8  --  foam pad balance:  march in place with CGA.  Tendency to step forward.  -     Cueing 8  --  Verbal;Tactile;Demo  -     Time 8  --  60 seconds  -        Exercise 9    Exercise Name 9  --  stepping over obstacles - 5 obstacles ranging in size from 3 inches tall x 3 inches wide, up to 6 inches tall, widest option foam pad - 18 inches.  Steps over obstacles with good speed and stability, no loss of balance or change in gait speed.  -        Exercise 10    Exercise Name 10  --  floor to/from stand transfers: performed 3x stand to floor via half kneel with UE support on mat table with CGA/SBA.  4th attempt, patient lying completely flat in prone on floor, manual cues at pelvis to push back onto hands/knees, then turned to face mat table to complete transfer half kneel to stand with UE support.  Daughter present.  -     Cueing 10  --  Verbal;Tactile;Demo  -        Exercise 11    Exercise Name 11  --  O2 sats:  intermittent wheezing noted with activity - O2 sats and HR checked - 93% and 89 bpm, 95% and 112 bpm  -        Exercise 12    Exercise Name 12  --  Cane fit assessed per daughter request - cane at appropriate height for patient and patient using appropriately over level surfaces.  -       User Key  (r) = Recorded By, (t) = Taken By, (c) = Cosigned By    Initials Name Provider Type     Carine Crowder, PT Physical Therapist        Therapy Education  Education Details: issued silver band for HEP.  Patient and daughter educated on technique for floor to stand transfers.  PT recommends use of life alert for safety due to history of falling with no family around and remaining in place on ground for 2 hours.  Patient refuses recommendation.  Discussed keeping phone in pocket - patient declines, daughter reports he would likely be unable to use it correctly.  Given: Fall prevention and home safety  Program: New  How Provided:  Verbal, Demonstration  Provided to: Patient, Caregiver  Level of Understanding: Verbalized    Time Calculation:   Start Time: 1400  Stop Time: 1453  Time Calculation (min): 53 min   Therapy Charges for Today     Code Description Service Date Service Provider Modifiers Qty    32097275107 HC PT THER PROC EA 15 MIN 7/24/2019 Carine Crowder, PT GP 4          Carine Crowder, PT  7/24/2019

## 2019-07-26 ENCOUNTER — HOSPITAL ENCOUNTER (OUTPATIENT)
Dept: PHYSICAL THERAPY | Facility: HOSPITAL | Age: 84
Setting detail: THERAPIES SERIES
Discharge: HOME | End: 2019-07-26

## 2019-07-26 DIAGNOSIS — R55 SYNCOPE AND COLLAPSE: ICD-10-CM

## 2019-07-26 DIAGNOSIS — F01.50 VASCULAR DEMENTIA WITHOUT BEHAVIORAL DISTURBANCE (HCC): Primary | ICD-10-CM

## 2019-07-26 DIAGNOSIS — Z86.73 HISTORY OF CVA (CEREBROVASCULAR ACCIDENT): ICD-10-CM

## 2019-07-26 DIAGNOSIS — R29.6 FALLS FREQUENTLY: ICD-10-CM

## 2019-07-26 PROCEDURE — 97110 THERAPEUTIC EXERCISES: CPT

## 2019-07-26 NOTE — THERAPY TREATMENT NOTE
Outpatient Physical Therapy Neuro Treatment Note  CORTNEY Childers     Patient Name: Pawel Goldberg  : 1932  MRN: [id number]  Today's Date: 2019      Visit Date: 2019    Visit Dx:    ICD-10-CM ICD-9-CM   1. Vascular dementia without behavioral disturbance F01.50 290.40   2. Falls frequently R29.6 V15.88   3. Syncope and collapse R55 780.2   4. History of CVA (cerebrovascular accident) Z86.73 V12.54       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   • Obstruction of carotid artery   • Stenosis of carotid artery   • EEG abnormality without seizure   • Hereditary and idiopathic peripheral neuropathy   • Mixed hyperlipidemia   • Acquired hypothyroidism   • Low back pain with sciatica   • Lumbar canal stenosis   • Lumbar radiculopathy   • Degenerative arthritis of lumbar spine   • Osteoarthritis of hip   • History of repair of hip joint   • Syncope   • Temporary cerebral vascular dysfunction   • History of prostate cancer   • Dementia associated with other underlying disease without behavioral disturbance   • Arthritis   • PAD (peripheral artery disease) (CMS/[facility])   • Routine adult health maintenance   • Medicare annual wellness visit, subsequent   • Essential hypertension   • Medication monitoring encounter   • Status post implantation of artificial urinary sphincter   • Moderate asthma with acute exacerbation   • Vascular dementia without behavioral disturbance   • 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 (CMS/[facility])     PT Assessment/Plan     Row Name 19 1100          PT Assessment    Assessment Comments  Patient demonstrates improved balance/gait based on improved Tinetti scores.  He is limited primarily by shortness of breath.  Good safety/stability with floor to stand transfers with UE support and with navigating obstacles in gait.  Patient has met all goals.    -        PT Plan    PT Plan Comments  Patient  "declines further PT at this time, \"I can do my exercises at home.  I don't need to come in here.\"  Will hold on officially discharging chart for 2 weeks to ensure no patient/family concers - no family present today.  Patient in agreement with plan.  Encouraged to call PT if any questions/concerns arise.  -       User Key  (r) = Recorded By, (t) = Taken By, (c) = Cosigned By    Initials Name Provider Type     Carine Crowder, PT Physical Therapist             Exercises     Row Name 07/26/19 1144 07/26/19 1100          Subjective Comments    Subjective Comments  --  Patient reports he is consistent with HEP and is getting back to walking.  \"I may menchaca and puff, but I can keep going.\"  -        Total Minutes    42112 - PT Therapeutic Exercise Minutes  39  -LH  --        Exercise 1    Exercise Name 1  --  Heel raises with no UE support, CGA.  No loss of balance  -     Cueing 1  --  Verbal;Demo  -LH     Reps 1  --  30  -LH     Additional Comments  --  on foam pad today for added balance challenge - good stability  -        Exercise 2    Exercise Name 2  --  Standing hip abduction with one/two UE support.  -     Cueing 2  --  Verbal;Demo  -LH     Reps 2  --  30 bilateral  -LH     Additional Comments  --  3#  -LH        Exercise 3    Exercise Name 3  --  squats with  no UE support  -LH     Cueing 3  --  Verbal;Demo  -LH     Reps 3  --  30  -LH        Exercise 4    Exercise Name 4  --  sit to/from stand - no UE support.  SBA  -LH     Cueing 4  --  Verbal;Demo  -LH     Reps 4  --  20  -LH        Exercise 5    Exercise Name 5  --  seated hip abduction vs. tband  -LH     Cueing 5  --  Verbal;Demo  -LH     Reps 5  --  30  -LH     Additional Comments  --  silver tband  -LH        Exercise 6    Exercise Name 6  --  HS vs. tband - seated, bilateral  -LH     Cueing 6  --  Verbal;Tactile;Demo  -     Reps 6  --  30 each leg  -LH     Additional Comments  --  silver tband  -LH        Exercise 7    Exercise Name 7  --  " Foam pad balance - eyes closed with CGA for balance  -     Cueing 7  --  Verbal;Tactile  -     Time 7  --  60 seconds - increased sway in all directions but able to regain balance without external assistance  -        Exercise 8    Exercise Name 8  --  foam pad balance:  march in place with CGA.  Tendency to step forward.  -     Cueing 8  --  Verbal;Tactile;Demo  -LH     Time 8  --  60 seconds  -        Exercise 10    Exercise Name 10  --  floor to/from stand transfers via half kneel with UE support on mat table, supervision  -     Cueing 10  --  Demo  -LH     Reps 10  --  4  -       User Key  (r) = Recorded By, (t) = Taken By, (c) = Cosigned By    Initials Name Provider Type     Carine Crowder, PT Physical Therapist        PT OP Goals     Row Name 07/26/19 1100          PT Short Term Goals    STG Date to Achieve  07/24/19  -     STG 1  Patient/caregiver to be independent with HEP in order to maintain and improve upon gains made in therapy.  -     STG 1 Progress  Met  -     STG 2  Patient will perform 10 consecutive sit to stand transfers from standard height chair to demonstrate improved LE strength and safety with transfers.  -     STG 2 Progress  Met  -        Long Term Goals    LTG Date to Achieve  08/07/19  -     LTG 1  Patient will complete 6 minute walk test with no rest break to demonstrate adequate activity tolerance and safety with ambulation.  -     LTG 1 Progress  Met  -     LTG 2  Patient will improve score on Tinetti by at least 3 points to demonstrate improved safety with gait and balance activities.  -     LTG 2 Progress  Met  -     LTG 3  Patient will ambulate over level surfaces, stepping over and around obstacles with no  loss of balance to demonstrate improved safety in the home and community.  -     LTG 3 Progress  Met  -       User Key  (r) = Recorded By, (t) = Taken By, (c) = Cosigned By    Initials Name Provider Type    Carine Beyer, PT  "Physical Therapist               Outcome Measure Options: 6 Minute Walk Test, Tinetti  6 Minute Walk Test  Distance: 1000  6 Minute Walk Comments: peristant shortness of air - patient declines rest breaks and declines use of inhaler  Tinetti Assessment  Tinetti Assessment: yes  Sitting Balance: Steady,safe  Arises: Able in 1 attempt  Attempts to Rise: Able in 1 attempt  Immediate Standing Balance (first 5 sec): Steady without support  Standing Balance: Narrow stance, without support  Sternal Nudge (feet close together): Steady  Eyes Closed (feet close together): Steady  Turning 360 Degrees- Steps: Continuous steps  Turning 360 Degrees- Steadiness: Steady  Sitting Down: Safe, smooth motion  Tinetti Balance Score: 16  Gait Initiation (immediate after told \"go\"): No hesitancy  Step Length- Right Swing: Right swing foot passes Left stance leg  Step Length- Left Swing: Left swing foot passes Right  Foot Clearance- Right Foot: Right foot completely clears floor  Foot Clearance- Left Foot: Left foot completely clears floor  Step Symmetry: Right and Left step length equal  Step Continuity: Steps appear continuous  Path (excursion): Straight without device  Trunk: No sway but knee or trunk flexion or spread arms while walking  Base of Support: Heels apart  Gait Score: 10  Tinetti Total Score: 26      Time Calculation:   Start Time: 1101  Stop Time: 1140  Time Calculation (min): 39 min   Therapy Charges for Today     Code Description Service Date Service Provider Modifiers Qty    23601501892 HC PT THER PROC EA 15 MIN 7/26/2019 Carine Crowder, PT GP 3          PT G-Codes  Outcome Measure Options: 6 Minute Walk Test, Tinetti  Tinetti Total Score: 26         Carine Crowder, PT  7/26/2019     "

## 2019-08-16 ENCOUNTER — OFFICE VISIT (OUTPATIENT)
Dept: FAMILY MEDICINE CLINIC | Facility: CLINIC | Age: 84
End: 2019-08-16

## 2019-08-16 VITALS
HEART RATE: 84 BPM | DIASTOLIC BLOOD PRESSURE: 88 MMHG | WEIGHT: 193.8 LBS | BODY MASS INDEX: 30.42 KG/M2 | OXYGEN SATURATION: 94 % | HEIGHT: 67 IN | SYSTOLIC BLOOD PRESSURE: 150 MMHG

## 2019-08-16 DIAGNOSIS — I73.9 PAD (PERIPHERAL ARTERY DISEASE) (HCC): Primary | ICD-10-CM

## 2019-08-16 DIAGNOSIS — R20.9 COLD HANDS AND FEET: ICD-10-CM

## 2019-08-16 PROCEDURE — 99213 OFFICE O/P EST LOW 20 MIN: CPT | Performed by: FAMILY MEDICINE

## 2019-08-16 NOTE — PROGRESS NOTES
Subjective   Pawel Goldberg is a 86 y.o. male who is here for   Chief Complaint   Patient presents with   • Cyanosis     hands and feet- 4 days ago    .     History of Present Illness   Brought in by daughter  His hands and feet appeared blue this week  Back to normal y'day  Normal today  Feet > hands  Has not taken Proamatine.  No decongestants  Had no pain  No skin break down  chronic 1+ edema      The following portions of the patient's history were reviewed and updated as appropriate: allergies, current medications, past family history, past medical history, past social history, past surgical history and problem list.    Review of Systems   Cardiovascular: Positive for leg swelling. Negative for chest pain and palpitations.   Skin: Positive for color change.       Objective   Physical Exam   Constitutional: He appears well-developed and well-nourished.   Cardiovascular: Normal rate and intact distal pulses.   Pulmonary/Chest: Effort normal.   Musculoskeletal: He exhibits no edema.        Right foot: There is normal range of motion, no tenderness, no swelling and normal capillary refill.        Left foot: There is normal range of motion, no tenderness, no bony tenderness, no swelling and normal capillary refill.   Nursing note and vitals reviewed.      Assessment/Plan   Pawel was seen today for cyanosis.    Diagnoses and all orders for this visit:    PAD (peripheral artery disease) (CMS/[facility])    Cold hands and feet    obs for now  Drop up feet  Warm soaks if bluish  Call if painful or skin breakdown    There are no Patient Instructions on file for this visit.    There are no discontinued medications.     No Follow-up on file.    Dr. Charlie Trinh  [place], [place]

## 2019-08-19 RX ORDER — TOLTERODINE 4 MG/1
CAPSULE, EXTENDED RELEASE ORAL
Qty: 90 CAPSULE | Refills: 4 | Status: SHIPPED | OUTPATIENT
Start: 2019-08-19 | End: 2021-05-21 | Stop reason: ALTCHOICE

## 2019-09-30 ENCOUNTER — APPOINTMENT (OUTPATIENT)
Dept: GENERAL RADIOLOGY | Facility: HOSPITAL | Age: 84
End: 2019-09-30

## 2019-09-30 ENCOUNTER — HOSPITAL ENCOUNTER (EMERGENCY)
Facility: HOSPITAL | Age: 84
Discharge: HOME | End: 2019-09-30
Attending: EMERGENCY MEDICINE | Admitting: EMERGENCY MEDICINE

## 2019-09-30 VITALS
HEIGHT: 69 IN | BODY MASS INDEX: 28.88 KG/M2 | DIASTOLIC BLOOD PRESSURE: 97 MMHG | TEMPERATURE: 97.8 F | SYSTOLIC BLOOD PRESSURE: 121 MMHG | HEART RATE: 79 BPM | WEIGHT: 195 LBS | RESPIRATION RATE: 20 BRPM | OXYGEN SATURATION: 93 %

## 2019-09-30 DIAGNOSIS — J44.1 COPD WITH ACUTE EXACERBATION (HCC): Primary | ICD-10-CM

## 2019-09-30 LAB
ALBUMIN SERPL-MCNC: 3.7 G/DL (ref 3.5–5.2)
ALBUMIN/GLOB SERPL: 1.4 G/DL
ALP SERPL-CCNC: 47 U/L (ref 39–117)
ALT SERPL W P-5'-P-CCNC: 20 U/L (ref 1–41)
ANION GAP SERPL CALCULATED.3IONS-SCNC: 10.1 MMOL/L (ref 5–15)
AST SERPL-CCNC: 21 U/L (ref 1–40)
BASOPHILS # BLD AUTO: 0.05 10*3/MM3 (ref 0–0.2)
BASOPHILS NFR BLD AUTO: 0.6 % (ref 0–1.5)
BILIRUB SERPL-MCNC: 0.4 MG/DL (ref 0.2–1.2)
BUN BLD-MCNC: 18 MG/DL (ref 8–23)
BUN/CREAT SERPL: 20 (ref 7–25)
CALCIUM SPEC-SCNC: 9.3 MG/DL (ref 8.6–10.5)
CHLORIDE SERPL-SCNC: 98 MMOL/L (ref 98–107)
CO2 SERPL-SCNC: 27.9 MMOL/L (ref 22–29)
CREAT BLD-MCNC: 0.9 MG/DL (ref 0.76–1.27)
DEPRECATED RDW RBC AUTO: 50.2 FL (ref 37–54)
EOSINOPHIL # BLD AUTO: 0.38 10*3/MM3 (ref 0–0.4)
EOSINOPHIL NFR BLD AUTO: 4.9 % (ref 0.3–6.2)
ERYTHROCYTE [DISTWIDTH] IN BLOOD BY AUTOMATED COUNT: 14.2 % (ref 12.3–15.4)
GFR SERPL CREATININE-BSD FRML MDRD: 80 ML/MIN/1.73
GLOBULIN UR ELPH-MCNC: 2.7 GM/DL
GLUCOSE BLD-MCNC: 103 MG/DL (ref 65–99)
HCT VFR BLD AUTO: 42.1 % (ref 37.5–51)
HGB BLD-MCNC: 13.5 G/DL (ref 13–17.7)
IMM GRANULOCYTES # BLD AUTO: 0.03 10*3/MM3 (ref 0–0.05)
IMM GRANULOCYTES NFR BLD AUTO: 0.4 % (ref 0–0.5)
LYMPHOCYTES # BLD AUTO: 1.75 10*3/MM3 (ref 0.7–3.1)
LYMPHOCYTES NFR BLD AUTO: 22.4 % (ref 19.6–45.3)
MCH RBC QN AUTO: 30.7 PG (ref 26.6–33)
MCHC RBC AUTO-ENTMCNC: 32.1 G/DL (ref 31.5–35.7)
MCV RBC AUTO: 95.7 FL (ref 79–97)
MONOCYTES # BLD AUTO: 0.86 10*3/MM3 (ref 0.1–0.9)
MONOCYTES NFR BLD AUTO: 11 % (ref 5–12)
NEUTROPHILS # BLD AUTO: 4.74 10*3/MM3 (ref 1.7–7)
NEUTROPHILS NFR BLD AUTO: 60.7 % (ref 42.7–76)
NRBC BLD AUTO-RTO: 0 /100 WBC (ref 0–0.2)
PLATELET # BLD AUTO: 225 10*3/MM3 (ref 140–450)
PMV BLD AUTO: 10.1 FL (ref 6–12)
POTASSIUM BLD-SCNC: 4.6 MMOL/L (ref 3.5–5.2)
PROT SERPL-MCNC: 6.4 G/DL (ref 6–8.5)
RBC # BLD AUTO: 4.4 10*6/MM3 (ref 4.14–5.8)
SODIUM BLD-SCNC: 136 MMOL/L (ref 136–145)
TROPONIN T SERPL-MCNC: <0.01 NG/ML (ref 0–0.03)
WBC NRBC COR # BLD: 7.81 10*3/MM3 (ref 3.4–10.8)

## 2019-09-30 PROCEDURE — 25010000002 METHYLPREDNISOLONE PER 125 MG: Performed by: EMERGENCY MEDICINE

## 2019-09-30 PROCEDURE — 94799 UNLISTED PULMONARY SVC/PX: CPT

## 2019-09-30 PROCEDURE — 85025 COMPLETE CBC W/AUTO DIFF WBC: CPT | Performed by: EMERGENCY MEDICINE

## 2019-09-30 PROCEDURE — 84484 ASSAY OF TROPONIN QUANT: CPT | Performed by: EMERGENCY MEDICINE

## 2019-09-30 PROCEDURE — 80053 COMPREHEN METABOLIC PANEL: CPT | Performed by: EMERGENCY MEDICINE

## 2019-09-30 PROCEDURE — 25010000002 ONDANSETRON PER 1 MG: Performed by: EMERGENCY MEDICINE

## 2019-09-30 PROCEDURE — 96375 TX/PRO/DX INJ NEW DRUG ADDON: CPT

## 2019-09-30 PROCEDURE — 99284 EMERGENCY DEPT VISIT MOD MDM: CPT | Performed by: EMERGENCY MEDICINE

## 2019-09-30 PROCEDURE — 94640 AIRWAY INHALATION TREATMENT: CPT

## 2019-09-30 PROCEDURE — 96374 THER/PROPH/DIAG INJ IV PUSH: CPT

## 2019-09-30 PROCEDURE — 93005 ELECTROCARDIOGRAM TRACING: CPT | Performed by: EMERGENCY MEDICINE

## 2019-09-30 PROCEDURE — 99284 EMERGENCY DEPT VISIT MOD MDM: CPT

## 2019-09-30 PROCEDURE — 71046 X-RAY EXAM CHEST 2 VIEWS: CPT

## 2019-09-30 PROCEDURE — 93010 ELECTROCARDIOGRAM REPORT: CPT | Performed by: INTERNAL MEDICINE

## 2019-09-30 RX ORDER — METHYLPREDNISOLONE 4 MG/1
TABLET ORAL
Qty: 21 TABLET | Refills: 0 | Status: SHIPPED | OUTPATIENT
Start: 2019-09-30 | End: 2019-11-17

## 2019-09-30 RX ORDER — METHYLPREDNISOLONE SODIUM SUCCINATE 125 MG/2ML
125 INJECTION, POWDER, LYOPHILIZED, FOR SOLUTION INTRAMUSCULAR; INTRAVENOUS ONCE
Status: COMPLETED | OUTPATIENT
Start: 2019-09-30 | End: 2019-09-30

## 2019-09-30 RX ORDER — ONDANSETRON 2 MG/ML
4 INJECTION INTRAMUSCULAR; INTRAVENOUS ONCE
Status: COMPLETED | OUTPATIENT
Start: 2019-09-30 | End: 2019-09-30

## 2019-09-30 RX ORDER — SODIUM CHLORIDE 0.9 % (FLUSH) 0.9 %
10 SYRINGE (ML) INJECTION AS NEEDED
Status: DISCONTINUED | OUTPATIENT
Start: 2019-09-30 | End: 2019-09-30 | Stop reason: HOSPADM

## 2019-09-30 RX ORDER — ALBUTEROL SULFATE 2.5 MG/3ML
2.5 SOLUTION RESPIRATORY (INHALATION)
Status: COMPLETED | OUTPATIENT
Start: 2019-09-30 | End: 2019-09-30

## 2019-09-30 RX ORDER — IPRATROPIUM BROMIDE AND ALBUTEROL SULFATE 2.5; .5 MG/3ML; MG/3ML
3 SOLUTION RESPIRATORY (INHALATION) ONCE
Status: COMPLETED | OUTPATIENT
Start: 2019-09-30 | End: 2019-09-30

## 2019-09-30 RX ADMIN — ONDANSETRON 4 MG: 2 INJECTION, SOLUTION INTRAMUSCULAR; INTRAVENOUS at 14:08

## 2019-09-30 RX ADMIN — ALBUTEROL SULFATE 2.5 MG: 2.5 SOLUTION RESPIRATORY (INHALATION) at 14:35

## 2019-09-30 RX ADMIN — METHYLPREDNISOLONE SODIUM SUCCINATE 125 MG: 125 INJECTION, POWDER, FOR SOLUTION INTRAMUSCULAR; INTRAVENOUS at 13:56

## 2019-09-30 RX ADMIN — ALBUTEROL SULFATE 2.5 MG: 2.5 SOLUTION RESPIRATORY (INHALATION) at 14:28

## 2019-09-30 RX ADMIN — IPRATROPIUM BROMIDE AND ALBUTEROL SULFATE 3 ML: .5; 3 SOLUTION RESPIRATORY (INHALATION) at 13:59

## 2019-10-11 DIAGNOSIS — K22.70 BARRETT'S ESOPHAGUS WITHOUT DYSPLASIA: ICD-10-CM

## 2019-10-11 RX ORDER — PANTOPRAZOLE SODIUM 40 MG/1
TABLET, DELAYED RELEASE ORAL
Qty: 180 TABLET | Refills: 0 | Status: SHIPPED | OUTPATIENT
Start: 2019-10-11 | End: 2020-01-09

## 2019-10-16 ENCOUNTER — FLU SHOT (OUTPATIENT)
Dept: FAMILY MEDICINE CLINIC | Facility: CLINIC | Age: 84
End: 2019-10-16

## 2019-10-16 DIAGNOSIS — Z23 IMMUNIZATION, PNEUMOCOCCUS AND INFLUENZA: ICD-10-CM

## 2019-10-16 PROCEDURE — G0008 ADMIN INFLUENZA VIRUS VAC: HCPCS | Performed by: FAMILY MEDICINE

## 2019-10-16 PROCEDURE — 90653 IIV ADJUVANT VACCINE IM: CPT | Performed by: FAMILY MEDICINE

## 2019-11-11 ENCOUNTER — DOCUMENTATION (OUTPATIENT)
Dept: PHYSICAL THERAPY | Facility: HOSPITAL | Age: 84
End: 2019-11-11

## 2019-11-11 DIAGNOSIS — F01.50 VASCULAR DEMENTIA WITHOUT BEHAVIORAL DISTURBANCE (HCC): Primary | ICD-10-CM

## 2019-11-11 DIAGNOSIS — Z86.73 HISTORY OF CVA (CEREBROVASCULAR ACCIDENT): ICD-10-CM

## 2019-11-11 DIAGNOSIS — R29.6 FALLS FREQUENTLY: ICD-10-CM

## 2019-11-11 NOTE — THERAPY DISCHARGE NOTE
Outpatient Physical Therapy Discharge Summary         Patient Name: Pawel Goldberg  : 1932  MRN: [id number]    Today's Date: 2019    Visit Dx:    ICD-10-CM ICD-9-CM   1. Vascular dementia without behavioral disturbance (CMS/HCC) F01.50 290.40   2. Falls frequently R29.6 V15.88   3. History of CVA (cerebrovascular accident) Z86.73 V12.54       PT OP Goals     Row Name 19 0800          PT Short Term Goals    STG Date to Achieve  19  -     STG 1  Patient/caregiver to be independent with HEP in order to maintain and improve upon gains made in therapy.  -     STG 1 Progress  Met  -     STG 2  Patient will perform 10 consecutive sit to stand transfers from standard height chair to demonstrate improved LE strength and safety with transfers.  -     STG 2 Progress  Met  -        Long Term Goals    LTG Date to Achieve  19  -     LTG 1  Patient will complete 6 minute walk test with no rest break to demonstrate adequate activity tolerance and safety with ambulation.  -     LTG 1 Progress  Met  -     LTG 2  Patient will improve score on Tinetti by at least 3 points to demonstrate improved safety with gait and balance activities.  -     LTG 2 Progress  Met  [facility]     LTG 3  Patient will ambulate over level surfaces, stepping over and around obstacles with no  loss of balance to demonstrate improved safety in the home and community.  -     LTG 3 Progress  Met  [facility]       User Key  (r) = Recorded By, (t) = Taken By, (c) = Cosigned By    Initials Name Provider Type     Carine Crowder, PT Physical Therapist          OP PT Discharge Summary  Date of Discharge: 19  Reason for Discharge: All goals achieved  Outcomes Achieved: Able to achieve all goals within established timeline  Discharge Destination: Home with home program        Carine Crowder, PT  2019

## 2019-11-15 ENCOUNTER — OFFICE VISIT (OUTPATIENT)
Dept: FAMILY MEDICINE CLINIC | Facility: CLINIC | Age: 84
End: 2019-11-15

## 2019-11-15 VITALS
DIASTOLIC BLOOD PRESSURE: 74 MMHG | HEART RATE: 97 BPM | BODY MASS INDEX: 28.88 KG/M2 | WEIGHT: 195 LBS | HEIGHT: 69 IN | SYSTOLIC BLOOD PRESSURE: 126 MMHG | OXYGEN SATURATION: 96 %

## 2019-11-15 DIAGNOSIS — Z96.0 STATUS POST IMPLANTATION OF ARTIFICIAL URINARY SPHINCTER: Primary | ICD-10-CM

## 2019-11-15 DIAGNOSIS — N30.01 ACUTE CYSTITIS WITH HEMATURIA: ICD-10-CM

## 2019-11-15 PROBLEM — R20.9 COLD HANDS AND FEET: Status: RESOLVED | Noted: 2019-08-16 | Resolved: 2019-11-15

## 2019-11-15 PROCEDURE — 99213 OFFICE O/P EST LOW 20 MIN: CPT | Performed by: FAMILY MEDICINE

## 2019-11-15 RX ORDER — CIPROFLOXACIN 500 MG/1
500 TABLET, FILM COATED ORAL 2 TIMES DAILY
Qty: 14 TABLET | Refills: 0 | Status: SHIPPED | OUTPATIENT
Start: 2019-11-15 | End: 2020-01-22 | Stop reason: SDUPTHER

## 2019-11-15 NOTE — PROGRESS NOTES
Destinee Goldberg is a 86 y.o. male who is here for   Chief Complaint   Patient presents with   • Urinary Frequency     urge to go, some burning x 2-3 days    .     History of Present Illness     {Common H&P Review Areas:96796}    Review of Systems    Objective   Physical Exam    Assessment/Plan   {Assess/PlanSmartLinks:01865}  There are no Patient Instructions on file for this visit.    There are no discontinued medications.     No Follow-up on file.    Dr. Charlie Trinh  [facility] [place], [place]

## 2019-11-18 NOTE — PROGRESS NOTES
"Chief Complaint   Patient presents with   • Urinary Frequency     urge to go, some burning x 2-3 days        Urinary Tract Infection: Patient complains of frequency and hematuria He has had symptoms for a few days. Patient also complains of fever. Patient denies back pain. Patient does have a history of recurrent UTI.  Patient does have a history of pyelonephritis.   Blood in the urine this am  Prostate cancer history with a artificial urine sphincter.      Vitals:    11/15/19 1556   BP: 126/74   BP Location: Left arm   Patient Position: Sitting   Cuff Size: Adult   Pulse: 97   SpO2: 96%   Weight: 88.5 kg (195 lb)   Height: 175 cm (68.9\")     Gen: mildly ill appearing, alert  HEENT: WNL  Lung: regular RR, no audible wheeze  Heart: RR without murmur  Skin: no rash.  Abd: non tender  Flank: no CVA, no rash    In office urine dipstick results:    Urine is orange due to Azo.      Assessment/Plan   Pawel was seen today for urinary frequency.    Diagnoses and all orders for this visit:    Status post implantation of artificial urinary sphincter  -     ciprofloxacin (CIPRO) 500 MG tablet; Take 1 tablet by mouth 2 (Two) Times a Day.    Acute cystitis with hematuria  -     ciprofloxacin (CIPRO) 500 MG tablet; Take 1 tablet by mouth 2 (Two) Times a Day.             Tylenol or Advil as needed for pain, fever  Plenty of fluids  OTC Azo ok  Off work or school note given if needed.  Pros and cons of antibiotic use discussed    Dr. Charlie Trinh MD  [place], [place]  [facility]  "

## 2019-11-19 DIAGNOSIS — I63.542 CEREBROVASCULAR ACCIDENT (CVA) DUE TO OCCLUSION OF LEFT CEREBELLAR ARTERY (HCC): ICD-10-CM

## 2019-11-19 RX ORDER — CLOPIDOGREL BISULFATE 75 MG/1
TABLET ORAL
Qty: 90 TABLET | Refills: 0 | Status: SHIPPED | OUTPATIENT
Start: 2019-11-19 | End: 2020-02-17

## 2019-11-20 ENCOUNTER — HOSPITAL ENCOUNTER (OUTPATIENT)
Dept: GENERAL RADIOLOGY | Facility: HOSPITAL | Age: 84
Discharge: HOME | End: 2019-11-20
Admitting: FAMILY MEDICINE

## 2019-11-20 ENCOUNTER — OFFICE VISIT (OUTPATIENT)
Dept: FAMILY MEDICINE CLINIC | Facility: CLINIC | Age: 84
End: 2019-11-20

## 2019-11-20 VITALS
HEIGHT: 69 IN | HEART RATE: 63 BPM | DIASTOLIC BLOOD PRESSURE: 80 MMHG | OXYGEN SATURATION: 96 % | BODY MASS INDEX: 28.63 KG/M2 | WEIGHT: 193.3 LBS | SYSTOLIC BLOOD PRESSURE: 124 MMHG

## 2019-11-20 DIAGNOSIS — M25.562 ACUTE PAIN OF LEFT KNEE: ICD-10-CM

## 2019-11-20 DIAGNOSIS — F02.80 DEMENTIA ASSOCIATED WITH OTHER UNDERLYING DISEASE WITHOUT BEHAVIORAL DISTURBANCE (HCC): ICD-10-CM

## 2019-11-20 DIAGNOSIS — E03.9 ACQUIRED HYPOTHYROIDISM: Primary | ICD-10-CM

## 2019-11-20 PROCEDURE — 73562 X-RAY EXAM OF KNEE 3: CPT

## 2019-11-20 PROCEDURE — 99213 OFFICE O/P EST LOW 20 MIN: CPT | Performed by: FAMILY MEDICINE

## 2019-11-20 NOTE — PROGRESS NOTES
"Chief Complaint   Patient presents with   • Hypothyroidism   • Hyperlipidemia   • Knee Pain     left knee- when wakes up has hard time standing, feels like bone on bone        Hypothyroidism: Patient presents for evaluation of thyroid function. Symptoms consist of denies fatigue, weight changes, heat/cold intolerance, bowel/skin changes or CVS symptoms. Symptoms have present for several years. The symptoms are mild.  The problem has been controlled.  Previous thyroid studies include TSH. The hypothyroidism is due to hypothyroidism.    uti from last week feels much better    Dementia is stable    New non injury left knee pain.  No swelling      Vitals:    11/20/19 1431   BP: 124/80   BP Location: Left arm   Patient Position: Sitting   Cuff Size: Adult   Pulse: 63   SpO2: 96%   Weight: 87.7 kg (193 lb 4.8 oz)   Height: 175 cm (68.9\")     Gen: well appearing, alert  Eyes: EOMI, no eye bulge  Neck: no LAD. Thyroid normal size, no nodules, no tenderness  Lung: good air movement, regular RR  Heart: RR without murmur  Skin: no rash.    Lab Results   Component Value Date    TSH 3.490 05/08/2019           Assessment/Plan   Pawel was seen today for hypothyroidism, hyperlipidemia and knee pain.    Diagnoses and all orders for this visit:    Acquired hypothyroidism    Dementia associated with other underlying disease without behavioral disturbance (CMS/HCC)    Acute pain of left knee  -     XR Knee 3 View Left; Future    otc topicals or tylenol ok for knee             There are no Patient Instructions on file for this visit.    Continue to take thyroid medication on a regular basis, same time of day, on an empty stomach. Repeat thyroid labs in six months.    Dr. Charlie Trinh MD  [place], [place]  [facility]  "

## 2020-01-09 DIAGNOSIS — K22.70 BARRETT'S ESOPHAGUS WITHOUT DYSPLASIA: ICD-10-CM

## 2020-01-09 RX ORDER — PANTOPRAZOLE SODIUM 40 MG/1
TABLET, DELAYED RELEASE ORAL
Qty: 180 TABLET | Refills: 0 | Status: SHIPPED | OUTPATIENT
Start: 2020-01-09 | End: 2020-04-08

## 2020-01-22 ENCOUNTER — OFFICE VISIT (OUTPATIENT)
Dept: FAMILY MEDICINE CLINIC | Facility: CLINIC | Age: 85
End: 2020-01-22

## 2020-01-22 VITALS
BODY MASS INDEX: 29.33 KG/M2 | DIASTOLIC BLOOD PRESSURE: 88 MMHG | HEIGHT: 69 IN | SYSTOLIC BLOOD PRESSURE: 148 MMHG | OXYGEN SATURATION: 96 % | HEART RATE: 78 BPM | WEIGHT: 198 LBS

## 2020-01-22 DIAGNOSIS — Z96.0 STATUS POST IMPLANTATION OF ARTIFICIAL URINARY SPHINCTER: ICD-10-CM

## 2020-01-22 DIAGNOSIS — N30.01 ACUTE CYSTITIS WITH HEMATURIA: ICD-10-CM

## 2020-01-22 PROBLEM — M25.562 ACUTE PAIN OF LEFT KNEE: Status: RESOLVED | Noted: 2019-11-20 | Resolved: 2020-01-22

## 2020-01-22 PROBLEM — Z91.81 AT HIGH RISK FOR FALLS: Status: ACTIVE | Noted: 2019-12-18

## 2020-01-22 LAB
BILIRUB BLD-MCNC: NEGATIVE MG/DL
CLARITY, POC: CLEAR
COLOR UR: YELLOW
GLUCOSE UR STRIP-MCNC: NEGATIVE MG/DL
KETONES UR QL: NEGATIVE
LEUKOCYTE EST, POC: ABNORMAL
NITRITE UR-MCNC: POSITIVE MG/ML
PH UR: 5 [PH] (ref 5–8)
PROT UR STRIP-MCNC: NEGATIVE MG/DL
RBC # UR STRIP: ABNORMAL /UL
SP GR UR: 1.01 (ref 1–1.03)
UROBILINOGEN UR QL: NORMAL

## 2020-01-22 PROCEDURE — 99213 OFFICE O/P EST LOW 20 MIN: CPT | Performed by: FAMILY MEDICINE

## 2020-01-22 PROCEDURE — 81003 URINALYSIS AUTO W/O SCOPE: CPT | Performed by: FAMILY MEDICINE

## 2020-01-22 RX ORDER — CIPROFLOXACIN 500 MG/1
500 TABLET, FILM COATED ORAL 2 TIMES DAILY
Qty: 14 TABLET | Refills: 0 | OUTPATIENT
Start: 2020-01-22 | End: 2020-02-19

## 2020-01-22 NOTE — PROGRESS NOTES
"Chief Complaint   Patient presents with   • Urinary Retention     burning sensation while sitting, urge to go        Urinary Tract Infection: Patient complains of hesitancy, inability to void and incomplete bladder emptying He has had symptoms for a few days. Patient also complains of none. Patient denies back pain and fever. Patient does have a history of recurrent UTI.  Patient does not have a history of pyelonephritis.   Has artificial urine sphincter.        Vitals:    01/22/20 1433   BP: 148/88   BP Location: Left arm   Patient Position: Sitting   Cuff Size: Adult   Pulse: 78   SpO2: 96%   Weight: 89.8 kg (198 lb)   Height: 175 cm (68.9\")     Gen: mildly ill appearing, alert  HEENT: WNL  Lung: regular RR, no audible wheeze  Heart: RR without murmur  Skin: no rash.  Abd: non tender  Flank: no CVA, no rash    In office urine dipstick results:  Brief Urine Lab Results  (Last result in the past 365 days)      Color   Clarity   Blood   Leuk Est   Nitrite   Protein   CREAT   Urine HCG        01/22/20 1455 Yellow Clear Moderate Moderate (2+) Positive Negative                   Assessment/Plan   Pawel was seen today for urinary retention.    Diagnoses and all orders for this visit:    Status post implantation of artificial urinary sphincter  -     ciprofloxacin (CIPRO) 500 MG tablet; Take 1 tablet by mouth 2 (Two) Times a Day.  -     POCT urinalysis dipstick, automated  -     Urine Culture - Urine, Urine, Clean Catch    Acute cystitis with hematuria  -     ciprofloxacin (CIPRO) 500 MG tablet; Take 1 tablet by mouth 2 (Two) Times a Day.  -     Urine Culture - Urine, Urine, Clean Catch    will call with urine cx results       Tylenol or Advil as needed for pain, fever  Plenty of fluids  OTC Azo ok  Off work or school note given if needed.  Pros and cons of antibiotic use discussed    Dr. Charlie Trinh MD  Family [place], [place]  [facility]  "

## 2020-01-24 ENCOUNTER — TELEPHONE (OUTPATIENT)
Dept: FAMILY MEDICINE CLINIC | Facility: CLINIC | Age: 85
End: 2020-01-24

## 2020-01-24 RX ORDER — METHENAMINE, SODIUM PHOSPHATE, MONOBASIC, MONOHYDRATE, PHENYL SALICYLATE, METHYLENE BLUE, AND HYOSCYAMINE SULFATE 120; 40.8; 36; 10; .12 MG/1; MG/1; MG/1; MG/1; MG/1
1 CAPSULE ORAL 3 TIMES DAILY PRN
Qty: 20 CAPSULE | Refills: 1 | Status: SHIPPED | OUTPATIENT
Start: 2020-01-24 | End: 2021-05-21

## 2020-01-24 NOTE — TELEPHONE ENCOUNTER
Pt daughter in law called stating patient is not getting any better. The urine culture has not came back yet, any suggestions for the weekend?     I will send in Uribel medicine  To help bladder pain  Will turn urine blue green

## 2020-01-25 LAB
BACTERIA UR CULT: ABNORMAL
BACTERIA UR CULT: ABNORMAL
OTHER ANTIBIOTIC SUSC ISLT: ABNORMAL

## 2020-01-27 RX ORDER — SULFAMETHOXAZOLE AND TRIMETHOPRIM 800; 160 MG/1; MG/1
1 TABLET ORAL 2 TIMES DAILY
Qty: 20 TABLET | Refills: 0 | Status: SHIPPED | OUTPATIENT
Start: 2020-01-27 | End: 2020-02-19 | Stop reason: SDUPTHER

## 2020-02-17 DIAGNOSIS — I63.542 CEREBROVASCULAR ACCIDENT (CVA) DUE TO OCCLUSION OF LEFT CEREBELLAR ARTERY (HCC): ICD-10-CM

## 2020-02-17 RX ORDER — CLOPIDOGREL BISULFATE 75 MG/1
TABLET ORAL
Qty: 90 TABLET | Refills: 0 | Status: SHIPPED | OUTPATIENT
Start: 2020-02-17 | End: 2020-05-18

## 2020-02-19 ENCOUNTER — HOSPITAL ENCOUNTER (EMERGENCY)
Facility: HOSPITAL | Age: 85
Discharge: HOME | End: 2020-02-19
Attending: EMERGENCY MEDICINE | Admitting: EMERGENCY MEDICINE

## 2020-02-19 VITALS
TEMPERATURE: 98.1 F | HEART RATE: 98 BPM | OXYGEN SATURATION: 93 % | HEIGHT: 68 IN | RESPIRATION RATE: 25 BRPM | BODY MASS INDEX: 29.95 KG/M2 | WEIGHT: 197.6 LBS | DIASTOLIC BLOOD PRESSURE: 108 MMHG | SYSTOLIC BLOOD PRESSURE: 158 MMHG

## 2020-02-19 DIAGNOSIS — R33.8 ACUTE URINARY RETENTION: Primary | ICD-10-CM

## 2020-02-19 LAB
BACTERIA UR QL AUTO: ABNORMAL /HPF
BILIRUB UR QL STRIP: NEGATIVE
CLARITY UR: CLEAR
COLOR UR: YELLOW
GLUCOSE UR STRIP-MCNC: NEGATIVE MG/DL
HGB UR QL STRIP.AUTO: ABNORMAL
HYALINE CASTS UR QL AUTO: ABNORMAL /LPF
KETONES UR QL STRIP: NEGATIVE
LEUKOCYTE ESTERASE UR QL STRIP.AUTO: NEGATIVE
NITRITE UR QL STRIP: NEGATIVE
PH UR STRIP.AUTO: <=5 [PH] (ref 4.5–8)
PROT UR QL STRIP: NEGATIVE
RBC # UR: ABNORMAL /HPF
REF LAB TEST METHOD: ABNORMAL
SP GR UR STRIP: 1.02 (ref 1–1.03)
SQUAMOUS #/AREA URNS HPF: ABNORMAL /HPF
UROBILINOGEN UR QL STRIP: ABNORMAL
WBC UR QL AUTO: ABNORMAL /HPF

## 2020-02-19 PROCEDURE — 81001 URINALYSIS AUTO W/SCOPE: CPT | Performed by: EMERGENCY MEDICINE

## 2020-02-19 PROCEDURE — 51798 US URINE CAPACITY MEASURE: CPT

## 2020-02-19 PROCEDURE — 99283 EMERGENCY DEPT VISIT LOW MDM: CPT

## 2020-02-19 PROCEDURE — 99284 EMERGENCY DEPT VISIT MOD MDM: CPT | Performed by: EMERGENCY MEDICINE

## 2020-02-19 RX ORDER — HYDROCODONE BITARTRATE AND ACETAMINOPHEN 5; 325 MG/1; MG/1
1 TABLET ORAL ONCE
Status: COMPLETED | OUTPATIENT
Start: 2020-02-19 | End: 2020-02-19

## 2020-02-19 RX ORDER — SULFAMETHOXAZOLE AND TRIMETHOPRIM 800; 160 MG/1; MG/1
1 TABLET ORAL 2 TIMES DAILY
Qty: 20 TABLET | Refills: 0 | Status: SHIPPED | OUTPATIENT
Start: 2020-02-19 | End: 2020-05-27

## 2020-02-19 RX ORDER — SULFAMETHOXAZOLE AND TRIMETHOPRIM 800; 160 MG/1; MG/1
1 TABLET ORAL ONCE
Status: COMPLETED | OUTPATIENT
Start: 2020-02-19 | End: 2020-02-19

## 2020-02-19 RX ADMIN — HYDROCODONE BITARTRATE AND ACETAMINOPHEN 1 TABLET: 5; 325 TABLET ORAL at 05:47

## 2020-02-19 RX ADMIN — SULFAMETHOXAZOLE AND TRIMETHOPRIM 160 MG: 800; 160 TABLET ORAL at 06:42

## 2020-02-19 NOTE — ED NOTES
The pt arrived to the ED ambulatory. The pt c/o urinary retention. The pt is unsure of when his last urination was. The pt reports that he has an artificial urinary sphincter.     Liliam Pappas RN  02/19/20 4514

## 2020-02-19 NOTE — ED PROVIDER NOTES
"Subjective     History provided by:  Patient    History of Present Illness    · Chief complaint: Inability to urinate    · Location: Bladder    · Quality/Severity: Patient states he is been unable to urinate since yesterday afternoon and is in severe pain.    · Timing/Onset: He states he is amenable to urinate as yesterday afternoon.  He has had difficulty urinating for about a month.    · Modifying Factors: The patient has a artificial bladder sphincter a \"\" that was placed by Dr. Senior 7 years ago per the patient.    · Associated symptoms: He states he has chills \"going through his body\".    · Narrative: The patient is a 87-year-old white male who had an artificial sphincter () placed 7 years ago by Dr. Senior.  He started having trouble urinating about a month ago.  He was seen by his PCP Dr. Trinh on 1/22/2020 and his urine grew E. coli.  The patient was placed on Cipro, but cultures came back resistant to Cipro.  The patient states he has been unable to urinate since yesterday afternoon.  The valve in his scrotum is \"rock hard\" and warm compress to allow him to urinate.  He complains of severe pain in his bladder.    Review of Systems   Constitutional: Positive for chills. Negative for activity change, appetite change, diaphoresis and fever.   HENT: Negative for congestion, ear pain, rhinorrhea and sore throat.    Eyes: Negative for pain and visual disturbance.   Respiratory: Negative for cough and shortness of breath.    Cardiovascular: Negative for chest pain.   Gastrointestinal: Positive for abdominal pain ( Bladder pain). Negative for nausea and vomiting.   Genitourinary: Positive for difficulty urinating. Negative for flank pain, penile pain and testicular pain.   Musculoskeletal: Negative for back pain, neck pain and neck stiffness.   Skin: Negative for color change and rash.   Neurological: Negative for dizziness, weakness, numbness and headaches.   Psychiatric/Behavioral: Negative for " "confusion.     Past Medical History:   Diagnosis Date   • Allergic     Seasonal   • Allergic rhinitis    • Arthritis    • Asthma    • Brito esophagus    • Benign prostatic hyperplasia    • Carotid arterial disease (CMS/HCC)     Status post right CEA 4/1/14   • COPD (chronic obstructive pulmonary disease) (CMS/HCC)    • Coronary artery disease    • Dementia (CMS/HCC)    • Dyspnea    • Erectile dysfunction    • GERD (gastroesophageal reflux disease)    • HL (hearing loss)    • Hyperlipidemia    • Hypertension    • Hypothyroidism    • Low back pain    • Memory loss    • PAD (peripheral artery disease) (CMS/HCC)    • Prostate cancer (CMS/HCC)    • Stroke (CMS/HCC)     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   • Urinary tract infection      BP (!) 158/108   Pulse 98   Temp 98.1 °F (36.7 °C) (Oral)   Resp 25   Ht 172.7 cm (68\")   Wt 89.6 kg (197 lb 9.6 oz)   SpO2 93%   BMI 30.04 kg/m²     Past Medical History:   Diagnosis Date   • Allergic     Seasonal   • Allergic rhinitis    • Arthritis    • Asthma    • Brito esophagus    • Benign prostatic hyperplasia    • Carotid arterial disease (CMS/HCC)     Status post right CEA 4/1/14   • COPD (chronic obstructive pulmonary disease) (CMS/HCC)    • Coronary artery disease    • Dementia (CMS/HCC)    • Dyspnea    • Erectile dysfunction    • GERD (gastroesophageal reflux disease)    • HL (hearing loss)    • Hyperlipidemia    • Hypertension    • Hypothyroidism    • Low back pain    • Memory loss    • PAD (peripheral artery disease) (CMS/HCC)    • Prostate cancer (CMS/HCC)    • Stroke (CMS/HCC)     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   • Urinary tract infection        No Known Allergies    Past Surgical History:   Procedure Laterality Date   • CAROTID ARTERY ANGIOPLASTY  04/01/2014   • COLONOSCOPY  2009   • ENDOSCOPY  2009 2001,2003,2006   • FRACTURE SURGERY     • HERNIA REPAIR  2000   • HERNIA REPAIR  2009   • INGUINAL HERNIA REPAIR   "   • PARTIAL HIP ARTHROPLASTY Left 2014   • PROSTATECTOMY     • SHOULDER SURGERY     • SHOULDER SURGERY  2006   • SHOULDER SURGERY  2008       Family History   Problem Relation Age of Onset   • Hypertension Sister    • Thyroid disease Sister    • Stroke Sister    • Depression Sister    • Heart failure Sister    • Hypertension Brother    • Lung cancer Brother    • COPD Brother    • Alcohol abuse Brother    • Coronary artery disease Son         Son  from [place] at age 59        Social History     Socioeconomic History   • Marital status:      Spouse name: Not on file   • Number of children: 3   • Years of education: Not on file   • Highest education level: Not on file   Occupational History   • Occupation: Retired-[employer]   Tobacco Use   • Smoking status: Former Smoker     Packs/day: 0.50     Years: 15.00     Pack years: 7.50     Types: Cigarettes   • Smokeless tobacco: Never Used   • Tobacco comment: Quit 's   1 ppd for 15 years     Substance and Sexual Activity   • Alcohol use: No   • Drug use: No   • Sexual activity: Not Currently     Partners: Female     Birth control/protection: None           Objective   Physical Exam   Constitutional: He is oriented to person, place, and time. He appears well-developed and well-nourished. No distress.   The patient complains of pain.  He does not appear ill or toxic.  Review of his vital signs: He is afebrile with a temperature 98.1, he is tachypnea with a respiratory rate of 25 with a normal oxygen saturation of 94% on room air, mildly tachycardic with a heart rate of 98, blood pressure elevated 169/123.   HENT:   Head: Normocephalic and atraumatic.   Nose: Nose normal.   Mouth/Throat: Oropharynx is clear and moist. No oropharyngeal exudate.   Eyes: Pupils are equal, round, and reactive to light. EOM are normal. Right eye exhibits no discharge. Left eye exhibits no discharge. No scleral icterus.   Neck: Normal range of motion. Neck supple. No JVD  present. No thyromegaly present.   Cardiovascular: Normal rate, regular rhythm and normal heart sounds.   No murmur heard.  Pulmonary/Chest: Effort normal and breath sounds normal. He has no wheezes. He has no rales. He exhibits no tenderness.   Abdominal: Soft. Bowel sounds are normal. He exhibits no distension. There is tenderness ( Subjective suprapubic). There is no rebound and no guarding.   Musculoskeletal: Normal range of motion. He exhibits no edema, tenderness or deformity.   Lymphadenopathy:     He has no cervical adenopathy.   Neurological: He is alert and oriented to person, place, and time. No cranial nerve deficit. Coordination normal.   No focal motor sensory deficit   Skin: Skin is warm and dry. Capillary refill takes less than 2 seconds. No rash noted. He is not diaphoretic.   Psychiatric: He has a normal mood and affect. His behavior is normal. Judgment and thought content normal.   Nursing note and vitals reviewed.      Procedures           ED Course  ED Course as of Feb 19 0712   Wed Feb 19, 2020   0545 Bladder scan of the patient's bladder showed 345 cc of urine.    [TP]   0545 05:00 the patient was discussed with Dr. Cooper, urology on call covering for Dr. Senior, who requested that I attempt to gently pass a 14 French coudé catheter or a 12 French catheter.    [TP]   0546 I attempted to gently pass a 14 French coudé catheter, then a 12 French catheter, then a 5 French catheter but met tight resistance.  The pump/valve in his scrotum is very hard and will not compress for me.    [TP]   0547 05:30 I spoke to Dr. Cooper again he stated he would contact Dr. Senior.    [TP]   0547 05:40 the patient was discussed with his urologist Dr. Senior, who stated he would come to the emergency department to evaluate the patient.    [TP]   0637 06:30 the patient was seen by Dr. Senior in the ER he was able to open the patient's artificial bladder sphincter and the patient passed 350 cc of urine.  The  sphincter will be left open until he sees Dr. Senior in the office.  Dr. Senior stated he would have his office work him and this coming Tuesday.    [TP]   0638 Urine sample was sent for culture.  The patient is urine culture obtained last month on 1/22/2019 was positive for E. coli that was sensitive to Bactrim DS.  The patient will be discharged with prescription for the same.    [TP]      ED Course User Index  [TP] Baldomero Barnes MD                                           MDM  Number of Diagnoses or Management Options  Acute urinary retention: new and requires workup     Amount and/or Complexity of Data Reviewed  Clinical lab tests: ordered  Discuss the patient with other providers: yes    Risk of Complications, Morbidity, and/or Mortality  Presenting problems: high  Diagnostic procedures: low  Management options: moderate    Patient Progress  Patient progress: improved      Final diagnoses:   Acute urinary retention           Labs Reviewed   URINALYSIS W/ CULTURE IF INDICATED - Abnormal; Notable for the following components:       Result Value    Blood, UA Large (3+) (*)     All other components within normal limits   URINALYSIS, MICROSCOPIC ONLY     No orders to display          Medication List      Stop    ciprofloxacin 500 MG tablet  Commonly known as:  Baldomero Luevano MD  02/19/20 0792

## 2020-02-19 NOTE — CONSULTS
FIRST UROLOGY CONSULT      Patient Identification:  NAME:  Pawel Goldberg  Age:  87 y.o.   Sex:  male   :  1932   MRN:  [id number]       Chief complaint: Unable to urinate    History of present illness: This is a very pleasant 87-year-old gentleman I have inherited.  He has a history of prostate cancer with a previous robotic prostatectomy.  He developed severe incontinence.  Had an artificial sphincter placed here locally and then had a revised up at [facility] back in .  He has had some intermittent confusion about the pump and had explained to him several occasions that he cannot be manipulating the shot all valve.  He is been unable to void since last night.  They try to catheterize him without success.  He has a dual cuff prosthesis and.  His control pump is unable to manipulated it is hard and firm.      Past medical history:  Past Medical History:   Diagnosis Date   • Allergic     Seasonal   • Allergic rhinitis    • Arthritis    • Asthma    • Brito esophagus    • Benign prostatic hyperplasia    • Carotid arterial disease (CMS/HCC)     Status post right CEA 14   • COPD (chronic obstructive pulmonary disease) (CMS/HCC)    • Coronary artery disease    • Dementia (CMS/HCC)    • Dyspnea    • Erectile dysfunction    • GERD (gastroesophageal reflux disease)    • HL (hearing loss)    • Hyperlipidemia    • Hypertension    • Hypothyroidism    • Low back pain    • Memory loss    • PAD (peripheral artery disease) (CMS/HCC)    • Prostate cancer (CMS/HCC)    • Stroke (CMS/HCC)     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   • Urinary tract infection        Past surgical history:  Past Surgical History:   Procedure Laterality Date   • CAROTID ARTERY ANGIOPLASTY  2014   • COLONOSCOPY     • ENDOSCOPY  2009,,   • FRACTURE SURGERY     • HERNIA REPAIR     • HERNIA REPAIR     • INGUINAL HERNIA REPAIR     • PARTIAL HIP  ARTHROPLASTY Left 2014   • PROSTATECTOMY     • SHOULDER SURGERY     • SHOULDER SURGERY     • SHOULDER SURGERY         Allergies:  Patient has no known allergies.    Home medications:    (Not in a hospital admission)     Hospital medications:      No current facility-administered medications for this encounter.       Family history:  Family History   Problem Relation Age of Onset   • Hypertension Sister    • Thyroid disease Sister    • Stroke Sister    • Depression Sister    • Heart failure Sister    • Hypertension Brother    • Lung cancer Brother    • COPD Brother    • Alcohol abuse Brother    • Coronary artery disease Son         Son  from [place] at age 59        Social history:  Social History     Tobacco Use   • Smoking status: Former Smoker     Packs/day: 0.50     Years: 15.00     Pack years: 7.50     Types: Cigarettes   • Smokeless tobacco: Never Used   • Tobacco comment: Quit 1960's   1 ppd for 15 years     Substance Use Topics   • Alcohol use: No   • Drug use: No       Review of systems:    Negative 12-system ROS except for the following: Dribbling.  Incontinence is pretty controlled outside of that.  Recent UTI treated by his primary care physician.  No gross hematuria.  No defecation issues.      Objective:  TMax 24 hours:   Temp (24hrs), Av.1 °F (36.7 °C), Min:98.1 °F (36.7 °C), Max:98.1 °F (36.7 °C)      Vitals Ranges:   Temp:  [98.1 °F (36.7 °C)] 98.1 °F (36.7 °C)  Heart Rate:  [98] 98  Resp:  [25] 25  BP: (158-169)/(108-123) 158/108    Intake/Output Last 3 shifts:  No intake/output data recorded.     Physical Exam:       General Appearance:    Alert, cooperative, in no acute distress   Head:    Normocephalic, without obvious abnormality, atraumatic   Eyes:          PERRL, conjunctivae and corneas clear   Ears:    Normal external inspection   Throat:   No oral lesions, oral mucosa moist   Neck:   Supple, no LAD, trachea midline   Back:     No CVA tenderness   Lungs:      Respirations unlabored, symmetric excursion    Heart:    RRR, intact peripheral pulses   Abdomen:    Control pump in the right hemiscrotum shows no cellulitis.  It is firm and somewhat rigid.   :   Anus normal scrotum normal no orchitis   MELODIE:  Extremities:  Deferred.  No edema, no deformity   Skin:   No bleeding, bruising or rashes   Neuro/Psych:   Orientation intact, mood/affect pleasant, no focal findings       Results review:   I reviewed the patient's new clinical results.    Data review:  Lab Results (last 24 hours)     ** No results found for the last 24 hours. **           Imaging:  Imaging Results (Last 24 Hours)     ** No results found for the last 24 hours. **             Assessment:       * No active hospital problems. *    Urinary retention likely secondary to inadvertent shutoff valve being activated by the patient.    Plan:     Was able to cycle his pump and reactivated and drain his bladder.  I then deactivated the pump in the open cycle.  We will have him come to our office next week to reactivated.  Suggested Dr. Barnes that he put him on antibiotics.    Hardik Senior MD  02/19/20  2:00 PM

## 2020-02-19 NOTE — NURSING NOTE
In and out cath attempted as instructed by urologist. Unable to insert a 14 English coude. Resistance met  When attempting to inset catheter

## 2020-03-30 ENCOUNTER — PATIENT MESSAGE (OUTPATIENT)
Dept: FAMILY MEDICINE CLINIC | Facility: CLINIC | Age: 85
End: 2020-03-30

## 2020-03-30 DIAGNOSIS — J40 BRONCHITIS: ICD-10-CM

## 2020-03-31 RX ORDER — ALBUTEROL SULFATE 90 UG/1
2 AEROSOL, METERED RESPIRATORY (INHALATION) EVERY 4 HOURS PRN
Qty: 1 INHALER | Refills: 1 | Status: SHIPPED | OUTPATIENT
Start: 2020-03-31 | End: 2022-12-21

## 2020-03-31 NOTE — TELEPHONE ENCOUNTER
From: Pawel Goldberg  To: Charlie Trinh MD  Sent: 3/30/2020 2:37 PM EDT  Subject: Prescription Question    I tried to get a refill on the Ventolin  (90 Base) MCG ACT Inhaler but could not do it through [employer] and we could not find the original box for the prescription number. Pawel Goldberg uses this as his rescue inhaler. If this is still what you think would work best for him, could you please call us in a prescription?    Thank you,  Jyotsna Clements (His daughter)

## 2020-04-08 DIAGNOSIS — K22.70 BARRETT'S ESOPHAGUS WITHOUT DYSPLASIA: ICD-10-CM

## 2020-04-08 RX ORDER — PANTOPRAZOLE SODIUM 40 MG/1
TABLET, DELAYED RELEASE ORAL
Qty: 180 TABLET | Refills: 3 | Status: SHIPPED | OUTPATIENT
Start: 2020-04-08 | End: 2021-05-21 | Stop reason: SDUPTHER

## 2020-05-16 DIAGNOSIS — E03.9 ACQUIRED HYPOTHYROIDISM: ICD-10-CM

## 2020-05-17 DIAGNOSIS — I63.542 CEREBROVASCULAR ACCIDENT (CVA) DUE TO OCCLUSION OF LEFT CEREBELLAR ARTERY (HCC): ICD-10-CM

## 2020-05-18 RX ORDER — CLOPIDOGREL BISULFATE 75 MG/1
TABLET ORAL
Qty: 90 TABLET | Refills: 0 | Status: SHIPPED | OUTPATIENT
Start: 2020-05-18 | End: 2020-05-27 | Stop reason: SDUPTHER

## 2020-05-18 RX ORDER — LEVOTHYROXINE SODIUM 0.05 MG/1
TABLET ORAL
Qty: 90 TABLET | Refills: 0 | Status: SHIPPED | OUTPATIENT
Start: 2020-05-18 | End: 2020-05-27 | Stop reason: SDUPTHER

## 2020-05-20 ENCOUNTER — TELEPHONE (OUTPATIENT)
Dept: FAMILY MEDICINE CLINIC | Facility: CLINIC | Age: 85
End: 2020-05-20

## 2020-05-20 DIAGNOSIS — E78.2 MIXED HYPERLIPIDEMIA: ICD-10-CM

## 2020-05-20 DIAGNOSIS — E78.2 MIXED HYPERLIPIDEMIA: Primary | ICD-10-CM

## 2020-05-20 DIAGNOSIS — E03.9 ACQUIRED HYPOTHYROIDISM: ICD-10-CM

## 2020-05-20 DIAGNOSIS — I10 ESSENTIAL HYPERTENSION: ICD-10-CM

## 2020-05-22 LAB
ALBUMIN SERPL-MCNC: 4.1 G/DL (ref 3.5–5.2)
ALBUMIN/GLOB SERPL: 1.4 G/DL
ALP SERPL-CCNC: 50 U/L (ref 39–117)
ALT SERPL-CCNC: 18 U/L (ref 1–41)
APPEARANCE UR: CLEAR
AST SERPL-CCNC: 28 U/L (ref 1–40)
BACTERIA #/AREA URNS HPF: ABNORMAL /HPF
BILIRUB SERPL-MCNC: 0.6 MG/DL (ref 0.2–1.2)
BILIRUB UR QL STRIP: NEGATIVE
BUN SERPL-MCNC: 17 MG/DL (ref 8–23)
BUN/CREAT SERPL: 18.5 (ref 7–25)
CALCIUM SERPL-MCNC: 9.6 MG/DL (ref 8.6–10.5)
CASTS URNS MICRO: ABNORMAL
CHLORIDE SERPL-SCNC: 95 MMOL/L (ref 98–107)
CHOLEST SERPL-MCNC: 124 MG/DL (ref 0–200)
CO2 SERPL-SCNC: 30 MMOL/L (ref 22–29)
COLOR UR: YELLOW
CREAT SERPL-MCNC: 0.92 MG/DL (ref 0.76–1.27)
EPI CELLS #/AREA URNS HPF: ABNORMAL /HPF
ERYTHROCYTE [DISTWIDTH] IN BLOOD BY AUTOMATED COUNT: 13.1 % (ref 12.3–15.4)
GLOBULIN SER CALC-MCNC: 2.9 GM/DL
GLUCOSE SERPL-MCNC: 99 MG/DL (ref 65–99)
GLUCOSE UR QL: NEGATIVE
HCT VFR BLD AUTO: 39.5 % (ref 37.5–51)
HDLC SERPL-MCNC: 47 MG/DL (ref 40–60)
HGB BLD-MCNC: 13.6 G/DL (ref 13–17.7)
HGB UR QL STRIP: NEGATIVE
KETONES UR QL STRIP: NEGATIVE
LDLC SERPL CALC-MCNC: 57 MG/DL (ref 0–100)
LEUKOCYTE ESTERASE UR QL STRIP: ABNORMAL
MCH RBC QN AUTO: 30.9 PG (ref 26.6–33)
MCHC RBC AUTO-ENTMCNC: 34.4 G/DL (ref 31.5–35.7)
MCV RBC AUTO: 89.8 FL (ref 79–97)
MUCOUS THREADS URNS QL MICRO: ABNORMAL /HPF
NITRITE UR QL STRIP: NEGATIVE
PH UR STRIP: 7.5 [PH] (ref 5–8)
PLATELET # BLD AUTO: 229 10*3/MM3 (ref 140–450)
POTASSIUM SERPL-SCNC: 4.6 MMOL/L (ref 3.5–5.2)
PROT SERPL-MCNC: 7 G/DL (ref 6–8.5)
PROT UR QL STRIP: NEGATIVE
PSA SERPL-MCNC: <0.014 NG/ML (ref 0–4)
RBC # BLD AUTO: 4.4 10*6/MM3 (ref 4.14–5.8)
RBC #/AREA URNS HPF: ABNORMAL /HPF
SODIUM SERPL-SCNC: 133 MMOL/L (ref 136–145)
SP GR UR: 1.02 (ref 1–1.03)
T4 FREE SERPL-MCNC: 1.35 NG/DL (ref 0.93–1.7)
TRIGL SERPL-MCNC: 100 MG/DL (ref 0–150)
TSH SERPL DL<=0.005 MIU/L-ACNC: 5.15 UIU/ML (ref 0.27–4.2)
UROBILINOGEN UR STRIP-MCNC: ABNORMAL MG/DL
VLDLC SERPL CALC-MCNC: 20 MG/DL (ref 5–40)
WBC # BLD AUTO: 7.75 10*3/MM3 (ref 3.4–10.8)
WBC #/AREA URNS HPF: ABNORMAL /HPF

## 2020-05-25 DIAGNOSIS — R41.3 MEMORY LOSS: ICD-10-CM

## 2020-05-26 RX ORDER — RIVASTIGMINE 9.5 MG/24H
1 PATCH, EXTENDED RELEASE TRANSDERMAL DAILY
Qty: 90 EACH | Refills: 3 | Status: SHIPPED | OUTPATIENT
Start: 2020-05-26 | End: 2021-05-21 | Stop reason: SDUPTHER

## 2020-05-27 ENCOUNTER — OFFICE VISIT (OUTPATIENT)
Dept: FAMILY MEDICINE CLINIC | Facility: CLINIC | Age: 85
End: 2020-05-27

## 2020-05-27 VITALS
HEART RATE: 64 BPM | BODY MASS INDEX: 29.86 KG/M2 | SYSTOLIC BLOOD PRESSURE: 142 MMHG | HEIGHT: 68 IN | WEIGHT: 197 LBS | DIASTOLIC BLOOD PRESSURE: 100 MMHG | OXYGEN SATURATION: 93 % | TEMPERATURE: 98.2 F

## 2020-05-27 DIAGNOSIS — I63.542 CEREBROVASCULAR ACCIDENT (CVA) DUE TO OCCLUSION OF LEFT CEREBELLAR ARTERY (HCC): ICD-10-CM

## 2020-05-27 DIAGNOSIS — E03.9 ACQUIRED HYPOTHYROIDISM: ICD-10-CM

## 2020-05-27 DIAGNOSIS — E78.2 MIXED HYPERLIPIDEMIA: ICD-10-CM

## 2020-05-27 DIAGNOSIS — Z00.00 MEDICARE ANNUAL WELLNESS VISIT, SUBSEQUENT: Primary | ICD-10-CM

## 2020-05-27 DIAGNOSIS — F01.50 VASCULAR DEMENTIA WITHOUT BEHAVIORAL DISTURBANCE (HCC): ICD-10-CM

## 2020-05-27 DIAGNOSIS — I73.9 PAD (PERIPHERAL ARTERY DISEASE) (HCC): ICD-10-CM

## 2020-05-27 PROCEDURE — G0439 PPPS, SUBSEQ VISIT: HCPCS | Performed by: FAMILY MEDICINE

## 2020-05-27 RX ORDER — CLOPIDOGREL BISULFATE 75 MG/1
75 TABLET ORAL DAILY
Qty: 90 TABLET | Refills: 3 | Status: SHIPPED | OUTPATIENT
Start: 2020-05-27 | End: 2021-05-21 | Stop reason: SDUPTHER

## 2020-05-27 RX ORDER — LEVOTHYROXINE SODIUM 0.05 MG/1
50 TABLET ORAL DAILY
Qty: 90 TABLET | Refills: 1 | Status: SHIPPED | OUTPATIENT
Start: 2020-05-27 | End: 2020-11-16 | Stop reason: SDUPTHER

## 2020-05-27 RX ORDER — ATORVASTATIN CALCIUM 20 MG/1
20 TABLET, FILM COATED ORAL DAILY
Qty: 90 TABLET | Refills: 3 | Status: SHIPPED | OUTPATIENT
Start: 2020-05-27 | End: 2021-05-21 | Stop reason: SDUPTHER

## 2020-05-27 NOTE — PROGRESS NOTES
The ABCs of the Annual Wellness Visit  Subsequent Medicare Wellness Visit    Chief Complaint   Patient presents with   • Wellness     medicare        Subjective   History of Present Illness:  Pawel Goldberg is a 87 y.o. male who presents for a Subsequent Medicare Wellness Visit.    Wife reports he is overall decline.      HEALTH RISK ASSESSMENT    Recent Hospitalizations:  No hospitalization(s) within the last year.    Current Medical Providers:  Patient Care Team:  Charlie Trinh MD as PCP - General (Family Medicine)  Sabino Perdomo MD as PCP - Claims Attributed  Hardik Senior MD as Consulting Physician (Urology)  MAURO Peters MD as Consulting Physician (Ophthalmology)  Brant Polanco MD as Consulting Physician (Pulmonary Disease)  Charlie Goldstein DO as Consulting Physician (Neurology)    Smoking Status:  Social History     Tobacco Use   Smoking Status Former Smoker   • Packs/day: 0.50   • Years: 15.00   • Pack years: 7.50   • Types: Cigarettes   Smokeless Tobacco Never Used   Tobacco Comment    Quit 1960's   1 ppd for 15 years         Alcohol Consumption:  Social History     Substance and Sexual Activity   Alcohol Use No       Depression Screen:   PHQ-2/PHQ-9 Depression Screening 5/27/2020   Little interest or pleasure in doing things 0   Feeling down, depressed, or hopeless 0   Trouble falling or staying asleep, or sleeping too much -   Feeling tired or having little energy -   Poor appetite or overeating -   Feeling bad about yourself - or that you are a failure or have let yourself or your family down -   Trouble concentrating on things, such as reading the newspaper or watching television -   Moving or speaking so slowly that other people could have noticed. Or the opposite - being so fidgety or restless that you have been moving around a lot more than usual -   Thoughts that you would be better off dead, or of hurting yourself in some way -   Total Score 0   If you checked off any  problems, how difficult have these problems made it for you to do your work, take care of things at home, or get along with other people? -       Fall Risk Screen:  ASHLEY Fall Risk Assessment was completed, and patient is at HIGH risk for falls. Assessment completed on:5/27/2020    Health Habits and Functional and Cognitive Screening:  Functional & Cognitive Status 5/17/2018   Do you have difficulty preparing food and eating? No   Do you have difficulty bathing yourself, getting dressed or grooming yourself? No   Do you have difficulty using the toilet? No   Do you have difficulty moving around from place to place? No   Do you have trouble with steps or getting out of a bed or a chair? No   Current Diet Well Balanced Diet   Dental Exam Not up to date   Eye Exam Not up to date   Exercise (times per week) 4 times per week   Current Exercise Activities Include Walking   Do you need help using the phone?  No   Are you deaf or do you have serious difficulty hearing?  Yes   Do you need help with transportation? No   Do you need help shopping? No   Do you need help preparing meals?  No   Do you need help with housework?  No   Do you need help with laundry? No   Do you need help taking your medications? Yes   Do you need help managing money? No   Do you ever drive or ride in a car without wearing a seat belt? No   Have you felt unusual stress, anger or loneliness in the last month? No   Who do you live with? Spouse   If you need help, do you have trouble finding someone available to you? No   Have you been bothered in the last four weeks by sexual problems? No   Do you have difficulty concentrating, remembering or making decisions? Yes         Does the patient have evidence of cognitive impairment? Yes    Asprin use counseling:On clopidrogel as an alternative (due to ASA contraindication)    Age-appropriate Screening Schedule:  Refer to the list below for future screening recommendations based on patient's age, sex and/or  medical conditions. Orders for these recommended tests are listed in the plan section. The patient has been provided with a written plan.    Health Maintenance   Topic Date Due   • ZOSTER VACCINE (2 of 3) 07/06/2016   • TDAP/TD VACCINES (2 - Td) 07/20/2020   • INFLUENZA VACCINE  08/01/2020   • LIPID PANEL  05/21/2021          The following portions of the patient's history were reviewed and updated as appropriate: allergies, current medications, past family history, past medical history, past social history, past surgical history and problem list.    Outpatient Medications Prior to Visit   Medication Sig Dispense Refill   • albuterol sulfate HFA (Ventolin HFA) 108 (90 Base) MCG/ACT inhaler Inhale 2 puffs Every 4 (Four) Hours As Needed for Wheezing or Shortness of Air. 1 inhaler 1   • Magnesium 250 MG tablet Take  by mouth.     • Multiple Vitamins-Minerals (MULTIVITAMIN ADULT PO) Take  by mouth.     • pantoprazole (PROTONIX) 40 MG EC tablet TAKE 1 TABLET TWICE A  tablet 3   • Probiotic Product (PROBIOTIC COLON SUPPORT PO) Take  by mouth.     • rivastigmine (EXELON) 9.5 MG/24HR patch PLACE 1 PATCH ON THE SKIN AS DIRECTED BY PROVIDER DAILY 90 each 3   • tiotropium bromide-olodaterol (STIOLTO RESPIMAT) 2.5-2.5 MCG/ACT aerosol solution inhaler Inhale Daily.     • tolterodine LA (DETROL LA) 4 MG 24 hr capsule TAKE 1 CAPSULE EVERY NIGHT AT BEDTIME FOR OVERACTIVE BLADDER 90 capsule 4   • uribel (URO-MP) 118 MG capsule capsule Take 1 capsule by mouth 3 (Three) Times a Day As Needed (bladder urine irritation). 20 capsule 1   • atorvastatin (LIPITOR) 20 MG tablet Take 1 tablet by mouth Daily. 90 tablet 3   • clopidogrel (PLAVIX) 75 MG tablet TAKE 1 TABLET DAILY 90 tablet 0   • levothyroxine (SYNTHROID, LEVOTHROID) 50 MCG tablet TAKE 1 TABLET DAILY 90 tablet 0   • ANORO ELLIPTA 62.5-25 MCG/INH aerosol powder  inhaler Inhale 1 puff Daily.  1   • sulfamethoxazole-trimethoprim (BACTRIM DS) 800-160 MG per tablet Take 1  tablet by mouth 2 (Two) Times a Day. 20 tablet 0     No facility-administered medications prior to visit.        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   • Obstruction of carotid artery   • Stenosis of carotid artery   • EEG abnormality without seizure   • Hereditary and idiopathic peripheral neuropathy   • Mixed hyperlipidemia   • Acquired hypothyroidism   • Low back pain with sciatica   • Lumbar canal stenosis   • Degenerative arthritis of lumbar spine   • Osteoarthritis of hip   • History of repair of hip joint   • Syncope   • Temporary cerebral vascular dysfunction   • History of prostate cancer   • Dementia associated with other underlying disease without behavioral disturbance (CMS/[facility])   • Arthritis   • PAD (peripheral artery disease) (CMS/HCC)   • Routine adult health maintenance   • Medicare annual wellness visit, subsequent   • Essential hypertension   • Medication monitoring encounter   • Status post implantation of artificial urinary sphincter   • Moderate asthma with acute exacerbation   • Vascular dementia without behavioral disturbance (CMS/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)   • At high risk for falls       Advanced Care Planning:  ACP discussion was declined by the patient. Patient has an advance directive (not in EMR), copy requested.    Review of Systems   Constitutional: Positive for fatigue.   Respiratory: Positive for shortness of breath.    Genitourinary: Positive for urgency.   Musculoskeletal: Positive for arthralgias.   Neurological: Positive for tremors.   Hematological: Bruises/bleeds easily.   Psychiatric/Behavioral: Positive for sleep disturbance.       Compared to one year ago, the patient feels his physical health is worse.  Compared to one year ago, the patient feels his mental health is worse.    Reviewed chart for potential of high risk medication in the elderly: yes  Reviewed chart  "for potential of harmful drug interactions in the elderly:yes    Objective         Vitals:    05/27/20 1423   BP: 142/100   Pulse: 64   Temp: 98.2 °F (36.8 °C)   TempSrc: Oral   SpO2: 93%   Weight: 89.4 kg (197 lb)   Height: 172.7 cm (68\")       Body mass index is 29.95 kg/m².  Discussed the patient's BMI with him. The BMI is in the acceptable range.    Physical Exam   Constitutional: No distress.   HENT:   Mouth/Throat: Oropharynx is clear and moist.   Cardiovascular: Normal rate.   Pulmonary/Chest: Effort normal.   Neurological: He is alert. He displays atrophy and tremor. No cranial nerve deficit. Gait abnormal.   Psychiatric: He exhibits abnormal recent memory.   Nursing note and vitals reviewed.      Lab Results   Component Value Date    GLU 99 05/21/2020    CHLPL 124 05/21/2020    TRIG 100 05/21/2020    HDL 47 05/21/2020    LDL 57 05/21/2020    VLDL 20 05/21/2020        Assessment/Plan   Medicare Risks and Personalized Health Plan  CMS Preventative Services Quick Reference  Advance Directive Discussion  Cardiovascular risk  Dementia/Memory   Diabetic Lab Screening   Fall Risk  Hearing Problem  Inadequate Social Support, Isolation, Loneliness, Lack of Transportation, Financial Difficulties, or Caregiver Stress   Polypharmacy    The above risks/problems have been discussed with the patient.  Pertinent information has been shared with the patient in the After Visit Summary.  Follow up plans and orders are seen below in the Assessment/Plan Section.    Diagnoses and all orders for this visit:    1. Medicare annual wellness visit, subsequent (Primary)    2. Vascular dementia without behavioral disturbance (CMS/[facility])  -     Basic Metabolic Panel; Future    3. Mixed hyperlipidemia  -     atorvastatin (LIPITOR) 20 MG tablet; Take 1 tablet by mouth Daily. Indications: Cerebrovascular Accident or Stroke  Dispense: 90 tablet; Refill: 3    4. PAD (peripheral artery disease) (CMS/[facility])  -     atorvastatin (LIPITOR) 20 MG " tablet; Take 1 tablet by mouth Daily. Indications: Cerebrovascular Accident or Stroke  Dispense: 90 tablet; Refill: 3    5. 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  Dispense: 90 tablet; Refill: 3    6. Acquired hypothyroidism  -     levothyroxine (SYNTHROID, LEVOTHROID) 50 MCG tablet; Take 1 tablet by mouth Daily.  Dispense: 90 tablet; Refill: 1  -     TSH; Future  -     T4, Free; Future  -     T3; Future    spoke about safety at home, regarding him using his power ride on lawn mower  Suggest he give that up, high risk of injury    Follow Up:  Return in about 6 months (around 11/27/2020).     An After Visit Summary and PPPS were given to the patient.

## 2020-10-16 ENCOUNTER — FLU SHOT (OUTPATIENT)
Dept: FAMILY MEDICINE CLINIC | Facility: CLINIC | Age: 85
End: 2020-10-16

## 2020-10-16 DIAGNOSIS — Z23 NEED FOR INFLUENZA VACCINATION: ICD-10-CM

## 2020-10-16 PROCEDURE — G0008 ADMIN INFLUENZA VIRUS VAC: HCPCS | Performed by: FAMILY MEDICINE

## 2020-10-16 PROCEDURE — 90694 VACC AIIV4 NO PRSRV 0.5ML IM: CPT | Performed by: FAMILY MEDICINE

## 2020-10-27 ENCOUNTER — RESULTS ENCOUNTER (OUTPATIENT)
Dept: FAMILY MEDICINE CLINIC | Facility: CLINIC | Age: 85
End: 2020-10-27

## 2020-10-27 DIAGNOSIS — E03.9 ACQUIRED HYPOTHYROIDISM: ICD-10-CM

## 2020-10-27 DIAGNOSIS — F01.50 VASCULAR DEMENTIA WITHOUT BEHAVIORAL DISTURBANCE (HCC): ICD-10-CM

## 2020-11-10 LAB
BUN SERPL-MCNC: 16 MG/DL (ref 8–27)
BUN/CREAT SERPL: 18 (ref 10–24)
CALCIUM SERPL-MCNC: 9.3 MG/DL (ref 8.6–10.2)
CHLORIDE SERPL-SCNC: 95 MMOL/L (ref 96–106)
CO2 SERPL-SCNC: 25 MMOL/L (ref 20–29)
CREAT SERPL-MCNC: 0.91 MG/DL (ref 0.76–1.27)
GLUCOSE SERPL-MCNC: 89 MG/DL (ref 65–99)
POTASSIUM SERPL-SCNC: 4.4 MMOL/L (ref 3.5–5.2)
SODIUM SERPL-SCNC: 136 MMOL/L (ref 134–144)
T3 SERPL-MCNC: 104 NG/DL (ref 71–180)
T4 FREE SERPL-MCNC: 1.31 NG/DL (ref 0.82–1.77)
TSH SERPL DL<=0.005 MIU/L-ACNC: 3.21 UIU/ML (ref 0.45–4.5)

## 2020-11-16 ENCOUNTER — OFFICE VISIT (OUTPATIENT)
Dept: FAMILY MEDICINE CLINIC | Facility: CLINIC | Age: 85
End: 2020-11-16

## 2020-11-16 VITALS
DIASTOLIC BLOOD PRESSURE: 76 MMHG | OXYGEN SATURATION: 94 % | HEART RATE: 61 BPM | BODY MASS INDEX: 29.86 KG/M2 | WEIGHT: 197 LBS | HEIGHT: 68 IN | SYSTOLIC BLOOD PRESSURE: 130 MMHG

## 2020-11-16 DIAGNOSIS — G47.01 INSOMNIA DUE TO MEDICAL CONDITION: Primary | ICD-10-CM

## 2020-11-16 DIAGNOSIS — F01.50 VASCULAR DEMENTIA WITHOUT BEHAVIORAL DISTURBANCE (HCC): ICD-10-CM

## 2020-11-16 DIAGNOSIS — E03.9 ACQUIRED HYPOTHYROIDISM: ICD-10-CM

## 2020-11-16 PROCEDURE — 99213 OFFICE O/P EST LOW 20 MIN: CPT | Performed by: FAMILY MEDICINE

## 2020-11-16 RX ORDER — LEVOTHYROXINE SODIUM 0.05 MG/1
50 TABLET ORAL DAILY
Qty: 90 TABLET | Refills: 1 | Status: SHIPPED | OUTPATIENT
Start: 2020-11-16 | End: 2021-05-21 | Stop reason: SDUPTHER

## 2020-11-16 RX ORDER — ZOLPIDEM TARTRATE 5 MG/1
5 TABLET ORAL NIGHTLY PRN
Qty: 30 TABLET | Refills: 0
Start: 2020-11-16 | End: 2020-11-25 | Stop reason: SDUPTHER

## 2020-11-16 NOTE — PROGRESS NOTES
"Chief Complaint   Patient presents with   • Hypothyroidism   • Hypertension   • Insomnia     unable to sleep all during the night        Hypothyroidism: Patient presents for evaluation of thyroid function. Symptoms consist of fatigue, tremulousness, depression. Symptoms have present for several years. The symptoms are mild.  The problem has been unchanged.  Previous thyroid studies include TSH. The hypothyroidism is due to hypothyroidism.    Not doing well  Weak   And falls  In the ER back in sept.  Not sleeping well. Has to sleep on back due to urine leakage if on his stomach  Used wifes Ambien 5mg, she is ok for him to use the 5 mg as needed. As he is easily confused and is a fall risk.    Vitals:    11/16/20 1327   BP: 130/76   BP Location: Left arm   Patient Position: Sitting   Cuff Size: Adult   Pulse: 61   SpO2: 94%   Weight: 89.4 kg (197 lb)   Height: 172.7 cm (68\")     Gen: well appearing, alert  Eyes: EOMI, no eye bulge  Neck: no LAD. Thyroid normal size, no nodules, no tenderness  Lung: good air movement, regular RR  Heart: RR without murmur  Skin: no rash.    Lab Results   Component Value Date    TSH 3.210 11/09/2020           Assessment/Plan   Diagnoses and all orders for this visit:    1. 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  Dispense: 30 tablet; Refill: 0    2. Acquired hypothyroidism  -     levothyroxine (SYNTHROID, LEVOTHROID) 50 MCG tablet; Take 1 tablet by mouth Daily.  Dispense: 90 tablet; Refill: 1    3. Vascular dementia without behavioral disturbance (CMS/HCC)             There are no Patient Instructions on file for this visit.    Continue to take thyroid medication on a regular basis, same time of day, on an empty stomach. Repeat thyroid labs in six months.    Dr. Charlie Trinh MD  [place], [place]  [facility]  "

## 2020-11-25 DIAGNOSIS — G47.01 INSOMNIA DUE TO MEDICAL CONDITION: ICD-10-CM

## 2020-11-25 RX ORDER — ZOLPIDEM TARTRATE 5 MG/1
5 TABLET ORAL NIGHTLY PRN
Qty: 30 TABLET | Refills: 5 | Status: SHIPPED | OUTPATIENT
Start: 2020-11-25 | End: 2021-05-21

## 2020-11-25 NOTE — TELEPHONE ENCOUNTER
PATIENT REQUESTED A REFILL ON zolpidem (Ambien) 5 MG tablet    PATIENT CAN BE REACHED ON:[phone]    PHARMACY PREFERRED:[id number] - [place] - [address] [phone] [facility] [phone] [place]

## 2021-02-01 ENCOUNTER — IMMUNIZATION (OUTPATIENT)
Dept: VACCINE CLINIC | Facility: HOSPITAL | Age: 86
End: 2021-02-01

## 2021-02-01 PROCEDURE — 0001A: CPT | Performed by: INTERNAL MEDICINE

## 2021-02-01 PROCEDURE — 91300 HC SARSCOV02 VAC 30MCG/0.3ML IM: CPT | Performed by: INTERNAL MEDICINE

## 2021-02-22 ENCOUNTER — IMMUNIZATION (OUTPATIENT)
Dept: VACCINE CLINIC | Facility: HOSPITAL | Age: 86
End: 2021-02-22

## 2021-02-22 PROCEDURE — 0002A: CPT | Performed by: INTERNAL MEDICINE

## 2021-02-22 PROCEDURE — 91300 HC SARSCOV02 VAC 30MCG/0.3ML IM: CPT | Performed by: INTERNAL MEDICINE

## 2021-05-13 DIAGNOSIS — I10 ESSENTIAL HYPERTENSION: ICD-10-CM

## 2021-05-13 DIAGNOSIS — Z79.899 HIGH RISK MEDICATION USE: ICD-10-CM

## 2021-05-13 DIAGNOSIS — E78.2 MIXED HYPERLIPIDEMIA: ICD-10-CM

## 2021-05-13 DIAGNOSIS — E03.9 ACQUIRED HYPOTHYROIDISM: Primary | ICD-10-CM

## 2021-05-13 DIAGNOSIS — Z51.81 MEDICATION MONITORING ENCOUNTER: ICD-10-CM

## 2021-05-13 DIAGNOSIS — E03.9 ACQUIRED HYPOTHYROIDISM: ICD-10-CM

## 2021-05-13 DIAGNOSIS — Z12.5 SCREENING FOR PROSTATE CANCER: ICD-10-CM

## 2021-05-21 ENCOUNTER — OFFICE VISIT (OUTPATIENT)
Dept: FAMILY MEDICINE CLINIC | Facility: CLINIC | Age: 86
End: 2021-05-21

## 2021-05-21 VITALS
TEMPERATURE: 97.3 F | BODY MASS INDEX: 30.16 KG/M2 | WEIGHT: 199 LBS | HEART RATE: 68 BPM | SYSTOLIC BLOOD PRESSURE: 124 MMHG | OXYGEN SATURATION: 95 % | DIASTOLIC BLOOD PRESSURE: 80 MMHG | HEIGHT: 68 IN

## 2021-05-21 DIAGNOSIS — I63.542 CEREBROVASCULAR ACCIDENT (CVA) DUE TO OCCLUSION OF LEFT CEREBELLAR ARTERY (HCC): ICD-10-CM

## 2021-05-21 DIAGNOSIS — Z00.00 MEDICARE ANNUAL WELLNESS VISIT, SUBSEQUENT: Primary | ICD-10-CM

## 2021-05-21 DIAGNOSIS — R41.3 MEMORY LOSS: ICD-10-CM

## 2021-05-21 DIAGNOSIS — E03.9 ACQUIRED HYPOTHYROIDISM: ICD-10-CM

## 2021-05-21 DIAGNOSIS — I73.9 PAD (PERIPHERAL ARTERY DISEASE) (HCC): ICD-10-CM

## 2021-05-21 DIAGNOSIS — E78.2 MIXED HYPERLIPIDEMIA: ICD-10-CM

## 2021-05-21 DIAGNOSIS — K22.70 BARRETT'S ESOPHAGUS WITHOUT DYSPLASIA: ICD-10-CM

## 2021-05-21 LAB — DRUGS UR: NORMAL

## 2021-05-21 PROCEDURE — G0439 PPPS, SUBSEQ VISIT: HCPCS | Performed by: FAMILY MEDICINE

## 2021-05-21 RX ORDER — ATORVASTATIN CALCIUM 20 MG/1
20 TABLET, FILM COATED ORAL DAILY
Qty: 90 TABLET | Refills: 3 | Status: SHIPPED | OUTPATIENT
Start: 2021-05-21 | End: 2022-05-16

## 2021-05-21 RX ORDER — LIFITEGRAST 50 MG/ML
SOLUTION/ DROPS OPHTHALMIC
COMMUNITY
Start: 2021-02-19 | End: 2021-12-01 | Stop reason: ALTCHOICE

## 2021-05-21 RX ORDER — LEVOTHYROXINE SODIUM 0.05 MG/1
50 TABLET ORAL DAILY
Qty: 90 TABLET | Refills: 1 | Status: SHIPPED | OUTPATIENT
Start: 2021-05-21 | End: 2021-12-01 | Stop reason: SDUPTHER

## 2021-05-21 RX ORDER — RIVASTIGMINE 9.5 MG/24H
1 PATCH, EXTENDED RELEASE TRANSDERMAL DAILY
Qty: 90 EACH | Refills: 3 | Status: SHIPPED | OUTPATIENT
Start: 2021-05-21 | End: 2022-05-16

## 2021-05-21 RX ORDER — CLOPIDOGREL BISULFATE 75 MG/1
75 TABLET ORAL DAILY
Qty: 90 TABLET | Refills: 3 | Status: SHIPPED | OUTPATIENT
Start: 2021-05-21 | End: 2021-07-13

## 2021-05-21 RX ORDER — PANTOPRAZOLE SODIUM 40 MG/1
40 TABLET, DELAYED RELEASE ORAL DAILY
Qty: 90 TABLET | Refills: 3 | Status: SHIPPED | OUTPATIENT
Start: 2021-05-21 | End: 2022-04-27

## 2021-05-21 NOTE — PROGRESS NOTES
The ABCs of the Annual Wellness Visit  Subsequent Medicare Wellness Visit    Chief Complaint   Patient presents with   • Medicare Wellness-subsequent       Subjective   History of Present Illness:  Pawel Goldberg is a 88 y.o. male who presents for a Subsequent Medicare Wellness Visit.    HEALTH RISK ASSESSMENT    Recent Hospitalizations:  No hospitalization(s) within the last year.    Current Medical Providers:  Patient Care Team:  Charlie Trinh MD as PCP - General (Family Medicine)  Hardik Senior MD as Consulting Physician (Urology)  MAURO Peters MD as Consulting Physician (Ophthalmology)  Brant Polanco MD as Consulting Physician (Pulmonary Disease)  Charlie Goldstein DO as Consulting Physician (Neurology)    Smoking Status:  Social History     Tobacco Use   Smoking Status Former Smoker   • Packs/day: 0.50   • Years: 15.00   • Pack years: 7.50   • Types: Cigarettes   Smokeless Tobacco Never Used   Tobacco Comment    Quit 1960's   1 ppd for 15 years         Alcohol Consumption:  Social History     Substance and Sexual Activity   Alcohol Use No       Depression Screen:   PHQ-2/PHQ-9 Depression Screening 5/21/2021   Little interest or pleasure in doing things 0   Feeling down, depressed, or hopeless 0   Trouble falling or staying asleep, or sleeping too much 0   Feeling tired or having little energy 0   Poor appetite or overeating 0   Feeling bad about yourself - or that you are a failure or have let yourself or your family down 0   Trouble concentrating on things, such as reading the newspaper or watching television 0   Moving or speaking so slowly that other people could have noticed. Or the opposite - being so fidgety or restless that you have been moving around a lot more than usual 0   Thoughts that you would be better off dead, or of hurting yourself in some way 0   Total Score 0   If you checked off any problems, how difficult have these problems made it for you to do your work, take  care of things at home, or get along with other people? -       Fall Risk Screen:  ASHLEY Fall Risk Assessment has not been completed.    Health Habits and Functional and Cognitive Screening:  Functional & Cognitive Status 5/21/2021   Do you have difficulty preparing food and eating? No   Do you have difficulty bathing yourself, getting dressed or grooming yourself? No   Do you have difficulty using the toilet? No   Do you have difficulty moving around from place to place? No   Do you have trouble with steps or getting out of a bed or a chair? No   Current Diet Unhealthy Diet   Dental Exam Up to date   Eye Exam Up to date        Eye Exam Comment -   Exercise (times per week) 0 times per week   Current Exercises Include No Regular Exercise   Current Exercise Activities Include -   Do you need help using the phone?  No   Are you deaf or do you have serious difficulty hearing?  Yes   Do you need help with transportation? No   Do you need help shopping? No   Do you need help preparing meals?  No   Do you need help with housework?  No   Do you need help with laundry? No   Do you need help taking your medications? No   Do you need help managing money? No   Do you ever drive or ride in a car without wearing a seat belt? No   Have you felt unusual stress, anger or loneliness in the last month? No   Who do you live with? Spouse   If you need help, do you have trouble finding someone available to you? No   Have you been bothered in the last four weeks by sexual problems? No   Do you have difficulty concentrating, remembering or making decisions? No         Does the patient have evidence of cognitive impairment? Yes    Asprin use counseling:On clopidrogel as an alternative (due to ASA contraindication)    Age-appropriate Screening Schedule:  Refer to the list below for future screening recommendations based on patient's age, sex and/or medical conditions. Orders for these recommended tests are listed in the plan section. The  patient has been provided with a written plan.    Health Maintenance   Topic Date Due   • ZOSTER VACCINE (2 of 3) 07/06/2016   • TDAP/TD VACCINES (3 - Td or Tdap) 07/20/2020   • INFLUENZA VACCINE  08/01/2021   • LIPID PANEL  05/14/2022          The following portions of the patient's history were reviewed and updated as appropriate: allergies, current medications, past medical history, past social history, past surgical history and problem list.    Outpatient Medications Prior to Visit   Medication Sig Dispense Refill   • albuterol sulfate HFA (Ventolin HFA) 108 (90 Base) MCG/ACT inhaler Inhale 2 puffs Every 4 (Four) Hours As Needed for Wheezing or Shortness of Air. 1 inhaler 1   • Magnesium 250 MG tablet Take  by mouth.     • Multiple Vitamins-Minerals (MULTIVITAMIN ADULT PO) Take  by mouth.     • Probiotic Product (PROBIOTIC COLON SUPPORT PO) Take  by mouth.     • Trelegy Ellipta 100-62.5-25 MCG/INH inhaler Inhale 1 puff Daily.     • Xiidra 5 % ophthalmic solution      • atorvastatin (LIPITOR) 20 MG tablet Take 1 tablet by mouth Daily. Indications: Cerebrovascular Accident or Stroke 90 tablet 3   • clopidogrel (PLAVIX) 75 MG tablet Take 1 tablet by mouth Daily. 90 tablet 3   • levothyroxine (SYNTHROID, LEVOTHROID) 50 MCG tablet Take 1 tablet by mouth Daily. 90 tablet 1   • pantoprazole (PROTONIX) 40 MG EC tablet TAKE 1 TABLET TWICE A  tablet 3   • rivastigmine (EXELON) 9.5 MG/24HR patch PLACE 1 PATCH ON THE SKIN AS DIRECTED BY PROVIDER DAILY 90 each 3   • tolterodine LA (DETROL LA) 4 MG 24 hr capsule TAKE 1 CAPSULE EVERY NIGHT AT BEDTIME FOR OVERACTIVE BLADDER 90 capsule 4   • zolpidem (Ambien) 5 MG tablet Take 1 tablet by mouth At Night As Needed for Sleep. 30 tablet 5   • tiotropium bromide-olodaterol (STIOLTO RESPIMAT) 2.5-2.5 MCG/ACT aerosol solution inhaler Inhale Daily.     • ANORO ELLIPTA 62.5-25 MCG/INH aerosol powder  inhaler Inhale 1 puff Daily.  1   • uribel (URO-MP) 118 MG capsule capsule Take 1  capsule by mouth 3 (Three) Times a Day As Needed (bladder urine irritation). 20 capsule 1     No facility-administered medications prior to visit.       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   • Obstruction of carotid artery   • Stenosis of carotid artery   • EEG abnormality without seizure   • Hereditary and idiopathic peripheral neuropathy   • Mixed hyperlipidemia   • Acquired hypothyroidism   • Low back pain with sciatica   • Lumbar canal stenosis   • Degenerative arthritis of lumbar spine   • Osteoarthritis of hip   • History of repair of hip joint   • Syncope   • Temporary cerebral vascular dysfunction   • History of prostate cancer   • Dementia associated with other underlying disease without behavioral disturbance (CMS/HCC)   • Arthritis   • PAD (peripheral artery disease) (CMS/HCC)   • Routine adult health maintenance   • Medicare annual wellness visit, subsequent   • Essential hypertension   • Medication monitoring encounter   • Status post implantation of artificial urinary sphincter   • Moderate asthma with acute exacerbation   • Vascular dementia without behavioral disturbance (CMS/HCC)   • 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 (CMS/HCC)   • At high risk for falls   • Insomnia due to medical condition       Advanced Care Planning:  ACP discussion was held with the patient during this visit. Patient has an advance directive (not in EMR), copy requested.    Review of Systems    Compared to one year ago, the patient feels his physical health is worse.  Compared to one year ago, the patient feels his mental health is worse.    Reviewed chart for potential of high risk medication in the elderly: yes  Reviewed chart for potential of harmful drug interactions in the elderly:yes    Objective         Vitals:    05/21/21 1347   BP: 124/80   BP Location: Left arm   Patient Position: Sitting   Cuff Size: Adult   Pulse: 68   Temp: 97.3 °F  "(36.3 °C)   TempSrc: Temporal   SpO2: 95%   Weight: 90.3 kg (199 lb)   Height: 172.7 cm (68\")       Body mass index is 30.26 kg/m².  Discussed the patient's BMI with him. The BMI is in the acceptable range.    Physical Exam  Cardiovascular:      Rate and Rhythm: Normal rate.   Pulmonary:      Effort: No respiratory distress.   Neurological:      Mental Status: He is alert.   Psychiatric:         Cognition and Memory: Cognition is impaired. Memory is impaired.         Lab Results   Component Value Date    GLU 94 05/14/2021    CHLPL 127 05/14/2021    TRIG 88 05/14/2021    HDL 51 05/14/2021    LDL 59 05/14/2021    VLDL 17 05/14/2021        Assessment/Plan   Medicare Risks and Personalized Health Plan  CMS Preventative Services Quick Reference  Dementia/Memory     The above risks/problems have been discussed with the patient.  Pertinent information has been shared with the patient in the After Visit Summary.  Follow up plans and orders are seen below in the Assessment/Plan Section.    Diagnoses and all orders for this visit:    1. Medicare annual wellness visit, subsequent (Primary)    2. Mixed hyperlipidemia  -     atorvastatin (LIPITOR) 20 MG tablet; Take 1 tablet by mouth Daily. Indications: Cerebrovascular Accident or Stroke  Dispense: 90 tablet; Refill: 3    3. PAD (peripheral artery disease) (CMS/[facility])  -     atorvastatin (LIPITOR) 20 MG tablet; Take 1 tablet by mouth Daily. Indications: Cerebrovascular Accident or Stroke  Dispense: 90 tablet; Refill: 3    4. 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 (CMS/[facility])  -     clopidogrel (PLAVIX) 75 MG tablet; Take 1 tablet by mouth Daily.  Dispense: 90 tablet; Refill: 3    5. Acquired hypothyroidism  -     levothyroxine (SYNTHROID, LEVOTHROID) 50 MCG tablet; Take 1 tablet by mouth Daily.  Dispense: 90 tablet; Refill: 1    6. Brito's esophagus without dysplasia  -     pantoprazole (PROTONIX) 40 MG EC tablet; Take 1 tablet by mouth Daily.  " Dispense: 90 tablet; Refill: 3    7. Memory loss  -     rivastigmine (EXELON) 9.5 MG/24HR patch; Place 1 patch on the skin as directed by provider Daily. Indications: vascular dementia/ CVA  Dispense: 90 each; Refill: 3      Overall Mr. Goldberg is doing okay.  His dementia is slowly worsening.  His wife has to take care of him 24 hours a day.    Had a recent urinary tract infections seen by urology.  Apparently had a rash with Bactrim.  Levaquin was called in.  Wife had questions about the side effects she was reading about the antibiotic.  Urged to go ahead and try the Levaquin for the urine infection    Follow Up:  No follow-ups on file.     An After Visit Summary and PPPS were given to the patient.

## 2021-07-13 ENCOUNTER — HOSPITAL ENCOUNTER (OUTPATIENT)
Dept: GENERAL RADIOLOGY | Facility: HOSPITAL | Age: 86
Discharge: HOME | End: 2021-07-13

## 2021-07-13 ENCOUNTER — OFFICE VISIT (OUTPATIENT)
Dept: FAMILY MEDICINE CLINIC | Facility: CLINIC | Age: 86
End: 2021-07-13

## 2021-07-13 VITALS
TEMPERATURE: 97.7 F | DIASTOLIC BLOOD PRESSURE: 80 MMHG | BODY MASS INDEX: 29.4 KG/M2 | HEART RATE: 110 BPM | WEIGHT: 194 LBS | SYSTOLIC BLOOD PRESSURE: 140 MMHG | HEIGHT: 68 IN | OXYGEN SATURATION: 97 %
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
## 2021-07-13 DIAGNOSIS — S49.91XS INJURY OF RIGHT SHOULDER, SEQUELA: ICD-10-CM

## 2021-07-13 DIAGNOSIS — S69.91XS INJURY OF RIGHT THUMB, SEQUELA: ICD-10-CM

## 2021-07-13 DIAGNOSIS — S00.03XS HEMATOMA OF FRONTAL SCALP, SEQUELA: ICD-10-CM

## 2021-07-13 DIAGNOSIS — S09.90XS CLOSED HEAD INJURY, SEQUELA: ICD-10-CM

## 2021-07-13 DIAGNOSIS — I63.542 CEREBROVASCULAR ACCIDENT (CVA) DUE TO OCCLUSION OF LEFT CEREBELLAR ARTERY (HCC): ICD-10-CM

## 2021-07-13 PROBLEM — S00.03XA HEMATOMA OF FRONTAL SCALP: Status: ACTIVE | Noted: 2021-07-08
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
## 2021-07-13 LAB
BILIRUB BLD-MCNC: NEGATIVE MG/DL
CLARITY, POC: CLEAR
COLOR UR: ABNORMAL
GLUCOSE UR STRIP-MCNC: NEGATIVE MG/DL
KETONES UR QL: NEGATIVE
LEUKOCYTE EST, POC: NEGATIVE
NITRITE UR-MCNC: NEGATIVE MG/ML
PH UR: 8.5 [PH] (ref 5–8)
PROT UR STRIP-MCNC: NEGATIVE MG/DL
RBC # UR STRIP: NEGATIVE /UL
SP GR UR: 1 (ref 1–1.03)
UROBILINOGEN UR QL: NORMAL

## 2021-07-13 PROCEDURE — 73030 X-RAY EXAM OF SHOULDER: CPT

## 2021-07-13 PROCEDURE — 73140 X-RAY EXAM OF FINGER(S): CPT

## 2021-07-13 PROCEDURE — 81002 URINALYSIS NONAUTO W/O SCOPE: CPT | Performed by: FAMILY MEDICINE

## 2021-07-13 PROCEDURE — 99214 OFFICE O/P EST MOD 30 MIN: CPT | Performed by: FAMILY MEDICINE

## 2021-07-13 RX ORDER — PHENOL 1.4 %
1 AEROSOL, SPRAY (ML) MUCOUS MEMBRANE
COMMUNITY
End: 2021-08-13

## 2021-07-13 NOTE — PROGRESS NOTES
"  Destinee   Pawel Goldberg is a 88 y.o. male who is here for   Chief Complaint   Patient presents with   • Hospital Follow Up Visit     fell at home, hit head on concrete - is experiencing right side weakness    .     History of Present Illness   Patient comes in after hospital follow-up.  He was admitted overnight observation due to trauma.  Pascual fell in his driveway yet again.  Landing on his left face.  He has a large left facial hematoma and soft tissue swelling.  He has got a hematoma purple size of a chicken egg on his left brow.  Black eyes on both sides.  Blood tracking down into his neck.  He had CAT scans of head and neck at [facility] which did not reveal any fractures or intracranial blood.  He may have a broken right thumb.  He was walking with a cane his right hand that may be the mechanism of the thumb fracture.  He is now home.  Did not want in-home therapy.  They plan on take him to [facility] outpatient therapy in the next week or 2 when he is feeling better.  We will probably have to hold his Plavix for now as he is having continued large hematoma on the face.    Daughter brings him in today states that he is having some more headaches.  His memory is about the same.  Having limited range of motion with the right shoulder.      The following portions of the patient's history were reviewed and updated as appropriate: allergies, current medications, past family history, past medical history, past social history, past surgical history and problem list.    Review of Systems    Objective   Vitals:    07/13/21 1308   BP: 140/80   BP Location: Left arm   Patient Position: Sitting   Cuff Size: Adult   Pulse: 110   Temp: 97.7 °F (36.5 °C)   TempSrc: Temporal   SpO2: 97%   Weight: 88 kg (194 lb)   Height: 172.7 cm (68\")      Physical Exam  HENT:      Head: Raccoon eyes, contusion, right periorbital erythema and left periorbital erythema present.      Jaw: There is normal jaw occlusion.      " Nose: Nose normal.   Cardiovascular:      Rate and Rhythm: Normal rate.   Pulmonary:      Breath sounds: Wheezing present.   Musculoskeletal:      Cervical back: Rigidity and crepitus present. Pain with movement, spinous process tenderness and muscular tenderness present. Decreased range of motion.   Neurological:      Mental Status: He is alert.      Motor: Weakness present.      Coordination: Coordination abnormal.      Gait: Gait abnormal.     Right thumb is swollen and purple.  Right shoulder has decreased weakness for abduction and abduction.  Has a painless passive range of motion.    Results for orders placed or performed in visit on 07/13/21   POCT urinalysis dipstick, manual    Specimen: Urine   Result Value Ref Range    Color Dark Yellow Yellow, Straw, Dark Yellow, Laurie    Clarity, UA Clear Clear    Glucose, UA Negative Negative, 1000 mg/dL (3+) mg/dL    Bilirubin Negative Negative    Ketones, UA Negative Negative    Specific Gravity  1.005 1.005 - 1.030    Blood, UA Negative Negative    pH, Urine 8.5 (A) 5.0 - 8.0    Protein, POC Negative Negative mg/dL    Urobilinogen, UA Normal Normal    Leukocytes Negative Negative    Nitrite, UA Negative Negative       Assessment/Plan   Diagnoses and all orders for this visit:    1. Fall, sequela (Primary)  -     MRI Brain Without Contrast; Future  -     POCT urinalysis dipstick, manual    2. Hematoma of frontal scalp, sequela    3. 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 Injury of right shoulder, sequela  -     XR Shoulder 2+ View Right; Future    5. Injury of right thumb, sequela  -     XR finger 2+ vw right; Future    6. Closed head injury, sequela        Current outpatient and discharge medications have been reconciled for the patient.  Reviewed by: Charlie Trinh MD      We will recheck a brain MRI to make sure the fall was not a stroke.  Gave the daughter's diagnosis  of an obvious closed head injury.  Try to wear the thumb splint.   ice pack to the thumb twice a day for the next week or 2.  Tylenol is okay for pain.  Asked him to use his Rollator for ambulation.  Outpatient physical therapy on hold for now.  Will x-rays right shoulder.  X-ray right thumb.  Due to his artificial urinary sphincter family asked that a urinalysis be checked.  It is normal.  We will hold the Plavix for now due to recent trauma  We will see him back in 1 week  There are no Patient Instructions on file for this visit.    Medications Discontinued During This Encounter   Medication Reason   • clopidogrel (PLAVIX) 75 MG tablet         Return in about 1 week (around 7/20/2021).    Dr. Charlie Trinh  [place], [place]

## 2021-07-14 ENCOUNTER — TELEPHONE (OUTPATIENT)
Dept: FAMILY MEDICINE CLINIC | Facility: CLINIC | Age: 86
End: 2021-07-14

## 2021-07-14 NOTE — TELEPHONE ENCOUNTER
Caller: ERNESTO PEARSON    Relationship:DAUGHTER    Best call back number: [phone]    What orders are you requesting (i.e. lab or imaging): MRI ON HEAD     In what timeframe would the patient need to come in: ASAP       PLEASE CALL

## 2021-07-19 ENCOUNTER — OFFICE VISIT (OUTPATIENT)
Dept: FAMILY MEDICINE CLINIC | Facility: CLINIC | Age: 86
End: 2021-07-19

## 2021-07-19 VITALS
TEMPERATURE: 97.5 F | OXYGEN SATURATION: 94 % | WEIGHT: 191 LBS | HEIGHT: 68 IN | BODY MASS INDEX: 28.95 KG/M2 | HEART RATE: 75 BPM | DIASTOLIC BLOOD PRESSURE: 80 MMHG | SYSTOLIC BLOOD PRESSURE: 140 MMHG
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
## 2021-07-19 DIAGNOSIS — Z91.81 AT HIGH RISK FOR FALLS: ICD-10-CM

## 2021-07-19 DIAGNOSIS — Z87.81 HISTORY OF THUMB FRACTURE: ICD-10-CM

## 2021-07-19 DIAGNOSIS — S00.03XS HEMATOMA OF FRONTAL SCALP, SEQUELA: ICD-10-CM

## 2021-07-19 PROCEDURE — 99214 OFFICE O/P EST MOD 30 MIN: CPT | Performed by: FAMILY MEDICINE

## 2021-07-19 RX ORDER — ERYTHROMYCIN 5 MG/G
OINTMENT OPHTHALMIC
COMMUNITY
Start: 2021-05-25 | End: 2021-07-19

## 2021-07-19 NOTE — PROGRESS NOTES
"  Subjective   Pawel Goldberg is a 88 y.o. male who is here for   Chief Complaint   Patient presents with   • Fall     f/u mri and x rays    .     History of Present Illness   Pawel is brought in by his daughter today follow-up for his fall.  We able to get his brain MRI done at [facility].  Good news shows no new stroke tumor blood or fracture.  Does have extensive old evidence of stroke and scarring.  Explained this to daughter.  He said no new falls at home.  His large left facial hematoma slowly improving.  His right periorbital edema is getting better.  Still has significant amount over the left.  Most of his left face is purple    We also reviewed his x-rays of right shoulder which she fell on no fracture there but advanced arthritis    We reviewed his x-rays of his right thumb.  Already has a dislocation and fracture.  He is right-handed this is causing significant disability for him.  Especially since he has a artificial urinary sphincter he has to manipulate with his right thumb and right index finger.  Get him in to see hand surgery for follow-up.    We will hold his anticoagulation for now.  He still not healed from his trauma.      The following portions of the patient's history were reviewed and updated as appropriate: allergies, current medications, past family history, past medical history, past social history, past surgical history and problem list.    Review of Systems    Objective   Vitals:    07/19/21 1532   BP: 140/80   BP Location: Left arm   Patient Position: Sitting   Cuff Size: Adult   Pulse: 75   Temp: 97.5 °F (36.4 °C)   TempSrc: Temporal   SpO2: 94%   Weight: 86.6 kg (191 lb)   Height: 172.7 cm (68\")      Physical Exam  Cardiovascular:      Rate and Rhythm: Normal rate.   Pulmonary:      Breath sounds: No wheezing.   Neurological:      Mental Status: He is alert.      Motor: Weakness present.      Gait: Gait abnormal.   Psychiatric:         Mood and Affect: Mood normal. "       XR Finger 2+ View Right (07/13/2021 14:25)  XR Shoulder 2+ View Right (07/13/2021 14:26)      Assessment/Plan   Diagnoses and all orders for this visit:    1. Fall, sequela (Primary)  -     Ambulatory Referral to Hand Surgery    2. At high risk for falls    3. Hematoma of frontal scalp, sequela    4. History of thumb fracture  -     Ambulatory Referral to Hand Surgery    They have an appointment to start his outpatient physical therapy next week.  For gait training.  Encouraged use his Rollator when ambulatory outside of the home.    There are no Patient Instructions on file for this visit.    Medications Discontinued During This Encounter   Medication Reason   • erythromycin (ROMYCIN) 5 MG/GM ophthalmic ointment *Therapy completed        Return in about 2 months (around 9/19/2021).    Dr. Charlie Trinh  [place], [place]

## 2021-07-29 ENCOUNTER — HOSPITAL ENCOUNTER (OUTPATIENT)
Dept: PHYSICAL THERAPY | Facility: HOSPITAL | Age: 86
Setting detail: THERAPIES SERIES
Discharge: HOME | End: 2021-07-29

## 2021-07-29 DIAGNOSIS — R29.6 FREQUENT FALLS: Primary | ICD-10-CM

## 2021-07-29 PROCEDURE — 97161 PT EVAL LOW COMPLEX 20 MIN: CPT

## 2021-08-11 ENCOUNTER — HOSPITAL ENCOUNTER (OUTPATIENT)
Dept: PHYSICAL THERAPY | Facility: HOSPITAL | Age: 86
Setting detail: THERAPIES SERIES
Discharge: HOME | End: 2021-08-11

## 2021-08-11 DIAGNOSIS — R29.6 FREQUENT FALLS: Primary | ICD-10-CM

## 2021-08-11 PROCEDURE — 97112 NEUROMUSCULAR REEDUCATION: CPT

## 2021-08-11 PROCEDURE — 97530 THERAPEUTIC ACTIVITIES: CPT

## 2021-08-11 NOTE — THERAPY TREATMENT NOTE
Outpatient Physical Therapy Neuro Treatment Note  [facility]     Patient Name: Pawel Goldberg  : 1932  MRN: [id number]  Today's Date: 2021      Visit Date: 2021    Visit Dx:    ICD-10-CM ICD-9-CM   1. 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   • Obstruction of carotid artery   • Stenosis of carotid artery   • EEG abnormality without seizure   • Hereditary and idiopathic peripheral neuropathy   • Mixed hyperlipidemia   • Acquired hypothyroidism   • Low back pain with sciatica   • Lumbar canal stenosis   • Degenerative arthritis of lumbar spine   • Osteoarthritis of hip   • History of repair of hip joint   • Syncope   • Temporary cerebral vascular dysfunction   • History of prostate cancer   • Dementia associated with other underlying disease without behavioral disturbance (CMS/HCC)   • Arthritis   • PAD (peripheral artery disease) (CMS/HCC)   • Routine adult health maintenance   • Medicare annual wellness visit, subsequent   • Essential hypertension   • Medication monitoring encounter   • Status post implantation of artificial urinary sphincter   • Moderate asthma with acute exacerbation   • Vascular dementia without behavioral disturbance (CMS/HCC)   • 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   -JV         Precautions and Contraindications    Precautions/Limitations  fall precautions;hip precautions- left  -JV         Subjective Pain    Able to rate subjective pain?  yes  -JV      Pre-Treatment Pain Level  0  -JV      Post-Treatment Pain Level  0  -JV        User Key  (r) = Recorded By, (t) =  Taken By, (c) = Cosigned By    Initials Name Provider Type    Liliam Thomas PT Physical Therapist                  PT Assessment/Plan     Row Name 08/11/21 1549          PT Assessment    Functional Limitations  Decreased safety during functional activities;Impaired gait;Limitation in home management;Performance in leisure activities;Performance in self-care ADL  -JV     Impairments  Balance;Gait;Coordination;Endurance  -JV     Assessment Comments  Pt presents this date for tx, reports no new falls and appt with Cardiology on Friday. Pt amb this date with st cane and states he does not like to use a walker. Pt husam balance activities very well and able to maintain balance in normal and narrow Rodríguez with eyes open, moving head up/down and side to side. Pt also able to maintain balance on compliant surface with eyes open in normal and narrow Rodríguez.  -JV     Rehab Potential  Good  -JV        PT Plan    PT Plan Comments  Cont plan to progress toward goals.  -JV       User Key  (r) = Recorded By, (t) = Taken By, (c) = Cosigned By    Initials Name Provider Type    Liliam Thomas PT Physical Therapist             OP Exercises     Row Name 08/11/21 1100             Precautions    Existing Precautions/Restrictions  fall  -JV         Subjective Comments    Subjective Comments  Pt reports he has not been walking much because he is afraid of falling.   -JV         Subjective Pain    Able to rate subjective pain?  yes  -JV      Pre-Treatment Pain Level  0  -JV      Post-Treatment Pain Level  0  -JV         Exercise 1    Exercise Name 1  Balance progression in corner with chair in front for safety. Pt able to hold balance with normal and narrow Rodríguez with eyes open and head mov't up/down and side to side.  -JV      Cueing 1  Verbal;Demo  -JV         Exercise 2    Exercise Name 2  Balance activities on blue foam pad with normal and narrow Rodríguez  -JV      Cueing 2  Verbal;Demo  -JV        User Key  (r) = Recorded By, (t) = Taken By,  (c) = Cosigned By    Initials Name Provider Type    Liliam Thomas, PT Physical Therapist                          Therapy Education  Education Details: HEP  Given: HEP  Program: New  How Provided: Verbal, Demonstration, Written  Provided to: Patient, Caregiver  Level of Understanding: Teach back education performed, Verbalized, Demonstrated              Time Calculation:   Start Time: 1100  Stop Time: 1200  Time Calculation (min): 60 min   Therapy Charges for Today     Code Description Service Date Service Provider Modifiers Qty    [id number]  PT NEUROMUSC RE EDUCATION EA 15 MIN 8/11/2021 Liliam De La O, PT GP 2    [id number]  PT THERAPEUTIC ACT EA 15 MIN 8/11/2021 Liliam De La O, PT GP 2                    Liliam De La O, PT  8/11/2021

## 2021-08-13 ENCOUNTER — OFFICE VISIT (OUTPATIENT)
Dept: CARDIOLOGY | Facility: CLINIC | Age: 86
End: 2021-08-13

## 2021-08-13 VITALS
DIASTOLIC BLOOD PRESSURE: 82 MMHG | HEART RATE: 57 BPM | WEIGHT: 198 LBS | BODY MASS INDEX: 30.01 KG/M2 | SYSTOLIC BLOOD PRESSURE: 140 MMHG | HEIGHT: 68 IN

## 2021-08-13 DIAGNOSIS — Z86.79 HISTORY OF HYPOTENSION: ICD-10-CM

## 2021-08-13 DIAGNOSIS — R06.02 SHORTNESS OF BREATH: ICD-10-CM

## 2021-08-13 DIAGNOSIS — R55 SYNCOPE AND COLLAPSE: Primary | ICD-10-CM

## 2021-08-13 DIAGNOSIS — I25.84 CORONARY ARTERY CALCIFICATION: ICD-10-CM

## 2021-08-13 DIAGNOSIS — I25.10 CORONARY ARTERY CALCIFICATION: ICD-10-CM

## 2021-08-13 PROCEDURE — 99214 OFFICE O/P EST MOD 30 MIN: CPT | Performed by: INTERNAL MEDICINE

## 2021-08-13 RX ORDER — CIPROFLOXACIN 500 MG/1
TABLET, FILM COATED ORAL
COMMUNITY
Start: 2021-08-04 | End: 2021-12-01

## 2021-08-13 RX ORDER — CLOPIDOGREL BISULFATE 75 MG/1
75 TABLET ORAL DAILY
COMMUNITY
End: 2022-06-21

## 2021-08-13 RX ORDER — CLINDAMYCIN HYDROCHLORIDE 300 MG/1
300 CAPSULE ORAL 3 TIMES DAILY
COMMUNITY
Start: 2021-08-11 | End: 2022-06-01

## 2021-08-15 NOTE — PROGRESS NOTES
Date of Office Visit: 2021  Encounter Provider: Rowdy Moore MD  Place of Service: [facility] CARDIOLOGY  Patient Name: Pawel Goldberg  :1932    Chief complaint: Follow-up for shortness of breath, coronary artery calcificaion on CT scan, history of hypotension.    History of Present Illness:    I again had the pleasure of seeing the patient in cardiology office on 2021.  He is a very pleasant 88 year-old white male with a history of carotid artery disease, mild dementia, interstitial lung disease, prior stroke, and coronary artery disease on CT scan who presents for follow-up.  I initially saw the patient in the office on 2018.  He had seen Dr. Polanco in pulmonology, and had been getting more short of breath over the previous several months.  A CT scan of his chest with contrast on 2018 showed no evidence of pulmonary emboli, although there was possible interstitial lung disease.  He was also incidentally noted to have coronary artery disease and atherosclerotic disease of the aorta.  A high-resolution CT scan of the chest on 2018 showed mild fibrosis in the left mid and lower lobe, as well as mild fibrotic scarring and bronchiectasis in the right middle lobe, lingula, and central left lower lobe.  He did have an echocardiogram performed on 5/15/2018, which showed an ejection fraction of 61%, grade 1 diastolic dysfunction, and no significant valvular disease.  He subsequently underwent a Lexiscan nuclear stress test on 10/2/2018 which was normal.  Of note, he has had a stroke on 3/29/2014.  An MRI of his brain at that time showed multiple small infarcts in the right MCA distribution.  He was found to have right carotid artery stenosis, and underwent a right carotid endarterectomy on 2014.    The patient presents today for follow-up.  He has had frequent falls recently, although he recently had an episode of falling where he may have had  syncope.  He is not clear on what happened, but evidently lost consciousness.  He was admitted to [facility] on 7/9/2021, and was diagnosed with a left scalp hematoma, but no bleeding.  His blood pressure has been labile at home, although it has mainly been high.  He still has the same baseline dyspnea, although this has not changed.  He has had no chest pain.    Past Medical History:   Diagnosis Date   • Allergic     Seasonal   • Allergic rhinitis    • Arthritis    • Asthma    • Brito esophagus    • Benign prostatic hyperplasia    • Carotid arterial disease (CMS/HCC)     Status post right CEA 4/1/14   • COPD (chronic obstructive pulmonary disease) (CMS/HCC)    • Coronary artery disease    • Dementia (CMS/[facility])    • Dyspnea    • Erectile dysfunction    • GERD (gastroesophageal reflux disease)    • HL (hearing loss)    • Low back pain    • PAD (peripheral artery disease) (CMS/[facility])    • Prostate cancer (CMS/[facility])    • Stroke (CMS/[facility])     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   • Urinary tract infection        Past Surgical History:   Procedure Laterality Date   • CAROTID ARTERY ANGIOPLASTY  04/01/2014   • COLONOSCOPY  2009   • ENDOSCOPY  2009    2001,2003,2006   • FRACTURE SURGERY     • HERNIA REPAIR  2000   • HERNIA REPAIR  2009   • INGUINAL HERNIA REPAIR     • PARTIAL HIP ARTHROPLASTY Left 07/02/2014   • PROSTATECTOMY  2008   • SHOULDER SURGERY  2005   • SHOULDER SURGERY  2006   • SHOULDER SURGERY  2008       Current Outpatient Medications on File Prior to Visit   Medication Sig Dispense Refill   • albuterol sulfate HFA (Ventolin HFA) 108 (90 Base) MCG/ACT inhaler Inhale 2 puffs Every 4 (Four) Hours As Needed for Wheezing or Shortness of Air. 1 inhaler 1   • atorvastatin (LIPITOR) 20 MG tablet Take 1 tablet by mouth Daily. Indications: Cerebrovascular Accident or Stroke 90 tablet 3   • Cetirizine HCl (ZYRTEC PO) Take 1 tablet by mouth As Needed.     • clindamycin (CLEOCIN) 300 MG  capsule Take 300 mg by mouth 3 (Three) Times a Day.     • clopidogrel (PLAVIX) 75 MG tablet Take 75 mg by mouth Daily.     • CRANBERRY PO Take 1 tablet by mouth As Needed.     • guaiFENesin (MUCUS RELIEF PO) Take 1 tablet by mouth As Needed.     • levothyroxine (SYNTHROID, LEVOTHROID) 50 MCG tablet Take 1 tablet by mouth Daily. 90 tablet 1   • Multiple Vitamins-Minerals (MULTIVITAMIN ADULT PO) Take  by mouth.     • pantoprazole (PROTONIX) 40 MG EC tablet Take 1 tablet by mouth Daily. 90 tablet 3   • Probiotic Product (PROBIOTIC COLON SUPPORT PO) Take  by mouth.     • rivastigmine (EXELON) 9.5 MG/24HR patch Place 1 patch on the skin as directed by provider Daily. Indications: vascular dementia/ CVA 90 each 3   • tiotropium bromide-olodaterol (STIOLTO RESPIMAT) 2.5-2.5 MCG/ACT aerosol solution inhaler Inhale Daily.     • Trelegy Ellipta 100-62.5-25 MCG/INH inhaler Inhale 1 puff Daily.     • umeclidinium-vilanterol (Anoro Ellipta) 62.5-25 MCG/INH aerosol powder  inhaler Inhale Daily.     • ciprofloxacin (CIPRO) 500 MG tablet TAKE 1 TABLET BY MOUTH TWICE DAILY STARTING THE DAY PRIOR TO PROCEDURE     • Magnesium 250 MG tablet Take  by mouth.     • Xiidra 5 % ophthalmic solution        No current facility-administered medications on file prior to visit.     Allergies as of 08/13/2021 - Reviewed 08/13/2021   Allergen Reaction Noted   • Bactrim [sulfamethoxazole-trimethoprim] Rash 05/21/2021     Social History     Socioeconomic History   • Marital status:      Spouse name: Not on file   • Number of children: 3   • Years of education: Not on file   • Highest education level: Not on file   Tobacco Use   • Smoking status: Former Smoker     Packs/day: 0.50     Years: 15.00     Pack years: 7.50     Types: Cigarettes   • Smokeless tobacco: Never Used   • Tobacco comment: Quit 1960's   1 ppd for 15 years     Substance and Sexual Activity   • Alcohol use: No   • Drug use: No   • Sexual activity: Not Currently     Partners:  "Female     Birth control/protection: None     Family History   Problem Relation Age of Onset   • Hypertension Sister    • Thyroid disease Sister    • Stroke Sister    • Depression Sister    • Heart failure Sister    • Hypertension Brother    • Lung cancer Brother    • COPD Brother    • Alcohol abuse Brother    • Coronary artery disease Son         Son  from MI at age 59        Review of Systems   Constitutional: Positive for malaise/fatigue.   HENT: Positive for hearing loss.    Cardiovascular: Positive for leg swelling and syncope.   Respiratory: Positive for cough.    Genitourinary: Positive for frequency and hesitancy.   All other systems reviewed and are negative.     Objective:     Vitals:    21 1331   BP: 140/82   Pulse: 57   Weight: 89.8 kg (198 lb)   Height: 172.7 cm (68\")     Body mass index is 30.11 kg/m².    Physical Exam  Constitutional:       Appearance: He is well-developed.   HENT:      Head: Normocephalic and atraumatic.   Eyes:      Conjunctiva/sclera: Conjunctivae normal.   Cardiovascular:      Rate and Rhythm: Normal rate and regular rhythm.      Heart sounds: No murmur heard.   No friction rub. No gallop.    Pulmonary:      Effort: Pulmonary effort is normal.      Breath sounds: Decreased breath sounds present.   Abdominal:      Palpations: Abdomen is soft.      Tenderness: There is no abdominal tenderness.   Musculoskeletal:      Cervical back: Neck supple.   Skin:     General: Skin is warm.   Neurological:      Mental Status: He is alert and oriented to person, place, and time.   Psychiatric:         Behavior: Behavior normal.       Lab Review:   Procedures    Cardiac Procedures:  1.  Echocardiogram on 5/15/2018: Ejection fraction was 61%.  There was mild LVH.    There was grade 1 diastolic dysfunction.  Right ventricle was normal.  There was mild   aortic insufficiency.  There was moderate mitral annular calcification.  2.  CT scan of the chest with contrast on 2018: There " was questionable interstitial   lung disease.  There was coronary artery disease and atherosclerotic disease of the   aorta noted.  3.  Lexiscan Myoview stress test on 10/2/2018: There was no ischemia.  The ejection   fraction was 64%.    Assessment:       Diagnosis Plan   1. Syncope and collapse  Holter Monitor - 24 Hour    Adult Transthoracic Echo Complete w/ Color, Spectral and Contrast if Necessary Per Protocol   2. Shortness of breath     3. Coronary artery calcification     4. History of hypotension       Plan:       Again, he fell and hit his head on 7/9/2021.  He has had multiple falls recently, mainly for gait instability.  However, on this particular episode, it was unclear as to whether he had syncope.  His blood pressure has been labile, and this certainly could contribute.  However, it mainly has been high at home (he has had hypotension in the past).  He had a left scalp hematoma, but no bleeding on his CT scan.  For now, I am going to check a 24-hour Holter monitor to start to ensure that his rhythm is stable.  His heart rate is 57 today, although he did not have any rhythm issues while hospitalized recently.  I am also going to check an echocardiogram to ensure he has not developed any significant structural heart disease that would have contributed to this.  His Plavix was briefly on hold after the fall, although he is back on this now.  He will continue on the Lipitor for his history of stroke.  Further plans will be made pending the results of the echo and Holter monitor.

## 2021-08-26 ENCOUNTER — HOSPITAL ENCOUNTER (OUTPATIENT)
Dept: CARDIOLOGY | Facility: HOSPITAL | Age: 86
Discharge: HOME | End: 2021-08-26
Admitting: INTERNAL MEDICINE

## 2021-08-26 VITALS
BODY MASS INDEX: 30.01 KG/M2 | HEART RATE: 57 BPM | WEIGHT: 198 LBS | DIASTOLIC BLOOD PRESSURE: 82 MMHG | HEIGHT: 68 IN | SYSTOLIC BLOOD PRESSURE: 140 MMHG

## 2021-08-26 DIAGNOSIS — R55 SYNCOPE AND COLLAPSE: ICD-10-CM

## 2021-08-26 PROCEDURE — 93306 TTE W/DOPPLER COMPLETE: CPT | Performed by: INTERNAL MEDICINE

## 2021-08-26 PROCEDURE — 93306 TTE W/DOPPLER COMPLETE: CPT

## 2021-08-27 ENCOUNTER — TELEPHONE (OUTPATIENT)
Dept: CARDIOLOGY | Facility: CLINIC | Age: 86
End: 2021-08-27

## 2021-08-27 LAB
AORTIC ARCH: 2.2 CM
ASCENDING AORTA: 3.1 CM
BH CV ECHO MEAS - ACS: 1.5 CM
BH CV ECHO MEAS - AO MAX PG (FULL): 4.5 MMHG
BH CV ECHO MEAS - AO MAX PG: 7.3 MMHG
BH CV ECHO MEAS - AO MEAN PG (FULL): 3 MMHG
BH CV ECHO MEAS - AO MEAN PG: 4 MMHG
BH CV ECHO MEAS - AO ROOT AREA (BSA CORRECTED): 1.7
BH CV ECHO MEAS - AO ROOT AREA: 9.1 CM^2
BH CV ECHO MEAS - AO ROOT DIAM: 3.4 CM
BH CV ECHO MEAS - AO V2 MAX: 135 CM/SEC
BH CV ECHO MEAS - AO V2 MEAN: 86.3 CM/SEC
BH CV ECHO MEAS - AO V2 VTI: 21.5 CM
BH CV ECHO MEAS - AVA(I,A): 2.3 CM^2
BH CV ECHO MEAS - AVA(I,D): 2.3 CM^2
BH CV ECHO MEAS - AVA(V,A): 2.4 CM^2
BH CV ECHO MEAS - AVA(V,D): 2.4 CM^2
BH CV ECHO MEAS - BSA(HAYCOCK): 2.1 M^2
BH CV ECHO MEAS - BSA: 2 M^2
BH CV ECHO MEAS - BZI_BMI: 30.1 KILOGRAMS/M^2
BH CV ECHO MEAS - BZI_METRIC_HEIGHT: 172.7 CM
BH CV ECHO MEAS - BZI_METRIC_WEIGHT: 89.8 KG
BH CV ECHO MEAS - EDV(CUBED): 79.5 ML
BH CV ECHO MEAS - EDV(MOD-SP2): 57 ML
BH CV ECHO MEAS - EDV(MOD-SP4): 73 ML
BH CV ECHO MEAS - EDV(TEICH): 83.1 ML
BH CV ECHO MEAS - EF(CUBED): 82.6 %
BH CV ECHO MEAS - EF(MOD-BP): 69.7 %
BH CV ECHO MEAS - EF(MOD-SP2): 70.2 %
BH CV ECHO MEAS - EF(MOD-SP4): 68.5 %
BH CV ECHO MEAS - EF(TEICH): 75.7 %
BH CV ECHO MEAS - ESV(CUBED): 13.8 ML
BH CV ECHO MEAS - ESV(MOD-SP2): 17 ML
BH CV ECHO MEAS - ESV(MOD-SP4): 23 ML
BH CV ECHO MEAS - ESV(TEICH): 20.2 ML
BH CV ECHO MEAS - FS: 44.2 %
BH CV ECHO MEAS - IVS/LVPW: 0.92
BH CV ECHO MEAS - IVSD: 1.1 CM
BH CV ECHO MEAS - LAT PEAK E' VEL: 9 CM/SEC
BH CV ECHO MEAS - LV DIASTOLIC VOL/BSA (35-75): 35.9 ML/M^2
BH CV ECHO MEAS - LV MASS(C)D: 173.6 GRAMS
BH CV ECHO MEAS - LV MASS(C)DI: 85.3 GRAMS/M^2
BH CV ECHO MEAS - LV MAX PG: 2.8 MMHG
BH CV ECHO MEAS - LV MEAN PG: 1 MMHG
BH CV ECHO MEAS - LV SYSTOLIC VOL/BSA (12-30): 11.3 ML/M^2
BH CV ECHO MEAS - LV V1 MAX: 83.6 CM/SEC
BH CV ECHO MEAS - LV V1 MEAN: 53.8 CM/SEC
BH CV ECHO MEAS - LV V1 VTI: 13.2 CM
BH CV ECHO MEAS - LVIDD: 4.3 CM
BH CV ECHO MEAS - LVIDS: 2.4 CM
BH CV ECHO MEAS - LVLD AP2: 8.5 CM
BH CV ECHO MEAS - LVLD AP4: 8.7 CM
BH CV ECHO MEAS - LVLS AP2: 6.6 CM
BH CV ECHO MEAS - LVLS AP4: 6.6 CM
BH CV ECHO MEAS - LVOT AREA (M): 3.8 CM^2
BH CV ECHO MEAS - LVOT AREA: 3.8 CM^2
BH CV ECHO MEAS - LVOT DIAM: 2.2 CM
BH CV ECHO MEAS - LVPWD: 1.2 CM
BH CV ECHO MEAS - MED PEAK E' VEL: 6.3 CM/SEC
BH CV ECHO MEAS - MV A DUR: 0.21 SEC
BH CV ECHO MEAS - MV A MAX VEL: 92.3 CM/SEC
BH CV ECHO MEAS - MV DEC SLOPE: 473 CM/SEC^2
BH CV ECHO MEAS - MV DEC TIME: 0.3 SEC
BH CV ECHO MEAS - MV E MAX VEL: 59.3 CM/SEC
BH CV ECHO MEAS - MV E/A: 0.64
BH CV ECHO MEAS - MV MAX PG: 6.4 MMHG
BH CV ECHO MEAS - MV MEAN PG: 2 MMHG
BH CV ECHO MEAS - MV P1/2T MAX VEL: 73.3 CM/SEC
BH CV ECHO MEAS - MV P1/2T: 45.4 MSEC
BH CV ECHO MEAS - MV V2 MAX: 126 CM/SEC
BH CV ECHO MEAS - MV V2 MEAN: 70.9 CM/SEC
BH CV ECHO MEAS - MV V2 VTI: 29.2 CM
BH CV ECHO MEAS - MVA P1/2T LCG: 3 CM^2
BH CV ECHO MEAS - MVA(P1/2T): 4.8 CM^2
BH CV ECHO MEAS - MVA(VTI): 1.7 CM^2
BH CV ECHO MEAS - PA ACC TIME: 0.11 SEC
BH CV ECHO MEAS - PA MAX PG (FULL): 2.3 MMHG
BH CV ECHO MEAS - PA MAX PG: 3.7 MMHG
BH CV ECHO MEAS - PA PR(ACCEL): 29.1 MMHG
BH CV ECHO MEAS - PA V2 MAX: 96.4 CM/SEC
BH CV ECHO MEAS - PULM A REVS DUR: 0.16 SEC
BH CV ECHO MEAS - PULM A REVS VEL: 27.1 CM/SEC
BH CV ECHO MEAS - PULM DIAS VEL: 30.4 CM/SEC
BH CV ECHO MEAS - PULM S/D: 1.5
BH CV ECHO MEAS - PULM SYS VEL: 46.7 CM/SEC
BH CV ECHO MEAS - RAP SYSTOLE: 3 MMHG
BH CV ECHO MEAS - RV MAX PG: 1.4 MMHG
BH CV ECHO MEAS - RV MEAN PG: 1 MMHG
BH CV ECHO MEAS - RV V1 MAX: 59.4 CM/SEC
BH CV ECHO MEAS - RV V1 MEAN: 36.8 CM/SEC
BH CV ECHO MEAS - RV V1 VTI: 10.9 CM
BH CV ECHO MEAS - SI(AO): 95.9 ML/M^2
BH CV ECHO MEAS - SI(CUBED): 32.3 ML/M^2
BH CV ECHO MEAS - SI(LVOT): 24.7 ML/M^2
BH CV ECHO MEAS - SI(MOD-SP2): 19.7 ML/M^2
BH CV ECHO MEAS - SI(MOD-SP4): 24.6 ML/M^2
BH CV ECHO MEAS - SI(TEICH): 30.9 ML/M^2
BH CV ECHO MEAS - SV(AO): 195.2 ML
BH CV ECHO MEAS - SV(CUBED): 65.7 ML
BH CV ECHO MEAS - SV(LVOT): 50.2 ML
BH CV ECHO MEAS - SV(MOD-SP2): 40 ML
BH CV ECHO MEAS - SV(MOD-SP4): 50 ML
BH CV ECHO MEAS - SV(TEICH): 62.9 ML
BH CV ECHO MEAS - TAPSE (>1.6): 1.6 CM
BH CV ECHO MEASUREMENTS AVERAGE E/E' RATIO: 7.75
BH CV XLRA - RV BASE: 2.5 CM
BH CV XLRA - RV LENGTH: 6.8 CM
BH CV XLRA - RV MID: 2 CM
BH CV XLRA - TDI S': 9.1 CM/SEC
LEFT ATRIUM VOLUME INDEX: 14 ML/M2
LV EF 2D ECHO EST: 70 %
MAXIMAL PREDICTED HEART RATE: 132 BPM
SINUS: 3.1 CM
STJ: 2.3 CM
STRESS TARGET HR: 112 BPM

## 2021-08-27 NOTE — TELEPHONE ENCOUNTER
Attempted to call both phone numbers listed in chart to give echo results.  Both phone numbers were patient's daughter, Jyotsna Clements.  I did leave a voice mail for her to call us back to receive echo results.  Also verified that Ms Clements is on patient's list of people who can receive calls and results.

## 2021-12-01 ENCOUNTER — OFFICE VISIT (OUTPATIENT)
Dept: FAMILY MEDICINE CLINIC | Facility: CLINIC | Age: 86
End: 2021-12-01

## 2021-12-01 VITALS
HEIGHT: 68 IN | WEIGHT: 194 LBS | TEMPERATURE: 97.1 F | HEART RATE: 72 BPM | DIASTOLIC BLOOD PRESSURE: 84 MMHG | OXYGEN SATURATION: 95 % | BODY MASS INDEX: 29.4 KG/M2 | SYSTOLIC BLOOD PRESSURE: 142 MMHG

## 2021-12-01 DIAGNOSIS — F02.80 DEMENTIA ASSOCIATED WITH OTHER UNDERLYING DISEASE WITHOUT BEHAVIORAL DISTURBANCE (HCC): Primary | ICD-10-CM

## 2021-12-01 DIAGNOSIS — K22.70 BARRETT'S ESOPHAGUS WITHOUT DYSPLASIA: ICD-10-CM

## 2021-12-01 DIAGNOSIS — Z91.81 AT HIGH RISK FOR FALLS: ICD-10-CM

## 2021-12-01 DIAGNOSIS — E03.9 ACQUIRED HYPOTHYROIDISM: ICD-10-CM

## 2021-12-01 PROBLEM — S00.03XA HEMATOMA OF FRONTAL SCALP: Status: RESOLVED | Noted: 2021-07-08 | Resolved: 2021-12-01

## 2021-12-01 PROCEDURE — 99214 OFFICE O/P EST MOD 30 MIN: CPT | Performed by: FAMILY MEDICINE

## 2021-12-01 RX ORDER — LEVOTHYROXINE SODIUM 0.05 MG/1
50 TABLET ORAL DAILY
Qty: 90 TABLET | Refills: 1 | Status: SHIPPED | OUTPATIENT
Start: 2021-12-01 | End: 2022-06-01 | Stop reason: SDUPTHER

## 2021-12-01 NOTE — PROGRESS NOTES
"Chief Complaint   Patient presents with   • Hyperlipidemia   • Hypothyroidism   • Memory Loss     Answers for HPI/ROS submitted by the patient on 11/29/2021  What is the primary reason for your visit?: Other  Please describe your symptoms.: This is a routine 6 month check-up to review his medications.  Have you had these symptoms before?: No  How long have you been having these symptoms?: 1-4 days  Please list any medications you are currently taking for this condition.: None  Please describe any probable cause for these symptoms. : None      Hypothyroidism: Patient presents for evaluation of thyroid function. Symptoms consist of fatigue, tremulousness. Symptoms have present for several years. The symptoms are mild.  The problem has been controlled.  Previous thyroid studies include TSH. The hypothyroidism is due to hypothyroidism.      Vitals:    12/01/21 1239   BP: 142/84   BP Location: Left arm   Patient Position: Sitting   Cuff Size: Adult   Pulse: 72   Temp: 97.1 °F (36.2 °C)   TempSrc: Temporal   SpO2: 95%   Weight: 88 kg (194 lb)   Height: 172.7 cm (68\")     Gen: well appearing, alert,  elderly and frail  Eyes: EOMI, no eye bulge  Neck: no LAD. Thyroid normal size, no nodules, no tenderness  Lung: good air movement, regular RR  Heart: RR without murmur  Skin: no rash.    Lab Results   Component Value Date    TSH 3.720 05/14/2021           Assessment/Plan   Diagnoses and all orders for this visit:    1. 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 Acquired hypothyroidism  -     levothyroxine (SYNTHROID, LEVOTHROID) 50 MCG tablet; Take 1 tablet by mouth Daily. Indications: Underactive Thyroid  Dispense: 90 tablet; Refill: 1    3. At high risk for falls    4. 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  Uses a cane and walker sometimes.  We talked about safety while driving the car.  Encouraged him to limit his driving a car.  Went to " very short trips during the daytime during good weather avoiding major highways.    His COPD has been stable.    Dementia is about the same    Daughter and wife reports that he has had his flu shot this year and is Covid booster.             There are no Patient Instructions on file for this visit.    Continue to take thyroid medication on a regular basis, same time of day, on an empty stomach. Repeat thyroid labs in six months.    Dr. Charlie Trinh MD  [place], [place]  [facility]

## 2022-03-15 ENCOUNTER — HOSPITAL ENCOUNTER (EMERGENCY)
Facility: HOSPITAL | Age: 87
Discharge: HOME | End: 2022-03-15
Attending: EMERGENCY MEDICINE | Admitting: EMERGENCY MEDICINE

## 2022-03-15 VITALS
BODY MASS INDEX: 28.14 KG/M2 | HEIGHT: 69 IN | WEIGHT: 190 LBS | HEART RATE: 66 BPM | SYSTOLIC BLOOD PRESSURE: 179 MMHG | RESPIRATION RATE: 22 BRPM | DIASTOLIC BLOOD PRESSURE: 95 MMHG | TEMPERATURE: 97.7 F | OXYGEN SATURATION: 93 %

## 2022-03-15 DIAGNOSIS — N32.89 BLADDER SPASM: Primary | ICD-10-CM

## 2022-03-15 PROCEDURE — 99283 EMERGENCY DEPT VISIT LOW MDM: CPT | Performed by: EMERGENCY MEDICINE

## 2022-03-15 PROCEDURE — 99282 EMERGENCY DEPT VISIT SF MDM: CPT

## 2022-03-16 NOTE — ED PROVIDER NOTES
Subjective   History of Present Illness  89-year-old male presents to the emergency room concerned that he is having some dysfunction of his artificial urinary sphincter pump.  He says that he feels like he needs to urinate because he is having bladder spasms.  No fevers no chills no nausea or vomiting, just the spasm sensation.  He saw his urologist today who told him that he may need to have the pump replaced sometime in the future.  He was able to urinate some at the urologist office.  Tonight he was not able to urinate any so they thought he might be having urinary retention so they came to the ER.  Denies abdominal pain no nausea no vomiting no other complaints.  When asked about the chills he mentioned at triage he says that is the bladder spasm that he is describing.  He is not actually having fevers and chills.  Review of Systems    Past Medical History:   Diagnosis Date   • Allergic     Seasonal   • Allergic rhinitis    • Arthritis    • Asthma    • Brito esophagus    • Benign prostatic hyperplasia    • Carotid arterial disease ([facility])     Status post right CEA 4/1/14   • COPD (chronic obstructive pulmonary disease) ([facility])    • Coronary artery disease    • Dementia ([facility])    • Dyspnea    • Erectile dysfunction    • GERD (gastroesophageal reflux disease)    • Hematoma of frontal scalp 7/8/2021   • HL (hearing loss)    • Low back pain    • PAD (peripheral artery disease) ([facility])    • Prostate cancer ([facility])    • Stroke ([facility])     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   • Urinary tract infection        Allergies   Allergen Reactions   • Bactrim [Sulfamethoxazole-Trimethoprim] Rash       Past Surgical History:   Procedure Laterality Date   • CAROTID ARTERY ANGIOPLASTY  04/01/2014   • COLONOSCOPY  2009   • ENDOSCOPY  2009 2001,2003,2006   • FRACTURE SURGERY     • HERNIA REPAIR  2000   • HERNIA REPAIR  2009   • INGUINAL HERNIA REPAIR     • PARTIAL HIP ARTHROPLASTY Left 07/02/2014   • PROSTATECTOMY   2008   • SHOULDER SURGERY     • SHOULDER SURGERY  2006   • SHOULDER SURGERY  2008       Family History   Problem Relation Age of Onset   • Hypertension Sister    • Thyroid disease Sister    • Stroke Sister    • Depression Sister    • Heart failure Sister    • Hypertension Brother    • Lung cancer Brother    • COPD Brother    • Alcohol abuse Brother    • Coronary artery disease Son         Son  from MI at age 59        Social History     Socioeconomic History   • Marital status:    • Number of children: 3   Tobacco Use   • Smoking status: Former Smoker     Packs/day: 0.50     Years: 15.00     Pack years: 7.50     Types: Cigarettes   • Smokeless tobacco: Never Used   • Tobacco comment: Quit 's   1 ppd for 15 years     Substance and Sexual Activity   • Alcohol use: No   • Drug use: No   • Sexual activity: Not Currently     Partners: Female     Birth control/protection: None           Objective   Physical Exam  INITIAL VITAL SIGNS: Reviewed by me.  Pulse ox normal  GENERAL: Alert and interactive. No acute distress.  HEAD: Head is normocephalic.  EYES: EOMI. PERRL. No scleral icterus. No conjunctival injection.  ENT: Moist mucous membranes.   NECK: Supple. Full range of motion.  RESPIRATORY: No tachypnea. Clear breath sounds bilaterally. No wheezing. No rales. No rhonchi.  CV: Regular rate and rhythm. No murmurs. No rubs or gallops.  ABDOMEN: Soft, nondistended, nontender.  There is no suprapubic fullness  BACK:  No obvious deformity.  EXTREMITIES: No deformity. No clubbing or cyanosis. No edema.     Procedures           ED Course  ED Course as of 03/15/22 2047   Tue Mar 15, 2022   2044 Patient was able to urinate earlier today, sounds like he is having some bladder spasms which his daughter-in-law says he has medication for at home.  I are concerned that he was having difficulty with his artificial urinary sphincter pump because he said he needed to urinate but bladder scan here he has 17 mL in  the bladder.  Encouraged him to take medication for bladder spasm and follow-up with his urologist. [RO]      ED Course User Index  [RO] Vazquez Lawrence MD                                                 [facility]    Final diagnoses:   Bladder spasm       ED Disposition  ED Disposition     ED Disposition   Discharge    Condition   Good    Comment   --             Your urologist    Call   To schedule follow-up appointment         Medication List      No changes were made to your prescriptions during this visit.          Vazquez Lawrence MD  03/15/22 2048

## 2022-03-16 NOTE — ED TRIAGE NOTES
Pt via PV from home with c/o chills, urinary rentention. Pt reports that he was seen at his dr today and told his pump was going out.   Pt reports that he last urinated today.    All triage performed with this RN wearing appropriate PPE.  Pt placed in mask upon arrival to ED.

## 2022-03-16 NOTE — DISCHARGE INSTRUCTIONS
Please follow-up with your urologist.  Return to the emergency room if you develop chest pain, shortness of breath, abdominal pain or for any other concerns.

## 2022-04-27 DIAGNOSIS — K22.70 BARRETT'S ESOPHAGUS WITHOUT DYSPLASIA: ICD-10-CM

## 2022-04-27 RX ORDER — PANTOPRAZOLE SODIUM 40 MG/1
TABLET, DELAYED RELEASE ORAL
Qty: 90 TABLET | Refills: 3 | Status: SHIPPED | OUTPATIENT
Start: 2022-04-27 | End: 2022-09-29 | Stop reason: HOSPADM

## 2022-05-16 DIAGNOSIS — I73.9 PAD (PERIPHERAL ARTERY DISEASE): ICD-10-CM

## 2022-05-16 DIAGNOSIS — R41.3 MEMORY LOSS: ICD-10-CM

## 2022-05-16 DIAGNOSIS — E78.2 MIXED HYPERLIPIDEMIA: ICD-10-CM

## 2022-05-16 RX ORDER — RIVASTIGMINE 9.5 MG/24H
PATCH, EXTENDED RELEASE TRANSDERMAL
Qty: 90 PATCH | Refills: 3 | Status: SHIPPED | OUTPATIENT
Start: 2022-05-16 | End: 2022-09-29 | Stop reason: HOSPADM

## 2022-05-16 RX ORDER — ATORVASTATIN CALCIUM 20 MG/1
TABLET, FILM COATED ORAL
Qty: 90 TABLET | Refills: 3 | Status: SHIPPED | OUTPATIENT
Start: 2022-05-16 | End: 2022-11-30

## 2022-05-20 DIAGNOSIS — E03.9 ACQUIRED HYPOTHYROIDISM: ICD-10-CM

## 2022-05-20 DIAGNOSIS — I10 ESSENTIAL HYPERTENSION: ICD-10-CM

## 2022-05-20 DIAGNOSIS — E78.2 MIXED HYPERLIPIDEMIA: Primary | ICD-10-CM

## 2022-05-20 DIAGNOSIS — E78.2 MIXED HYPERLIPIDEMIA: ICD-10-CM

## 2022-05-20 DIAGNOSIS — I63.542 CEREBROVASCULAR ACCIDENT (CVA) DUE TO OCCLUSION OF LEFT CEREBELLAR ARTERY: ICD-10-CM

## 2022-05-24 LAB
ALT SERPL-CCNC: 18 IU/L (ref 0–44)
APPEARANCE UR: ABNORMAL
BACTERIA #/AREA URNS HPF: ABNORMAL /[HPF]
BILIRUB UR QL STRIP: NEGATIVE
BUN SERPL-MCNC: 15 MG/DL (ref 8–27)
BUN/CREAT SERPL: 17 (ref 10–24)
CALCIUM SERPL-MCNC: 9.3 MG/DL (ref 8.6–10.2)
CASTS URNS QL MICRO: ABNORMAL /LPF
CHLORIDE SERPL-SCNC: 99 MMOL/L (ref 96–106)
CHOLEST SERPL-MCNC: 126 MG/DL (ref 100–199)
CO2 SERPL-SCNC: 24 MMOL/L (ref 20–29)
COLOR UR: YELLOW
CREAT SERPL-MCNC: 0.88 MG/DL (ref 0.76–1.27)
EGFRCR SERPLBLD CKD-EPI 2021: 82 ML/MIN/1.73
EPI CELLS #/AREA URNS HPF: ABNORMAL /HPF (ref 0–10)
ERYTHROCYTE [DISTWIDTH] IN BLOOD BY AUTOMATED COUNT: 13.1 % (ref 11.6–15.4)
GLUCOSE SERPL-MCNC: 93 MG/DL (ref 65–99)
GLUCOSE UR QL STRIP: NEGATIVE
HCT VFR BLD AUTO: 39.9 % (ref 37.5–51)
HDLC SERPL-MCNC: 44 MG/DL
HGB BLD-MCNC: 13.7 G/DL (ref 13–17.7)
HGB UR QL STRIP: ABNORMAL
KETONES UR QL STRIP: NEGATIVE
LDLC SERPL CALC-MCNC: 62 MG/DL (ref 0–99)
LEUKOCYTE ESTERASE UR QL STRIP: ABNORMAL
MCH RBC QN AUTO: 30.4 PG (ref 26.6–33)
MCHC RBC AUTO-ENTMCNC: 34.3 G/DL (ref 31.5–35.7)
MCV RBC AUTO: 89 FL (ref 79–97)
MICRO URNS: ABNORMAL
NITRITE UR QL STRIP: POSITIVE
PH UR STRIP: 6.5 [PH] (ref 5–7.5)
PLATELET # BLD AUTO: 252 X10E3/UL (ref 150–450)
POTASSIUM SERPL-SCNC: 4.3 MMOL/L (ref 3.5–5.2)
PROT UR QL STRIP: ABNORMAL
RBC # BLD AUTO: 4.5 X10E6/UL (ref 4.14–5.8)
RBC #/AREA URNS HPF: ABNORMAL /HPF (ref 0–2)
SODIUM SERPL-SCNC: 137 MMOL/L (ref 134–144)
SP GR UR STRIP: 1.02 (ref 1–1.03)
TRIGL SERPL-MCNC: 109 MG/DL (ref 0–149)
TSH SERPL DL<=0.005 MIU/L-ACNC: 3.2 UIU/ML (ref 0.45–4.5)
UROBILINOGEN UR STRIP-MCNC: 0.2 MG/DL (ref 0.2–1)
VLDLC SERPL CALC-MCNC: 20 MG/DL (ref 5–40)
WBC # BLD AUTO: 7.4 X10E3/UL (ref 3.4–10.8)
WBC #/AREA URNS HPF: >30 /HPF (ref 0–5)

## 2022-06-01 ENCOUNTER — OFFICE VISIT (OUTPATIENT)
Dept: FAMILY MEDICINE CLINIC | Facility: CLINIC | Age: 87
End: 2022-06-01

## 2022-06-01 VITALS
SYSTOLIC BLOOD PRESSURE: 140 MMHG | HEIGHT: 69 IN | TEMPERATURE: 98.2 F | BODY MASS INDEX: 28.14 KG/M2 | OXYGEN SATURATION: 96 % | WEIGHT: 190 LBS | HEART RATE: 69 BPM | DIASTOLIC BLOOD PRESSURE: 80 MMHG

## 2022-06-01 DIAGNOSIS — N39.0 CHRONIC UTI: ICD-10-CM

## 2022-06-01 DIAGNOSIS — Z91.81 AT HIGH RISK FOR FALLS: ICD-10-CM

## 2022-06-01 DIAGNOSIS — F02.80 DEMENTIA ASSOCIATED WITH OTHER UNDERLYING DISEASE WITHOUT BEHAVIORAL DISTURBANCE: ICD-10-CM

## 2022-06-01 DIAGNOSIS — E03.9 ACQUIRED HYPOTHYROIDISM: ICD-10-CM

## 2022-06-01 DIAGNOSIS — Z00.00 MEDICARE ANNUAL WELLNESS VISIT, SUBSEQUENT: Primary | ICD-10-CM
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
## 2022-06-01 LAB
BILIRUB BLD-MCNC: NEGATIVE MG/DL
CLARITY, POC: ABNORMAL
COLOR UR: ABNORMAL
GLUCOSE UR STRIP-MCNC: NEGATIVE MG/DL
KETONES UR QL: NEGATIVE
LEUKOCYTE EST, POC: ABNORMAL
NITRITE UR-MCNC: POSITIVE MG/ML
PH UR: 5 [PH] (ref 5–8)
PROT UR STRIP-MCNC: NEGATIVE MG/DL
RBC # UR STRIP: ABNORMAL /UL
SP GR UR: 1.01 (ref 1–1.03)
UROBILINOGEN UR QL: NORMAL

## 2022-06-01 PROCEDURE — 81002 URINALYSIS NONAUTO W/O SCOPE: CPT | Performed by: FAMILY MEDICINE

## 2022-06-01 PROCEDURE — 99213 OFFICE O/P EST LOW 20 MIN: CPT | Performed by: FAMILY MEDICINE

## 2022-06-01 PROCEDURE — 1159F MED LIST DOCD IN RCRD: CPT | Performed by: FAMILY MEDICINE

## 2022-06-01 PROCEDURE — 1170F FXNL STATUS ASSESSED: CPT | Performed by: FAMILY MEDICINE

## 2022-06-01 PROCEDURE — G0439 PPPS, SUBSEQ VISIT: HCPCS | Performed by: FAMILY MEDICINE

## 2022-06-01 RX ORDER — LEVOTHYROXINE SODIUM 0.05 MG/1
50 TABLET ORAL DAILY
Qty: 90 TABLET | Refills: 3 | Status: SHIPPED | OUTPATIENT
Start: 2022-06-01 | End: 2023-01-07 | Stop reason: SDUPTHER

## 2022-06-01 RX ORDER — CEPHALEXIN 500 MG/1
500 CAPSULE ORAL 3 TIMES DAILY
Qty: 21 CAPSULE | Refills: 0 | Status: SHIPPED | OUTPATIENT
Start: 2022-06-01 | End: 2022-07-11

## 2022-06-01 RX ORDER — VIBEGRON 75 MG/1
TABLET, FILM COATED ORAL DAILY
COMMUNITY
End: 2022-09-29 | Stop reason: HOSPADM

## 2022-06-01 RX ORDER — PHENOL 1.4 %
AEROSOL, SPRAY (ML) MUCOUS MEMBRANE NIGHTLY
COMMUNITY

## 2022-06-01 NOTE — PROGRESS NOTES
PCP: Rosy Mcelroy, last visit 9/11/2017      S:    Bianca Mcclain is also complaining of thick, brittle, painful toenails which are painful to palpation and ambulation in shoes. The nails have not responded to conservative treatment and the patient doesn't want any oral medication or laser treatment at this time.    Past Medical Hx, Medications, Allergies, Social History  I have reviewed the patient's medications and allergies, past medical, surgical, social and family history, updating these as appropriate. See Histories section of the EMR for a display of this information.      O:    Vascular: dorsalis pedis and posterior pulses are 2/4, CFT is less than 3 secs, extremities are warm.  Dermatologic:Nails are thickened, discolored, and elongated with subungual debris. no ecchymosis, no edema, no erythema, normal skin texture and turgor, no interdigital maceration  Neurologic: Monofilament testing reveals loss of protective sensation at the level of the digits. Vibratory sensation is present at the level of the 1st MPJ  Orthopedic: No gross deformities      A:    1. Onychomycosis x10 nails  2. Pain in feet bilaterally  3. 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 The patient was instructed about good foot health. I did debride nails x10 using sharp and mechanical debridement, reducing the diseased nail tissue to a level that alleviated the patient's symptoms and is medically safe for the patient once verbal consent was obtained. Patient tolerated this well. All questions were answered. Return to clinic as needed.           The ABCs of the Annual Wellness Visit  Subsequent Medicare Wellness Visit    Chief Complaint   Patient presents with   • Medicare Wellness-subsequent      Subjective    History of Present Illness:  Pawel Goldberg is a 89 y.o. male who presents for a Subsequent Medicare Wellness Visit.    The following portions of the patient's history were reviewed and   updated as appropriate: allergies, current medications, past medical history, past social history, past surgical history and problem list.    Compared to one year ago, the patient feels his physical   health is the same.    Compared to one year ago, the patient feels his mental   health is worse.    Recent Hospitalizations:  He was not admitted to the hospital during the last year.       Current Medical Providers:  Patient Care Team:  Charlie Trinh MD as PCP - General (Family Medicine)  Hardik Senior MD as Consulting Physician (Urology)  MAURO Peters MD as Consulting Physician (Ophthalmology)  Brant Polanco MD as Consulting Physician (Pulmonary Disease)  Charlie Goldstein DO as Consulting Physician (Neurology)    Outpatient Medications Prior to Visit   Medication Sig Dispense Refill   • albuterol sulfate HFA (Ventolin HFA) 108 (90 Base) MCG/ACT inhaler Inhale 2 puffs Every 4 (Four) Hours As Needed for Wheezing or Shortness of Air. 1 inhaler 1   • atorvastatin (LIPITOR) 20 MG tablet TAKE 1 TABLET DAILY FOR CEREBROVASCULAR ACCIDENT OR STROKE 90 tablet 3   • Cetirizine HCl (ZYRTEC PO) Take 1 tablet by mouth As Needed.     • clopidogrel (PLAVIX) 75 MG tablet Take 75 mg by mouth Daily.     • CRANBERRY PO Take 1 tablet by mouth As Needed.     • Melatonin 10 MG tablet Take  by mouth Every Night.     • Multiple Vitamins-Minerals (MULTIVITAMIN ADULT PO) Take  by mouth.     • pantoprazole (PROTONIX) 40 MG EC tablet TAKE 1 TABLET DAILY 90 tablet 3   • Probiotic Product (PROBIOTIC COLON SUPPORT PO) Take  by mouth.     • rivastigmine (EXELON) 9.5 MG/24HR  patch APPLY 1 PATCH ON THE SKIN AS DIRECTED BY PROVIDER DAILY FOR VASCULAR DEMENTIA/CVA 90 patch 3   • tiotropium bromide-olodaterol (STIOLTO RESPIMAT) 2.5-2.5 MCG/ACT aerosol solution inhaler Inhale Daily.     • Trelegy Ellipta 100-62.5-25 MCG/INH inhaler Inhale 1 puff Daily.     • Vibegron (Gemtesa) 75 MG tablet Take  by mouth Daily.     • levothyroxine (SYNTHROID, LEVOTHROID) 50 MCG tablet Take 1 tablet by mouth Daily. Indications: Underactive Thyroid 90 tablet 1   • guaiFENesin (MUCUS RELIEF PO) Take 1 tablet by mouth As Needed.     • clindamycin (CLEOCIN) 300 MG capsule Take 300 mg by mouth 3 (Three) Times a Day.       No facility-administered medications prior to visit.       No opioid medication identified on active medication list. I have reviewed chart for other potential  high risk medication/s and harmful drug interactions in the elderly.          Aspirin is not on active medication list.  Aspirin use is not indicated based on review of current medical condition/s. Risk of harm outweighs potential benefits.  .    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   • Obstruction of carotid artery   • Stenosis of carotid artery   • EEG abnormality without seizure   • Hereditary and idiopathic peripheral neuropathy   • Mixed hyperlipidemia   • Acquired hypothyroidism   • Low back pain with sciatica   • Lumbar canal stenosis   • Degenerative arthritis of lumbar spine   • Osteoarthritis of hip   • History of repair of hip joint   • Syncope   • Temporary cerebral vascular dysfunction   • History of prostate cancer   • Dementia associated with other underlying disease without behavioral disturbance ([facility])   • Arthritis   • PAD (peripheral artery disease) ([facility])   • Medicare annual wellness visit, subsequent   • Essential hypertension   • Medication monitoring encounter   • Status post implantation of artificial urinary sphincter   • Moderate asthma with acute exacerbation   •  "Vascular dementia without behavioral disturbance (HCC)   • 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  ACP discussion was held with the patient during this visit. Patient has an advance directive (not in EMR), copy requested.          Objective    Vitals:    06/01/22 1400   BP: 140/80   BP Location: Left arm   Patient Position: Sitting   Cuff Size: Adult   Pulse: 69   Temp: 98.2 °F (36.8 °C)   SpO2: 96%   Weight: 86.2 kg (190 lb)   Height: 175.3 cm (69\")     Body mass index is 28.06 kg/m².  BMI is >= 25 and < 30. (Overweight) The following options were offered after discussion: none (medical contraindication)    Does the patient have evidence of cognitive impairment? Yes    Physical Exam  Vitals reviewed.   Cardiovascular:      Rate and Rhythm: Normal rate.   Pulmonary:      Effort: Pulmonary effort is normal.   Neurological:      Mental Status: He is alert.      Motor: Weakness present.      Gait: Gait abnormal.   Psychiatric:         Cognition and Memory: Cognition is impaired. Memory is impaired.       Lab Results   Component Value Date    CHLPL 126 05/23/2022    TRIG 109 05/23/2022    HDL 44 05/23/2022    LDL 62 05/23/2022    VLDL 20 05/23/2022            HEALTH RISK ASSESSMENT    Smoking Status:  Social History     Tobacco Use   Smoking Status Former Smoker   • Packs/day: 0.50   • Years: 15.00   • Pack years: 7.50   • Types: Cigarettes   Smokeless Tobacco Never Used   Tobacco Comment    Quit 1960's   1 ppd for 15 years       Alcohol Consumption:  Social History     Substance and Sexual Activity   Alcohol Use No     Fall Risk Screen:    STEADI Fall Risk Assessment was completed, and patient is at LOW risk for falls.Assessment completed on:6/1/2022    Depression Screening:  PHQ-2/PHQ-9 Depression Screening 6/1/2022   Retired PHQ-9 Total " Score -   Retired Total Score -   Little Interest or Pleasure in Doing Things 0-->not at all   Feeling Down, Depressed or Hopeless 0-->not at all   PHQ-9: Brief Depression Severity Measure Score 0       Health Habits and Functional and Cognitive Screening:  Functional & Cognitive Status 6/1/2022   Do you have difficulty preparing food and eating? Yes   Do you have difficulty bathing yourself, getting dressed or grooming yourself? No   Do you have difficulty using the toilet? No   Do you have difficulty moving around from place to place? Yes   Do you have trouble with steps or getting out of a bed or a chair? Yes   Current Diet Well Balanced Diet   Dental Exam Up to date   Eye Exam Up to date        Eye Exam Comment -   Exercise (times per week) 7 times per week   Current Exercises Include Walking   Current Exercise Activities Include -   Do you need help using the phone?  Yes   Are you deaf or do you have serious difficulty hearing?  Yes   Do you need help with transportation? Yes   Do you need help shopping? No   Do you need help preparing meals?  No   Do you need help with housework?  No   Do you need help with laundry? No   Do you need help taking your medications? No   Do you need help managing money? No   Do you ever drive or ride in a car without wearing a seat belt? No   Have you felt unusual stress, anger or loneliness in the last month? No   Who do you live with? Spouse   If you need help, do you have trouble finding someone available to you? No   Have you been bothered in the last four weeks by sexual problems? No   Do you have difficulty concentrating, remembering or making decisions? Yes       Age-appropriate Screening Schedule:  Refer to the list below for future screening recommendations based on patient's age, sex and/or medical conditions. Orders for these recommended tests are listed in the plan section. The patient has been provided with a written plan.    Health Maintenance   Topic Date Due   •  ZOSTER VACCINE (2 of 3) 07/06/2016   • INFLUENZA VACCINE  08/01/2022   • LIPID PANEL  05/23/2023   • TDAP/TD VACCINES (4 - Td or Tdap) 07/08/2031              Assessment & Plan   CMS Preventative Services Quick Reference  Risk Factors Identified During Encounter  Dementia/Memory   Fall Risk-High or Moderate  Urinary Incontinence  The above risks/problems have been discussed with the patient.  Follow up actions/plans if indicated are seen below in the Assessment/Plan Section.  Pertinent information has been shared with the patient in the After Visit Summary.        Results for orders placed or performed in visit on 06/01/22   POCT urinalysis dipstick, manual    Specimen: Urine   Result Value Ref Range    Color Dark Yellow Yellow, Straw, Dark Yellow, Laurie    Clarity, UA Cloudy (A) Clear    Glucose, UA Negative Negative, 1000 mg/dL (3+) mg/dL    Bilirubin Negative Negative    Ketones, UA Negative Negative    Specific Gravity  1.015 1.005 - 1.030    Blood, UA Moderate (A) Negative    pH, Urine 5.0 5.0 - 8.0    Protein, POC Negative Negative mg/dL    Urobilinogen, UA Normal Normal    Leukocytes Large (3+) (A) Negative    Nitrite, UA Positive (A) Negative       Diagnoses and all orders for this visit:    1. Medicare annual wellness visit, subsequent (Primary)    2. Acquired hypothyroidism  -     levothyroxine (SYNTHROID, LEVOTHROID) 50 MCG tablet; Take 1 tablet by mouth Daily. Indications: Underactive Thyroid  Dispense: 90 tablet; Refill: 3    3. At high risk for falls    4. Dementia associated with other underlying disease without behavioral disturbance (HCC)    5. Chronic UTI  -     POCT urinalysis dipstick, manual  -     cephalexin (Keflex) 500 MG capsule; Take 1 capsule by mouth 3 (Three) Times a Day. Indications: Urinary Tract Infection  Dispense: 21 capsule; Refill: 0  -     Urine Culture - Urine, Urine, Clean Catch    MR brought in by daughterKiran Armas's dementia is slowly worsening.  Not eating very well.  Losing  weight.  Sleeping okay.  Gait is unsteady.  But no recent falls.  Labs reviewed.    Chronic urinary tract infection.  He just finished up a round of Macrobid from his urologist.  Repeat urine dip in the office still reveals infection.  We will send in Keflex and send for urine culture        Follow Up:   No follow-ups on file.     An After Visit Summary and PPPS were made available to the patient.

## 2022-06-05 LAB
BACTERIA UR CULT: ABNORMAL
BACTERIA UR CULT: ABNORMAL
OTHER ANTIBIOTIC SUSC ISLT: ABNORMAL

## 2022-06-09 ENCOUNTER — TELEPHONE (OUTPATIENT)
Dept: FAMILY MEDICINE CLINIC | Facility: CLINIC | Age: 87
End: 2022-06-09

## 2022-06-09 DIAGNOSIS — N39.0 URINARY TRACT INFECTION WITHOUT HEMATURIA, SITE UNSPECIFIED: ICD-10-CM

## 2022-06-09 DIAGNOSIS — N39.0 CHRONIC UTI: Primary | ICD-10-CM

## 2022-06-09 RX ORDER — LEVOFLOXACIN 250 MG/1
250 TABLET ORAL DAILY
Qty: 3 TABLET | Refills: 0 | Status: SHIPPED | OUTPATIENT
Start: 2022-06-09 | End: 2022-07-11 | Stop reason: SDUPTHER

## 2022-06-21 RX ORDER — CLOPIDOGREL BISULFATE 75 MG/1
TABLET ORAL
Qty: 90 TABLET | Refills: 3 | Status: SHIPPED | OUTPATIENT
Start: 2022-06-21

## 2022-06-22 NOTE — THERAPY TREATMENT NOTE
Outpatient Physical Therapy Neuro Treatment Note  [facility]     Patient Name: Pawel Goldberg  : 1932  MRN: [id number]  Today's Date: 7/15/2019      Visit Date: 07/15/2019    Visit Dx:    ICD-10-CM ICD-9-CM   1. Vascular dementia without behavioral disturbance F01.50 290.40   2. Falls frequently R29.6 V15.88   3. Syncope and collapse R55 780.2   4. History of CVA (cerebrovascular accident) Z86.73 V12.54       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   • Obstruction of carotid artery   • Stenosis of carotid artery   • EEG abnormality without seizure   • Hereditary and idiopathic peripheral neuropathy   • Mixed hyperlipidemia   • Acquired hypothyroidism   • Low back pain with sciatica   • Lumbar canal stenosis   • Lumbar radiculopathy   • Degenerative arthritis of lumbar spine   • Osteoarthritis of hip   • History of repair of hip joint   • Syncope   • Temporary cerebral vascular dysfunction   • History of prostate cancer   • Dementia associated with other underlying disease without behavioral disturbance   • Arthritis   • PAD (peripheral artery disease) (CMS/[facility]ly [facility])   • Routine adult health maintenance   • Medicare annual wellness visit, subsequent   • Essential hypertension   • Medication monitoring encounter   • Status post implantation of artificial urinary sphincter   • Moderate asthma with acute exacerbation   • Vascular dementia without behavioral disturbance   • 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  Shortness of breath noted with physical activity.  Patient is highly motivated.  -        PT Plan    PT Plan Comments  Patient agreeable to return for second visit this week - scheduled to return Wednesday.  Progress current exercises as tolerated.  -       User Key  (r)  "= Recorded By, (t) = Taken By, (c) = Cosigned By    Initials Name Provider Type     Adilsonleonel Carine N, PT Physical Therapist             Exercises     Row Name 07/15/19 1516 07/15/19 1500          Subjective Comments    Subjective Comments  --  Patient reports he has done exercises twice a day, every day.  Patient reports he feels \"shaky and nervous\" since starting inhaler.    -        Total Minutes    55025 - PT Therapeutic Exercise Minutes  46  -LH  --        Exercise 1    Exercise Name 1  --  Heel raises with no UE support, CGA.  One loss of balance, stepping anterior to regain.  -     Cueing 1  --  Verbal;Demo  -     Reps 1  --  20  -LH        Exercise 2    Exercise Name 2  --  Standing hip abduction with one UE support.  -     Cueing 2  --  Verbal;Demo  -LH     Reps 2  --  30 bilateral  -LH     Additional Comments  --  2.5#  -LH        Exercise 3    Exercise Name 3  --  squats with no UE support  -     Cueing 3  --  Verbal;Demo  -LH     Reps 3  --  20  -LH        Exercise 4    Exercise Name 4  --  sit to/from stand - no UE support.  SBA  -     Cueing 4  --  Verbal;Demo  -LH     Sets 4  --  2  -LH     Reps 4  --  10  -LH        Exercise 5    Exercise Name 5  --  seated hip abduction vs. tband  -LH     Cueing 5  --  Verbal;Demo  -LH     Reps 5  --  30  -LH     Additional Comments  --  black tband  -        Exercise 6    Exercise Name 6  --  HS vs. tband - seated, bilateral  -LH     Cueing 6  --  Verbal;Tactile;Demo  -LH     Reps 6  --  30 each leg  -     Additional Comments  --  black tband  -LH        Exercise 7    Exercise Name 7  --  Foam pad balance - eyes closed with CGA/min A for balance  -     Cueing 7  --  Verbal;Tactile  -     Time 7  --  30 seconds x 2  -LH        Exercise 8    Exercise Name 8  --  foam pad balance:  march in place with min A.  Tendency to step forward.  -     Cueing 8  --  Verbal;Tactile;Demo  -     Time 8  --  60 seconds  -        Exercise 9    Exercise " Name 9  --  Patient expressed fear of falling - would like to be able to walk to daughters house but fearful  of falls.  Has a cane and walker at home but does not use.  Declined PT offer/recommendation to use cane.   -       User Key  (r) = Recorded By, (t) = Taken By, (c) = Cosigned By    Initials Name Provider Type     Carine Crowder, PT Physical Therapist          Therapy Education  Education Details: issued written HEP for sit to stand, hip abd. vs band and HS v.s tband.  To add to current HEP.  Given: HEP  Program: Progressed  How Provided: Verbal, Demonstration, Written  Provided to: Patient, Caregiver  Level of Understanding: Teach back education performed, Demonstrated, Verbalized    Outcome Measure Options: 6 Minute Walk Test  6 Minute Walk Test  Distance: 1000  6 Minute Walk Comments: shortness of air noted - patient declined rest breaks.         Time Calculation:   Start Time: 1300  Stop Time: 1346  Time Calculation (min): 46 min   Therapy Charges for Today     Code Description Service Date Service Provider Modifiers Qty    35678591357 HC PT THER PROC EA 15 MIN 7/15/2019 Carine Crowder, PT GP 3          PT G-Codes  Outcome Measure Options: 6 Minute Walk Test         Carine Crowder, PT  7/15/2019      (4) no limitation

## 2022-07-11 ENCOUNTER — OFFICE VISIT (OUTPATIENT)
Dept: FAMILY MEDICINE CLINIC | Facility: CLINIC | Age: 87
End: 2022-07-11

## 2022-07-11 VITALS
SYSTOLIC BLOOD PRESSURE: 120 MMHG | BODY MASS INDEX: 26.96 KG/M2 | HEART RATE: 112 BPM | HEIGHT: 69 IN | DIASTOLIC BLOOD PRESSURE: 74 MMHG | TEMPERATURE: 98.4 F | OXYGEN SATURATION: 92 % | WEIGHT: 182 LBS

## 2022-07-11 DIAGNOSIS — N39.0 URINARY TRACT INFECTION WITHOUT HEMATURIA, SITE UNSPECIFIED: ICD-10-CM

## 2022-07-11 PROCEDURE — 99213 OFFICE O/P EST LOW 20 MIN: CPT | Performed by: FAMILY MEDICINE

## 2022-07-11 RX ORDER — LEVOFLOXACIN 250 MG/1
250 TABLET ORAL DAILY
Qty: 10 TABLET | Refills: 0 | Status: SHIPPED | OUTPATIENT
Start: 2022-07-11 | End: 2022-09-29 | Stop reason: HOSPADM

## 2022-07-14 NOTE — PROGRESS NOTES
"Chief Complaint   Patient presents with   • Headache     3 weeks in back of head where tumors are- seeing neuro in October for headaches    • burning with urination      Possible UTI        Urinary Tract Infection: Patient complains of burning with urination He has had symptoms for a few days. Patient also complains of Mental confusion. Patient denies fever. Patient does have a history of recurrent UTI.  Patient does have a history of pyelonephritis.     Also having posterior headaches.      Vitals:    07/11/22 1321   BP: 120/74   BP Location: Left arm   Patient Position: Sitting   Cuff Size: Adult   Pulse: 112   Temp: 98.4 °F (36.9 °C)   SpO2: 92%   Weight: 82.6 kg (182 lb)   Height: 175.3 cm (69\")     Gen: mildly ill appearing, alert, extremely hard of hearing.  Most history from his adult daughter.  Patient needed extensive help in the bathroom to obtain his urine specimen.  As he has an artificial urinary sphincter.    Heart: RR without murmur  Skin: no rash.  Abd: non tender  Flank: no CVA, no rash  Patient grabs the back of his neck as a source of the pain of his headache    In office urine dipstick results:  Brief Urine Lab Results  (Last result in the past 365 days)      Color   Clarity   Blood   Leuk Est   Nitrite   Protein   CREAT   Urine HCG        06/01/22 1431 Dark Yellow   Cloudy   Moderate   Large (3+)   Positive   Negative                     Assessment & Plan   Diagnoses and all orders for this visit:    1. Urinary tract infection without hematuria, site unspecified  -     levoFLOXacin (Levaquin) 250 MG tablet; Take 1 tablet by mouth Daily. Indications: Urinary Tract Infection  Dispense: 10 tablet; Refill: 0               Dr. Charlie Trinh MD  [place], [place]  [facility]  "

## 2022-09-18 ENCOUNTER — HOSPITAL ENCOUNTER (INPATIENT)
Facility: HOSPITAL | Age: 87
LOS: 10 days | Discharge: NURSING, REHAB OR LONG-TERM CARE | End: 2022-09-29
Attending: EMERGENCY MEDICINE | Admitting: STUDENT IN AN ORGANIZED HEALTH CARE EDUCATION/TRAINING PROGRAM

## 2022-09-18 ENCOUNTER — APPOINTMENT (OUTPATIENT)
Dept: CT IMAGING | Facility: HOSPITAL | Age: 87
End: 2022-09-18

## 2022-09-18 DIAGNOSIS — R29.6 FREQUENT FALLS: Primary | ICD-10-CM

## 2022-09-18 DIAGNOSIS — N39.0 URINARY TRACT INFECTION IN ELDERLY PATIENT: ICD-10-CM

## 2022-09-18 DIAGNOSIS — R29.898 WEAKNESS OF BOTH LOWER EXTREMITIES: ICD-10-CM

## 2022-09-18 DIAGNOSIS — S22.42XA CLOSED FRACTURE OF MULTIPLE RIBS OF LEFT SIDE, INITIAL ENCOUNTER: ICD-10-CM

## 2022-09-18 DIAGNOSIS — J96.01 ACUTE RESPIRATORY FAILURE WITH HYPOXIA: ICD-10-CM

## 2022-09-18 LAB
ALBUMIN SERPL-MCNC: 3.6 G/DL (ref 3.5–5.2)
ALBUMIN/GLOB SERPL: 1.1 G/DL
ALP SERPL-CCNC: 47 U/L (ref 39–117)
ALT SERPL W P-5'-P-CCNC: 16 U/L (ref 1–41)
ANION GAP SERPL CALCULATED.3IONS-SCNC: 9.5 MMOL/L (ref 5–15)
AST SERPL-CCNC: 24 U/L (ref 1–40)
BACTERIA UR QL AUTO: ABNORMAL /HPF
BASOPHILS # BLD AUTO: 0.03 10*3/MM3 (ref 0–0.2)
BASOPHILS NFR BLD AUTO: 0.4 % (ref 0–1.5)
BILIRUB SERPL-MCNC: 0.7 MG/DL (ref 0–1.2)
BILIRUB UR QL STRIP: NEGATIVE
BUN SERPL-MCNC: 22 MG/DL (ref 8–23)
BUN/CREAT SERPL: 28.2 (ref 7–25)
CALCIUM SPEC-SCNC: 9.7 MG/DL (ref 8.6–10.5)
CHLORIDE SERPL-SCNC: 99 MMOL/L (ref 98–107)
CK SERPL-CCNC: 318 U/L (ref 20–200)
CLARITY UR: CLEAR
CO2 SERPL-SCNC: 27.5 MMOL/L (ref 22–29)
COLOR UR: YELLOW
CREAT SERPL-MCNC: 0.78 MG/DL (ref 0.76–1.27)
DEPRECATED RDW RBC AUTO: 48.1 FL (ref 37–54)
EGFRCR SERPLBLD CKD-EPI 2021: 85.2 ML/MIN/1.73
EOSINOPHIL # BLD AUTO: 0.09 10*3/MM3 (ref 0–0.4)
EOSINOPHIL NFR BLD AUTO: 1.1 % (ref 0.3–6.2)
ERYTHROCYTE [DISTWIDTH] IN BLOOD BY AUTOMATED COUNT: 14.1 % (ref 12.3–15.4)
GLOBULIN UR ELPH-MCNC: 3.3 GM/DL
GLUCOSE SERPL-MCNC: 112 MG/DL (ref 65–99)
GLUCOSE UR STRIP-MCNC: NEGATIVE MG/DL
HCT VFR BLD AUTO: 40.8 % (ref 37.5–51)
HGB BLD-MCNC: 13.3 G/DL (ref 13–17.7)
HGB UR QL STRIP.AUTO: NEGATIVE
HYALINE CASTS UR QL AUTO: ABNORMAL /LPF
IMM GRANULOCYTES # BLD AUTO: 0.03 10*3/MM3 (ref 0–0.05)
IMM GRANULOCYTES NFR BLD AUTO: 0.4 % (ref 0–0.5)
KETONES UR QL STRIP: NEGATIVE
LEUKOCYTE ESTERASE UR QL STRIP.AUTO: ABNORMAL
LYMPHOCYTES # BLD AUTO: 1.25 10*3/MM3 (ref 0.7–3.1)
LYMPHOCYTES NFR BLD AUTO: 14.7 % (ref 19.6–45.3)
MCH RBC QN AUTO: 30.5 PG (ref 26.6–33)
MCHC RBC AUTO-ENTMCNC: 32.6 G/DL (ref 31.5–35.7)
MCV RBC AUTO: 93.6 FL (ref 79–97)
MONOCYTES # BLD AUTO: 0.79 10*3/MM3 (ref 0.1–0.9)
MONOCYTES NFR BLD AUTO: 9.3 % (ref 5–12)
NEUTROPHILS NFR BLD AUTO: 6.29 10*3/MM3 (ref 1.7–7)
NEUTROPHILS NFR BLD AUTO: 74.1 % (ref 42.7–76)
NITRITE UR QL STRIP: NEGATIVE
NRBC BLD AUTO-RTO: 0 /100 WBC (ref 0–0.2)
PH UR STRIP.AUTO: 6 [PH] (ref 4.5–8)
PLATELET # BLD AUTO: 254 10*3/MM3 (ref 140–450)
PMV BLD AUTO: 9.3 FL (ref 6–12)
POTASSIUM SERPL-SCNC: 4.2 MMOL/L (ref 3.5–5.2)
PROT SERPL-MCNC: 6.9 G/DL (ref 6–8.5)
PROT UR QL STRIP: NEGATIVE
RBC # BLD AUTO: 4.36 10*6/MM3 (ref 4.14–5.8)
RBC # UR STRIP: ABNORMAL /HPF
REF LAB TEST METHOD: ABNORMAL
SODIUM SERPL-SCNC: 136 MMOL/L (ref 136–145)
SP GR UR STRIP: 1.02 (ref 1–1.03)
SQUAMOUS #/AREA URNS HPF: ABNORMAL /HPF
UROBILINOGEN UR QL STRIP: ABNORMAL
WBC # UR STRIP: ABNORMAL /HPF
WBC NRBC COR # BLD: 8.48 10*3/MM3 (ref 3.4–10.8)

## 2022-09-18 PROCEDURE — G0378 HOSPITAL OBSERVATION PER HR: HCPCS

## 2022-09-18 PROCEDURE — 85025 COMPLETE CBC W/AUTO DIFF WBC: CPT | Performed by: EMERGENCY MEDICINE

## 2022-09-18 PROCEDURE — 70450 CT HEAD/BRAIN W/O DYE: CPT

## 2022-09-18 PROCEDURE — 94799 UNLISTED PULMONARY SVC/PX: CPT

## 2022-09-18 PROCEDURE — 94664 DEMO&/EVAL PT USE INHALER: CPT

## 2022-09-18 PROCEDURE — 94761 N-INVAS EAR/PLS OXIMETRY MLT: CPT

## 2022-09-18 PROCEDURE — 25010000002 CEFTRIAXONE SODIUM-DEXTROSE 1-3.74 GM-%(50ML) RECONSTITUTED SOLUTION: Performed by: EMERGENCY MEDICINE

## 2022-09-18 PROCEDURE — 94640 AIRWAY INHALATION TREATMENT: CPT

## 2022-09-18 PROCEDURE — 81001 URINALYSIS AUTO W/SCOPE: CPT | Performed by: EMERGENCY MEDICINE

## 2022-09-18 PROCEDURE — 71250 CT THORAX DX C-: CPT

## 2022-09-18 PROCEDURE — 82550 ASSAY OF CK (CPK): CPT | Performed by: EMERGENCY MEDICINE

## 2022-09-18 PROCEDURE — 80053 COMPREHEN METABOLIC PANEL: CPT | Performed by: EMERGENCY MEDICINE

## 2022-09-18 PROCEDURE — 99222 1ST HOSP IP/OBS MODERATE 55: CPT | Performed by: STUDENT IN AN ORGANIZED HEALTH CARE EDUCATION/TRAINING PROGRAM

## 2022-09-18 PROCEDURE — 87086 URINE CULTURE/COLONY COUNT: CPT | Performed by: STUDENT IN AN ORGANIZED HEALTH CARE EDUCATION/TRAINING PROGRAM

## 2022-09-18 PROCEDURE — 99284 EMERGENCY DEPT VISIT MOD MDM: CPT

## 2022-09-18 RX ORDER — ARFORMOTEROL TARTRATE 15 UG/2ML
15 SOLUTION RESPIRATORY (INHALATION)
Status: DISCONTINUED | OUTPATIENT
Start: 2022-09-18 | End: 2022-09-29

## 2022-09-18 RX ORDER — GUAIFENESIN 600 MG/1
600 TABLET, EXTENDED RELEASE ORAL AS NEEDED
Status: DISCONTINUED | OUTPATIENT
Start: 2022-09-18 | End: 2022-09-24

## 2022-09-18 RX ORDER — RIVASTIGMINE 4.6 MG/24H
1 PATCH, EXTENDED RELEASE TRANSDERMAL DAILY
Refills: 3 | Status: DISCONTINUED | OUTPATIENT
Start: 2022-09-18 | End: 2022-09-19

## 2022-09-18 RX ORDER — LEVOTHYROXINE SODIUM 0.05 MG/1
50 TABLET ORAL
Status: DISCONTINUED | OUTPATIENT
Start: 2022-09-19 | End: 2022-09-19

## 2022-09-18 RX ORDER — SODIUM CHLORIDE 0.9 % (FLUSH) 0.9 %
10 SYRINGE (ML) INJECTION EVERY 12 HOURS SCHEDULED
Status: DISCONTINUED | OUTPATIENT
Start: 2022-09-18 | End: 2022-09-29 | Stop reason: HOSPADM

## 2022-09-18 RX ORDER — IPRATROPIUM BROMIDE AND ALBUTEROL SULFATE 2.5; .5 MG/3ML; MG/3ML
3 SOLUTION RESPIRATORY (INHALATION)
Status: DISCONTINUED | OUTPATIENT
Start: 2022-09-18 | End: 2022-09-19

## 2022-09-18 RX ORDER — CHOLECALCIFEROL (VITAMIN D3) 125 MCG
10 CAPSULE ORAL NIGHTLY PRN
Status: DISCONTINUED | OUTPATIENT
Start: 2022-09-18 | End: 2022-09-19

## 2022-09-18 RX ORDER — SODIUM CHLORIDE 0.9 % (FLUSH) 0.9 %
10 SYRINGE (ML) INJECTION AS NEEDED
Status: DISCONTINUED | OUTPATIENT
Start: 2022-09-18 | End: 2022-09-29 | Stop reason: HOSPADM

## 2022-09-18 RX ORDER — ATORVASTATIN CALCIUM 20 MG/1
20 TABLET, FILM COATED ORAL NIGHTLY
Status: DISCONTINUED | OUTPATIENT
Start: 2022-09-18 | End: 2022-09-18

## 2022-09-18 RX ORDER — PANTOPRAZOLE SODIUM 40 MG/1
40 TABLET, DELAYED RELEASE ORAL DAILY
Status: DISCONTINUED | OUTPATIENT
Start: 2022-09-18 | End: 2022-09-19

## 2022-09-18 RX ORDER — BUDESONIDE AND FORMOTEROL FUMARATE DIHYDRATE 160; 4.5 UG/1; UG/1
2 AEROSOL RESPIRATORY (INHALATION)
Status: DISCONTINUED | OUTPATIENT
Start: 2022-09-18 | End: 2022-09-18

## 2022-09-18 RX ORDER — CLOPIDOGREL BISULFATE 75 MG/1
75 TABLET ORAL DAILY
Status: DISCONTINUED | OUTPATIENT
Start: 2022-09-18 | End: 2022-09-19

## 2022-09-18 RX ORDER — HYDROCODONE BITARTRATE AND ACETAMINOPHEN 5; 325 MG/1; MG/1
1 TABLET ORAL EVERY 6 HOURS PRN
Status: DISCONTINUED | OUTPATIENT
Start: 2022-09-18 | End: 2022-09-19

## 2022-09-18 RX ORDER — SODIUM CHLORIDE 9 MG/ML
40 INJECTION, SOLUTION INTRAVENOUS AS NEEDED
Status: DISCONTINUED | OUTPATIENT
Start: 2022-09-18 | End: 2022-09-29 | Stop reason: HOSPADM

## 2022-09-18 RX ORDER — CETIRIZINE HYDROCHLORIDE 10 MG/1
10 TABLET ORAL NIGHTLY
Status: DISCONTINUED | OUTPATIENT
Start: 2022-09-18 | End: 2022-09-29 | Stop reason: HOSPADM

## 2022-09-18 RX ORDER — CEFTRIAXONE 1 G/50ML
1 INJECTION, SOLUTION INTRAVENOUS ONCE
Status: DISCONTINUED | OUTPATIENT
Start: 2022-09-18 | End: 2022-09-19

## 2022-09-18 RX ORDER — BUDESONIDE 0.5 MG/2ML
0.5 INHALANT ORAL
Status: DISCONTINUED | OUTPATIENT
Start: 2022-09-18 | End: 2022-09-19

## 2022-09-18 RX ADMIN — PANTOPRAZOLE SODIUM 40 MG: 40 TABLET, DELAYED RELEASE ORAL at 20:58

## 2022-09-18 RX ADMIN — ARFORMOTEROL TARTRATE 15 MCG: 15 SOLUTION RESPIRATORY (INHALATION) at 21:23

## 2022-09-18 RX ADMIN — CLOPIDOGREL BISULFATE 75 MG: 75 TABLET ORAL at 20:58

## 2022-09-18 RX ADMIN — BUDESONIDE 0.5 MG: 0.5 SUSPENSION RESPIRATORY (INHALATION) at 21:14

## 2022-09-18 RX ADMIN — CETIRIZINE HYDROCHLORIDE 10 MG: 10 TABLET, FILM COATED ORAL at 20:58

## 2022-09-18 RX ADMIN — Medication 10 ML: at 20:58

## 2022-09-18 RX ADMIN — RIVASTIGMINE TRANSDERMAL SYSTEM 1 PATCH: 4.6 PATCH, EXTENDED RELEASE TRANSDERMAL at 20:25

## 2022-09-18 RX ADMIN — IPRATROPIUM BROMIDE AND ALBUTEROL SULFATE 3 ML: 2.5; .5 SOLUTION RESPIRATORY (INHALATION) at 21:14

## 2022-09-19 ENCOUNTER — APPOINTMENT (OUTPATIENT)
Dept: GENERAL RADIOLOGY | Facility: HOSPITAL | Age: 87
End: 2022-09-19

## 2022-09-19 ENCOUNTER — APPOINTMENT (OUTPATIENT)
Dept: CT IMAGING | Facility: HOSPITAL | Age: 87
End: 2022-09-19

## 2022-09-19 LAB
ALBUMIN SERPL-MCNC: 3.4 G/DL (ref 3.5–5.2)
ALBUMIN/GLOB SERPL: 1.1 G/DL
ALP SERPL-CCNC: 43 U/L (ref 39–117)
ALT SERPL W P-5'-P-CCNC: 16 U/L (ref 1–41)
ANION GAP SERPL CALCULATED.3IONS-SCNC: 10.5 MMOL/L (ref 5–15)
ARTERIAL PATENCY WRIST A: POSITIVE
ARTERIAL PATENCY WRIST A: POSITIVE
AST SERPL-CCNC: 27 U/L (ref 1–40)
ATMOSPHERIC PRESS: 738 MMHG
ATMOSPHERIC PRESS: 739 MMHG
B PARAPERT DNA SPEC QL NAA+PROBE: NOT DETECTED
B PERT DNA SPEC QL NAA+PROBE: NOT DETECTED
BACTERIA SPEC AEROBE CULT: ABNORMAL
BASE EXCESS BLDA CALC-SCNC: 1.7 MMOL/L (ref 0–2)
BASE EXCESS BLDA CALC-SCNC: 1.7 MMOL/L (ref 0–2)
BASOPHILS # BLD AUTO: 0.02 10*3/MM3 (ref 0–0.2)
BASOPHILS NFR BLD AUTO: 0.3 % (ref 0–1.5)
BDY SITE: ABNORMAL
BDY SITE: ABNORMAL
BILIRUB SERPL-MCNC: 0.7 MG/DL (ref 0–1.2)
BODY TEMPERATURE: 37 C
BODY TEMPERATURE: 37 C
BUN SERPL-MCNC: 21 MG/DL (ref 8–23)
BUN/CREAT SERPL: 24.1 (ref 7–25)
C PNEUM DNA NPH QL NAA+NON-PROBE: NOT DETECTED
CALCIUM SPEC-SCNC: 8.9 MG/DL (ref 8.6–10.5)
CHLORIDE SERPL-SCNC: 97 MMOL/L (ref 98–107)
CK SERPL-CCNC: 355 U/L (ref 20–200)
CO2 SERPL-SCNC: 27.5 MMOL/L (ref 22–29)
CREAT SERPL-MCNC: 0.87 MG/DL (ref 0.76–1.27)
D-LACTATE SERPL-SCNC: 1.7 MMOL/L (ref 0.5–2)
DEPRECATED RDW RBC AUTO: 48.9 FL (ref 37–54)
EGFRCR SERPLBLD CKD-EPI 2021: 82.5 ML/MIN/1.73
EOSINOPHIL # BLD AUTO: 0 10*3/MM3 (ref 0–0.4)
EOSINOPHIL NFR BLD AUTO: 0 % (ref 0.3–6.2)
ERYTHROCYTE [DISTWIDTH] IN BLOOD BY AUTOMATED COUNT: 14 % (ref 12.3–15.4)
ERYTHROCYTE [SEDIMENTATION RATE] IN BLOOD: 20 MM/HR (ref 0–20)
FLUAV SUBTYP SPEC NAA+PROBE: NOT DETECTED
FLUBV RNA ISLT QL NAA+PROBE: NOT DETECTED
GAS FLOW AIRWAY: 13 LPM
GLOBULIN UR ELPH-MCNC: 3.1 GM/DL
GLUCOSE SERPL-MCNC: 135 MG/DL (ref 65–99)
HADV DNA SPEC NAA+PROBE: NOT DETECTED
HCO3 BLDA-SCNC: 27.4 MMOL/L (ref 20–26)
HCO3 BLDA-SCNC: 27.5 MMOL/L (ref 20–26)
HCOV 229E RNA SPEC QL NAA+PROBE: NOT DETECTED
HCOV HKU1 RNA SPEC QL NAA+PROBE: NOT DETECTED
HCOV NL63 RNA SPEC QL NAA+PROBE: NOT DETECTED
HCOV OC43 RNA SPEC QL NAA+PROBE: NOT DETECTED
HCT VFR BLD AUTO: 40.5 % (ref 37.5–51)
HCT VFR BLD AUTO: 41.2 % (ref 37.5–51)
HGB BLD-MCNC: 13.3 G/DL (ref 13–17.7)
HGB BLD-MCNC: 13.5 G/DL (ref 13–17.7)
HGB BLDA-MCNC: 13 G/DL (ref 14–18)
HGB BLDA-MCNC: 13.6 G/DL (ref 14–18)
HMPV RNA NPH QL NAA+NON-PROBE: NOT DETECTED
HPIV1 RNA ISLT QL NAA+PROBE: NOT DETECTED
HPIV2 RNA SPEC QL NAA+PROBE: NOT DETECTED
HPIV3 RNA NPH QL NAA+PROBE: NOT DETECTED
HPIV4 P GENE NPH QL NAA+PROBE: NOT DETECTED
IMM GRANULOCYTES # BLD AUTO: 0.01 10*3/MM3 (ref 0–0.05)
IMM GRANULOCYTES NFR BLD AUTO: 0.1 % (ref 0–0.5)
INHALED O2 CONCENTRATION: 100 %
LYMPHOCYTES # BLD AUTO: 0.43 10*3/MM3 (ref 0.7–3.1)
LYMPHOCYTES NFR BLD AUTO: 5.8 % (ref 19.6–45.3)
Lab: ABNORMAL
M PNEUMO IGG SER IA-ACNC: NOT DETECTED
MAGNESIUM SERPL-MCNC: 1.6 MG/DL (ref 1.6–2.4)
MCH RBC QN AUTO: 31.1 PG (ref 26.6–33)
MCHC RBC AUTO-ENTMCNC: 32.8 G/DL (ref 31.5–35.7)
MCV RBC AUTO: 94.9 FL (ref 79–97)
MODALITY: ABNORMAL
MODALITY: ABNORMAL
MONOCYTES # BLD AUTO: 0.46 10*3/MM3 (ref 0.1–0.9)
MONOCYTES NFR BLD AUTO: 6.2 % (ref 5–12)
MRSA DNA SPEC QL NAA+PROBE: NORMAL
NEUTROPHILS NFR BLD AUTO: 6.47 10*3/MM3 (ref 1.7–7)
NEUTROPHILS NFR BLD AUTO: 87.6 % (ref 42.7–76)
NOTIFIED BY: ABNORMAL
NOTIFIED WHO: ABNORMAL
NRBC BLD AUTO-RTO: 0 /100 WBC (ref 0–0.2)
PCO2 BLDA: 46 MM HG (ref 35–45)
PCO2 BLDA: 46.9 MM HG (ref 35–45)
PCO2 TEMP ADJ BLD: 46 MM HG (ref 35–45)
PCO2 TEMP ADJ BLD: 46.9 MM HG (ref 35–45)
PEEP RESPIRATORY: 5 CM[H2O]
PH BLDA: 7.38 PH UNITS (ref 7.35–7.45)
PH BLDA: 7.38 PH UNITS (ref 7.35–7.45)
PH, TEMP CORRECTED: 7.38 PH UNITS (ref 7.35–7.45)
PH, TEMP CORRECTED: 7.38 PH UNITS (ref 7.35–7.45)
PLATELET # BLD AUTO: 261 10*3/MM3 (ref 140–450)
PMV BLD AUTO: 9.4 FL (ref 6–12)
PO2 BLDA: 54.3 MM HG (ref 83–108)
PO2 BLDA: 97.6 MM HG (ref 83–108)
PO2 TEMP ADJ BLD: 54.3 MM HG (ref 83–108)
PO2 TEMP ADJ BLD: 97.6 MM HG (ref 83–108)
POTASSIUM SERPL-SCNC: 4.3 MMOL/L (ref 3.5–5.2)
PROCALCITONIN SERPL-MCNC: 0.35 NG/ML (ref 0–0.25)
PROT SERPL-MCNC: 6.5 G/DL (ref 6–8.5)
RBC # BLD AUTO: 4.34 10*6/MM3 (ref 4.14–5.8)
RHINOVIRUS RNA SPEC NAA+PROBE: NOT DETECTED
RSV RNA NPH QL NAA+NON-PROBE: NOT DETECTED
SAO2 % BLDCOA: 86.1 % (ref 94–99)
SAO2 % BLDCOA: 97.4 % (ref 94–99)
SARS-COV-2 RNA NPH QL NAA+NON-PROBE: NOT DETECTED
SET MECH RESP RATE: 15
SODIUM SERPL-SCNC: 135 MMOL/L (ref 136–145)
VENTILATOR MODE: ABNORMAL
VENTILATOR MODE: ABNORMAL
VT ON VENT VENT: 500 ML
WBC NRBC COR # BLD: 7.39 10*3/MM3 (ref 3.4–10.8)

## 2022-09-19 PROCEDURE — 94799 UNLISTED PULMONARY SVC/PX: CPT

## 2022-09-19 PROCEDURE — 31500 INSERT EMERGENCY AIRWAY: CPT | Performed by: HOSPITALIST

## 2022-09-19 PROCEDURE — 25010000002 CEFEPIME-DEXTROSE 2-5 GM-%(50ML) RECONSTITUTED SOLUTION: Performed by: STUDENT IN AN ORGANIZED HEALTH CARE EDUCATION/TRAINING PROGRAM

## 2022-09-19 PROCEDURE — 25010000002 METHYLPREDNISOLONE PER 40 MG: Performed by: INTERNAL MEDICINE

## 2022-09-19 PROCEDURE — 25010000002 PROPOFOL 10 MG/ML EMULSION

## 2022-09-19 PROCEDURE — 25010000002 PROPOFOL 10 MG/ML EMULSION: Performed by: HOSPITALIST

## 2022-09-19 PROCEDURE — 87070 CULTURE OTHR SPECIMN AEROBIC: CPT | Performed by: STUDENT IN AN ORGANIZED HEALTH CARE EDUCATION/TRAINING PROGRAM

## 2022-09-19 PROCEDURE — 25010000002 FENTANYL CITRATE (PF) 50 MCG/ML SOLUTION

## 2022-09-19 PROCEDURE — 85025 COMPLETE CBC W/AUTO DIFF WBC: CPT | Performed by: STUDENT IN AN ORGANIZED HEALTH CARE EDUCATION/TRAINING PROGRAM

## 2022-09-19 PROCEDURE — 0202U NFCT DS 22 TRGT SARS-COV-2: CPT | Performed by: HOSPITALIST

## 2022-09-19 PROCEDURE — 80053 COMPREHEN METABOLIC PANEL: CPT | Performed by: HOSPITALIST

## 2022-09-19 PROCEDURE — 71045 X-RAY EXAM CHEST 1 VIEW: CPT

## 2022-09-19 PROCEDURE — 25010000002 VANCOMYCIN 750 MG RECONSTITUTED SOLUTION: Performed by: STUDENT IN AN ORGANIZED HEALTH CARE EDUCATION/TRAINING PROGRAM

## 2022-09-19 PROCEDURE — 94761 N-INVAS EAR/PLS OXIMETRY MLT: CPT

## 2022-09-19 PROCEDURE — 99222 1ST HOSP IP/OBS MODERATE 55: CPT | Performed by: PSYCHIATRY & NEUROLOGY

## 2022-09-19 PROCEDURE — 83605 ASSAY OF LACTIC ACID: CPT | Performed by: STUDENT IN AN ORGANIZED HEALTH CARE EDUCATION/TRAINING PROGRAM

## 2022-09-19 PROCEDURE — 99291 CRITICAL CARE FIRST HOUR: CPT | Performed by: HOSPITALIST

## 2022-09-19 PROCEDURE — 87040 BLOOD CULTURE FOR BACTERIA: CPT | Performed by: STUDENT IN AN ORGANIZED HEALTH CARE EDUCATION/TRAINING PROGRAM

## 2022-09-19 PROCEDURE — 85014 HEMATOCRIT: CPT | Performed by: HOSPITALIST

## 2022-09-19 PROCEDURE — 83735 ASSAY OF MAGNESIUM: CPT | Performed by: HOSPITALIST

## 2022-09-19 PROCEDURE — 82550 ASSAY OF CK (CPK): CPT | Performed by: HOSPITALIST

## 2022-09-19 PROCEDURE — 87205 SMEAR GRAM STAIN: CPT | Performed by: STUDENT IN AN ORGANIZED HEALTH CARE EDUCATION/TRAINING PROGRAM

## 2022-09-19 PROCEDURE — 70450 CT HEAD/BRAIN W/O DYE: CPT

## 2022-09-19 PROCEDURE — 0BH17EZ INSERTION OF ENDOTRACHEAL AIRWAY INTO TRACHEA, VIA NATURAL OR ARTIFICIAL OPENING: ICD-10-PCS | Performed by: HOSPITALIST

## 2022-09-19 PROCEDURE — 85018 HEMOGLOBIN: CPT | Performed by: HOSPITALIST

## 2022-09-19 PROCEDURE — 85652 RBC SED RATE AUTOMATED: CPT | Performed by: HOSPITALIST

## 2022-09-19 PROCEDURE — 5A1945Z RESPIRATORY VENTILATION, 24-96 CONSECUTIVE HOURS: ICD-10-PCS | Performed by: HOSPITALIST

## 2022-09-19 PROCEDURE — 87641 MR-STAPH DNA AMP PROBE: CPT | Performed by: STUDENT IN AN ORGANIZED HEALTH CARE EDUCATION/TRAINING PROGRAM

## 2022-09-19 PROCEDURE — 84145 PROCALCITONIN (PCT): CPT | Performed by: HOSPITALIST

## 2022-09-19 PROCEDURE — 25010000002 VANCOMYCIN 750 MG RECONSTITUTED SOLUTION 1 EACH VIAL: Performed by: STUDENT IN AN ORGANIZED HEALTH CARE EDUCATION/TRAINING PROGRAM

## 2022-09-19 PROCEDURE — 25010000002 METHYLPREDNISOLONE PER 125 MG: Performed by: HOSPITALIST

## 2022-09-19 PROCEDURE — 99233 SBSQ HOSP IP/OBS HIGH 50: CPT | Performed by: STUDENT IN AN ORGANIZED HEALTH CARE EDUCATION/TRAINING PROGRAM

## 2022-09-19 PROCEDURE — 36600 WITHDRAWAL OF ARTERIAL BLOOD: CPT

## 2022-09-19 PROCEDURE — 25810000003 SODIUM CHLORIDE 0.9 % WITH KCL 40 MEQ/L 40-0.9 MEQ/L-% SOLUTION: Performed by: HOSPITALIST

## 2022-09-19 PROCEDURE — 94002 VENT MGMT INPAT INIT DAY: CPT

## 2022-09-19 PROCEDURE — 25810000003 SODIUM CHLORIDE 0.9 % WITH KCL 40 MEQ/L 40-0.9 MEQ/L-% SOLUTION

## 2022-09-19 PROCEDURE — 74018 RADEX ABDOMEN 1 VIEW: CPT

## 2022-09-19 PROCEDURE — 25010000002 ONDANSETRON PER 1 MG: Performed by: HOSPITALIST

## 2022-09-19 PROCEDURE — 25010000002 MAGNESIUM SULFATE 2 GM/50ML SOLUTION

## 2022-09-19 PROCEDURE — 25010000002 VANCOMYCIN 1 G RECONSTITUTED SOLUTION 1 EACH VIAL: Performed by: STUDENT IN AN ORGANIZED HEALTH CARE EDUCATION/TRAINING PROGRAM

## 2022-09-19 PROCEDURE — 82803 BLOOD GASES ANY COMBINATION: CPT

## 2022-09-19 RX ORDER — SODIUM CHLORIDE AND POTASSIUM CHLORIDE 300; 900 MG/100ML; MG/100ML
100 INJECTION, SOLUTION INTRAVENOUS CONTINUOUS
Status: DISCONTINUED | OUTPATIENT
Start: 2022-09-19 | End: 2022-09-19

## 2022-09-19 RX ORDER — PROPOFOL 10 MG/ML
VIAL (ML) INTRAVENOUS
Status: COMPLETED
Start: 2022-09-19 | End: 2022-09-19

## 2022-09-19 RX ORDER — MAGNESIUM SULFATE 1 G/100ML
1 INJECTION INTRAVENOUS AS NEEDED
Status: DISCONTINUED | OUTPATIENT
Start: 2022-09-19 | End: 2022-09-29 | Stop reason: HOSPADM

## 2022-09-19 RX ORDER — CHLORHEXIDINE GLUCONATE 0.12 MG/ML
15 RINSE ORAL EVERY 12 HOURS SCHEDULED
Status: DISCONTINUED | OUTPATIENT
Start: 2022-09-19 | End: 2022-09-26

## 2022-09-19 RX ORDER — MAGNESIUM SULFATE HEPTAHYDRATE 40 MG/ML
2 INJECTION, SOLUTION INTRAVENOUS ONCE
Status: COMPLETED | OUTPATIENT
Start: 2022-09-19 | End: 2022-09-19

## 2022-09-19 RX ORDER — CEFEPIME HYDROCHLORIDE 2 G/50ML
2 INJECTION, SOLUTION INTRAVENOUS EVERY 12 HOURS SCHEDULED
Status: DISCONTINUED | OUTPATIENT
Start: 2022-09-19 | End: 2022-09-19 | Stop reason: DRUGHIGH

## 2022-09-19 RX ORDER — POTASSIUM CHLORIDE 20 MEQ/1
40 TABLET, EXTENDED RELEASE ORAL AS NEEDED
Status: DISCONTINUED | OUTPATIENT
Start: 2022-09-19 | End: 2022-09-29 | Stop reason: HOSPADM

## 2022-09-19 RX ORDER — CEFEPIME HYDROCHLORIDE 2 G/50ML
2 INJECTION, SOLUTION INTRAVENOUS EVERY 8 HOURS
Status: DISCONTINUED | OUTPATIENT
Start: 2022-09-19 | End: 2022-09-24

## 2022-09-19 RX ORDER — CEFTRIAXONE 1 G/50ML
1 INJECTION, SOLUTION INTRAVENOUS EVERY 24 HOURS
Status: DISCONTINUED | OUTPATIENT
Start: 2022-09-19 | End: 2022-09-19

## 2022-09-19 RX ORDER — SODIUM CHLORIDE AND POTASSIUM CHLORIDE 300; 900 MG/100ML; MG/100ML
INJECTION, SOLUTION INTRAVENOUS
Status: COMPLETED
Start: 2022-09-19 | End: 2022-09-19

## 2022-09-19 RX ORDER — POTASSIUM CHLORIDE 1.5 G/1.77G
40 POWDER, FOR SOLUTION ORAL AS NEEDED
Status: DISCONTINUED | OUTPATIENT
Start: 2022-09-19 | End: 2022-09-29 | Stop reason: HOSPADM

## 2022-09-19 RX ORDER — POTASSIUM CHLORIDE 7.45 MG/ML
10 INJECTION INTRAVENOUS
Status: DISCONTINUED | OUTPATIENT
Start: 2022-09-19 | End: 2022-09-29 | Stop reason: HOSPADM

## 2022-09-19 RX ORDER — METHYLPREDNISOLONE SODIUM SUCCINATE 125 MG/2ML
80 INJECTION, POWDER, LYOPHILIZED, FOR SOLUTION INTRAMUSCULAR; INTRAVENOUS ONCE
Status: COMPLETED | OUTPATIENT
Start: 2022-09-19 | End: 2022-09-19

## 2022-09-19 RX ORDER — IPRATROPIUM BROMIDE AND ALBUTEROL SULFATE 2.5; .5 MG/3ML; MG/3ML
3 SOLUTION RESPIRATORY (INHALATION)
Status: DISCONTINUED | OUTPATIENT
Start: 2022-09-19 | End: 2022-09-22

## 2022-09-19 RX ORDER — DEXMEDETOMIDINE HYDROCHLORIDE 4 UG/ML
.2-1.5 INJECTION, SOLUTION INTRAVENOUS
Status: DISCONTINUED | OUTPATIENT
Start: 2022-09-19 | End: 2022-09-21

## 2022-09-19 RX ORDER — FENTANYL CITRATE 0.05 MG/ML
25 INJECTION, SOLUTION INTRAMUSCULAR; INTRAVENOUS ONCE
Status: COMPLETED | OUTPATIENT
Start: 2022-09-19 | End: 2022-09-19

## 2022-09-19 RX ORDER — PROPOFOL 10 MG/ML
100 VIAL (ML) INTRAVENOUS ONCE
Status: COMPLETED | OUTPATIENT
Start: 2022-09-19 | End: 2022-09-19

## 2022-09-19 RX ORDER — MAGNESIUM SULFATE HEPTAHYDRATE 40 MG/ML
4 INJECTION, SOLUTION INTRAVENOUS AS NEEDED
Status: DISCONTINUED | OUTPATIENT
Start: 2022-09-19 | End: 2022-09-29 | Stop reason: HOSPADM

## 2022-09-19 RX ORDER — METHYLPREDNISOLONE SODIUM SUCCINATE 40 MG/ML
40 INJECTION, POWDER, LYOPHILIZED, FOR SOLUTION INTRAMUSCULAR; INTRAVENOUS EVERY 12 HOURS
Status: DISCONTINUED | OUTPATIENT
Start: 2022-09-19 | End: 2022-09-22

## 2022-09-19 RX ORDER — MAGNESIUM SULFATE HEPTAHYDRATE 40 MG/ML
2 INJECTION, SOLUTION INTRAVENOUS AS NEEDED
Status: DISCONTINUED | OUTPATIENT
Start: 2022-09-19 | End: 2022-09-29 | Stop reason: HOSPADM

## 2022-09-19 RX ORDER — METRONIDAZOLE 500 MG/100ML
500 INJECTION, SOLUTION INTRAVENOUS EVERY 8 HOURS
Status: DISCONTINUED | OUTPATIENT
Start: 2022-09-19 | End: 2022-09-19

## 2022-09-19 RX ORDER — ALBUTEROL SULFATE 2.5 MG/3ML
2.5 SOLUTION RESPIRATORY (INHALATION) ONCE
Status: COMPLETED | OUTPATIENT
Start: 2022-09-19 | End: 2022-09-19

## 2022-09-19 RX ORDER — ATORVASTATIN CALCIUM 20 MG/1
20 TABLET, FILM COATED ORAL NIGHTLY
Status: DISCONTINUED | OUTPATIENT
Start: 2022-09-19 | End: 2022-09-21

## 2022-09-19 RX ORDER — LEVOTHYROXINE SODIUM 0.05 MG/1
50 TABLET ORAL
Status: DISCONTINUED | OUTPATIENT
Start: 2022-09-19 | End: 2022-09-27

## 2022-09-19 RX ORDER — MAGNESIUM SULFATE HEPTAHYDRATE 40 MG/ML
INJECTION, SOLUTION INTRAVENOUS
Status: COMPLETED
Start: 2022-09-19 | End: 2022-09-19

## 2022-09-19 RX ORDER — HYDROCODONE BITARTRATE AND ACETAMINOPHEN 5; 325 MG/1; MG/1
1 TABLET ORAL EVERY 6 HOURS PRN
Status: DISPENSED | OUTPATIENT
Start: 2022-09-19 | End: 2022-09-26

## 2022-09-19 RX ORDER — CEFEPIME HYDROCHLORIDE 2 G/50ML
2 INJECTION, SOLUTION INTRAVENOUS ONCE
Status: COMPLETED | OUTPATIENT
Start: 2022-09-19 | End: 2022-09-19

## 2022-09-19 RX ORDER — PANTOPRAZOLE SODIUM 40 MG/10ML
40 INJECTION, POWDER, LYOPHILIZED, FOR SOLUTION INTRAVENOUS
Status: DISCONTINUED | OUTPATIENT
Start: 2022-09-19 | End: 2022-09-29 | Stop reason: HOSPADM

## 2022-09-19 RX ORDER — ONDANSETRON 2 MG/ML
4 INJECTION INTRAMUSCULAR; INTRAVENOUS EVERY 6 HOURS PRN
Status: DISCONTINUED | OUTPATIENT
Start: 2022-09-19 | End: 2022-09-29 | Stop reason: HOSPADM

## 2022-09-19 RX ORDER — HYDROCODONE BITARTRATE AND ACETAMINOPHEN 5; 325 MG/1; MG/1
TABLET ORAL
Status: COMPLETED
Start: 2022-09-19 | End: 2022-09-19

## 2022-09-19 RX ORDER — FENTANYL CITRATE 50 UG/ML
INJECTION, SOLUTION INTRAMUSCULAR; INTRAVENOUS
Status: COMPLETED
Start: 2022-09-19 | End: 2022-09-19

## 2022-09-19 RX ORDER — HYDROCODONE BITARTRATE AND ACETAMINOPHEN 5; 325 MG/1; MG/1
1 TABLET ORAL EVERY 6 HOURS
Status: DISCONTINUED | OUTPATIENT
Start: 2022-09-19 | End: 2022-09-19

## 2022-09-19 RX ORDER — SODIUM CHLORIDE, SODIUM LACTATE, POTASSIUM CHLORIDE, CALCIUM CHLORIDE 600; 310; 30; 20 MG/100ML; MG/100ML; MG/100ML; MG/100ML
125 INJECTION, SOLUTION INTRAVENOUS CONTINUOUS
Status: ACTIVE | OUTPATIENT
Start: 2022-09-19 | End: 2022-09-20

## 2022-09-19 RX ADMIN — Medication 10 ML: at 08:39

## 2022-09-19 RX ADMIN — MAGNESIUM SULFATE HEPTAHYDRATE 2 G: 40 INJECTION, SOLUTION INTRAVENOUS at 05:58

## 2022-09-19 RX ADMIN — METHYLPREDNISOLONE SODIUM SUCCINATE 40 MG: 40 INJECTION, POWDER, FOR SOLUTION INTRAMUSCULAR; INTRAVENOUS at 13:31

## 2022-09-19 RX ADMIN — RIVASTIGMINE TRANSDERMAL SYSTEM 1 PATCH: 4.6 PATCH, EXTENDED RELEASE TRANSDERMAL at 08:34

## 2022-09-19 RX ADMIN — SODIUM CHLORIDE 750 MG: 900 INJECTION, SOLUTION INTRAVENOUS at 22:13

## 2022-09-19 RX ADMIN — SODIUM CHLORIDE, POTASSIUM CHLORIDE, SODIUM LACTATE AND CALCIUM CHLORIDE 125 ML/HR: 600; 310; 30; 20 INJECTION, SOLUTION INTRAVENOUS at 22:09

## 2022-09-19 RX ADMIN — ARFORMOTEROL TARTRATE 15 MCG: 15 SOLUTION RESPIRATORY (INHALATION) at 08:07

## 2022-09-19 RX ADMIN — HYDROCODONE BITARTRATE AND ACETAMINOPHEN 1 TABLET: 5; 325 TABLET ORAL at 09:45

## 2022-09-19 RX ADMIN — METHYLPREDNISOLONE SODIUM SUCCINATE 80 MG: 125 INJECTION, POWDER, FOR SOLUTION INTRAMUSCULAR; INTRAVENOUS at 03:50

## 2022-09-19 RX ADMIN — Medication 10 ML: at 20:28

## 2022-09-19 RX ADMIN — PROPOFOL 5 MCG/KG/MIN: 10 INJECTION, EMULSION INTRAVENOUS at 03:30

## 2022-09-19 RX ADMIN — HYDROCODONE BITARTRATE AND ACETAMINOPHEN 1 TABLET: 5; 325 TABLET ORAL at 22:03

## 2022-09-19 RX ADMIN — POTASSIUM CHLORIDE AND SODIUM CHLORIDE 1000 ML: 900; 300 INJECTION, SOLUTION INTRAVENOUS at 04:42

## 2022-09-19 RX ADMIN — CEFEPIME HYDROCHLORIDE 2 G: 2 INJECTION, SOLUTION INTRAVENOUS at 10:13

## 2022-09-19 RX ADMIN — ALBUTEROL SULFATE 2.5 MG: 2.5 SOLUTION RESPIRATORY (INHALATION) at 01:46

## 2022-09-19 RX ADMIN — IPRATROPIUM BROMIDE AND ALBUTEROL SULFATE 3 ML: 2.5; .5 SOLUTION RESPIRATORY (INHALATION) at 15:16

## 2022-09-19 RX ADMIN — VANCOMYCIN HYDROCHLORIDE 1750 MG: 1 INJECTION, POWDER, LYOPHILIZED, FOR SOLUTION INTRAVENOUS at 10:38

## 2022-09-19 RX ADMIN — FENTANYL CITRATE 25 MCG: 0.05 INJECTION, SOLUTION INTRAMUSCULAR; INTRAVENOUS at 03:35

## 2022-09-19 RX ADMIN — CETIRIZINE HYDROCHLORIDE 10 MG: 10 TABLET, FILM COATED ORAL at 20:28

## 2022-09-19 RX ADMIN — LEVOTHYROXINE SODIUM 50 MCG: 50 TABLET ORAL at 08:34

## 2022-09-19 RX ADMIN — CHLORHEXIDINE GLUCONATE 0.12% ORAL RINSE 15 ML: 1.2 LIQUID ORAL at 08:34

## 2022-09-19 RX ADMIN — ONDANSETRON 4 MG: 2 INJECTION INTRAMUSCULAR; INTRAVENOUS at 01:07

## 2022-09-19 RX ADMIN — IPRATROPIUM BROMIDE AND ALBUTEROL SULFATE 3 ML: 2.5; .5 SOLUTION RESPIRATORY (INHALATION) at 11:31

## 2022-09-19 RX ADMIN — POTASSIUM CHLORIDE AND SODIUM CHLORIDE 100 ML/HR: 900; 300 INJECTION, SOLUTION INTRAVENOUS at 05:58

## 2022-09-19 RX ADMIN — PROPOFOL 100 MG: 10 INJECTION, EMULSION INTRAVENOUS at 03:15

## 2022-09-19 RX ADMIN — BUDESONIDE 0.5 MG: 0.5 SUSPENSION RESPIRATORY (INHALATION) at 08:07

## 2022-09-19 RX ADMIN — CEFEPIME HYDROCHLORIDE 2 G: 2 INJECTION, SOLUTION INTRAVENOUS at 18:34

## 2022-09-19 RX ADMIN — METRONIDAZOLE 500 MG: 500 INJECTION, SOLUTION INTRAVENOUS at 04:42

## 2022-09-19 RX ADMIN — IPRATROPIUM BROMIDE AND ALBUTEROL SULFATE 3 ML: 2.5; .5 SOLUTION RESPIRATORY (INHALATION) at 08:24

## 2022-09-19 RX ADMIN — PROPOFOL 35 MCG/KG/MIN: 10 INJECTION, EMULSION INTRAVENOUS at 07:46

## 2022-09-19 RX ADMIN — ARFORMOTEROL TARTRATE 15 MCG: 15 SOLUTION RESPIRATORY (INHALATION) at 19:16

## 2022-09-19 RX ADMIN — IPRATROPIUM BROMIDE AND ALBUTEROL SULFATE 3 ML: 2.5; .5 SOLUTION RESPIRATORY (INHALATION) at 22:50

## 2022-09-19 RX ADMIN — SODIUM CHLORIDE, POTASSIUM CHLORIDE, SODIUM LACTATE AND CALCIUM CHLORIDE 125 ML/HR: 600; 310; 30; 20 INJECTION, SOLUTION INTRAVENOUS at 15:07

## 2022-09-19 RX ADMIN — CHLORHEXIDINE GLUCONATE 0.12% ORAL RINSE 15 ML: 1.2 LIQUID ORAL at 20:28

## 2022-09-19 RX ADMIN — DEXMEDETOMIDINE HYDROCHLORIDE 0.2 MCG/KG/HR: 400 INJECTION, SOLUTION INTRAVENOUS at 13:31

## 2022-09-19 RX ADMIN — PANTOPRAZOLE SODIUM 40 MG: 40 INJECTION, POWDER, FOR SOLUTION INTRAVENOUS at 08:34

## 2022-09-19 RX ADMIN — IPRATROPIUM BROMIDE AND ALBUTEROL SULFATE 3 ML: 2.5; .5 SOLUTION RESPIRATORY (INHALATION) at 19:08

## 2022-09-19 RX ADMIN — ATORVASTATIN CALCIUM 20 MG: 20 TABLET, FILM COATED ORAL at 20:28

## 2022-09-20 ENCOUNTER — APPOINTMENT (OUTPATIENT)
Dept: PULMONOLOGY | Facility: HOSPITAL | Age: 87
End: 2022-09-20

## 2022-09-20 ENCOUNTER — APPOINTMENT (OUTPATIENT)
Dept: GENERAL RADIOLOGY | Facility: HOSPITAL | Age: 87
End: 2022-09-20

## 2022-09-20 LAB
ALBUMIN SERPL-MCNC: 3.1 G/DL (ref 3.5–5.2)
ALBUMIN/GLOB SERPL: 1.1 G/DL
ALP SERPL-CCNC: 30 U/L (ref 39–117)
ALT SERPL W P-5'-P-CCNC: 13 U/L (ref 1–41)
ANION GAP SERPL CALCULATED.3IONS-SCNC: 6.7 MMOL/L (ref 5–15)
ARTERIAL PATENCY WRIST A: ABNORMAL
AST SERPL-CCNC: 18 U/L (ref 1–40)
ATMOSPHERIC PRESS: 739 MMHG
BASE EXCESS BLDA CALC-SCNC: 0.9 MMOL/L (ref 0–2)
BASOPHILS # BLD AUTO: 0.01 10*3/MM3 (ref 0–0.2)
BASOPHILS NFR BLD AUTO: 0.1 % (ref 0–1.5)
BDY SITE: ABNORMAL
BILIRUB SERPL-MCNC: 0.5 MG/DL (ref 0–1.2)
BODY TEMPERATURE: 37 C
BUN SERPL-MCNC: 32 MG/DL (ref 8–23)
BUN/CREAT SERPL: 39.5 (ref 7–25)
CALCIUM SPEC-SCNC: 8.2 MG/DL (ref 8.6–10.5)
CHLORIDE SERPL-SCNC: 100 MMOL/L (ref 98–107)
CO2 SERPL-SCNC: 25.3 MMOL/L (ref 22–29)
CREAT SERPL-MCNC: 0.81 MG/DL (ref 0.76–1.27)
DEPRECATED RDW RBC AUTO: 50.9 FL (ref 37–54)
EGFRCR SERPLBLD CKD-EPI 2021: 84.3 ML/MIN/1.73
EOSINOPHIL # BLD AUTO: 0 10*3/MM3 (ref 0–0.4)
EOSINOPHIL NFR BLD AUTO: 0 % (ref 0.3–6.2)
ERYTHROCYTE [DISTWIDTH] IN BLOOD BY AUTOMATED COUNT: 14.2 % (ref 12.3–15.4)
GLOBULIN UR ELPH-MCNC: 2.7 GM/DL
GLUCOSE SERPL-MCNC: 161 MG/DL (ref 65–99)
HCO3 BLDA-SCNC: 26.8 MMOL/L (ref 20–26)
HCT VFR BLD AUTO: 34.1 % (ref 37.5–51)
HGB BLD-MCNC: 10.8 G/DL (ref 13–17.7)
HGB BLDA-MCNC: 11.2 G/DL (ref 14–18)
IMM GRANULOCYTES # BLD AUTO: 0.01 10*3/MM3 (ref 0–0.05)
IMM GRANULOCYTES NFR BLD AUTO: 0.1 % (ref 0–0.5)
INHALED O2 CONCENTRATION: 50 %
LYMPHOCYTES # BLD AUTO: 0.5 10*3/MM3 (ref 0.7–3.1)
LYMPHOCYTES NFR BLD AUTO: 4.8 % (ref 19.6–45.3)
Lab: ABNORMAL
MAGNESIUM SERPL-MCNC: 1.9 MG/DL (ref 1.6–2.4)
MCH RBC QN AUTO: 30.4 PG (ref 26.6–33)
MCHC RBC AUTO-ENTMCNC: 31.7 G/DL (ref 31.5–35.7)
MCV RBC AUTO: 96.1 FL (ref 79–97)
MODALITY: ABNORMAL
MONOCYTES # BLD AUTO: 0.4 10*3/MM3 (ref 0.1–0.9)
MONOCYTES NFR BLD AUTO: 3.8 % (ref 5–12)
NEUTROPHILS NFR BLD AUTO: 9.54 10*3/MM3 (ref 1.7–7)
NEUTROPHILS NFR BLD AUTO: 91.2 % (ref 42.7–76)
PCO2 BLDA: 47.5 MM HG (ref 35–45)
PCO2 TEMP ADJ BLD: 47.5 MM HG (ref 35–45)
PEEP RESPIRATORY: 7.5 CM[H2O]
PH BLDA: 7.36 PH UNITS (ref 7.35–7.45)
PH, TEMP CORRECTED: 7.36 PH UNITS (ref 7.35–7.45)
PLATELET # BLD AUTO: 213 10*3/MM3 (ref 140–450)
PMV BLD AUTO: 9.7 FL (ref 6–12)
PO2 BLDA: 79 MM HG (ref 83–108)
PO2 TEMP ADJ BLD: 79 MM HG (ref 83–108)
POTASSIUM SERPL-SCNC: 4.7 MMOL/L (ref 3.5–5.2)
PROCALCITONIN SERPL-MCNC: 4.4 NG/ML (ref 0–0.25)
PROT SERPL-MCNC: 5.8 G/DL (ref 6–8.5)
RBC # BLD AUTO: 3.55 10*6/MM3 (ref 4.14–5.8)
SAO2 % BLDCOA: 95.4 % (ref 94–99)
SET MECH RESP RATE: 16
SODIUM SERPL-SCNC: 132 MMOL/L (ref 136–145)
VENTILATOR MODE: ABNORMAL
VT ON VENT VENT: 500 ML
WBC NRBC COR # BLD: 10.46 10*3/MM3 (ref 3.4–10.8)

## 2022-09-20 PROCEDURE — 95816 EEG AWAKE AND DROWSY: CPT | Performed by: PSYCHIATRY & NEUROLOGY

## 2022-09-20 PROCEDURE — 71045 X-RAY EXAM CHEST 1 VIEW: CPT

## 2022-09-20 PROCEDURE — 80053 COMPREHEN METABOLIC PANEL: CPT | Performed by: INTERNAL MEDICINE

## 2022-09-20 PROCEDURE — 25010000002 MAGNESIUM SULFATE 2 GM/50ML SOLUTION: Performed by: STUDENT IN AN ORGANIZED HEALTH CARE EDUCATION/TRAINING PROGRAM

## 2022-09-20 PROCEDURE — 94003 VENT MGMT INPAT SUBQ DAY: CPT

## 2022-09-20 PROCEDURE — 94799 UNLISTED PULMONARY SVC/PX: CPT

## 2022-09-20 PROCEDURE — 83735 ASSAY OF MAGNESIUM: CPT | Performed by: STUDENT IN AN ORGANIZED HEALTH CARE EDUCATION/TRAINING PROGRAM

## 2022-09-20 PROCEDURE — 82803 BLOOD GASES ANY COMBINATION: CPT

## 2022-09-20 PROCEDURE — 84145 PROCALCITONIN (PCT): CPT | Performed by: INTERNAL MEDICINE

## 2022-09-20 PROCEDURE — 25010000002 FENTANYL CITRATE (PF) 50 MCG/ML SOLUTION: Performed by: INTERNAL MEDICINE

## 2022-09-20 PROCEDURE — 99233 SBSQ HOSP IP/OBS HIGH 50: CPT | Performed by: STUDENT IN AN ORGANIZED HEALTH CARE EDUCATION/TRAINING PROGRAM

## 2022-09-20 PROCEDURE — 95816 EEG AWAKE AND DROWSY: CPT

## 2022-09-20 PROCEDURE — 25010000002 METHYLPREDNISOLONE PER 40 MG: Performed by: INTERNAL MEDICINE

## 2022-09-20 PROCEDURE — 36600 WITHDRAWAL OF ARTERIAL BLOOD: CPT

## 2022-09-20 PROCEDURE — 25010000002 CEFEPIME-DEXTROSE 2-5 GM-%(50ML) RECONSTITUTED SOLUTION: Performed by: STUDENT IN AN ORGANIZED HEALTH CARE EDUCATION/TRAINING PROGRAM

## 2022-09-20 PROCEDURE — 94761 N-INVAS EAR/PLS OXIMETRY MLT: CPT

## 2022-09-20 PROCEDURE — 99231 SBSQ HOSP IP/OBS SF/LOW 25: CPT | Performed by: PSYCHIATRY & NEUROLOGY

## 2022-09-20 PROCEDURE — 85025 COMPLETE CBC W/AUTO DIFF WBC: CPT | Performed by: INTERNAL MEDICINE

## 2022-09-20 RX ORDER — FENTANYL CITRATE 50 UG/ML
50 INJECTION, SOLUTION INTRAMUSCULAR; INTRAVENOUS
Status: DISCONTINUED | OUTPATIENT
Start: 2022-09-20 | End: 2022-09-23

## 2022-09-20 RX ADMIN — IPRATROPIUM BROMIDE AND ALBUTEROL SULFATE 3 ML: 2.5; .5 SOLUTION RESPIRATORY (INHALATION) at 19:12

## 2022-09-20 RX ADMIN — MAGNESIUM SULFATE HEPTAHYDRATE 2 G: 40 INJECTION, SOLUTION INTRAVENOUS at 06:08

## 2022-09-20 RX ADMIN — CHLORHEXIDINE GLUCONATE 0.12% ORAL RINSE 15 ML: 1.2 LIQUID ORAL at 20:07

## 2022-09-20 RX ADMIN — METHYLPREDNISOLONE SODIUM SUCCINATE 40 MG: 40 INJECTION, POWDER, FOR SOLUTION INTRAMUSCULAR; INTRAVENOUS at 13:29

## 2022-09-20 RX ADMIN — PANTOPRAZOLE SODIUM 40 MG: 40 INJECTION, POWDER, FOR SOLUTION INTRAVENOUS at 08:17

## 2022-09-20 RX ADMIN — FENTANYL CITRATE 50 MCG: 50 INJECTION, SOLUTION INTRAMUSCULAR; INTRAVENOUS at 16:02

## 2022-09-20 RX ADMIN — DEXMEDETOMIDINE HYDROCHLORIDE 0.4 MCG/KG/HR: 400 INJECTION, SOLUTION INTRAVENOUS at 03:08

## 2022-09-20 RX ADMIN — SODIUM CHLORIDE, POTASSIUM CHLORIDE, SODIUM LACTATE AND CALCIUM CHLORIDE 125 ML/HR: 600; 310; 30; 20 INJECTION, SOLUTION INTRAVENOUS at 06:07

## 2022-09-20 RX ADMIN — FENTANYL CITRATE 50 MCG: 50 INJECTION, SOLUTION INTRAMUSCULAR; INTRAVENOUS at 22:35

## 2022-09-20 RX ADMIN — Medication 10 ML: at 08:18

## 2022-09-20 RX ADMIN — CEFEPIME HYDROCHLORIDE 2 G: 2 INJECTION, SOLUTION INTRAVENOUS at 17:57

## 2022-09-20 RX ADMIN — IPRATROPIUM BROMIDE AND ALBUTEROL SULFATE 3 ML: 2.5; .5 SOLUTION RESPIRATORY (INHALATION) at 10:54

## 2022-09-20 RX ADMIN — ARFORMOTEROL TARTRATE 15 MCG: 15 SOLUTION RESPIRATORY (INHALATION) at 19:24

## 2022-09-20 RX ADMIN — HYDROCODONE BITARTRATE AND ACETAMINOPHEN 1 TABLET: 5; 325 TABLET ORAL at 21:28

## 2022-09-20 RX ADMIN — IPRATROPIUM BROMIDE AND ALBUTEROL SULFATE 3 ML: 2.5; .5 SOLUTION RESPIRATORY (INHALATION) at 02:46

## 2022-09-20 RX ADMIN — CEFEPIME HYDROCHLORIDE 2 G: 2 INJECTION, SOLUTION INTRAVENOUS at 01:12

## 2022-09-20 RX ADMIN — ARFORMOTEROL TARTRATE 15 MCG: 15 SOLUTION RESPIRATORY (INHALATION) at 07:25

## 2022-09-20 RX ADMIN — Medication 10 ML: at 20:07

## 2022-09-20 RX ADMIN — FENTANYL CITRATE 50 MCG: 50 INJECTION, SOLUTION INTRAMUSCULAR; INTRAVENOUS at 13:29

## 2022-09-20 RX ADMIN — CETIRIZINE HYDROCHLORIDE 10 MG: 10 TABLET, FILM COATED ORAL at 20:07

## 2022-09-20 RX ADMIN — IPRATROPIUM BROMIDE AND ALBUTEROL SULFATE 3 ML: 2.5; .5 SOLUTION RESPIRATORY (INHALATION) at 22:59

## 2022-09-20 RX ADMIN — CEFEPIME HYDROCHLORIDE 2 G: 2 INJECTION, SOLUTION INTRAVENOUS at 10:19

## 2022-09-20 RX ADMIN — METHYLPREDNISOLONE SODIUM SUCCINATE 40 MG: 40 INJECTION, POWDER, FOR SOLUTION INTRAMUSCULAR; INTRAVENOUS at 01:10

## 2022-09-20 RX ADMIN — DEXMEDETOMIDINE HYDROCHLORIDE 0.2 MCG/KG/HR: 400 INJECTION, SOLUTION INTRAVENOUS at 21:50

## 2022-09-20 RX ADMIN — IPRATROPIUM BROMIDE AND ALBUTEROL SULFATE 3 ML: 2.5; .5 SOLUTION RESPIRATORY (INHALATION) at 07:25

## 2022-09-20 RX ADMIN — CHLORHEXIDINE GLUCONATE 0.12% ORAL RINSE 15 ML: 1.2 LIQUID ORAL at 08:17

## 2022-09-20 RX ADMIN — FENTANYL CITRATE 50 MCG: 50 INJECTION, SOLUTION INTRAMUSCULAR; INTRAVENOUS at 10:19

## 2022-09-20 RX ADMIN — FENTANYL CITRATE 50 MCG: 50 INJECTION, SOLUTION INTRAMUSCULAR; INTRAVENOUS at 20:27

## 2022-09-20 RX ADMIN — ATORVASTATIN CALCIUM 20 MG: 20 TABLET, FILM COATED ORAL at 20:07

## 2022-09-20 RX ADMIN — IPRATROPIUM BROMIDE AND ALBUTEROL SULFATE 3 ML: 2.5; .5 SOLUTION RESPIRATORY (INHALATION) at 14:57

## 2022-09-20 RX ADMIN — LEVOTHYROXINE SODIUM 50 MCG: 50 TABLET ORAL at 05:11

## 2022-09-21 ENCOUNTER — APPOINTMENT (OUTPATIENT)
Dept: GENERAL RADIOLOGY | Facility: HOSPITAL | Age: 87
End: 2022-09-21

## 2022-09-21 LAB
ALBUMIN SERPL-MCNC: 3.2 G/DL (ref 3.5–5.2)
ALBUMIN/GLOB SERPL: 1.1 G/DL
ALP SERPL-CCNC: 30 U/L (ref 39–117)
ALT SERPL W P-5'-P-CCNC: 14 U/L (ref 1–41)
ANION GAP SERPL CALCULATED.3IONS-SCNC: 6.5 MMOL/L (ref 5–15)
ARTERIAL PATENCY WRIST A: POSITIVE
AST SERPL-CCNC: 20 U/L (ref 1–40)
ATMOSPHERIC PRESS: 738 MMHG
BACTERIA SPEC RESP CULT: ABNORMAL
BACTERIA SPEC RESP CULT: ABNORMAL
BASE EXCESS BLDA CALC-SCNC: 3.1 MMOL/L (ref 0–2)
BASOPHILS # BLD AUTO: 0.01 10*3/MM3 (ref 0–0.2)
BASOPHILS NFR BLD AUTO: 0.1 % (ref 0–1.5)
BDY SITE: ABNORMAL
BILIRUB SERPL-MCNC: 0.4 MG/DL (ref 0–1.2)
BODY TEMPERATURE: 37 C
BUN SERPL-MCNC: 28 MG/DL (ref 8–23)
BUN/CREAT SERPL: 38.9 (ref 7–25)
CALCIUM SPEC-SCNC: 8.3 MG/DL (ref 8.6–10.5)
CHLORIDE SERPL-SCNC: 101 MMOL/L (ref 98–107)
CO2 SERPL-SCNC: 25.5 MMOL/L (ref 22–29)
CREAT SERPL-MCNC: 0.72 MG/DL (ref 0.76–1.27)
DEPRECATED RDW RBC AUTO: 49.1 FL (ref 37–54)
EGFRCR SERPLBLD CKD-EPI 2021: 87.3 ML/MIN/1.73
EOSINOPHIL # BLD AUTO: 0 10*3/MM3 (ref 0–0.4)
EOSINOPHIL NFR BLD AUTO: 0 % (ref 0.3–6.2)
ERYTHROCYTE [DISTWIDTH] IN BLOOD BY AUTOMATED COUNT: 14.1 % (ref 12.3–15.4)
GLOBULIN UR ELPH-MCNC: 2.8 GM/DL
GLUCOSE SERPL-MCNC: 140 MG/DL (ref 65–99)
GRAM STN SPEC: ABNORMAL
HCO3 BLDA-SCNC: 27.6 MMOL/L (ref 20–26)
HCT VFR BLD AUTO: 34.8 % (ref 37.5–51)
HGB BLD-MCNC: 11.3 G/DL (ref 13–17.7)
HGB BLDA-MCNC: 11.8 G/DL (ref 14–18)
IMM GRANULOCYTES # BLD AUTO: 0.09 10*3/MM3 (ref 0–0.05)
IMM GRANULOCYTES NFR BLD AUTO: 0.7 % (ref 0–0.5)
INHALED O2 CONCENTRATION: 35 %
LYMPHOCYTES # BLD AUTO: 0.66 10*3/MM3 (ref 0.7–3.1)
LYMPHOCYTES NFR BLD AUTO: 5.5 % (ref 19.6–45.3)
Lab: ABNORMAL
MAGNESIUM SERPL-MCNC: 2.2 MG/DL (ref 1.6–2.4)
MCH RBC QN AUTO: 30.6 PG (ref 26.6–33)
MCHC RBC AUTO-ENTMCNC: 32.5 G/DL (ref 31.5–35.7)
MCV RBC AUTO: 94.3 FL (ref 79–97)
MODALITY: ABNORMAL
MONOCYTES # BLD AUTO: 0.61 10*3/MM3 (ref 0.1–0.9)
MONOCYTES NFR BLD AUTO: 5.1 % (ref 5–12)
NEUTROPHILS NFR BLD AUTO: 10.69 10*3/MM3 (ref 1.7–7)
NEUTROPHILS NFR BLD AUTO: 88.6 % (ref 42.7–76)
NRBC BLD AUTO-RTO: 0 /100 WBC (ref 0–0.2)
PCO2 BLDA: 41 MM HG (ref 35–45)
PCO2 TEMP ADJ BLD: 41 MM HG (ref 35–45)
PEEP RESPIRATORY: 7.5 CM[H2O]
PH BLDA: 7.44 PH UNITS (ref 7.35–7.45)
PH, TEMP CORRECTED: 7.44 PH UNITS (ref 7.35–7.45)
PLATELET # BLD AUTO: 232 10*3/MM3 (ref 140–450)
PMV BLD AUTO: 9.8 FL (ref 6–12)
PO2 BLDA: 70.1 MM HG (ref 83–108)
PO2 TEMP ADJ BLD: 70.1 MM HG (ref 83–108)
POTASSIUM SERPL-SCNC: 4.9 MMOL/L (ref 3.5–5.2)
PROCALCITONIN SERPL-MCNC: 2.72 NG/ML (ref 0–0.25)
PROT SERPL-MCNC: 6 G/DL (ref 6–8.5)
PSV: 10 CMH2O
RBC # BLD AUTO: 3.69 10*6/MM3 (ref 4.14–5.8)
SAO2 % BLDCOA: 95.1 % (ref 94–99)
SODIUM SERPL-SCNC: 133 MMOL/L (ref 136–145)
VENTILATOR MODE: ABNORMAL
VT ON VENT VENT: 650 ML
WBC NRBC COR # BLD: 12.06 10*3/MM3 (ref 3.4–10.8)

## 2022-09-21 PROCEDURE — 94799 UNLISTED PULMONARY SVC/PX: CPT

## 2022-09-21 PROCEDURE — 25010000002 METHYLPREDNISOLONE PER 40 MG: Performed by: INTERNAL MEDICINE

## 2022-09-21 PROCEDURE — 25010000002 HYDRALAZINE PER 20 MG

## 2022-09-21 PROCEDURE — 85025 COMPLETE CBC W/AUTO DIFF WBC: CPT | Performed by: INTERNAL MEDICINE

## 2022-09-21 PROCEDURE — 84145 PROCALCITONIN (PCT): CPT | Performed by: STUDENT IN AN ORGANIZED HEALTH CARE EDUCATION/TRAINING PROGRAM

## 2022-09-21 PROCEDURE — 71045 X-RAY EXAM CHEST 1 VIEW: CPT

## 2022-09-21 PROCEDURE — 25010000002 CEFEPIME-DEXTROSE 2-5 GM-%(50ML) RECONSTITUTED SOLUTION: Performed by: STUDENT IN AN ORGANIZED HEALTH CARE EDUCATION/TRAINING PROGRAM

## 2022-09-21 PROCEDURE — 99232 SBSQ HOSP IP/OBS MODERATE 35: CPT | Performed by: HOSPITALIST

## 2022-09-21 PROCEDURE — 83735 ASSAY OF MAGNESIUM: CPT | Performed by: STUDENT IN AN ORGANIZED HEALTH CARE EDUCATION/TRAINING PROGRAM

## 2022-09-21 PROCEDURE — 36600 WITHDRAWAL OF ARTERIAL BLOOD: CPT

## 2022-09-21 PROCEDURE — 80053 COMPREHEN METABOLIC PANEL: CPT | Performed by: INTERNAL MEDICINE

## 2022-09-21 PROCEDURE — 94003 VENT MGMT INPAT SUBQ DAY: CPT

## 2022-09-21 PROCEDURE — 82803 BLOOD GASES ANY COMBINATION: CPT

## 2022-09-21 PROCEDURE — 25010000002 FENTANYL CITRATE (PF) 50 MCG/ML SOLUTION: Performed by: INTERNAL MEDICINE

## 2022-09-21 PROCEDURE — 94761 N-INVAS EAR/PLS OXIMETRY MLT: CPT

## 2022-09-21 RX ORDER — HYDRALAZINE HYDROCHLORIDE 20 MG/ML
10 INJECTION INTRAMUSCULAR; INTRAVENOUS EVERY 6 HOURS PRN
Status: DISCONTINUED | OUTPATIENT
Start: 2022-09-21 | End: 2022-09-29 | Stop reason: HOSPADM

## 2022-09-21 RX ORDER — METRONIDAZOLE 500 MG/100ML
500 INJECTION, SOLUTION INTRAVENOUS EVERY 8 HOURS
Status: DISCONTINUED | OUTPATIENT
Start: 2022-09-21 | End: 2022-09-27

## 2022-09-21 RX ORDER — HYDRALAZINE HYDROCHLORIDE 20 MG/ML
INJECTION INTRAMUSCULAR; INTRAVENOUS
Status: COMPLETED
Start: 2022-09-21 | End: 2022-09-21

## 2022-09-21 RX ADMIN — HYDRALAZINE HYDROCHLORIDE 10 MG: 20 INJECTION INTRAMUSCULAR; INTRAVENOUS at 05:34

## 2022-09-21 RX ADMIN — PANTOPRAZOLE SODIUM 40 MG: 40 INJECTION, POWDER, FOR SOLUTION INTRAVENOUS at 09:27

## 2022-09-21 RX ADMIN — METHYLPREDNISOLONE SODIUM SUCCINATE 40 MG: 40 INJECTION, POWDER, FOR SOLUTION INTRAMUSCULAR; INTRAVENOUS at 01:40

## 2022-09-21 RX ADMIN — METRONIDAZOLE 500 MG: 500 INJECTION, SOLUTION INTRAVENOUS at 14:20

## 2022-09-21 RX ADMIN — IPRATROPIUM BROMIDE AND ALBUTEROL SULFATE 3 ML: 2.5; .5 SOLUTION RESPIRATORY (INHALATION) at 18:59

## 2022-09-21 RX ADMIN — ARFORMOTEROL TARTRATE 15 MCG: 15 SOLUTION RESPIRATORY (INHALATION) at 19:05

## 2022-09-21 RX ADMIN — ARFORMOTEROL TARTRATE 15 MCG: 15 SOLUTION RESPIRATORY (INHALATION) at 08:51

## 2022-09-21 RX ADMIN — IPRATROPIUM BROMIDE AND ALBUTEROL SULFATE 3 ML: 2.5; .5 SOLUTION RESPIRATORY (INHALATION) at 03:10

## 2022-09-21 RX ADMIN — CEFEPIME HYDROCHLORIDE 2 G: 2 INJECTION, SOLUTION INTRAVENOUS at 18:30

## 2022-09-21 RX ADMIN — METRONIDAZOLE 500 MG: 500 INJECTION, SOLUTION INTRAVENOUS at 21:12

## 2022-09-21 RX ADMIN — LEVOTHYROXINE SODIUM 50 MCG: 50 TABLET ORAL at 05:34

## 2022-09-21 RX ADMIN — Medication 10 ML: at 21:09

## 2022-09-21 RX ADMIN — MICONAZOLE NITRATE: 2 POWDER TOPICAL at 14:20

## 2022-09-21 RX ADMIN — HYDROCODONE BITARTRATE AND ACETAMINOPHEN 1 TABLET: 5; 325 TABLET ORAL at 03:38

## 2022-09-21 RX ADMIN — MICONAZOLE NITRATE: 2 POWDER TOPICAL at 21:09

## 2022-09-21 RX ADMIN — DEXMEDETOMIDINE HYDROCHLORIDE 0.4 MCG/KG/HR: 400 INJECTION, SOLUTION INTRAVENOUS at 09:24

## 2022-09-21 RX ADMIN — DEXMEDETOMIDINE HYDROCHLORIDE 0.8 MCG/KG/HR: 400 INJECTION, SOLUTION INTRAVENOUS at 02:03

## 2022-09-21 RX ADMIN — CEFEPIME HYDROCHLORIDE 2 G: 2 INJECTION, SOLUTION INTRAVENOUS at 09:26

## 2022-09-21 RX ADMIN — IPRATROPIUM BROMIDE AND ALBUTEROL SULFATE 3 ML: 2.5; .5 SOLUTION RESPIRATORY (INHALATION) at 11:55

## 2022-09-21 RX ADMIN — FENTANYL CITRATE 50 MCG: 50 INJECTION, SOLUTION INTRAMUSCULAR; INTRAVENOUS at 03:16

## 2022-09-21 RX ADMIN — CHLORHEXIDINE GLUCONATE 0.12% ORAL RINSE 15 ML: 1.2 LIQUID ORAL at 21:09

## 2022-09-21 RX ADMIN — CEFEPIME HYDROCHLORIDE 2 G: 2 INJECTION, SOLUTION INTRAVENOUS at 01:40

## 2022-09-21 RX ADMIN — METHYLPREDNISOLONE SODIUM SUCCINATE 40 MG: 40 INJECTION, POWDER, FOR SOLUTION INTRAMUSCULAR; INTRAVENOUS at 14:20

## 2022-09-21 RX ADMIN — IPRATROPIUM BROMIDE AND ALBUTEROL SULFATE 3 ML: 2.5; .5 SOLUTION RESPIRATORY (INHALATION) at 07:38

## 2022-09-21 RX ADMIN — IPRATROPIUM BROMIDE AND ALBUTEROL SULFATE 3 ML: 2.5; .5 SOLUTION RESPIRATORY (INHALATION) at 22:49

## 2022-09-21 RX ADMIN — Medication 10 ML: at 09:25

## 2022-09-21 RX ADMIN — IPRATROPIUM BROMIDE AND ALBUTEROL SULFATE 3 ML: 2.5; .5 SOLUTION RESPIRATORY (INHALATION) at 16:12

## 2022-09-21 RX ADMIN — CHLORHEXIDINE GLUCONATE 0.12% ORAL RINSE 15 ML: 1.2 LIQUID ORAL at 09:25

## 2022-09-22 ENCOUNTER — APPOINTMENT (OUTPATIENT)
Dept: GENERAL RADIOLOGY | Facility: HOSPITAL | Age: 87
End: 2022-09-22

## 2022-09-22 LAB
ALBUMIN SERPL-MCNC: 3.2 G/DL (ref 3.5–5.2)
ALBUMIN/GLOB SERPL: 1.3 G/DL
ALP SERPL-CCNC: 29 U/L (ref 39–117)
ALT SERPL W P-5'-P-CCNC: 15 U/L (ref 1–41)
ANION GAP SERPL CALCULATED.3IONS-SCNC: 5.4 MMOL/L (ref 5–15)
AST SERPL-CCNC: 20 U/L (ref 1–40)
BASOPHILS # BLD AUTO: 0.02 10*3/MM3 (ref 0–0.2)
BASOPHILS NFR BLD AUTO: 0.2 % (ref 0–1.5)
BILIRUB SERPL-MCNC: 0.6 MG/DL (ref 0–1.2)
BUN SERPL-MCNC: 28 MG/DL (ref 8–23)
BUN/CREAT SERPL: 41.2 (ref 7–25)
CALCIUM SPEC-SCNC: 8.2 MG/DL (ref 8.6–10.5)
CHLORIDE SERPL-SCNC: 102 MMOL/L (ref 98–107)
CO2 SERPL-SCNC: 27.6 MMOL/L (ref 22–29)
CREAT SERPL-MCNC: 0.68 MG/DL (ref 0.76–1.27)
DEPRECATED RDW RBC AUTO: 49.4 FL (ref 37–54)
EGFRCR SERPLBLD CKD-EPI 2021: 88.9 ML/MIN/1.73
EOSINOPHIL # BLD AUTO: 0 10*3/MM3 (ref 0–0.4)
EOSINOPHIL NFR BLD AUTO: 0 % (ref 0.3–6.2)
ERYTHROCYTE [DISTWIDTH] IN BLOOD BY AUTOMATED COUNT: 14.3 % (ref 12.3–15.4)
GLOBULIN UR ELPH-MCNC: 2.5 GM/DL
GLUCOSE SERPL-MCNC: 114 MG/DL (ref 65–99)
HCT VFR BLD AUTO: 34.5 % (ref 37.5–51)
HGB BLD-MCNC: 11.5 G/DL (ref 13–17.7)
IMM GRANULOCYTES # BLD AUTO: 0.16 10*3/MM3 (ref 0–0.05)
IMM GRANULOCYTES NFR BLD AUTO: 1.2 % (ref 0–0.5)
LYMPHOCYTES # BLD AUTO: 0.52 10*3/MM3 (ref 0.7–3.1)
LYMPHOCYTES NFR BLD AUTO: 4 % (ref 19.6–45.3)
MCH RBC QN AUTO: 31.3 PG (ref 26.6–33)
MCHC RBC AUTO-ENTMCNC: 33.3 G/DL (ref 31.5–35.7)
MCV RBC AUTO: 94 FL (ref 79–97)
MONOCYTES # BLD AUTO: 0.49 10*3/MM3 (ref 0.1–0.9)
MONOCYTES NFR BLD AUTO: 3.8 % (ref 5–12)
NEUTROPHILS NFR BLD AUTO: 11.76 10*3/MM3 (ref 1.7–7)
NEUTROPHILS NFR BLD AUTO: 90.8 % (ref 42.7–76)
NRBC BLD AUTO-RTO: 0 /100 WBC (ref 0–0.2)
PLATELET # BLD AUTO: 244 10*3/MM3 (ref 140–450)
PMV BLD AUTO: 9.4 FL (ref 6–12)
POTASSIUM SERPL-SCNC: 4.3 MMOL/L (ref 3.5–5.2)
PROCALCITONIN SERPL-MCNC: 1.46 NG/ML (ref 0–0.25)
PROT SERPL-MCNC: 5.7 G/DL (ref 6–8.5)
RBC # BLD AUTO: 3.67 10*6/MM3 (ref 4.14–5.8)
SODIUM SERPL-SCNC: 135 MMOL/L (ref 136–145)
WBC NRBC COR # BLD: 12.95 10*3/MM3 (ref 3.4–10.8)

## 2022-09-22 PROCEDURE — 25010000002 CEFEPIME-DEXTROSE 2-5 GM-%(50ML) RECONSTITUTED SOLUTION: Performed by: STUDENT IN AN ORGANIZED HEALTH CARE EDUCATION/TRAINING PROGRAM

## 2022-09-22 PROCEDURE — 92610 EVALUATE SWALLOWING FUNCTION: CPT

## 2022-09-22 PROCEDURE — 25010000002 ONDANSETRON PER 1 MG: Performed by: HOSPITALIST

## 2022-09-22 PROCEDURE — 99232 SBSQ HOSP IP/OBS MODERATE 35: CPT | Performed by: HOSPITALIST

## 2022-09-22 PROCEDURE — 25010000002 CEFEPIME-DEXTROSE 2-5 GM-%(50ML) RECONSTITUTED SOLUTION: Performed by: HOSPITALIST

## 2022-09-22 PROCEDURE — 94761 N-INVAS EAR/PLS OXIMETRY MLT: CPT

## 2022-09-22 PROCEDURE — 94799 UNLISTED PULMONARY SVC/PX: CPT

## 2022-09-22 PROCEDURE — 84145 PROCALCITONIN (PCT): CPT | Performed by: HOSPITALIST

## 2022-09-22 PROCEDURE — 25010000002 METHYLPREDNISOLONE PER 40 MG: Performed by: INTERNAL MEDICINE

## 2022-09-22 PROCEDURE — 74018 RADEX ABDOMEN 1 VIEW: CPT

## 2022-09-22 PROCEDURE — 25010000002 HYDRALAZINE PER 20 MG: Performed by: HOSPITALIST

## 2022-09-22 PROCEDURE — 25010000002 METHYLPREDNISOLONE PER 40 MG: Performed by: HOSPITALIST

## 2022-09-22 PROCEDURE — 85025 COMPLETE CBC W/AUTO DIFF WBC: CPT | Performed by: INTERNAL MEDICINE

## 2022-09-22 PROCEDURE — 80053 COMPREHEN METABOLIC PANEL: CPT | Performed by: INTERNAL MEDICINE

## 2022-09-22 RX ORDER — PREDNISONE 20 MG/1
40 TABLET ORAL
Status: DISCONTINUED | OUTPATIENT
Start: 2022-09-23 | End: 2022-09-24

## 2022-09-22 RX ORDER — IPRATROPIUM BROMIDE AND ALBUTEROL SULFATE 2.5; .5 MG/3ML; MG/3ML
3 SOLUTION RESPIRATORY (INHALATION) EVERY 6 HOURS PRN
Status: DISCONTINUED | OUTPATIENT
Start: 2022-09-22 | End: 2022-09-29 | Stop reason: HOSPADM

## 2022-09-22 RX ORDER — POLYVINYL ALCOHOL 14 MG/ML
1 SOLUTION/ DROPS OPHTHALMIC
Status: DISCONTINUED | OUTPATIENT
Start: 2022-09-22 | End: 2022-09-29 | Stop reason: HOSPADM

## 2022-09-22 RX ORDER — BISACODYL 10 MG
10 SUPPOSITORY, RECTAL RECTAL DAILY
Status: DISCONTINUED | OUTPATIENT
Start: 2022-09-22 | End: 2022-09-26

## 2022-09-22 RX ADMIN — METRONIDAZOLE 500 MG: 500 INJECTION, SOLUTION INTRAVENOUS at 04:49

## 2022-09-22 RX ADMIN — IPRATROPIUM BROMIDE AND ALBUTEROL SULFATE 3 ML: 2.5; .5 SOLUTION RESPIRATORY (INHALATION) at 02:57

## 2022-09-22 RX ADMIN — METRONIDAZOLE 500 MG: 500 INJECTION, SOLUTION INTRAVENOUS at 21:00

## 2022-09-22 RX ADMIN — METHYLPREDNISOLONE SODIUM SUCCINATE 40 MG: 40 INJECTION, POWDER, FOR SOLUTION INTRAMUSCULAR; INTRAVENOUS at 01:18

## 2022-09-22 RX ADMIN — CHLORHEXIDINE GLUCONATE 0.12% ORAL RINSE 15 ML: 1.2 LIQUID ORAL at 09:35

## 2022-09-22 RX ADMIN — CEFEPIME HYDROCHLORIDE 2 G: 2 INJECTION, SOLUTION INTRAVENOUS at 01:45

## 2022-09-22 RX ADMIN — Medication 10 ML: at 09:36

## 2022-09-22 RX ADMIN — CETIRIZINE HYDROCHLORIDE 10 MG: 10 TABLET, FILM COATED ORAL at 21:00

## 2022-09-22 RX ADMIN — CEFEPIME HYDROCHLORIDE 2 G: 2 INJECTION, SOLUTION INTRAVENOUS at 17:21

## 2022-09-22 RX ADMIN — BISACODYL 10 MG: 10 SUPPOSITORY RECTAL at 09:37

## 2022-09-22 RX ADMIN — IPRATROPIUM BROMIDE AND ALBUTEROL SULFATE 3 ML: 2.5; .5 SOLUTION RESPIRATORY (INHALATION) at 16:06

## 2022-09-22 RX ADMIN — MICONAZOLE NITRATE: 2 POWDER TOPICAL at 09:36

## 2022-09-22 RX ADMIN — ONDANSETRON 4 MG: 2 INJECTION INTRAMUSCULAR; INTRAVENOUS at 15:16

## 2022-09-22 RX ADMIN — HYDRALAZINE HYDROCHLORIDE 10 MG: 20 INJECTION INTRAMUSCULAR; INTRAVENOUS at 17:22

## 2022-09-22 RX ADMIN — METHYLPREDNISOLONE SODIUM SUCCINATE 40 MG: 40 INJECTION, POWDER, FOR SOLUTION INTRAMUSCULAR; INTRAVENOUS at 13:31

## 2022-09-22 RX ADMIN — HYDROCODONE BITARTRATE AND ACETAMINOPHEN 1 TABLET: 5; 325 TABLET ORAL at 13:31

## 2022-09-22 RX ADMIN — MICONAZOLE NITRATE: 2 POWDER TOPICAL at 20:59

## 2022-09-22 RX ADMIN — ARFORMOTEROL TARTRATE 15 MCG: 15 SOLUTION RESPIRATORY (INHALATION) at 21:43

## 2022-09-22 RX ADMIN — PANTOPRAZOLE SODIUM 40 MG: 40 INJECTION, POWDER, FOR SOLUTION INTRAVENOUS at 09:37

## 2022-09-22 RX ADMIN — METRONIDAZOLE 500 MG: 500 INJECTION, SOLUTION INTRAVENOUS at 13:31

## 2022-09-22 RX ADMIN — IPRATROPIUM BROMIDE AND ALBUTEROL SULFATE 3 ML: 2.5; .5 SOLUTION RESPIRATORY (INHALATION) at 11:42

## 2022-09-22 RX ADMIN — Medication 10 ML: at 20:59

## 2022-09-22 RX ADMIN — LEVOTHYROXINE SODIUM 50 MCG: 50 TABLET ORAL at 09:37

## 2022-09-22 RX ADMIN — IPRATROPIUM BROMIDE AND ALBUTEROL SULFATE 3 ML: 2.5; .5 SOLUTION RESPIRATORY (INHALATION) at 07:43

## 2022-09-22 RX ADMIN — ARFORMOTEROL TARTRATE 15 MCG: 15 SOLUTION RESPIRATORY (INHALATION) at 11:37

## 2022-09-22 RX ADMIN — CEFEPIME HYDROCHLORIDE 2 G: 2 INJECTION, SOLUTION INTRAVENOUS at 09:36

## 2022-09-23 LAB
ALBUMIN SERPL-MCNC: 3.2 G/DL (ref 3.5–5.2)
ALBUMIN/GLOB SERPL: 1 G/DL
ALP SERPL-CCNC: 33 U/L (ref 39–117)
ALT SERPL W P-5'-P-CCNC: 20 U/L (ref 1–41)
ANION GAP SERPL CALCULATED.3IONS-SCNC: 8.4 MMOL/L (ref 5–15)
AST SERPL-CCNC: 27 U/L (ref 1–40)
BASOPHILS # BLD AUTO: 0.01 10*3/MM3 (ref 0–0.2)
BASOPHILS NFR BLD AUTO: 0.1 % (ref 0–1.5)
BILIRUB SERPL-MCNC: 0.6 MG/DL (ref 0–1.2)
BUN SERPL-MCNC: 31 MG/DL (ref 8–23)
BUN/CREAT SERPL: 44.9 (ref 7–25)
CALCIUM SPEC-SCNC: 8.4 MG/DL (ref 8.6–10.5)
CHLORIDE SERPL-SCNC: 98 MMOL/L (ref 98–107)
CO2 SERPL-SCNC: 25.6 MMOL/L (ref 22–29)
CREAT SERPL-MCNC: 0.69 MG/DL (ref 0.76–1.27)
DEPRECATED RDW RBC AUTO: 51.1 FL (ref 37–54)
EGFRCR SERPLBLD CKD-EPI 2021: 88.5 ML/MIN/1.73
EOSINOPHIL # BLD AUTO: 0.01 10*3/MM3 (ref 0–0.4)
EOSINOPHIL NFR BLD AUTO: 0.1 % (ref 0.3–6.2)
ERYTHROCYTE [DISTWIDTH] IN BLOOD BY AUTOMATED COUNT: 14.2 % (ref 12.3–15.4)
GLOBULIN UR ELPH-MCNC: 3.2 GM/DL
GLUCOSE SERPL-MCNC: 112 MG/DL (ref 65–99)
HCT VFR BLD AUTO: 41.2 % (ref 37.5–51)
HGB BLD-MCNC: 13 G/DL (ref 13–17.7)
IMM GRANULOCYTES # BLD AUTO: 0.21 10*3/MM3 (ref 0–0.05)
IMM GRANULOCYTES NFR BLD AUTO: 1.3 % (ref 0–0.5)
LYMPHOCYTES # BLD AUTO: 1.25 10*3/MM3 (ref 0.7–3.1)
LYMPHOCYTES NFR BLD AUTO: 7.8 % (ref 19.6–45.3)
MCH RBC QN AUTO: 30.3 PG (ref 26.6–33)
MCHC RBC AUTO-ENTMCNC: 31.6 G/DL (ref 31.5–35.7)
MCV RBC AUTO: 96 FL (ref 79–97)
MONOCYTES # BLD AUTO: 1.54 10*3/MM3 (ref 0.1–0.9)
MONOCYTES NFR BLD AUTO: 9.7 % (ref 5–12)
NEUTROPHILS NFR BLD AUTO: 12.93 10*3/MM3 (ref 1.7–7)
NEUTROPHILS NFR BLD AUTO: 81 % (ref 42.7–76)
PLATELET # BLD AUTO: 281 10*3/MM3 (ref 140–450)
PMV BLD AUTO: 8.9 FL (ref 6–12)
POTASSIUM SERPL-SCNC: 4.1 MMOL/L (ref 3.5–5.2)
PROCALCITONIN SERPL-MCNC: 0.71 NG/ML (ref 0–0.25)
PROT SERPL-MCNC: 6.4 G/DL (ref 6–8.5)
RBC # BLD AUTO: 4.29 10*6/MM3 (ref 4.14–5.8)
SODIUM SERPL-SCNC: 132 MMOL/L (ref 136–145)
WBC NRBC COR # BLD: 15.95 10*3/MM3 (ref 3.4–10.8)

## 2022-09-23 PROCEDURE — 80053 COMPREHEN METABOLIC PANEL: CPT | Performed by: HOSPITALIST

## 2022-09-23 PROCEDURE — 94761 N-INVAS EAR/PLS OXIMETRY MLT: CPT

## 2022-09-23 PROCEDURE — 99232 SBSQ HOSP IP/OBS MODERATE 35: CPT | Performed by: HOSPITALIST

## 2022-09-23 PROCEDURE — 25010000002 CEFEPIME-DEXTROSE 2-5 GM-%(50ML) RECONSTITUTED SOLUTION: Performed by: HOSPITALIST

## 2022-09-23 PROCEDURE — 94799 UNLISTED PULMONARY SVC/PX: CPT

## 2022-09-23 PROCEDURE — 85025 COMPLETE CBC W/AUTO DIFF WBC: CPT | Performed by: HOSPITALIST

## 2022-09-23 PROCEDURE — 25010000002 LORAZEPAM PER 2 MG: Performed by: NURSE PRACTITIONER

## 2022-09-23 PROCEDURE — 97165 OT EVAL LOW COMPLEX 30 MIN: CPT

## 2022-09-23 PROCEDURE — 94664 DEMO&/EVAL PT USE INHALER: CPT

## 2022-09-23 PROCEDURE — 63710000001 PREDNISONE PER 1 MG: Performed by: INTERNAL MEDICINE

## 2022-09-23 PROCEDURE — 97161 PT EVAL LOW COMPLEX 20 MIN: CPT

## 2022-09-23 PROCEDURE — 25010000002 HYDRALAZINE PER 20 MG: Performed by: HOSPITALIST

## 2022-09-23 PROCEDURE — 84145 PROCALCITONIN (PCT): CPT | Performed by: HOSPITALIST

## 2022-09-23 RX ORDER — IPRATROPIUM BROMIDE AND ALBUTEROL SULFATE 2.5; .5 MG/3ML; MG/3ML
3 SOLUTION RESPIRATORY (INHALATION)
Status: DISCONTINUED | OUTPATIENT
Start: 2022-09-23 | End: 2022-09-29 | Stop reason: HOSPADM

## 2022-09-23 RX ORDER — LORAZEPAM 2 MG/ML
0.5 INJECTION INTRAMUSCULAR ONCE
Status: COMPLETED | OUTPATIENT
Start: 2022-09-23 | End: 2022-09-23

## 2022-09-23 RX ORDER — TOBRAMYCIN 3 MG/ML
2 SOLUTION/ DROPS OPHTHALMIC EVERY 6 HOURS SCHEDULED
Status: DISCONTINUED | OUTPATIENT
Start: 2022-09-23 | End: 2022-09-29 | Stop reason: HOSPADM

## 2022-09-23 RX ORDER — LORAZEPAM 2 MG/ML
1 INJECTION INTRAMUSCULAR EVERY 4 HOURS PRN
Status: DISCONTINUED | OUTPATIENT
Start: 2022-09-23 | End: 2022-09-27

## 2022-09-23 RX ORDER — LORAZEPAM 2 MG/ML
0.5 INJECTION INTRAMUSCULAR EVERY 4 HOURS PRN
Status: DISCONTINUED | OUTPATIENT
Start: 2022-09-23 | End: 2022-09-23

## 2022-09-23 RX ORDER — LORAZEPAM 2 MG/ML
0.5 INJECTION INTRAMUSCULAR ONCE
Status: DISCONTINUED | OUTPATIENT
Start: 2022-09-23 | End: 2022-09-23

## 2022-09-23 RX ADMIN — PREDNISONE 40 MG: 20 TABLET ORAL at 10:49

## 2022-09-23 RX ADMIN — IPRATROPIUM BROMIDE AND ALBUTEROL SULFATE 3 ML: .5; 2.5 SOLUTION RESPIRATORY (INHALATION) at 17:43

## 2022-09-23 RX ADMIN — PANTOPRAZOLE SODIUM 40 MG: 40 INJECTION, POWDER, FOR SOLUTION INTRAVENOUS at 09:29

## 2022-09-23 RX ADMIN — CEFEPIME HYDROCHLORIDE 2 G: 2 INJECTION, SOLUTION INTRAVENOUS at 09:28

## 2022-09-23 RX ADMIN — TOBRAMYCIN OPHTHALMIC SOLUTION 2 DROP: 3 SOLUTION/ DROPS OPHTHALMIC at 17:14

## 2022-09-23 RX ADMIN — IPRATROPIUM BROMIDE AND ALBUTEROL SULFATE 3 ML: .5; 2.5 SOLUTION RESPIRATORY (INHALATION) at 14:15

## 2022-09-23 RX ADMIN — MICONAZOLE NITRATE: 2 POWDER TOPICAL at 20:17

## 2022-09-23 RX ADMIN — IPRATROPIUM BROMIDE AND ALBUTEROL SULFATE 3 ML: .5; 2.5 SOLUTION RESPIRATORY (INHALATION) at 06:50

## 2022-09-23 RX ADMIN — CETIRIZINE HYDROCHLORIDE 10 MG: 10 TABLET, FILM COATED ORAL at 20:12

## 2022-09-23 RX ADMIN — BISACODYL 10 MG: 10 SUPPOSITORY RECTAL at 09:34

## 2022-09-23 RX ADMIN — MICONAZOLE NITRATE: 2 POWDER TOPICAL at 09:35

## 2022-09-23 RX ADMIN — LORAZEPAM 0.5 MG: 2 INJECTION, SOLUTION INTRAMUSCULAR; INTRAVENOUS at 04:02

## 2022-09-23 RX ADMIN — METRONIDAZOLE 500 MG: 500 INJECTION, SOLUTION INTRAVENOUS at 21:18

## 2022-09-23 RX ADMIN — HYDRALAZINE HYDROCHLORIDE 10 MG: 20 INJECTION INTRAMUSCULAR; INTRAVENOUS at 00:08

## 2022-09-23 RX ADMIN — ARFORMOTEROL TARTRATE 15 MCG: 15 SOLUTION RESPIRATORY (INHALATION) at 10:45

## 2022-09-23 RX ADMIN — Medication 10 ML: at 20:17

## 2022-09-23 RX ADMIN — CEFEPIME HYDROCHLORIDE 2 G: 2 INJECTION, SOLUTION INTRAVENOUS at 17:11

## 2022-09-23 RX ADMIN — CEFEPIME HYDROCHLORIDE 2 G: 2 INJECTION, SOLUTION INTRAVENOUS at 01:54

## 2022-09-23 RX ADMIN — HYDRALAZINE HYDROCHLORIDE 10 MG: 20 INJECTION INTRAMUSCULAR; INTRAVENOUS at 06:25

## 2022-09-23 RX ADMIN — TOBRAMYCIN OPHTHALMIC SOLUTION 2 DROP: 3 SOLUTION/ DROPS OPHTHALMIC at 10:49

## 2022-09-23 RX ADMIN — METRONIDAZOLE 500 MG: 500 INJECTION, SOLUTION INTRAVENOUS at 13:35

## 2022-09-23 RX ADMIN — ARFORMOTEROL TARTRATE 15 MCG: 15 SOLUTION RESPIRATORY (INHALATION) at 20:23

## 2022-09-23 RX ADMIN — LORAZEPAM 0.5 MG: 2 INJECTION, SOLUTION INTRAMUSCULAR; INTRAVENOUS at 04:21

## 2022-09-23 RX ADMIN — METRONIDAZOLE 500 MG: 500 INJECTION, SOLUTION INTRAVENOUS at 05:10

## 2022-09-23 RX ADMIN — Medication 10 ML: at 09:30

## 2022-09-24 ENCOUNTER — APPOINTMENT (OUTPATIENT)
Dept: GENERAL RADIOLOGY | Facility: HOSPITAL | Age: 87
End: 2022-09-24

## 2022-09-24 LAB
ALBUMIN SERPL-MCNC: 3 G/DL (ref 3.5–5.2)
ALBUMIN/GLOB SERPL: 1.1 G/DL
ALP SERPL-CCNC: 32 U/L (ref 39–117)
ALT SERPL W P-5'-P-CCNC: 19 U/L (ref 1–41)
ANION GAP SERPL CALCULATED.3IONS-SCNC: 5.9 MMOL/L (ref 5–15)
AST SERPL-CCNC: 17 U/L (ref 1–40)
BACTERIA SPEC AEROBE CULT: NORMAL
BACTERIA SPEC AEROBE CULT: NORMAL
BASOPHILS # BLD AUTO: 0.02 10*3/MM3 (ref 0–0.2)
BASOPHILS NFR BLD AUTO: 0.2 % (ref 0–1.5)
BILIRUB SERPL-MCNC: 0.5 MG/DL (ref 0–1.2)
BUN SERPL-MCNC: 32 MG/DL (ref 8–23)
BUN/CREAT SERPL: 46.4 (ref 7–25)
CALCIUM SPEC-SCNC: 8.8 MG/DL (ref 8.6–10.5)
CHLORIDE SERPL-SCNC: 101 MMOL/L (ref 98–107)
CO2 SERPL-SCNC: 27.1 MMOL/L (ref 22–29)
CREAT SERPL-MCNC: 0.69 MG/DL (ref 0.76–1.27)
DEPRECATED RDW RBC AUTO: 50 FL (ref 37–54)
EGFRCR SERPLBLD CKD-EPI 2021: 88.5 ML/MIN/1.73
EOSINOPHIL # BLD AUTO: 0 10*3/MM3 (ref 0–0.4)
EOSINOPHIL NFR BLD AUTO: 0 % (ref 0.3–6.2)
ERYTHROCYTE [DISTWIDTH] IN BLOOD BY AUTOMATED COUNT: 14.4 % (ref 12.3–15.4)
GLOBULIN UR ELPH-MCNC: 2.8 GM/DL
GLUCOSE SERPL-MCNC: 127 MG/DL (ref 65–99)
HCT VFR BLD AUTO: 39.2 % (ref 37.5–51)
HGB BLD-MCNC: 12.8 G/DL (ref 13–17.7)
IMM GRANULOCYTES # BLD AUTO: 0.15 10*3/MM3 (ref 0–0.05)
IMM GRANULOCYTES NFR BLD AUTO: 1.4 % (ref 0–0.5)
LYMPHOCYTES # BLD AUTO: 0.85 10*3/MM3 (ref 0.7–3.1)
LYMPHOCYTES NFR BLD AUTO: 7.7 % (ref 19.6–45.3)
MAGNESIUM SERPL-MCNC: 2.1 MG/DL (ref 1.6–2.4)
MCH RBC QN AUTO: 30.9 PG (ref 26.6–33)
MCHC RBC AUTO-ENTMCNC: 32.7 G/DL (ref 31.5–35.7)
MCV RBC AUTO: 94.7 FL (ref 79–97)
MONOCYTES # BLD AUTO: 0.75 10*3/MM3 (ref 0.1–0.9)
MONOCYTES NFR BLD AUTO: 6.8 % (ref 5–12)
NEUTROPHILS NFR BLD AUTO: 83.9 % (ref 42.7–76)
NEUTROPHILS NFR BLD AUTO: 9.32 10*3/MM3 (ref 1.7–7)
NRBC BLD AUTO-RTO: 0 /100 WBC (ref 0–0.2)
PLATELET # BLD AUTO: 271 10*3/MM3 (ref 140–450)
PMV BLD AUTO: 9.3 FL (ref 6–12)
POTASSIUM SERPL-SCNC: 4.4 MMOL/L (ref 3.5–5.2)
PROT SERPL-MCNC: 5.8 G/DL (ref 6–8.5)
RBC # BLD AUTO: 4.14 10*6/MM3 (ref 4.14–5.8)
SODIUM SERPL-SCNC: 134 MMOL/L (ref 136–145)
WBC NRBC COR # BLD: 11.09 10*3/MM3 (ref 3.4–10.8)

## 2022-09-24 PROCEDURE — 71045 X-RAY EXAM CHEST 1 VIEW: CPT

## 2022-09-24 PROCEDURE — 80053 COMPREHEN METABOLIC PANEL: CPT | Performed by: HOSPITALIST

## 2022-09-24 PROCEDURE — 94664 DEMO&/EVAL PT USE INHALER: CPT

## 2022-09-24 PROCEDURE — 94799 UNLISTED PULMONARY SVC/PX: CPT

## 2022-09-24 PROCEDURE — 25010000002 HYDRALAZINE PER 20 MG: Performed by: HOSPITALIST

## 2022-09-24 PROCEDURE — 25010000002 CEFEPIME-DEXTROSE 2-5 GM-%(50ML) RECONSTITUTED SOLUTION: Performed by: HOSPITALIST

## 2022-09-24 PROCEDURE — 85025 COMPLETE CBC W/AUTO DIFF WBC: CPT | Performed by: HOSPITALIST

## 2022-09-24 PROCEDURE — 83735 ASSAY OF MAGNESIUM: CPT | Performed by: INTERNAL MEDICINE

## 2022-09-24 PROCEDURE — 97110 THERAPEUTIC EXERCISES: CPT | Performed by: PHYSICAL THERAPIST

## 2022-09-24 PROCEDURE — 99232 SBSQ HOSP IP/OBS MODERATE 35: CPT | Performed by: INTERNAL MEDICINE

## 2022-09-24 PROCEDURE — 94761 N-INVAS EAR/PLS OXIMETRY MLT: CPT

## 2022-09-24 PROCEDURE — 63710000001 PREDNISONE PER 1 MG: Performed by: INTERNAL MEDICINE

## 2022-09-24 PROCEDURE — 25010000002 CEFEPIME-DEXTROSE 1-5 GM-%(50ML) RECONSTITUTED SOLUTION: Performed by: STUDENT IN AN ORGANIZED HEALTH CARE EDUCATION/TRAINING PROGRAM

## 2022-09-24 RX ORDER — CEFEPIME HYDROCHLORIDE 1 G/1
INJECTION, POWDER, FOR SOLUTION INTRAMUSCULAR; INTRAVENOUS
Status: DISPENSED
Start: 2022-09-24 | End: 2022-09-24

## 2022-09-24 RX ORDER — SODIUM CHLORIDE 9 MG/ML
INJECTION, SOLUTION INTRAVENOUS
Status: DISPENSED
Start: 2022-09-24 | End: 2022-09-24

## 2022-09-24 RX ORDER — PREDNISONE 20 MG/1
20 TABLET ORAL
Status: DISCONTINUED | OUTPATIENT
Start: 2022-09-25 | End: 2022-09-26

## 2022-09-24 RX ORDER — CEFEPIME HYDROCHLORIDE 1 G/50ML
2 INJECTION, SOLUTION INTRAVENOUS EVERY 8 HOURS
Status: DISCONTINUED | OUTPATIENT
Start: 2022-09-24 | End: 2022-09-26

## 2022-09-24 RX ORDER — GUAIFENESIN 600 MG/1
1200 TABLET, EXTENDED RELEASE ORAL EVERY 12 HOURS SCHEDULED
Status: DISCONTINUED | OUTPATIENT
Start: 2022-09-24 | End: 2022-09-29 | Stop reason: HOSPADM

## 2022-09-24 RX ORDER — SODIUM CHLORIDE FOR INHALATION 7 %
4 VIAL, NEBULIZER (ML) INHALATION
Status: DISCONTINUED | OUTPATIENT
Start: 2022-09-24 | End: 2022-09-29 | Stop reason: HOSPADM

## 2022-09-24 RX ADMIN — MICONAZOLE NITRATE: 2 POWDER TOPICAL at 08:43

## 2022-09-24 RX ADMIN — IPRATROPIUM BROMIDE AND ALBUTEROL SULFATE 3 ML: .5; 2.5 SOLUTION RESPIRATORY (INHALATION) at 07:42

## 2022-09-24 RX ADMIN — Medication 10 ML: at 08:42

## 2022-09-24 RX ADMIN — GUAIFENESIN 1200 MG: 600 TABLET, EXTENDED RELEASE ORAL at 09:27

## 2022-09-24 RX ADMIN — CEFEPIME HYDROCHLORIDE 2 G: 1 INJECTION, SOLUTION INTRAVENOUS at 17:45

## 2022-09-24 RX ADMIN — IPRATROPIUM BROMIDE AND ALBUTEROL SULFATE 3 ML: .5; 2.5 SOLUTION RESPIRATORY (INHALATION) at 11:27

## 2022-09-24 RX ADMIN — TOBRAMYCIN OPHTHALMIC SOLUTION 2 DROP: 3 SOLUTION/ DROPS OPHTHALMIC at 23:11

## 2022-09-24 RX ADMIN — CEFEPIME HYDROCHLORIDE 2 G: 1 INJECTION, SOLUTION INTRAVENOUS at 09:55

## 2022-09-24 RX ADMIN — METRONIDAZOLE 500 MG: 500 INJECTION, SOLUTION INTRAVENOUS at 04:29

## 2022-09-24 RX ADMIN — ARFORMOTEROL TARTRATE 15 MCG: 15 SOLUTION RESPIRATORY (INHALATION) at 11:32

## 2022-09-24 RX ADMIN — CETIRIZINE HYDROCHLORIDE 10 MG: 10 TABLET, FILM COATED ORAL at 20:16

## 2022-09-24 RX ADMIN — SODIUM CHLORIDE SOLN NEBU 7% 4 ML: 7 NEBU SOLN at 11:36

## 2022-09-24 RX ADMIN — METRONIDAZOLE 500 MG: 500 INJECTION, SOLUTION INTRAVENOUS at 20:17

## 2022-09-24 RX ADMIN — METRONIDAZOLE 500 MG: 500 INJECTION, SOLUTION INTRAVENOUS at 11:31

## 2022-09-24 RX ADMIN — PANTOPRAZOLE SODIUM 40 MG: 40 INJECTION, POWDER, FOR SOLUTION INTRAVENOUS at 08:42

## 2022-09-24 RX ADMIN — MICONAZOLE NITRATE: 2 POWDER TOPICAL at 20:16

## 2022-09-24 RX ADMIN — TOBRAMYCIN OPHTHALMIC SOLUTION 2 DROP: 3 SOLUTION/ DROPS OPHTHALMIC at 17:44

## 2022-09-24 RX ADMIN — LEVOTHYROXINE SODIUM 50 MCG: 50 TABLET ORAL at 05:38

## 2022-09-24 RX ADMIN — IPRATROPIUM BROMIDE AND ALBUTEROL SULFATE 3 ML: .5; 2.5 SOLUTION RESPIRATORY (INHALATION) at 20:06

## 2022-09-24 RX ADMIN — IPRATROPIUM BROMIDE AND ALBUTEROL SULFATE 3 ML: .5; 2.5 SOLUTION RESPIRATORY (INHALATION) at 15:57

## 2022-09-24 RX ADMIN — GUAIFENESIN 1200 MG: 600 TABLET, EXTENDED RELEASE ORAL at 20:16

## 2022-09-24 RX ADMIN — CHLORHEXIDINE GLUCONATE 0.12% ORAL RINSE 15 ML: 1.2 LIQUID ORAL at 09:31

## 2022-09-24 RX ADMIN — PREDNISONE 40 MG: 20 TABLET ORAL at 08:42

## 2022-09-24 RX ADMIN — SODIUM CHLORIDE SOLN NEBU 7% 4 ML: 7 NEBU SOLN at 20:13

## 2022-09-24 RX ADMIN — TOBRAMYCIN OPHTHALMIC SOLUTION 2 DROP: 3 SOLUTION/ DROPS OPHTHALMIC at 11:31

## 2022-09-24 RX ADMIN — BISACODYL 10 MG: 10 SUPPOSITORY RECTAL at 08:42

## 2022-09-24 RX ADMIN — TOBRAMYCIN OPHTHALMIC SOLUTION 2 DROP: 3 SOLUTION/ DROPS OPHTHALMIC at 00:10

## 2022-09-24 RX ADMIN — HYDRALAZINE HYDROCHLORIDE 10 MG: 20 INJECTION INTRAMUSCULAR; INTRAVENOUS at 20:38

## 2022-09-24 RX ADMIN — ARFORMOTEROL TARTRATE 15 MCG: 15 SOLUTION RESPIRATORY (INHALATION) at 20:24

## 2022-09-24 RX ADMIN — Medication 10 ML: at 20:18

## 2022-09-24 RX ADMIN — CEFEPIME HYDROCHLORIDE 2 G: 2 INJECTION, SOLUTION INTRAVENOUS at 01:55

## 2022-09-24 RX ADMIN — TOBRAMYCIN OPHTHALMIC SOLUTION 2 DROP: 3 SOLUTION/ DROPS OPHTHALMIC at 05:38

## 2022-09-24 RX ADMIN — HYDRALAZINE HYDROCHLORIDE 10 MG: 20 INJECTION INTRAMUSCULAR; INTRAVENOUS at 04:32

## 2022-09-25 LAB
ALBUMIN SERPL-MCNC: 2.8 G/DL (ref 3.5–5.2)
ALBUMIN/GLOB SERPL: 1.1 G/DL
ALP SERPL-CCNC: 29 U/L (ref 39–117)
ALT SERPL W P-5'-P-CCNC: 17 U/L (ref 1–41)
ANION GAP SERPL CALCULATED.3IONS-SCNC: 4.6 MMOL/L (ref 5–15)
AST SERPL-CCNC: 17 U/L (ref 1–40)
BASOPHILS # BLD AUTO: 0.03 10*3/MM3 (ref 0–0.2)
BASOPHILS NFR BLD AUTO: 0.2 % (ref 0–1.5)
BILIRUB SERPL-MCNC: 0.5 MG/DL (ref 0–1.2)
BUN SERPL-MCNC: 27 MG/DL (ref 8–23)
BUN/CREAT SERPL: 40.9 (ref 7–25)
CALCIUM SPEC-SCNC: 8.1 MG/DL (ref 8.6–10.5)
CHLORIDE SERPL-SCNC: 101 MMOL/L (ref 98–107)
CO2 SERPL-SCNC: 27.4 MMOL/L (ref 22–29)
CREAT SERPL-MCNC: 0.66 MG/DL (ref 0.76–1.27)
DEPRECATED RDW RBC AUTO: 50.1 FL (ref 37–54)
EGFRCR SERPLBLD CKD-EPI 2021: 89.7 ML/MIN/1.73
EOSINOPHIL # BLD AUTO: 0.1 10*3/MM3 (ref 0–0.4)
EOSINOPHIL NFR BLD AUTO: 0.8 % (ref 0.3–6.2)
ERYTHROCYTE [DISTWIDTH] IN BLOOD BY AUTOMATED COUNT: 14.5 % (ref 12.3–15.4)
GLOBULIN UR ELPH-MCNC: 2.6 GM/DL
GLUCOSE SERPL-MCNC: 109 MG/DL (ref 65–99)
HCT VFR BLD AUTO: 37.7 % (ref 37.5–51)
HGB BLD-MCNC: 12.3 G/DL (ref 13–17.7)
IMM GRANULOCYTES # BLD AUTO: 0.22 10*3/MM3 (ref 0–0.05)
IMM GRANULOCYTES NFR BLD AUTO: 1.8 % (ref 0–0.5)
LYMPHOCYTES # BLD AUTO: 1.32 10*3/MM3 (ref 0.7–3.1)
LYMPHOCYTES NFR BLD AUTO: 10.7 % (ref 19.6–45.3)
MCH RBC QN AUTO: 30.8 PG (ref 26.6–33)
MCHC RBC AUTO-ENTMCNC: 32.6 G/DL (ref 31.5–35.7)
MCV RBC AUTO: 94.5 FL (ref 79–97)
MONOCYTES # BLD AUTO: 1.28 10*3/MM3 (ref 0.1–0.9)
MONOCYTES NFR BLD AUTO: 10.4 % (ref 5–12)
NEUTROPHILS NFR BLD AUTO: 76.1 % (ref 42.7–76)
NEUTROPHILS NFR BLD AUTO: 9.34 10*3/MM3 (ref 1.7–7)
NRBC BLD AUTO-RTO: 0 /100 WBC (ref 0–0.2)
PLATELET # BLD AUTO: 254 10*3/MM3 (ref 140–450)
PMV BLD AUTO: 8.9 FL (ref 6–12)
POTASSIUM SERPL-SCNC: 4.1 MMOL/L (ref 3.5–5.2)
PROT SERPL-MCNC: 5.4 G/DL (ref 6–8.5)
RBC # BLD AUTO: 3.99 10*6/MM3 (ref 4.14–5.8)
SODIUM SERPL-SCNC: 133 MMOL/L (ref 136–145)
WBC NRBC COR # BLD: 12.29 10*3/MM3 (ref 3.4–10.8)

## 2022-09-25 PROCEDURE — 25010000002 CEFEPIME-DEXTROSE 1-5 GM-%(50ML) RECONSTITUTED SOLUTION: Performed by: STUDENT IN AN ORGANIZED HEALTH CARE EDUCATION/TRAINING PROGRAM

## 2022-09-25 PROCEDURE — 94761 N-INVAS EAR/PLS OXIMETRY MLT: CPT

## 2022-09-25 PROCEDURE — 94799 UNLISTED PULMONARY SVC/PX: CPT

## 2022-09-25 PROCEDURE — 80053 COMPREHEN METABOLIC PANEL: CPT | Performed by: HOSPITALIST

## 2022-09-25 PROCEDURE — 97530 THERAPEUTIC ACTIVITIES: CPT

## 2022-09-25 PROCEDURE — 25010000002 LORAZEPAM PER 2 MG: Performed by: NURSE PRACTITIONER

## 2022-09-25 PROCEDURE — 85025 COMPLETE CBC W/AUTO DIFF WBC: CPT | Performed by: HOSPITALIST

## 2022-09-25 PROCEDURE — 94664 DEMO&/EVAL PT USE INHALER: CPT

## 2022-09-25 PROCEDURE — 63710000001 PREDNISONE PER 1 MG: Performed by: INTERNAL MEDICINE

## 2022-09-25 PROCEDURE — 99232 SBSQ HOSP IP/OBS MODERATE 35: CPT | Performed by: INTERNAL MEDICINE

## 2022-09-25 RX ORDER — CHOLECALCIFEROL (VITAMIN D3) 125 MCG
10 CAPSULE ORAL NIGHTLY PRN
Status: DISCONTINUED | OUTPATIENT
Start: 2022-09-25 | End: 2022-09-29 | Stop reason: HOSPADM

## 2022-09-25 RX ADMIN — IPRATROPIUM BROMIDE AND ALBUTEROL SULFATE 3 ML: .5; 2.5 SOLUTION RESPIRATORY (INHALATION) at 08:15

## 2022-09-25 RX ADMIN — CEFEPIME HYDROCHLORIDE 2 G: 1 INJECTION, SOLUTION INTRAVENOUS at 02:53

## 2022-09-25 RX ADMIN — MICONAZOLE NITRATE: 2 POWDER TOPICAL at 08:25

## 2022-09-25 RX ADMIN — TOBRAMYCIN OPHTHALMIC SOLUTION 2 DROP: 3 SOLUTION/ DROPS OPHTHALMIC at 18:25

## 2022-09-25 RX ADMIN — ARFORMOTEROL TARTRATE 15 MCG: 15 SOLUTION RESPIRATORY (INHALATION) at 19:06

## 2022-09-25 RX ADMIN — MELATONIN TAB 5 MG 10 MG: 5 TAB at 21:50

## 2022-09-25 RX ADMIN — LORAZEPAM 1 MG: 2 INJECTION, SOLUTION INTRAMUSCULAR; INTRAVENOUS at 00:09

## 2022-09-25 RX ADMIN — SODIUM CHLORIDE SOLN NEBU 7% 4 ML: 7 NEBU SOLN at 11:10

## 2022-09-25 RX ADMIN — ARFORMOTEROL TARTRATE 15 MCG: 15 SOLUTION RESPIRATORY (INHALATION) at 11:00

## 2022-09-25 RX ADMIN — Medication 10 ML: at 21:51

## 2022-09-25 RX ADMIN — TOBRAMYCIN OPHTHALMIC SOLUTION 2 DROP: 3 SOLUTION/ DROPS OPHTHALMIC at 23:34

## 2022-09-25 RX ADMIN — PANTOPRAZOLE SODIUM 40 MG: 40 INJECTION, POWDER, FOR SOLUTION INTRAVENOUS at 08:23

## 2022-09-25 RX ADMIN — CHLORHEXIDINE GLUCONATE 0.12% ORAL RINSE 15 ML: 1.2 LIQUID ORAL at 10:34

## 2022-09-25 RX ADMIN — IPRATROPIUM BROMIDE AND ALBUTEROL SULFATE 3 ML: .5; 2.5 SOLUTION RESPIRATORY (INHALATION) at 11:05

## 2022-09-25 RX ADMIN — METRONIDAZOLE 500 MG: 500 INJECTION, SOLUTION INTRAVENOUS at 11:59

## 2022-09-25 RX ADMIN — MICONAZOLE NITRATE: 2 POWDER TOPICAL at 21:48

## 2022-09-25 RX ADMIN — IPRATROPIUM BROMIDE AND ALBUTEROL SULFATE 3 ML: .5; 2.5 SOLUTION RESPIRATORY (INHALATION) at 19:14

## 2022-09-25 RX ADMIN — IPRATROPIUM BROMIDE AND ALBUTEROL SULFATE 3 ML: .5; 2.5 SOLUTION RESPIRATORY (INHALATION) at 16:21

## 2022-09-25 RX ADMIN — TOBRAMYCIN OPHTHALMIC SOLUTION 2 DROP: 3 SOLUTION/ DROPS OPHTHALMIC at 06:03

## 2022-09-25 RX ADMIN — PREDNISONE 20 MG: 20 TABLET ORAL at 08:23

## 2022-09-25 RX ADMIN — GUAIFENESIN 1200 MG: 600 TABLET, EXTENDED RELEASE ORAL at 21:50

## 2022-09-25 RX ADMIN — CEFEPIME HYDROCHLORIDE 2 G: 1 INJECTION, SOLUTION INTRAVENOUS at 18:25

## 2022-09-25 RX ADMIN — SODIUM CHLORIDE SOLN NEBU 7% 4 ML: 7 NEBU SOLN at 19:23

## 2022-09-25 RX ADMIN — GUAIFENESIN 1200 MG: 600 TABLET, EXTENDED RELEASE ORAL at 08:24

## 2022-09-25 RX ADMIN — METRONIDAZOLE 500 MG: 500 INJECTION, SOLUTION INTRAVENOUS at 03:51

## 2022-09-25 RX ADMIN — BISACODYL 10 MG: 10 SUPPOSITORY RECTAL at 08:24

## 2022-09-25 RX ADMIN — CETIRIZINE HYDROCHLORIDE 10 MG: 10 TABLET, FILM COATED ORAL at 21:50

## 2022-09-25 RX ADMIN — CEFEPIME HYDROCHLORIDE 2 G: 1 INJECTION, SOLUTION INTRAVENOUS at 10:35

## 2022-09-25 RX ADMIN — METRONIDAZOLE 500 MG: 500 INJECTION, SOLUTION INTRAVENOUS at 21:48

## 2022-09-25 RX ADMIN — TOBRAMYCIN OPHTHALMIC SOLUTION 2 DROP: 3 SOLUTION/ DROPS OPHTHALMIC at 11:29

## 2022-09-25 RX ADMIN — LEVOTHYROXINE SODIUM 50 MCG: 50 TABLET ORAL at 06:02

## 2022-09-25 RX ADMIN — Medication 10 ML: at 08:25

## 2022-09-25 RX ADMIN — CHLORHEXIDINE GLUCONATE 0.12% ORAL RINSE 15 ML: 1.2 LIQUID ORAL at 22:12

## 2022-09-26 LAB
ALBUMIN SERPL-MCNC: 2.6 G/DL (ref 3.5–5.2)
ALBUMIN/GLOB SERPL: 1.1 G/DL
ALP SERPL-CCNC: 28 U/L (ref 39–117)
ALT SERPL W P-5'-P-CCNC: 19 U/L (ref 1–41)
ANION GAP SERPL CALCULATED.3IONS-SCNC: 4 MMOL/L (ref 5–15)
AST SERPL-CCNC: 18 U/L (ref 1–40)
BASOPHILS # BLD AUTO: 0.02 10*3/MM3 (ref 0–0.2)
BASOPHILS NFR BLD AUTO: 0.2 % (ref 0–1.5)
BILIRUB SERPL-MCNC: 0.4 MG/DL (ref 0–1.2)
BUN SERPL-MCNC: 27 MG/DL (ref 8–23)
BUN/CREAT SERPL: 39.1 (ref 7–25)
CALCIUM SPEC-SCNC: 8.2 MG/DL (ref 8.6–10.5)
CHLORIDE SERPL-SCNC: 100 MMOL/L (ref 98–107)
CO2 SERPL-SCNC: 27 MMOL/L (ref 22–29)
CREAT SERPL-MCNC: 0.69 MG/DL (ref 0.76–1.27)
DEPRECATED RDW RBC AUTO: 50.7 FL (ref 37–54)
EGFRCR SERPLBLD CKD-EPI 2021: 88.5 ML/MIN/1.73
EOSINOPHIL # BLD AUTO: 0.55 10*3/MM3 (ref 0–0.4)
EOSINOPHIL NFR BLD AUTO: 4.8 % (ref 0.3–6.2)
ERYTHROCYTE [DISTWIDTH] IN BLOOD BY AUTOMATED COUNT: 14.6 % (ref 12.3–15.4)
GLOBULIN UR ELPH-MCNC: 2.3 GM/DL
GLUCOSE SERPL-MCNC: 125 MG/DL (ref 65–99)
HCT VFR BLD AUTO: 34.6 % (ref 37.5–51)
HGB BLD-MCNC: 11.3 G/DL (ref 13–17.7)
IMM GRANULOCYTES # BLD AUTO: 0.27 10*3/MM3 (ref 0–0.05)
IMM GRANULOCYTES NFR BLD AUTO: 2.4 % (ref 0–0.5)
LYMPHOCYTES # BLD AUTO: 2.16 10*3/MM3 (ref 0.7–3.1)
LYMPHOCYTES NFR BLD AUTO: 19 % (ref 19.6–45.3)
MAGNESIUM SERPL-MCNC: 1.8 MG/DL (ref 1.6–2.4)
MCH RBC QN AUTO: 30.9 PG (ref 26.6–33)
MCHC RBC AUTO-ENTMCNC: 32.7 G/DL (ref 31.5–35.7)
MCV RBC AUTO: 94.5 FL (ref 79–97)
MONOCYTES # BLD AUTO: 1.27 10*3/MM3 (ref 0.1–0.9)
MONOCYTES NFR BLD AUTO: 11.2 % (ref 5–12)
NEUTROPHILS NFR BLD AUTO: 62.4 % (ref 42.7–76)
NEUTROPHILS NFR BLD AUTO: 7.09 10*3/MM3 (ref 1.7–7)
NRBC BLD AUTO-RTO: 0 /100 WBC (ref 0–0.2)
PLATELET # BLD AUTO: 255 10*3/MM3 (ref 140–450)
PMV BLD AUTO: 9.3 FL (ref 6–12)
POTASSIUM SERPL-SCNC: 3.7 MMOL/L (ref 3.5–5.2)
PROT SERPL-MCNC: 4.9 G/DL (ref 6–8.5)
RBC # BLD AUTO: 3.66 10*6/MM3 (ref 4.14–5.8)
SODIUM SERPL-SCNC: 131 MMOL/L (ref 136–145)
WBC NRBC COR # BLD: 11.36 10*3/MM3 (ref 3.4–10.8)

## 2022-09-26 PROCEDURE — 25010000002 MAGNESIUM SULFATE 2 GM/50ML SOLUTION: Performed by: HOSPITALIST

## 2022-09-26 PROCEDURE — 80053 COMPREHEN METABOLIC PANEL: CPT | Performed by: HOSPITALIST

## 2022-09-26 PROCEDURE — 83735 ASSAY OF MAGNESIUM: CPT | Performed by: HOSPITALIST

## 2022-09-26 PROCEDURE — 94799 UNLISTED PULMONARY SVC/PX: CPT

## 2022-09-26 PROCEDURE — 63710000001 PREDNISONE PER 5 MG: Performed by: INTERNAL MEDICINE

## 2022-09-26 PROCEDURE — 99232 SBSQ HOSP IP/OBS MODERATE 35: CPT | Performed by: HOSPITALIST

## 2022-09-26 PROCEDURE — 92526 ORAL FUNCTION THERAPY: CPT

## 2022-09-26 PROCEDURE — 97530 THERAPEUTIC ACTIVITIES: CPT

## 2022-09-26 PROCEDURE — 25010000002 CEFEPIME PER 500 MG: Performed by: STUDENT IN AN ORGANIZED HEALTH CARE EDUCATION/TRAINING PROGRAM

## 2022-09-26 PROCEDURE — 97110 THERAPEUTIC EXERCISES: CPT

## 2022-09-26 PROCEDURE — 85025 COMPLETE CBC W/AUTO DIFF WBC: CPT | Performed by: HOSPITALIST

## 2022-09-26 PROCEDURE — 25010000002 CEFEPIME-DEXTROSE 1-5 GM-%(50ML) RECONSTITUTED SOLUTION: Performed by: STUDENT IN AN ORGANIZED HEALTH CARE EDUCATION/TRAINING PROGRAM

## 2022-09-26 PROCEDURE — 94761 N-INVAS EAR/PLS OXIMETRY MLT: CPT

## 2022-09-26 PROCEDURE — 94664 DEMO&/EVAL PT USE INHALER: CPT

## 2022-09-26 RX ORDER — PREDNISONE 10 MG/1
10 TABLET ORAL
Status: DISCONTINUED | OUTPATIENT
Start: 2022-09-26 | End: 2022-09-28

## 2022-09-26 RX ORDER — BISACODYL 5 MG/1
10 TABLET, DELAYED RELEASE ORAL DAILY PRN
Status: DISCONTINUED | OUTPATIENT
Start: 2022-09-26 | End: 2022-09-29 | Stop reason: HOSPADM

## 2022-09-26 RX ADMIN — Medication 10 ML: at 08:38

## 2022-09-26 RX ADMIN — MICONAZOLE NITRATE: 2 POWDER TOPICAL at 08:37

## 2022-09-26 RX ADMIN — GUAIFENESIN 1200 MG: 600 TABLET, EXTENDED RELEASE ORAL at 21:12

## 2022-09-26 RX ADMIN — PREDNISONE 10 MG: 10 TABLET ORAL at 08:37

## 2022-09-26 RX ADMIN — CEFEPIME HYDROCHLORIDE 2 G: 1 INJECTION, SOLUTION INTRAVENOUS at 03:30

## 2022-09-26 RX ADMIN — IPRATROPIUM BROMIDE AND ALBUTEROL SULFATE 3 ML: .5; 2.5 SOLUTION RESPIRATORY (INHALATION) at 07:26

## 2022-09-26 RX ADMIN — SODIUM CHLORIDE SOLN NEBU 7% 4 ML: 7 NEBU SOLN at 07:34

## 2022-09-26 RX ADMIN — CETIRIZINE HYDROCHLORIDE 10 MG: 10 TABLET, FILM COATED ORAL at 21:12

## 2022-09-26 RX ADMIN — MICONAZOLE NITRATE: 2 POWDER TOPICAL at 21:18

## 2022-09-26 RX ADMIN — CEFEPIME HYDROCHLORIDE 2 G: 2 INJECTION, POWDER, FOR SOLUTION INTRAVENOUS at 10:47

## 2022-09-26 RX ADMIN — SODIUM CHLORIDE SOLN NEBU 7% 4 ML: 7 NEBU SOLN at 18:06

## 2022-09-26 RX ADMIN — LEVOTHYROXINE SODIUM 50 MCG: 50 TABLET ORAL at 06:11

## 2022-09-26 RX ADMIN — TOBRAMYCIN OPHTHALMIC SOLUTION 2 DROP: 3 SOLUTION/ DROPS OPHTHALMIC at 06:11

## 2022-09-26 RX ADMIN — TOBRAMYCIN OPHTHALMIC SOLUTION 2 DROP: 3 SOLUTION/ DROPS OPHTHALMIC at 17:39

## 2022-09-26 RX ADMIN — PANTOPRAZOLE SODIUM 40 MG: 40 INJECTION, POWDER, FOR SOLUTION INTRAVENOUS at 08:37

## 2022-09-26 RX ADMIN — BISACODYL 10 MG: 10 SUPPOSITORY RECTAL at 08:32

## 2022-09-26 RX ADMIN — CHLORHEXIDINE GLUCONATE 0.12% ORAL RINSE 15 ML: 1.2 LIQUID ORAL at 08:36

## 2022-09-26 RX ADMIN — GUAIFENESIN 1200 MG: 600 TABLET, EXTENDED RELEASE ORAL at 08:37

## 2022-09-26 RX ADMIN — METRONIDAZOLE 500 MG: 500 INJECTION, SOLUTION INTRAVENOUS at 04:13

## 2022-09-26 RX ADMIN — CEFEPIME HYDROCHLORIDE 2 G: 2 INJECTION, POWDER, FOR SOLUTION INTRAVENOUS at 20:16

## 2022-09-26 RX ADMIN — Medication 10 ML: at 21:18

## 2022-09-26 RX ADMIN — ARFORMOTEROL TARTRATE 15 MCG: 15 SOLUTION RESPIRATORY (INHALATION) at 20:56

## 2022-09-26 RX ADMIN — ARFORMOTEROL TARTRATE 15 MCG: 15 SOLUTION RESPIRATORY (INHALATION) at 07:18

## 2022-09-26 RX ADMIN — METRONIDAZOLE 500 MG: 500 INJECTION, SOLUTION INTRAVENOUS at 13:02

## 2022-09-26 RX ADMIN — IPRATROPIUM BROMIDE AND ALBUTEROL SULFATE 3 ML: .5; 2.5 SOLUTION RESPIRATORY (INHALATION) at 10:46

## 2022-09-26 RX ADMIN — TOBRAMYCIN OPHTHALMIC SOLUTION 2 DROP: 3 SOLUTION/ DROPS OPHTHALMIC at 13:01

## 2022-09-26 RX ADMIN — IPRATROPIUM BROMIDE AND ALBUTEROL SULFATE 3 ML: .5; 2.5 SOLUTION RESPIRATORY (INHALATION) at 18:03

## 2022-09-26 RX ADMIN — MAGNESIUM SULFATE HEPTAHYDRATE 2 G: 40 INJECTION, SOLUTION INTRAVENOUS at 08:37

## 2022-09-26 RX ADMIN — METRONIDAZOLE 500 MG: 500 INJECTION, SOLUTION INTRAVENOUS at 21:12

## 2022-09-26 RX ADMIN — IPRATROPIUM BROMIDE AND ALBUTEROL SULFATE 3 ML: .5; 2.5 SOLUTION RESPIRATORY (INHALATION) at 15:14

## 2022-09-27 LAB
ALBUMIN SERPL-MCNC: 2.8 G/DL (ref 3.5–5.2)
ALBUMIN/GLOB SERPL: 1.2 G/DL
ALP SERPL-CCNC: 32 U/L (ref 39–117)
ALT SERPL W P-5'-P-CCNC: 26 U/L (ref 1–41)
ANION GAP SERPL CALCULATED.3IONS-SCNC: 4.2 MMOL/L (ref 5–15)
AST SERPL-CCNC: 24 U/L (ref 1–40)
BASOPHILS # BLD AUTO: 0.02 10*3/MM3 (ref 0–0.2)
BASOPHILS NFR BLD AUTO: 0.2 % (ref 0–1.5)
BILIRUB SERPL-MCNC: 0.4 MG/DL (ref 0–1.2)
BUN SERPL-MCNC: 23 MG/DL (ref 8–23)
BUN/CREAT SERPL: 35.4 (ref 7–25)
CALCIUM SPEC-SCNC: 8.4 MG/DL (ref 8.6–10.5)
CHLORIDE SERPL-SCNC: 99 MMOL/L (ref 98–107)
CO2 SERPL-SCNC: 27.8 MMOL/L (ref 22–29)
CREAT SERPL-MCNC: 0.65 MG/DL (ref 0.76–1.27)
DEPRECATED RDW RBC AUTO: 51 FL (ref 37–54)
EGFRCR SERPLBLD CKD-EPI 2021: 90.1 ML/MIN/1.73
EOSINOPHIL # BLD AUTO: 0.67 10*3/MM3 (ref 0–0.4)
EOSINOPHIL NFR BLD AUTO: 5.7 % (ref 0.3–6.2)
ERYTHROCYTE [DISTWIDTH] IN BLOOD BY AUTOMATED COUNT: 14.6 % (ref 12.3–15.4)
GLOBULIN UR ELPH-MCNC: 2.4 GM/DL
GLUCOSE SERPL-MCNC: 98 MG/DL (ref 65–99)
HCT VFR BLD AUTO: 37.1 % (ref 37.5–51)
HGB BLD-MCNC: 12.2 G/DL (ref 13–17.7)
IMM GRANULOCYTES # BLD AUTO: 0.2 10*3/MM3 (ref 0–0.05)
IMM GRANULOCYTES NFR BLD AUTO: 1.7 % (ref 0–0.5)
LYMPHOCYTES # BLD AUTO: 1.91 10*3/MM3 (ref 0.7–3.1)
LYMPHOCYTES NFR BLD AUTO: 16.3 % (ref 19.6–45.3)
MAGNESIUM SERPL-MCNC: 1.9 MG/DL (ref 1.6–2.4)
MCH RBC QN AUTO: 31 PG (ref 26.6–33)
MCHC RBC AUTO-ENTMCNC: 32.9 G/DL (ref 31.5–35.7)
MCV RBC AUTO: 94.4 FL (ref 79–97)
MONOCYTES # BLD AUTO: 1.29 10*3/MM3 (ref 0.1–0.9)
MONOCYTES NFR BLD AUTO: 11 % (ref 5–12)
NEUTROPHILS NFR BLD AUTO: 65.1 % (ref 42.7–76)
NEUTROPHILS NFR BLD AUTO: 7.63 10*3/MM3 (ref 1.7–7)
NRBC BLD AUTO-RTO: 0 /100 WBC (ref 0–0.2)
PLATELET # BLD AUTO: 242 10*3/MM3 (ref 140–450)
PMV BLD AUTO: 9.4 FL (ref 6–12)
POTASSIUM SERPL-SCNC: 4.2 MMOL/L (ref 3.5–5.2)
PROT SERPL-MCNC: 5.2 G/DL (ref 6–8.5)
RBC # BLD AUTO: 3.93 10*6/MM3 (ref 4.14–5.8)
SODIUM SERPL-SCNC: 131 MMOL/L (ref 136–145)
WBC NRBC COR # BLD: 11.72 10*3/MM3 (ref 3.4–10.8)

## 2022-09-27 PROCEDURE — 85025 COMPLETE CBC W/AUTO DIFF WBC: CPT | Performed by: HOSPITALIST

## 2022-09-27 PROCEDURE — 94799 UNLISTED PULMONARY SVC/PX: CPT

## 2022-09-27 PROCEDURE — 97110 THERAPEUTIC EXERCISES: CPT

## 2022-09-27 PROCEDURE — 97530 THERAPEUTIC ACTIVITIES: CPT

## 2022-09-27 PROCEDURE — 25010000002 MAGNESIUM SULFATE 2 GM/50ML SOLUTION: Performed by: HOSPITALIST

## 2022-09-27 PROCEDURE — 99232 SBSQ HOSP IP/OBS MODERATE 35: CPT | Performed by: NURSE PRACTITIONER

## 2022-09-27 PROCEDURE — 80053 COMPREHEN METABOLIC PANEL: CPT | Performed by: HOSPITALIST

## 2022-09-27 PROCEDURE — 83735 ASSAY OF MAGNESIUM: CPT | Performed by: HOSPITALIST

## 2022-09-27 PROCEDURE — 63710000001 PREDNISONE PER 5 MG: Performed by: INTERNAL MEDICINE

## 2022-09-27 PROCEDURE — 94761 N-INVAS EAR/PLS OXIMETRY MLT: CPT

## 2022-09-27 PROCEDURE — 94664 DEMO&/EVAL PT USE INHALER: CPT

## 2022-09-27 PROCEDURE — 25010000002 HYDRALAZINE PER 20 MG: Performed by: HOSPITALIST

## 2022-09-27 PROCEDURE — 93005 ELECTROCARDIOGRAM TRACING: CPT | Performed by: NURSE PRACTITIONER

## 2022-09-27 PROCEDURE — 93010 ELECTROCARDIOGRAM REPORT: CPT | Performed by: INTERNAL MEDICINE

## 2022-09-27 RX ORDER — LEVOTHYROXINE SODIUM 0.05 MG/1
50 TABLET ORAL
Status: DISCONTINUED | OUTPATIENT
Start: 2022-09-28 | End: 2022-09-29 | Stop reason: HOSPADM

## 2022-09-27 RX ADMIN — ARFORMOTEROL TARTRATE 15 MCG: 15 SOLUTION RESPIRATORY (INHALATION) at 20:42

## 2022-09-27 RX ADMIN — METRONIDAZOLE 500 MG: 500 INJECTION, SOLUTION INTRAVENOUS at 04:17

## 2022-09-27 RX ADMIN — SODIUM CHLORIDE SOLN NEBU 7% 4 ML: 7 NEBU SOLN at 07:07

## 2022-09-27 RX ADMIN — IPRATROPIUM BROMIDE AND ALBUTEROL SULFATE 3 ML: .5; 2.5 SOLUTION RESPIRATORY (INHALATION) at 11:18

## 2022-09-27 RX ADMIN — GUAIFENESIN 1200 MG: 600 TABLET, EXTENDED RELEASE ORAL at 10:34

## 2022-09-27 RX ADMIN — TOBRAMYCIN OPHTHALMIC SOLUTION 2 DROP: 3 SOLUTION/ DROPS OPHTHALMIC at 14:14

## 2022-09-27 RX ADMIN — CETIRIZINE HYDROCHLORIDE 10 MG: 10 TABLET, FILM COATED ORAL at 21:31

## 2022-09-27 RX ADMIN — TOBRAMYCIN OPHTHALMIC SOLUTION 2 DROP: 3 SOLUTION/ DROPS OPHTHALMIC at 02:24

## 2022-09-27 RX ADMIN — SODIUM CHLORIDE SOLN NEBU 7% 4 ML: 7 NEBU SOLN at 20:27

## 2022-09-27 RX ADMIN — MICONAZOLE NITRATE: 2 POWDER TOPICAL at 09:00

## 2022-09-27 RX ADMIN — PREDNISONE 10 MG: 10 TABLET ORAL at 10:34

## 2022-09-27 RX ADMIN — GUAIFENESIN 1200 MG: 600 TABLET, EXTENDED RELEASE ORAL at 21:31

## 2022-09-27 RX ADMIN — MAGNESIUM SULFATE HEPTAHYDRATE 2 G: 40 INJECTION, SOLUTION INTRAVENOUS at 06:37

## 2022-09-27 RX ADMIN — IPRATROPIUM BROMIDE AND ALBUTEROL SULFATE 3 ML: .5; 2.5 SOLUTION RESPIRATORY (INHALATION) at 07:06

## 2022-09-27 RX ADMIN — LEVOTHYROXINE SODIUM 50 MCG: 50 TABLET ORAL at 05:17

## 2022-09-27 RX ADMIN — IPRATROPIUM BROMIDE AND ALBUTEROL SULFATE 3 ML: .5; 2.5 SOLUTION RESPIRATORY (INHALATION) at 20:22

## 2022-09-27 RX ADMIN — IPRATROPIUM BROMIDE AND ALBUTEROL SULFATE 3 ML: .5; 2.5 SOLUTION RESPIRATORY (INHALATION) at 15:44

## 2022-09-27 RX ADMIN — TOBRAMYCIN OPHTHALMIC SOLUTION 2 DROP: 3 SOLUTION/ DROPS OPHTHALMIC at 06:10

## 2022-09-27 RX ADMIN — TOBRAMYCIN OPHTHALMIC SOLUTION 2 DROP: 3 SOLUTION/ DROPS OPHTHALMIC at 17:45

## 2022-09-27 RX ADMIN — Medication 10 ML: at 10:34

## 2022-09-27 RX ADMIN — HYDRALAZINE HYDROCHLORIDE 10 MG: 20 INJECTION INTRAMUSCULAR; INTRAVENOUS at 05:12

## 2022-09-27 RX ADMIN — MICONAZOLE NITRATE: 2 POWDER TOPICAL at 21:33

## 2022-09-27 RX ADMIN — ARFORMOTEROL TARTRATE 15 MCG: 15 SOLUTION RESPIRATORY (INHALATION) at 09:48

## 2022-09-28 ENCOUNTER — APPOINTMENT (OUTPATIENT)
Dept: GENERAL RADIOLOGY | Facility: HOSPITAL | Age: 87
End: 2022-09-28

## 2022-09-28 LAB
ALBUMIN SERPL-MCNC: 2.6 G/DL (ref 3.5–5.2)
ALBUMIN/GLOB SERPL: 1 G/DL
ALP SERPL-CCNC: 32 U/L (ref 39–117)
ALT SERPL W P-5'-P-CCNC: 23 U/L (ref 1–41)
ANION GAP SERPL CALCULATED.3IONS-SCNC: 4.3 MMOL/L (ref 5–15)
AST SERPL-CCNC: 18 U/L (ref 1–40)
BASOPHILS # BLD AUTO: 0.02 10*3/MM3 (ref 0–0.2)
BASOPHILS NFR BLD AUTO: 0.2 % (ref 0–1.5)
BILIRUB SERPL-MCNC: 0.4 MG/DL (ref 0–1.2)
BUN SERPL-MCNC: 22 MG/DL (ref 8–23)
BUN/CREAT SERPL: 35.5 (ref 7–25)
CALCIUM SPEC-SCNC: 8.3 MG/DL (ref 8.6–10.5)
CHLORIDE SERPL-SCNC: 99 MMOL/L (ref 98–107)
CO2 SERPL-SCNC: 27.7 MMOL/L (ref 22–29)
CREAT SERPL-MCNC: 0.62 MG/DL (ref 0.76–1.27)
DEPRECATED RDW RBC AUTO: 50.7 FL (ref 37–54)
EGFRCR SERPLBLD CKD-EPI 2021: 91.4 ML/MIN/1.73
EOSINOPHIL # BLD AUTO: 0.39 10*3/MM3 (ref 0–0.4)
EOSINOPHIL NFR BLD AUTO: 3.6 % (ref 0.3–6.2)
ERYTHROCYTE [DISTWIDTH] IN BLOOD BY AUTOMATED COUNT: 14.7 % (ref 12.3–15.4)
GLOBULIN UR ELPH-MCNC: 2.7 GM/DL
GLUCOSE SERPL-MCNC: 99 MG/DL (ref 65–99)
HCT VFR BLD AUTO: 35.9 % (ref 37.5–51)
HGB BLD-MCNC: 11.7 G/DL (ref 13–17.7)
IMM GRANULOCYTES # BLD AUTO: 0.14 10*3/MM3 (ref 0–0.05)
IMM GRANULOCYTES NFR BLD AUTO: 1.3 % (ref 0–0.5)
LYMPHOCYTES # BLD AUTO: 2.14 10*3/MM3 (ref 0.7–3.1)
LYMPHOCYTES NFR BLD AUTO: 20 % (ref 19.6–45.3)
MCH RBC QN AUTO: 30.7 PG (ref 26.6–33)
MCHC RBC AUTO-ENTMCNC: 32.6 G/DL (ref 31.5–35.7)
MCV RBC AUTO: 94.2 FL (ref 79–97)
MONOCYTES # BLD AUTO: 1.22 10*3/MM3 (ref 0.1–0.9)
MONOCYTES NFR BLD AUTO: 11.4 % (ref 5–12)
NEUTROPHILS NFR BLD AUTO: 6.79 10*3/MM3 (ref 1.7–7)
NEUTROPHILS NFR BLD AUTO: 63.5 % (ref 42.7–76)
NRBC BLD AUTO-RTO: 0 /100 WBC (ref 0–0.2)
PLATELET # BLD AUTO: 218 10*3/MM3 (ref 140–450)
PMV BLD AUTO: 9.3 FL (ref 6–12)
POTASSIUM SERPL-SCNC: 4 MMOL/L (ref 3.5–5.2)
PROT SERPL-MCNC: 5.3 G/DL (ref 6–8.5)
QT INTERVAL: 395 MS
RBC # BLD AUTO: 3.81 10*6/MM3 (ref 4.14–5.8)
SODIUM SERPL-SCNC: 131 MMOL/L (ref 136–145)
WBC NRBC COR # BLD: 10.7 10*3/MM3 (ref 3.4–10.8)

## 2022-09-28 PROCEDURE — 25010000002 ENOXAPARIN PER 10 MG: Performed by: NURSE PRACTITIONER

## 2022-09-28 PROCEDURE — 63710000001 ONDANSETRON PER 8 MG: Performed by: NURSE PRACTITIONER

## 2022-09-28 PROCEDURE — 94761 N-INVAS EAR/PLS OXIMETRY MLT: CPT

## 2022-09-28 PROCEDURE — 94799 UNLISTED PULMONARY SVC/PX: CPT

## 2022-09-28 PROCEDURE — 71045 X-RAY EXAM CHEST 1 VIEW: CPT

## 2022-09-28 PROCEDURE — 85025 COMPLETE CBC W/AUTO DIFF WBC: CPT | Performed by: HOSPITALIST

## 2022-09-28 PROCEDURE — 80053 COMPREHEN METABOLIC PANEL: CPT | Performed by: HOSPITALIST

## 2022-09-28 PROCEDURE — 94664 DEMO&/EVAL PT USE INHALER: CPT

## 2022-09-28 PROCEDURE — 99232 SBSQ HOSP IP/OBS MODERATE 35: CPT | Performed by: NURSE PRACTITIONER

## 2022-09-28 PROCEDURE — 97110 THERAPEUTIC EXERCISES: CPT

## 2022-09-28 PROCEDURE — 92526 ORAL FUNCTION THERAPY: CPT

## 2022-09-28 RX ORDER — POLYETHYLENE GLYCOL 3350 17 G/17G
17 POWDER, FOR SOLUTION ORAL DAILY
Status: DISCONTINUED | OUTPATIENT
Start: 2022-09-28 | End: 2022-09-29 | Stop reason: HOSPADM

## 2022-09-28 RX ORDER — ONDANSETRON 4 MG/1
4 TABLET, FILM COATED ORAL EVERY 6 HOURS PRN
Status: DISCONTINUED | OUTPATIENT
Start: 2022-09-28 | End: 2022-09-29 | Stop reason: HOSPADM

## 2022-09-28 RX ORDER — DOCUSATE SODIUM 100 MG/1
100 CAPSULE, LIQUID FILLED ORAL 2 TIMES DAILY
Status: DISCONTINUED | OUTPATIENT
Start: 2022-09-28 | End: 2022-09-29 | Stop reason: HOSPADM

## 2022-09-28 RX ORDER — ENOXAPARIN SODIUM 100 MG/ML
40 INJECTION SUBCUTANEOUS EVERY 24 HOURS
Status: DISCONTINUED | OUTPATIENT
Start: 2022-09-28 | End: 2022-09-29 | Stop reason: HOSPADM

## 2022-09-28 RX ADMIN — IPRATROPIUM BROMIDE AND ALBUTEROL SULFATE 3 ML: .5; 2.5 SOLUTION RESPIRATORY (INHALATION) at 20:06

## 2022-09-28 RX ADMIN — SODIUM CHLORIDE SOLN NEBU 7% 4 ML: 7 NEBU SOLN at 20:12

## 2022-09-28 RX ADMIN — POLYETHYLENE GLYCOL 3350 17 G: 17 POWDER, FOR SOLUTION ORAL at 13:43

## 2022-09-28 RX ADMIN — ARFORMOTEROL TARTRATE 15 MCG: 15 SOLUTION RESPIRATORY (INHALATION) at 07:23

## 2022-09-28 RX ADMIN — TOBRAMYCIN OPHTHALMIC SOLUTION 2 DROP: 3 SOLUTION/ DROPS OPHTHALMIC at 13:42

## 2022-09-28 RX ADMIN — CETIRIZINE HYDROCHLORIDE 10 MG: 10 TABLET, FILM COATED ORAL at 20:38

## 2022-09-28 RX ADMIN — MICONAZOLE NITRATE: 2 POWDER TOPICAL at 20:42

## 2022-09-28 RX ADMIN — ENOXAPARIN SODIUM 40 MG: 40 INJECTION SUBCUTANEOUS at 13:42

## 2022-09-28 RX ADMIN — TOBRAMYCIN OPHTHALMIC SOLUTION 2 DROP: 3 SOLUTION/ DROPS OPHTHALMIC at 06:41

## 2022-09-28 RX ADMIN — ONDANSETRON HYDROCHLORIDE 4 MG: 4 TABLET, FILM COATED ORAL at 12:56

## 2022-09-28 RX ADMIN — ARFORMOTEROL TARTRATE 15 MCG: 15 SOLUTION RESPIRATORY (INHALATION) at 20:26

## 2022-09-28 RX ADMIN — IPRATROPIUM BROMIDE AND ALBUTEROL SULFATE 3 ML: .5; 2.5 SOLUTION RESPIRATORY (INHALATION) at 07:22

## 2022-09-28 RX ADMIN — GUAIFENESIN 1200 MG: 600 TABLET, EXTENDED RELEASE ORAL at 20:38

## 2022-09-28 RX ADMIN — TOBRAMYCIN OPHTHALMIC SOLUTION 2 DROP: 3 SOLUTION/ DROPS OPHTHALMIC at 01:11

## 2022-09-28 RX ADMIN — GUAIFENESIN 1200 MG: 600 TABLET, EXTENDED RELEASE ORAL at 09:11

## 2022-09-28 RX ADMIN — MICONAZOLE NITRATE: 2 POWDER TOPICAL at 09:11

## 2022-09-28 RX ADMIN — LEVOTHYROXINE SODIUM 50 MCG: 50 TABLET ORAL at 06:40

## 2022-09-28 RX ADMIN — TOBRAMYCIN OPHTHALMIC SOLUTION 2 DROP: 3 SOLUTION/ DROPS OPHTHALMIC at 18:29

## 2022-09-28 RX ADMIN — DOCUSATE SODIUM 100 MG: 100 CAPSULE, LIQUID FILLED ORAL at 20:38

## 2022-09-28 RX ADMIN — IPRATROPIUM BROMIDE AND ALBUTEROL SULFATE 3 ML: .5; 2.5 SOLUTION RESPIRATORY (INHALATION) at 11:44

## 2022-09-28 RX ADMIN — SODIUM CHLORIDE SOLN NEBU 7% 4 ML: 7 NEBU SOLN at 11:44

## 2022-09-29 ENCOUNTER — APPOINTMENT (OUTPATIENT)
Dept: GENERAL RADIOLOGY | Facility: HOSPITAL | Age: 87
End: 2022-09-29

## 2022-09-29 ENCOUNTER — READMISSION MANAGEMENT (OUTPATIENT)
Dept: CALL CENTER | Facility: HOSPITAL | Age: 87
End: 2022-09-29

## 2022-09-29 VITALS
RESPIRATION RATE: 20 BRPM | SYSTOLIC BLOOD PRESSURE: 121 MMHG | HEART RATE: 71 BPM | WEIGHT: 186.95 LBS | TEMPERATURE: 97.5 F | BODY MASS INDEX: 27.69 KG/M2 | HEIGHT: 69 IN | DIASTOLIC BLOOD PRESSURE: 66 MMHG | OXYGEN SATURATION: 92 %

## 2022-09-29 LAB
ALBUMIN SERPL-MCNC: 2.8 G/DL (ref 3.5–5.2)
ALBUMIN/GLOB SERPL: 1.3 G/DL
ALP SERPL-CCNC: 34 U/L (ref 39–117)
ALT SERPL W P-5'-P-CCNC: 23 U/L (ref 1–41)
ANION GAP SERPL CALCULATED.3IONS-SCNC: 5.5 MMOL/L (ref 5–15)
AST SERPL-CCNC: 17 U/L (ref 1–40)
BASOPHILS # BLD AUTO: 0.02 10*3/MM3 (ref 0–0.2)
BASOPHILS NFR BLD AUTO: 0.2 % (ref 0–1.5)
BILIRUB SERPL-MCNC: 0.3 MG/DL (ref 0–1.2)
BUN SERPL-MCNC: 21 MG/DL (ref 8–23)
BUN/CREAT SERPL: 30 (ref 7–25)
CALCIUM SPEC-SCNC: 8.2 MG/DL (ref 8.6–10.5)
CHLORIDE SERPL-SCNC: 99 MMOL/L (ref 98–107)
CO2 SERPL-SCNC: 28.5 MMOL/L (ref 22–29)
CREAT SERPL-MCNC: 0.7 MG/DL (ref 0.76–1.27)
DEPRECATED RDW RBC AUTO: 51.8 FL (ref 37–54)
EGFRCR SERPLBLD CKD-EPI 2021: 88.1 ML/MIN/1.73
EOSINOPHIL # BLD AUTO: 0.45 10*3/MM3 (ref 0–0.4)
EOSINOPHIL NFR BLD AUTO: 3.9 % (ref 0.3–6.2)
ERYTHROCYTE [DISTWIDTH] IN BLOOD BY AUTOMATED COUNT: 14.8 % (ref 12.3–15.4)
GLOBULIN UR ELPH-MCNC: 2.2 GM/DL
GLUCOSE SERPL-MCNC: 99 MG/DL (ref 65–99)
HCT VFR BLD AUTO: 36.5 % (ref 37.5–51)
HGB BLD-MCNC: 11.7 G/DL (ref 13–17.7)
IMM GRANULOCYTES # BLD AUTO: 0.11 10*3/MM3 (ref 0–0.05)
IMM GRANULOCYTES NFR BLD AUTO: 1 % (ref 0–0.5)
LYMPHOCYTES # BLD AUTO: 2.25 10*3/MM3 (ref 0.7–3.1)
LYMPHOCYTES NFR BLD AUTO: 19.7 % (ref 19.6–45.3)
MCH RBC QN AUTO: 30.6 PG (ref 26.6–33)
MCHC RBC AUTO-ENTMCNC: 32.1 G/DL (ref 31.5–35.7)
MCV RBC AUTO: 95.5 FL (ref 79–97)
MONOCYTES # BLD AUTO: 1.2 10*3/MM3 (ref 0.1–0.9)
MONOCYTES NFR BLD AUTO: 10.5 % (ref 5–12)
NEUTROPHILS NFR BLD AUTO: 64.7 % (ref 42.7–76)
NEUTROPHILS NFR BLD AUTO: 7.4 10*3/MM3 (ref 1.7–7)
NRBC BLD AUTO-RTO: 0 /100 WBC (ref 0–0.2)
PLATELET # BLD AUTO: 224 10*3/MM3 (ref 140–450)
PMV BLD AUTO: 9 FL (ref 6–12)
POTASSIUM SERPL-SCNC: 4.4 MMOL/L (ref 3.5–5.2)
PROCALCITONIN SERPL-MCNC: 0.1 NG/ML (ref 0–0.25)
PROT SERPL-MCNC: 5 G/DL (ref 6–8.5)
RBC # BLD AUTO: 3.82 10*6/MM3 (ref 4.14–5.8)
SARS-COV-2 RNA PNL SPEC NAA+PROBE: NOT DETECTED
SODIUM SERPL-SCNC: 133 MMOL/L (ref 136–145)
WBC NRBC COR # BLD: 11.43 10*3/MM3 (ref 3.4–10.8)

## 2022-09-29 PROCEDURE — 97530 THERAPEUTIC ACTIVITIES: CPT

## 2022-09-29 PROCEDURE — 94664 DEMO&/EVAL PT USE INHALER: CPT

## 2022-09-29 PROCEDURE — 71045 X-RAY EXAM CHEST 1 VIEW: CPT

## 2022-09-29 PROCEDURE — 84145 PROCALCITONIN (PCT): CPT | Performed by: NURSE PRACTITIONER

## 2022-09-29 PROCEDURE — 80053 COMPREHEN METABOLIC PANEL: CPT | Performed by: HOSPITALIST

## 2022-09-29 PROCEDURE — 99239 HOSP IP/OBS DSCHRG MGMT >30: CPT | Performed by: NURSE PRACTITIONER

## 2022-09-29 PROCEDURE — 94799 UNLISTED PULMONARY SVC/PX: CPT

## 2022-09-29 PROCEDURE — 97110 THERAPEUTIC EXERCISES: CPT

## 2022-09-29 PROCEDURE — 85025 COMPLETE CBC W/AUTO DIFF WBC: CPT | Performed by: HOSPITALIST

## 2022-09-29 PROCEDURE — 87635 SARS-COV-2 COVID-19 AMP PRB: CPT | Performed by: NURSE PRACTITIONER

## 2022-09-29 PROCEDURE — 94761 N-INVAS EAR/PLS OXIMETRY MLT: CPT

## 2022-09-29 RX ORDER — LIDOCAINE 50 MG/G
1 PATCH TOPICAL
Status: DISCONTINUED | OUTPATIENT
Start: 2022-09-29 | End: 2022-09-29 | Stop reason: HOSPADM

## 2022-09-29 RX ORDER — ONDANSETRON 4 MG/1
4 TABLET, FILM COATED ORAL EVERY 6 HOURS PRN
Start: 2022-09-29 | End: 2022-11-30

## 2022-09-29 RX ORDER — IPRATROPIUM BROMIDE AND ALBUTEROL SULFATE 2.5; .5 MG/3ML; MG/3ML
3 SOLUTION RESPIRATORY (INHALATION)
Qty: 360 ML
Start: 2022-09-29 | End: 2022-11-30 | Stop reason: SDUPTHER

## 2022-09-29 RX ORDER — PANTOPRAZOLE SODIUM 40 MG/1
40 TABLET, DELAYED RELEASE ORAL DAILY
Start: 2022-09-29

## 2022-09-29 RX ORDER — IPRATROPIUM BROMIDE AND ALBUTEROL SULFATE 2.5; .5 MG/3ML; MG/3ML
3 SOLUTION RESPIRATORY (INHALATION) EVERY 6 HOURS PRN
Qty: 360 ML
Start: 2022-09-29 | End: 2022-12-21

## 2022-09-29 RX ORDER — BISACODYL 5 MG/1
10 TABLET, DELAYED RELEASE ORAL DAILY PRN
Start: 2022-09-29 | End: 2022-11-30

## 2022-09-29 RX ORDER — TOBRAMYCIN 3 MG/ML
2 SOLUTION/ DROPS OPHTHALMIC EVERY 6 HOURS SCHEDULED
Start: 2022-09-29 | End: 2022-11-30

## 2022-09-29 RX ORDER — POLYETHYLENE GLYCOL 3350 17 G/17G
17 POWDER, FOR SOLUTION ORAL DAILY
Start: 2022-09-29 | End: 2022-11-30

## 2022-09-29 RX ORDER — ARFORMOTEROL TARTRATE 15 UG/2ML
15 SOLUTION RESPIRATORY (INHALATION)
Qty: 120 ML
Start: 2022-09-29 | End: 2022-11-30

## 2022-09-29 RX ORDER — POLYVINYL ALCOHOL 14 MG/ML
1 SOLUTION/ DROPS OPHTHALMIC
Start: 2022-09-29 | End: 2022-11-30

## 2022-09-29 RX ORDER — SODIUM CHLORIDE FOR INHALATION 7 %
4 VIAL, NEBULIZER (ML) INHALATION ONCE
Status: COMPLETED | OUTPATIENT
Start: 2022-09-29 | End: 2022-09-29

## 2022-09-29 RX ORDER — PSEUDOEPHEDRINE HCL 30 MG
100 TABLET ORAL 2 TIMES DAILY
Start: 2022-09-29 | End: 2022-11-30

## 2022-09-29 RX ORDER — SODIUM CHLORIDE FOR INHALATION 7 %
4 VIAL, NEBULIZER (ML) INHALATION
Start: 2022-09-29 | End: 2022-11-30

## 2022-09-29 RX ADMIN — ARFORMOTEROL TARTRATE 15 MCG: 15 SOLUTION RESPIRATORY (INHALATION) at 11:31

## 2022-09-29 RX ADMIN — TOBRAMYCIN OPHTHALMIC SOLUTION 2 DROP: 3 SOLUTION/ DROPS OPHTHALMIC at 02:06

## 2022-09-29 RX ADMIN — LEVOTHYROXINE SODIUM 50 MCG: 50 TABLET ORAL at 05:53

## 2022-09-29 RX ADMIN — MICONAZOLE NITRATE: 2 POWDER TOPICAL at 14:00

## 2022-09-29 RX ADMIN — IPRATROPIUM BROMIDE AND ALBUTEROL SULFATE 3 ML: .5; 2.5 SOLUTION RESPIRATORY (INHALATION) at 08:28

## 2022-09-29 RX ADMIN — IPRATROPIUM BROMIDE AND ALBUTEROL SULFATE 3 ML: .5; 2.5 SOLUTION RESPIRATORY (INHALATION) at 11:31

## 2022-09-29 RX ADMIN — SODIUM CHLORIDE SOLN NEBU 7% 4 ML: 7 NEBU SOLN at 13:39

## 2022-09-29 RX ADMIN — SODIUM CHLORIDE SOLN NEBU 7% 4 ML: 7 NEBU SOLN at 08:28

## 2022-09-29 RX ADMIN — TOBRAMYCIN OPHTHALMIC SOLUTION 2 DROP: 3 SOLUTION/ DROPS OPHTHALMIC at 05:54

## 2022-09-29 RX ADMIN — IPRATROPIUM BROMIDE AND ALBUTEROL SULFATE 3 ML: .5; 2.5 SOLUTION RESPIRATORY (INHALATION) at 13:31

## 2022-09-29 RX ADMIN — DOCUSATE SODIUM 100 MG: 100 CAPSULE, LIQUID FILLED ORAL at 13:21

## 2022-09-29 RX ADMIN — GUAIFENESIN 1200 MG: 600 TABLET, EXTENDED RELEASE ORAL at 13:20

## 2022-09-29 RX ADMIN — TOBRAMYCIN OPHTHALMIC SOLUTION 2 DROP: 3 SOLUTION/ DROPS OPHTHALMIC at 13:21

## 2022-09-29 NOTE — OUTREACH NOTE
Prep Survey    Flowsheet Row Responses   [facility] facility patient discharged from? [place]   Is LACE score < 7 ? No   Emergency Room discharge w/ pulse ox? No   Eligibility Not Eligible   What are the reasons patient is not eligible? [facility]  [[facility] ]   Does the patient have one of the following disease processes/diagnoses(primary or secondary)? Pneumonia   Prep survey completed? Yes          IDA CATES - Registered Nurse

## 2022-10-03 NOTE — PAYOR COMM NOTE
"Taras Rocha (89 y.o. Male)     ATTN: NURSE REVIEWER  RE: DISCHARGE NOTIFICATION  REF#[id number]  PLS REPLY TO SALAZ[facility] [phone] FAX# [phone]              Date of Birth   12/31/1932    Social Security Number       Address   [address] [place], [place] [address] [facility] [address]    Home Phone   [phone]    MRN   [id number]       Christianity   Buddhist    Marital Status                               Admission Date   9/18/22    Admission Type   Emergency    Admitting Provider   Ronda Cárdenas DO    Attending Provider       Department, Room/Bed   [facility] MED SURG, 1408/1       Discharge Date   9/29/2022    Discharge Disposition   Home or Self Care    Discharge Destination                               Attending Provider: (none)   Allergies: Bactrim [Sulfamethoxazole-trimethoprim]    Isolation: None   Infection: None   Code Status: Prior   Advance Care Planning Activity    Ht: 175.3 cm (69\")   Wt: 84.8 kg (186 lb 15.2 oz)    Admission Cmt: None   Principal Problem: None                Active Insurance as of 9/18/2022     Primary Coverage     Payor Plan Insurance Group Employer/Plan Group    AETNA MEDICARE REPLACEMENT AETNA MEDICARE REPLACEMENT [id number]     Payor Plan Address Payor Plan Phone Number Payor Plan Fax Number Effective Dates    [address] [phone]  1/1/2020 - None Entered    [facility] [id number]       Subscriber Name Subscriber Birth Date Member ID       TARAS ROCHA 12/31/1932 [id number]                 Emergency Contacts      (Rel.) Home Phone Work Phone Mobile Phone    AshlynDebra (Spouse) -- -- [phone]    Joy Hayden (Daughter) [phone] -- [phone]    Jyotsna Clements (Daughter) [phone] -- [phone]               Discharge Summary      Kimber Vital APRN at 09/29/22 1417     Attestation signed by Ronda Cárdenas DO at 09/29/22 1624    I have reviewed this documentation and " "agree.    Mr. Goldberg is a very high-risk patient. He is 89-years-old and prior to his admission at this facility had planned on being DNR. Strangely, he changed his code status the same night as his aspiration caused him to necessitate intubation. He overall has been doing exceptionally well considering his recent medical problems, but unfortunately is very high risk for readmission and complications.                   Pawel Goldberg  12/31/1932  [id number]    Hospitalists Discharge Summary    Date of Admission: 9/18/2022  Date of Discharge:  9/29/2022    History of Present Illness from [facility] on admit:   \"Patient is an 89-year-old male with multiple comorbidities including history of CVA, CAD, Brito esophagus, asthma, frequent UTIs who presented to the ED earlier today due to worsening weakness for the past couple of weeks.  The patient has fallen multiple times to the point where he has multiple hematomas as well as some broken ribs.  He lives at home with his wife who is 93 years old and she is unable to get him up when he falls.     Patient states that he does not feel weak, but he keeps falling.  He denies feeling any dizziness or losing consciousness, however, the fact that he keeps falling on his face suggest that he may actually be doing so and does not realize it.     In addition to the frequent falls and the weakness, the patient is also been having some new onset headaches.  He has an appointment with a neurologist outpatient but it is not yet time for his appointment and his wife is gotten to the point where she is unable to care for him due to his falls.     The patient does have some left-sided deficits from a previous CVA as well as mild dementia.  He denies any fever, chills, chest pain not related to his rib fractures, nausea, vomiting, shortness of breath.\"     Primary Discharge diagnoses:  Acute hypoxic respiratory failure secondary to acute aspiration pneumonia/mucous plugging: Pulmonary " following  Steroid-induced leukocytosis  Suspected COPD versus asthma   Acute metabolic encephalopathy  Suspected vascular dementia  Generalized weakness    Secondary Discharge Diagnoses:   Irregular heart rhythm  CAD  Hypothyroidism   Multiple left rib fractures  Chronically elevated left hemidiaphragm   Chronic normocytic anemia   Hyponatremia   Artificial urinary sphincter   Suspected ileus     Hospital Course Summary:   The patient was followed in consultation by pulmonary, urology, neurology.  Shortly after admission, he had aspiration event requiring intubation.  He was quickly extubated on 9/21/2022 and aggressive pulmonary toilet and aspiration precautions continued.  Patient mentation has returned to baseline however generalized weakness has continued.  Irregular rhythm was reviewed with cardiology and found to be NSR with PACs.  Extended time in hospital but spent due to precertification for rehab placement.  Participation with PT/OT has been limited but patient is motivated however remains fatigued most of the time.  Oxygen requirement increased today with CXR showing no new findings and in fact some improvement.  Continue to wean oxygen as tolerated.  Discharged to [facility] for rehab  Follow-up pulmonary 2 weeks  Follow-up urology 4 weeks  Follow-up PCP 1 week after discharge from facility    PCP  Patient Care Team:  Charlie Trinh MD as PCP - General (Family Medicine)  Hardik Senior MD as Consulting Physician (Urology)  MAURO Peters MD as Consulting Physician (Ophthalmology)  Brant Polanco MD as Consulting Physician (Pulmonary Disease)  Charlie Goldstein DO as Consulting Physician (Neurology)    Consults:   Consults     Date and Time Order Name Status Description    9/20/2022  6:02 AM Inpatient Urology Consult Completed     9/19/2022  3:47 AM Inpatient Pulmonology Consult Completed     9/18/2022  7:40 PM Inpatient Neurology Consult General Completed         Operations and Procedures  Performed:    09/27 0752 Walking Oximetry  EEG    Result Date: 9/20/2022  Narrative: Reason for the study jerking left leg history of stroke Length of the study.  26 minutes 27 seconds The patient's background rhythm consisted of a moderately well-regulated 5 to 6 Hz activity throughout the recording there is some beta artifact noted.  Nonetheless, there were no gross interhemispheric asymmetries.  No areas of consistent focal slowing and no epileptiform discharges seen at any time.  Patient was drowsy and asleep during this tracing. Impression this EEG reflects significant generalized slowing in the drowsy and sleep states.  This is consistent with a metabolic encephalopathy and a drowsy patient. Otherwise there is no evidence to support any acute focal cortical irritability nor epileptiform potential. Teo Grullon and    Walking Oximetry    Result Date: 9/27/2022  Narrative: Karyna Araiza, RRT     9/27/2022  7:53 AM Pt unable to do a 6 min walk study at this time. It took 3 people to stand the pt to get in the chair.    CT Head Without Contrast    Result Date: 9/19/2022  Narrative: CT Head WO HISTORY: Fall with head injury prior to arrival TECHNIQUE: Axial unenhanced head CT. Radiation dose reduction techniques included automated exposure control or exposure modulation based on body size. Count of known CT and cardiac nuc med studies performed in previous 12 months: 1. Time of scan: 4:18 AM COMPARISON: 9/18/2022 FINDINGS: No intracranial hemorrhage, mass, or infarct. No hydrocephalus or extra-axial fluid collection. There are senescent changes, including volume loss and nonspecific white matter change, but no acute abnormality is seen. The skull base, calvarium, and extracranial soft tissues are normal.     Impression: Senescent changes without acute abnormality. Signer Name: Vazquez Monet MD  Signed: 9/19/2022 4:34 AM  Workstation Name: [facility]  [facility]    CT Head Without  Contrast    Result Date: 9/18/2022  Narrative: CT HEAD, NONCONTRAST, 9/18/2022 HISTORY: 89-year-old male in the ED after a fall 3 days ago. Struck left side of head. Many recent falls. TECHNIQUE: CT imaging of the head without IV contrast. Radiation dose reduction techniques included automated exposure control. Radiation audit for CT and nuclear cardiology exams in the last 12 months: 0. FINDINGS: Images through the skull base and lower brain are degraded by patient motion artifact. No evidence of acute intracranial injury. No visible skull fracture. Moderate generalized age-appropriate cerebral volume loss. Advanced diffuse chronic small vessel type white matter changes, nonspecific. Old lacunar infarct in the midline manjit. 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 Large benign arachnoid cyst in the midline posterior fossa measuring 6.5 x 3.2 x 5.9 cm. No evidence of acute intracranial hemorrhage. No mass effect, cerebral edema or suspicious extra-axial fluid collection. At least partial opacification of ethmoid sinuses.     Impression: 1.  Motion limited examination. 2.  No gross evidence of acute intracranial injury. No skull fracture. 3.  Diffuse chronic changes as noted above. 4.  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 Large midline posterior fossa benign arachnoid cyst. Signer Name: Jose De Jesus Hayes MD  Signed: 9/18/2022 12:48 PM  Workstation Name: [email or web address]  [facility]    CT Chest Without Contrast Diagnostic    Result Date: 9/18/2022  Narrative: CT CHEST, NONCONTRAST, 9/18/2022 HISTORY: 89-year-old male in the ED after a fall 3 days prior. Head injury. Left-sided rib pain. Reportedly multiple falls recently. TECHNIQUE: CT imaging of the chest without IV contrast. Radiation dose reduction techniques included automated exposure control. Radiation audit for CT and nuclear cardiology exams in the last 12 months: 0. FINDINGS: There are acute, nondisplaced  fractures of the anterior left 6th and 7th ribs with overlying body wall soft tissue contusion. No additional acute fracture of ribs, sternum or thoracic spine. There is no pneumothorax, visible pulmonary contusion or pleural effusion. Lung images show severe diffuse pulmonary emphysema with fibrosis and atelectasis in the lung bases. Trachea and central bronchi are patent. Tortuous thoracic aorta and aortic arch vessels are normal in caliber and contour. There is no mediastinal hematoma or other intrathoracic fluid collection. Atheromatous coronary artery calcification. Limited images through the uppermost abdomen show no active disease. Probable cholelithiasis. No evidence of acute solid organ injury involving liver, pancreas, spleen or kidneys.     Impression: 1.  Acute nondisplaced fractures of the anterior left 6th and 7th ribs. 2.  No evidence of acute traumatic injury within the chest or upper abdomen. 3.  Severe pulmonary emphysema. Signer Name: Jose De Jesus Hayes MD  Signed: 9/18/2022 12:55 PM  Workstation Name: [facility]-  [facility]    XR Chest 1 View    Result Date: 9/29/2022  Narrative: XR CHEST 1 VW-: 9/29/2022 2:13 PM  INDICATION: Atelectasis and pneumonia. Follow-up. Weakness 1 week following a fall. Worsening shortness of air today  COMPARISON:  09/28/2022.  FINDINGS: Single Portable AP view(s) of the chest. There is rotation to the right. Cardiac silhouette is stable with a tortuous and ectatic aorta demonstrating atherosclerotic change. Slight improvement in lung volumes. There is no pneumothorax effusion or dense consolidation. Perihilar atelectasis/scarring. Gaseous distention of the stomach and upper abdominal small bowel.      Impression:  1. Improvement in lung aeration. No new consolidation or effusion. No other significant change.  This report was finalized on 9/29/2022 2:19 PM by Dr. Arun Walker MD.      XR Chest 1 View    Result Date: 9/28/2022  Narrative: XR  CHEST 1 VW-: 9/28/2022 8:00 AM  INDICATION: Follow-up of atelectasis and pneumonia. Persistent weakness 1 week after a fall.  COMPARISON:  09/24/2022.  FINDINGS: Single Portable AP view(s) of the chest. There is rotation to the right. The thoracic aorta is tortuous and ectatic and atherosclerotic. Shallow lung expansion with low lung volumes and bronchovascular crowding. There is no pneumothorax. Probable combination of bibasilar atelectasis/scarring. Postop changes left shoulder.      Impression:  1. Shallow lung expansion with bronchovascular crowding and probable bibasilar atelectasis. No pneumothorax.  This report was finalized on 9/28/2022 8:39 AM by Dr. Arun Walker MD.      XR Chest 1 View    Result Date: 9/24/2022  Narrative: CR Chest 1 Vw INDICATION: Follow-up pneumonia COMPARISON:  9/21/2022 FINDINGS: Portable AP view(s) of the chest. The endotracheal tube has been removed. The feeding tube is been removed. There is mild bibasilar atelectasis with elevation left hemidiaphragm. This has improved. Heart size is normal.     Impression: Decreasing bibasilar atelectasis. Endotracheal tube and Dobbhoff tube have been removed Signer Name: Andrew Mccall MD  Signed: 9/24/2022 6:04 AM  Workstation Name: [employer]  Radiology [facility]    XR Chest 1 View    Result Date: 9/21/2022  Narrative: CR Chest 1 Vw INDICATION: Respiratory failure COMPARISON:  9/20/2022 FINDINGS: Portable AP view(s) of the chest. Support lines and tubes are unchanged. The heart and mediastinum are stable. Patchy infiltrate at the right lung base is unchanged. Infiltrate at the left lung base as well increased slightly. The upper lung fields remain clear. Pulmonary vascular markings are within normal limits.     Impression: There is increased infiltrate at the left lung base since the previous examination. No other changes are seen. Signer Name: Vazquez Monet MD  Signed: 9/21/2022 5:51 AM  Workstation Name: [employer]  Radiology  [facility]    XR Chest 1 View    Result Date: 9/20/2022  Narrative: CR Chest 1 Vw INDICATION: Respiratory failure COMPARISON:  9/19/2022 FINDINGS: Portable AP view(s) of the chest. Support lines and tubes are unchanged. Heart and mediastinum are stable. Increased lung markings are seen at the right base. This is unchanged and is in degree of inspiration are taken into account. No new infiltrates are seen on either side. Vascular markings are normal.     Impression: Shallower inspiratory effort compared to the previous exam. Taking this into account, right basilar infiltrates are unchanged. Signer Name: Vazquez Monet MD  Signed: 9/20/2022 5:13 AM  Workstation Name: [facility]  [facility]    XR Chest 1 View    Result Date: 9/19/2022  Narrative: CR Chest 1 Vw INDICATION: Dobbhoff tube placement COMPARISON:  9/19/2022 at 3:25 AM FINDINGS: Portable AP view(s) of the chest. The Dobbhoff tube is in satisfactory position coiled in the mid stomach. The heart lungs and mediastinum are unchanged.     Impression: The Dobbhoff tube is now in satisfactory position. Signer Name: Vazquez Monet MD  Signed: 9/19/2022 4:29 AM  Workstation Name: [facility]  Radiology [facility]    XR Chest 1 View    Result Date: 9/19/2022  Narrative: CR Chest 1 Vw INDICATION: Feeding tube placement COMPARISON:  9/19/2022 at 2:53 AM FINDINGS: Portable AP view(s) of the chest. A flexible feeding tube tip is seen coiled over the neck. The tube should be withdrawn and reinsertion attempted. The endotracheal tube remains in good position. Gastric distention and right-sided infiltrates are unchanged.     Impression: The Dobbhoff tube is incorrectly positioned. The tip is seen over the right side of the neck. Signer Name: Vazquez Monet MD  Signed: 9/19/2022 4:03 AM  Workstation Name: [facility]  Radiology [facility]    XR Chest 1 View    Result Date: 9/19/2022  Narrative: CR Chest 1 Vw  INDICATION: Hypoxia COMPARISON:  Chest CT from 9/18/2022 FINDINGS: Portable AP view(s) of the chest. An endotracheal tube is seen with the tip in good position above the henrik. The heart and mediastinum are unchanged from the CT yesterday. The stomach is quite distended and this is new. There is patchy infiltrate in the right mid to lower lung field. Given the history of vomiting this is suspicious for aspiration pneumonia. No new infiltrates are seen on the left.     Impression: There are patchy new infiltrates in the right mid to lower lung field since the CT scan yesterday. There is gastric distention that is new. The endotracheal tube is in satisfactory position. Signer Name: Vazquez Monet MD  Signed: 9/19/2022 3:51 AM  Workstation Name: [email or web address]-  [facility] [place]    XR Abdomen KUB    Result Date: 9/22/2022  Narrative: CR Abdomen 1 Vw INDICATION: Follow-up ileus. COMPARISON: Plain films 9/19/2022. FINDINGS: AP radiograph of the abdomen. 3 supinefilm(s) There is moderate to large amount of gas within large and small bowel loops. Colonic gas seen to the level the rectum. There are surgical clips in the pelvis and there is been previous hernia repair. Several of the small bowel loops are mildly distended best appreciated in the left abdomen. This could be due to ileus but early or partial small bowel obstruction is not excluded. On comparison to the prior, the appearance is worse. Consider correlation with supine and erect view to evaluate for fluid level or free air.     Impression: 1. Probably interval worsening in the appearance of the bowel with moderate to large amount of gas in the colon and small bowel. There is colonic gas to the level the rectum. There is also mild disproportionate distention of small bowel loops in the left-sided abdomen. This appears to be worsening. Findings could be due to worsening ileus but early or partial small bowel obstruction is in the differential.  Suggest correlation with supine and erect views of the abdomen/follow-up Signer Name: Michelle Slaughter MD  Signed: 2022 7:02 AM  Workstation Name: [facility]  [facility]    XR Abdomen KUB    Result Date: 2022  Narrative: CR Abdomen 1 Vw INDICATION: Vomiting COMPARISON: None available FINDINGS: AP radiograph(s) of the abdomen. The renal shadows are symmetric. No renal calculi. No bladder calculi. There is moderate gaseous distention of small and large bowel loops with more severe distention of the stomach. The pattern is nonobstructive. No acute osseous abnormalities. Postoperative changes are seen in the anterior abdominal wall consistent with hernia repair.     Impression: Nonspecific gaseous distention of stomach and bowel. Nonobstructive pattern. This could reflect a generalized ileus. Signer Name: Vazquez Monet MD  Signed: 2022 12:57 AM  Workstation Name: [facility]  [facility]    XR Knee 3 Vws RT    Result Date: 2022  Narrative: REVIEWING YOUR TEST RESULTS IN [facility] IS NOT A SUBSTITUTE FOR DISCUSSING THOSE RESULTS WITH YOUR HEALTH CARE PROVIDER. PLEASE CONTACT YOUR PROVIDER VIA [facility] TO DISCUSS ANY QUESTIONS OR CONCERNS YOU MAY HAVE REGARDING THESE TEST RESULTS.  RADIOLOGY REPORT FACILITY[facility] UNIT/AGE/GENDER: J.ED  ER      AGE:89 Y          SEX:M PATIENT NAME/:  TARAS ROCHA DONALD    1932 UNIT NUMBER:  [id number] ACCOUNT NUMBER:  [id number] ACCESSION NUMBER:  [id number] AP CHEST, SINGLE VIEW RIGHT KNEE, 3 VIEWS LEFT KNEE, 3 VIEWS DATE: 2022 COMPARISON: None available INDICATION: fall, chest wall pain L lateral. Right knee pain. Left knee pain. ER patient AP CHEST: IMPRESSION: Low lung volumes with elevated left diaphragm and mild left basilar atelectasis. No pneumothorax or large pleural effusion. Heart size is normal. No gross acute bone  abnormalities. RIGHT KNEE: IMPRESSION: Mild-to-moderate degenerative joint disease of the medial and patellofemoral compartments of the right knee without acute fracture, dislocation, nor joint effusion. Calcific atherosclerosis. LEFT KNEE: IMPRESSION: Moderate to severe degenerative joint disease of the medial compartment of the left knee with near bone-on-bone articulation. Milder degenerative changes in the patellofemoral compartment. Small joint effusion. Negative for acute fracture and dislocation. Calcific atherosclerosis. Dictated by: Gopi Shane M.D. Images and Report reviewed and interpreted by: Gopi Shane M.D. <PS><Electronically signed by: Gopi Shane M.D.> 2022 D: 2022 T: 2022    XR Knee 3 Vws LT    Result Date: 2022  Narrative: REVIEWING YOUR TEST RESULTS IN [facility] IS NOT A SUBSTITUTE FOR DISCUSSING THOSE RESULTS WITH YOUR HEALTH CARE PROVIDER. PLEASE CONTACT YOUR PROVIDER VIA [facility] TO DISCUSS ANY QUESTIONS OR CONCERNS YOU MAY HAVE REGARDING THESE TEST RESULTS.  RADIOLOGY REPORT FACILITY:  [facility] UNIT/AGE/GENDER: J.ED  ER      AGE:89 Y          SEX:M PATIENT NAME/:  TARAS ROCHA DONALD    1932 UNIT NUMBER:  [id number] ACCOUNT NUMBER:  [id number] ACCESSION NUMBER:  [id number] AP CHEST, SINGLE VIEW RIGHT KNEE, 3 VIEWS LEFT KNEE, 3 VIEWS DATE: 2022 COMPARISON: None available INDICATION: fall, chest wall pain L lateral. Right knee pain. Left knee pain. ER patient AP CHEST: IMPRESSION: Low lung volumes with elevated left diaphragm and mild left basilar atelectasis. No pneumothorax or large pleural effusion. Heart size is normal. No gross acute bone abnormalities. RIGHT KNEE: IMPRESSION: Mild-to-moderate degenerative joint disease of the medial and patellofemoral compartments of the right knee without acute fracture, dislocation, nor joint effusion. Calcific  atherosclerosis. LEFT KNEE: IMPRESSION: Moderate to severe degenerative joint disease of the medial compartment of the left knee with near bone-on-bone articulation. Milder degenerative changes in the patellofemoral compartment. Small joint effusion. Negative for acute fracture and dislocation. Calcific atherosclerosis. Dictated by: Gopi Shane M.D. Images and Report reviewed and interpreted by: Gopi Shane M.D. <PS><Electronically signed by: Gopi Shane M.D.> 2022 D: 2022 T: 2022    XR Chest 1 Vw    Result Date: 2022  Narrative: REVIEWING YOUR TEST RESULTS IN [facility] IS NOT A SUBSTITUTE FOR DISCUSSING THOSE RESULTS WITH YOUR HEALTH CARE PROVIDER. PLEASE CONTACT YOUR PROVIDER VIA [employer] TO DISCUSS ANY QUESTIONS OR CONCERNS YOU MAY HAVE REGARDING THESE TEST RESULTS.  RADIOLOGY REPORT FACILITY:  [facility] UNIT/AGE/GENDER: J.ED  ER      AGE:89 Y          SEX:M PATIENT NAME/:  TARAS ROCHA DONALD    1932 UNIT NUMBER:  [id number] ACCOUNT NUMBER:  [id number] ACCESSION NUMBER:  [id number] AP CHEST, SINGLE VIEW RIGHT KNEE, 3 VIEWS LEFT KNEE, 3 VIEWS DATE: 2022 COMPARISON: None available INDICATION: fall, chest wall pain L lateral. Right knee pain. Left knee pain. ER patient AP CHEST: IMPRESSION: Low lung volumes with elevated left diaphragm and mild left basilar atelectasis. No pneumothorax or large pleural effusion. Heart size is normal. No gross acute bone abnormalities. RIGHT KNEE: IMPRESSION: Mild-to-moderate degenerative joint disease of the medial and patellofemoral compartments of the right knee without acute fracture, dislocation, nor joint effusion. Calcific atherosclerosis. LEFT KNEE: IMPRESSION: Moderate to severe degenerative joint disease of the medial compartment of the left knee with near bone-on-bone articulation. Milder degenerative changes in the patellofemoral  compartment. Small joint effusion. Negative for acute fracture and dislocation. Calcific atherosclerosis. Dictated by: Gopi Shane M.D. Images and Report reviewed and interpreted by: Gopi Shane M.D. <PS><Electronically signed by: Gopi Shane M.D.> 2022 D: 2022 T: 2022    CT Head Wo Contrast    Result Date: 2022  Narrative: REVIEWING YOUR TEST RESULTS IN [facility] IS NOT A SUBSTITUTE FOR DISCUSSING THOSE RESULTS WITH YOUR HEALTH CARE PROVIDER. PLEASE CONTACT YOUR PROVIDER VIA [facility] TO DISCUSS ANY QUESTIONS OR CONCERNS YOU MAY HAVE REGARDING THESE TEST RESULTS.  RADIOLOGY REPORT FACILITY:  [facility] UNIT/AGE/GENDER: J.ED  ER      AGE:89 Y          SEX:M PATIENT NAME/:  TARAS ROCHA DONALD    1932 UNIT NUMBER:  [id number] ACCOUNT NUMBER:  [id number] ACCESSION NUMBER:  [id number] EXAMINATION: 1. CT OF THE HEAD WITHOUT CONTRAST. 2. CT OF THE CERVICAL SPINE WITHOUT CONTRAST 3. CT OF THE FACIAL BONES WITHOUT CONTRAST DATE: 2022 COMPARISON: CT of the head, cervical spine, and facial bones 2021 INDICATION: Neck trauma (Age >= 65y). Fall. Left facial injuries. Left facial swelling. Head trauma, minor. 89-year-old male in the emergency department. History of dementia. Anticoagulated on Plavix TECHNIQUE: CT images of the head, cervical spine, and facial bones were obtained without use of intravenous contrast.  Radiation dose reduction techniques were utilized per ALARA protocol. CT OF THE HEAD: FINDINGS: Age-related atrophy of the brain without midline shift, new major mass effect, or acute hydrocephalus. Negative for intracranial hemorrhage. 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 Chronic areas of encephalomalacia in the frontal lobes either from old trauma or prior infarcts. Other chronic small vessel ischemic changes of the bilateral  cerebral white matter, similar to prior. Negative for skull fracture. Diffuse opacity of the ethmoid sinuses. Mastoid air cells are clear. IMPRESSION: Negative for acute intracranial abnormality. No significant change from July 2021 CT OF THE CERVICAL SPINE: FINDINGS: Multilevel degenerative disc disease and facet arthropathy of the cervical spine, similar to July 2021. Left-sided facet fusion at C2-C3 and C3-C4. No prevertebral soft tissue swelling. Straightened cervical lordosis, chronic and unchanged. Negative for acute fracture. Multilevel mild central canal stenosis, greatest at C5-C6 on the right due to posterior disc osteophyte complex. Multilevel mild bilateral neural foraminal narrowing. Moderate right-sided neural foraminal narrowing at C5-C6. 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 CT OF THE FACIAL BONES: FINDINGS: Negative for facial bone fracture. Preseptal left periorbital soft tissue swelling. No retro-orbital hematomas. Additional soft tissue contusion and hematoma of the left cheek. Mucosal thickening and fluid of the ethmoid sinuses and frontal sinuses. Mucosal thickening in the left maxillary sinus and bilateral sphenoid sinuses. Mastoid air cells are clear. IMPRESSION: 1. Soft tissue contusions/hematoma of the left cheek and preseptal left periorbital soft tissues without acute facial bone fracture. No retro-orbital hematoma. 2. Sinus disease with possible acute sinusitis of the ethmoid and frontal sinuses. Dictated by: Gopi Shane M.D. Images and Report reviewed and interpreted by: Gopi Shane M.D. <PS><Electronically signed by: Gopi Shane M.D.> 09/16/2022 2129 D: 09/16/2022 2120 T: 09/16/2022 2120    CT Cervical Spine Wo Contrast    Result Date: 9/16/2022  Narrative: REVIEWING YOUR TEST RESULTS IN [facility] IS NOT A SUBSTITUTE FOR DISCUSSING THOSE RESULTS WITH YOUR HEALTH CARE PROVIDER. PLEASE  CONTACT YOUR PROVIDER VIA [facility] TO DISCUSS ANY QUESTIONS OR CONCERNS YOU MAY HAVE REGARDING THESE TEST RESULTS.  RADIOLOGY REPORT FACILITY:  [facility] UNIT/AGE/GENDER: J.ED  ER      AGE:89 Y          SEX:M PATIENT NAME/:  TARAS ROCHA DONALD    1932 UNIT NUMBER:  [id number] ACCOUNT NUMBER:  [id number] ACCESSION NUMBER:  [id number] EXAMINATION: 1. CT OF THE HEAD WITHOUT CONTRAST. 2. CT OF THE CERVICAL SPINE WITHOUT CONTRAST 3. CT OF THE FACIAL BONES WITHOUT CONTRAST DATE: 2022 COMPARISON: CT of the head, cervical spine, and facial bones 2021 INDICATION: Neck trauma (Age >= 65y). Fall. Left facial injuries. Left facial swelling. Head trauma, minor. 89-year-old male in the emergency department. History of dementia. Anticoagulated on Plavix TECHNIQUE: CT images of the head, cervical spine, and facial bones were obtained without use of intravenous contrast.  Radiation dose reduction techniques were utilized per ALARA protocol. CT OF THE HEAD: FINDINGS: Age-related atrophy of the brain without midline shift, new major mass effect, or acute hydrocephalus. Negative for intracranial hemorrhage. 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 Chronic areas of encephalomalacia in the frontal lobes either from old trauma or prior infarcts. Other chronic small vessel ischemic changes of the bilateral cerebral white matter, similar to prior. Negative for skull fracture. Diffuse opacity of the ethmoid sinuses. Mastoid air cells are clear. IMPRESSION: Negative for acute intracranial abnormality. No significant change from 2021 CT OF THE CERVICAL SPINE: FINDINGS: Multilevel degenerative disc disease and facet arthropathy of the cervical spine, similar to 2021. Left-sided facet fusion at C2-C3 and C3-C4. No prevertebral soft tissue swelling. Straightened cervical lordosis, chronic and unchanged. Negative for  acute fracture. Multilevel mild central canal stenosis, greatest at C5-C6 on the right due to posterior disc osteophyte complex. Multilevel mild bilateral neural foraminal narrowing. Moderate right-sided neural foraminal narrowing at C5-C6. 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 CT OF THE FACIAL BONES: FINDINGS: Negative for facial bone fracture. Preseptal left periorbital soft tissue swelling. No retro-orbital hematomas. Additional soft tissue contusion and hematoma of the left cheek. Mucosal thickening and fluid of the ethmoid sinuses and frontal sinuses. Mucosal thickening in the left maxillary sinus and bilateral sphenoid sinuses. Mastoid air cells are clear. IMPRESSION: 1. Soft tissue contusions/hematoma of the left cheek and preseptal left periorbital soft tissues without acute facial bone fracture. No retro-orbital hematoma. 2. Sinus disease with possible acute sinusitis of the ethmoid and frontal sinuses. Dictated by: Gopi Shane M.D. Images and Report reviewed and interpreted by: Gopi Sahne M.D. <PS><Electronically signed by: Gopi Shane M.D.> 2022 D: 2022 T: 2022    CT Facial Bones Wo Contrast    Result Date: 2022  Narrative: REVIEWING YOUR TEST RESULTS IN [facility] IS NOT A SUBSTITUTE FOR DISCUSSING THOSE RESULTS WITH YOUR HEALTH CARE PROVIDER. P[facility]TA[facility] YOUR PROVIDER [facility] TO DISCUSS ANY QUESTIONS OR CONCERNS YOU MAY HAVE REGARDING THESE TEST RESULTS.  RADIOLOGY REPORT FACILITY:  [facility] UNIT/AGE/GENDER: J.ED  ER      AGE:89 Y          SEX:M PATIENT NAME/:  TARAS ROCHA DONALD    1932 UNIT NUMBER:  [id number] ACCOUNT NUMBER:  [id number] ACCESSION NUMBER:  [id number] EXAMINATION: 1. CT OF THE HEAD WITHOUT CONTRAST. 2. CT OF THE CERVICAL SPINE WITHOUT CONTRAST 3. CT OF THE FACIAL BONES  WITHOUT CONTRAST DATE: 09/16/2022 COMPARISON: CT of the head, cervical spine, and facial bones July 8, 2021 INDICATION: Neck trauma (Age >= 65y). Fall. Left facial injuries. Left facial swelling. Head trauma, minor. 89-year-old male in the emergency department. History of dementia. Anticoagulated on Plavix TECHNIQUE: CT images of the head, cervical spine, and facial bones were obtained without use of intravenous contrast.  Radiation dose reduction techniques were utilized per ALARA protocol. CT OF THE HEAD: FINDINGS: Age-related atrophy of the brain without midline shift, new major mass effect, or acute hydrocephalus. Negative for intracranial hemorrhage. 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 Chronic areas of encephalomalacia in the frontal lobes either from old trauma or prior infarcts. Other chronic small vessel ischemic changes of the bilateral cerebral white matter, similar to prior. Negative for skull fracture. Diffuse opacity of the ethmoid sinuses. Mastoid air cells are clear. IMPRESSION: Negative for acute intracranial abnormality. No significant change from July 2021 CT OF THE CERVICAL SPINE: FINDINGS: Multilevel degenerative disc disease and facet arthropathy of the cervical spine, similar to July 2021. Left-sided facet fusion at C2-C3 and C3-C4. No prevertebral soft tissue swelling. Straightened cervical lordosis, chronic and unchanged. Negative for acute fracture. Multilevel mild central canal stenosis, greatest at C5-C6 on the right due to posterior disc osteophyte complex. Multilevel mild bilateral neural foraminal narrowing. Moderate right-sided neural foraminal narrowing at C5-C6. 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 CT OF THE FACIAL BONES: FINDINGS: Negative for facial bone fracture. Preseptal left periorbital soft tissue swelling. No retro-orbital hematomas. Additional soft tissue  contusion and hematoma of the left cheek. Mucosal thickening and fluid of the ethmoid sinuses and frontal sinuses. Mucosal thickening in the left maxillary sinus and bilateral sphenoid sinuses. Mastoid air cells are clear. IMPRESSION: 1. Soft tissue contusions/hematoma of the left cheek and preseptal left periorbital soft tissues without acute facial bone fracture. No retro-orbital hematoma. 2. Sinus disease with possible acute sinusitis of the ethmoid and frontal sinuses. Dictated by: Gopi Shane M.D. Images and Report reviewed and interpreted by: Gopi Shane M.D. <PS><Electronically signed by: Gopi Shane M.D.> 09/16/2022 2129 D: 09/16/2022 2120 T: 09/16/2022 2120    Allergies:  is allergic to bactrim [sulfamethoxazole-trimethoprim].    Luis Manuel  No medications noted per report, reviewed by me    Discharge Medications:     Discharge Medications      New Medications      Instructions Start Date   arformoterol 15 MCG/2ML nebulizer solution  Commonly known as: BROVANA   15 mcg, Nebulization, 2 Times Daily - RT      bisacodyl 5 MG EC tablet  Commonly known as: DULCOLAX   10 mg, Oral, Daily PRN      docusate sodium 100 MG capsule   100 mg, Oral, 2 Times Daily      ipratropium-albuterol 0.5-2.5 mg/3 ml nebulizer  Commonly known as: DUO-NEB   3 mL, Nebulization, Every 6 Hours PRN      ipratropium-albuterol 0.5-2.5 mg/3 ml nebulizer  Commonly known as: DUO-NEB   3 mL, Nebulization, 4 Times Daily - RT      miconazole 2 % powder  Commonly known as: MICOTIN   Topical, Every 12 Hours Scheduled      ondansetron 4 MG tablet  Commonly known as: ZOFRAN   4 mg, Oral, Every 6 Hours PRN      polyethylene glycol 17 g packet  Commonly known as: MIRALAX   17 g, Oral, Daily      polyvinyl alcohol 1.4 % ophthalmic solution  Commonly known as: LIQUIFILM   1 drop, Both Eyes, Every 1 Hour PRN      sodium chloride 7 % nebulizer solution nebulizer solution   4 mL, Nebulization, 2 Times Daily - RT      tobramycin 0.3 % solution  ophthalmic solution   2 drops, Both Eyes, Every 6 Hours Scheduled         Continue These Medications      Instructions Start Date   albuterol sulfate  (90 Base) MCG/ACT inhaler  Commonly known as: Ventolin HFA   2 puffs, Inhalation, Every 4 Hours PRN      atorvastatin 20 MG tablet  Commonly known as: LIPITOR   TAKE 1 TABLET DAILY FOR CEREBROVASCULAR ACCIDENT OR STROKE      clopidogrel 75 MG tablet  Commonly known as: PLAVIX   TAKE 1 TABLET DAILY      CRANBERRY PO   1 tablet, Oral, As Needed      levothyroxine 50 MCG tablet  Commonly known as: SYNTHROID, LEVOTHROID   50 mcg, Oral, Daily      Melatonin 10 MG tablet   Oral, Nightly      MUCUS RELIEF PO   1 tablet, Oral, As Needed      multivitamin with minerals tablet tablet   Oral      pantoprazole 40 MG EC tablet  Commonly known as: Protonix   40 mg, Oral, Daily      PROBIOTIC COLON SUPPORT PO   Oral      ZYRTEC PO   1 tablet, Oral, As Needed         Stop These Medications    Gemtesa 75 MG tablet  Generic drug: Vibegron     levoFLOXacin 250 MG tablet  Commonly known as: Levaquin     rivastigmine 9.5 MG/24HR patch  Commonly known as: EXELON     tiotropium bromide-olodaterol 2.5-2.5 MCG/ACT aerosol solution inhaler  Commonly known as: STIOLTO RESPIMAT     Trelegy Ellipta 100-62.5-25 MCG/INH inhaler  Generic drug: Fluticasone-Umeclidin-Vilant            Last Lab Results:   Lab Results (most recent)     Procedure Component Value Units Date/Time    COVID PRE-OP / PRE-PROCEDURE SCREENING ORDER (NO ISOLATION) - Swab, Nasal Cavity [[id number]] Collected: 09/29/22 1406    Specimen: Swab from Nasal Cavity Updated: 09/29/22 1414    Narrative:      The following orders were created for panel order COVID PRE-OP / PRE-PROCEDURE SCREENING ORDER (NO ISOLATION) - Swab, Nasal Cavity.  Procedure                               Abnormality         Status                     ---------                               -----------         ------                     COVID-19,[facility]  IN-STIVEN...[[id number]]                      In process                   Please view results for these tests on the individual orders.    COVID-19,[facility] IN-HOUSE,Nasal Swab No Transport Media 3-4 HR TAT - Swab, Nasal Cavity [[id number]] Collected: 09/29/22 1406    Specimen: Swab from Nasal Cavity Updated: 09/29/22 1414    Procalcitonin [[id number]] Collected: 09/29/22 0415    Specimen: Blood Updated: 09/29/22 1407    Comprehensive Metabolic Panel [[id number]]  (Abnormal) Collected: 09/29/22 0415    Specimen: Blood Updated: 09/29/22 0438     Glucose 99 mg/dL      BUN 21 mg/dL      Creatinine 0.70 mg/dL      Sodium 133 mmol/L      Potassium 4.4 mmol/L      Chloride 99 mmol/L      CO2 28.5 mmol/L      Calcium 8.2 mg/dL      Total Protein 5.0 g/dL      Albumin 2.80 g/dL      ALT (SGPT) 23 U/L      AST (SGOT) 17 U/L      Alkaline Phosphatase 34 U/L      Total Bilirubin 0.3 mg/dL      Globulin 2.2 gm/dL      A/G Ratio 1.3 g/dL      BUN/Creatinine Ratio 30.0     Anion Gap 5.5 mmol/L      eGFR 88.1 mL/min/1.73      Comment: National Kidney Foundation and American Society of Nephrology (ASN) Task Force recommended calculation based on the Chronic Kidney Disease Epidemiology Collaboration (CKD-EPI) equation refit without adjustment for race.       Narrative:      GFR Normal >60  Chronic Kidney Disease <60  Kidney Failure <15      CBC & Differential [[id number]]  (Abnormal) Collected: 09/29/22 0415    Specimen: Blood Updated: 09/29/22 0421    Narrative:      The following orders were created for panel order CBC & Differential.  Procedure                               Abnormality         Status                     ---------                               -----------         ------                     CBC Auto Differential[[id number]]        Abnormal            Final result                 Please view results for these tests on the individual orders.    CBC Auto Differential [[id number]]  (Abnormal) Collected: 09/29/22 0415     Specimen: Blood Updated: 09/29/22 0421     WBC 11.43 10*3/mm3      RBC 3.82 10*6/mm3      Hemoglobin 11.7 g/dL      Hematocrit 36.5 %      MCV 95.5 fL      MCH 30.6 pg      MCHC 32.1 g/dL      RDW 14.8 %      RDW-SD 51.8 fl      MPV 9.0 fL      Platelets 224 10*3/mm3      Neutrophil % 64.7 %      Lymphocyte % 19.7 %      Monocyte % 10.5 %      Eosinophil % 3.9 %      Basophil % 0.2 %      Immature Grans % 1.0 %      Neutrophils, Absolute 7.40 10*3/mm3      Lymphocytes, Absolute 2.25 10*3/mm3      Monocytes, Absolute 1.20 10*3/mm3      Eosinophils, Absolute 0.45 10*3/mm3      Basophils, Absolute 0.02 10*3/mm3      Immature Grans, Absolute 0.11 10*3/mm3      nRBC 0.0 /100 WBC     Comprehensive Metabolic Panel [[id number]]  (Abnormal) Collected: 09/28/22 0427    Specimen: Blood Updated: 09/28/22 0505     Glucose 99 mg/dL      BUN 22 mg/dL      Creatinine 0.62 mg/dL      Sodium 131 mmol/L      Potassium 4.0 mmol/L      Chloride 99 mmol/L      CO2 27.7 mmol/L      Calcium 8.3 mg/dL      Total Protein 5.3 g/dL      Albumin 2.60 g/dL      ALT (SGPT) 23 U/L      AST (SGOT) 18 U/L      Alkaline Phosphatase 32 U/L      Total Bilirubin 0.4 mg/dL      Globulin 2.7 gm/dL      A/G Ratio 1.0 g/dL      BUN/Creatinine Ratio 35.5     Anion Gap 4.3 mmol/L      eGFR 91.4 mL/min/1.73      Comment: National Kidney Foundation and American Society of Nephrology (ASN) Task Force recommended calculation based on the Chronic Kidney Disease Epidemiology Collaboration (CKD-EPI) equation refit without adjustment for race.       Narrative:      GFR Normal >60  Chronic Kidney Disease <60  Kidney Failure <15      CBC & Differential [[id number]]  (Abnormal) Collected: 09/28/22 0427    Specimen: Blood Updated: 09/28/22 0442    Narrative:      The following orders were created for panel order CBC & Differential.  Procedure                               Abnormality         Status                     ---------                               -----------          ------                     CBC Auto Differential[[id number]]        Abnormal            Final result                 Please view results for these tests on the individual orders.    CBC Auto Differential [[id number]]  (Abnormal) Collected: 09/28/22 0427    Specimen: Blood Updated: 09/28/22 0442     WBC 10.70 10*3/mm3      RBC 3.81 10*6/mm3      Hemoglobin 11.7 g/dL      Hematocrit 35.9 %      MCV 94.2 fL      MCH 30.7 pg      MCHC 32.6 g/dL      RDW 14.7 %      RDW-SD 50.7 fl      MPV 9.3 fL      Platelets 218 10*3/mm3      Neutrophil % 63.5 %      Lymphocyte % 20.0 %      Monocyte % 11.4 %      Eosinophil % 3.6 %      Basophil % 0.2 %      Immature Grans % 1.3 %      Neutrophils, Absolute 6.79 10*3/mm3      Lymphocytes, Absolute 2.14 10*3/mm3      Monocytes, Absolute 1.22 10*3/mm3      Eosinophils, Absolute 0.39 10*3/mm3      Basophils, Absolute 0.02 10*3/mm3      Immature Grans, Absolute 0.14 10*3/mm3      nRBC 0.0 /100 WBC     Magnesium [[id number]]  (Normal) Collected: 09/27/22 0351    Specimen: Blood Updated: 09/27/22 0450     Magnesium 1.9 mg/dL     Magnesium [[id number]]  (Normal) Collected: 09/26/22 0414    Specimen: Blood Updated: 09/26/22 0653     Magnesium 1.8 mg/dL     Blood Culture - Blood, Arm, Left [[id number]]  (Normal) Collected: 09/19/22 1006    Specimen: Blood from Arm, Left Updated: 09/24/22 1032     Blood Culture No growth at 5 days    Blood Culture - Blood, Arm, Left [[id number]]  (Normal) Collected: 09/19/22 0944    Specimen: Blood from Arm, Left Updated: 09/24/22 0948     Blood Culture No growth at 5 days    Procalcitonin [[id number]]  (Abnormal) Collected: 09/23/22 0624    Specimen: Blood Updated: 09/23/22 0715     Procalcitonin 0.71 ng/mL     Narrative:      As a Marker for Sepsis (Non-Neonates):    1. <0.5 ng/mL represents a low risk of severe sepsis and/or septic shock.  2. >2 ng/mL represents a high risk of severe sepsis and/or septic shock.    As a Marker for Lower Respiratory Tract  "Infections that require antibiotic therapy:    PCT on Admission    Antibiotic Therapy       6-12 Hrs later    >0.5                Strongly Recommended  >0.25 - <0.5        Recommended   0.1 - 0.25          Discouraged              Remeasure/reassess PCT  <0.1                Strongly Discouraged     Remeasure/reassess PCT    As 28 day mortality risk marker: \"Change in Procalcitonin Result\" (>80% or <=80%) if Day 0 (or Day 1) and Day 4 values are available. Refer to http://[email or web address]-pct-[email or web address]    Change in PCT <=80%  A decrease of PCT levels below or equal to 80% defines a positive change in PCT test result representing a higher risk for 28-day all-cause mortality of patients diagnosed with severe sepsis for septic shock.    Change in PCT >80%  A decrease of PCT levels of more than 80% defines a negative change in PCT result representing a lower risk for 28-day all-cause mortality of patients diagnosed with severe sepsis or septic shock.       Blood Gas, Arterial - [[id number]]  (Abnormal) Collected: 09/21/22 1028    Specimen: Arterial Blood Updated: 09/21/22 1030     Site Right Radial     Rishabh's Test Positive     pH, Arterial 7.437 pH units      pCO2, Arterial 41.0 mm Hg      pO2, Arterial 70.1 mm Hg      Comment: 84 Value below reference range        HCO3, Arterial 27.6 mmol/L      Comment: 83 Value above reference range        Base Excess, Arterial 3.1 mmol/L      Comment: 83 Value above reference range        O2 Saturation, Arterial 95.1 %      Hemoglobin, Blood Gas 11.8 g/dL      Comment: 84 Value below reference range        Temperature 37.0 C      Barometric Pressure for Blood Gas 738 mmHg      Modality Ventilator     FIO2 35 %      Ventilator Mode CPAP     Set Tidal Volume 650     PEEP 7.5     PSV 10.0 cmH2O      Collected by [id number]     Comment: Meter: [id number]     :  [id number]        pCO2, Temperature Corrected 41.0 mm Hg      pH, Temp Corrected 7.437 pH Units      pO2, Temperature " Corrected 70.1 mm Hg     Respiratory Culture - Aspirate, ET Suction [[id number]]  (Abnormal) Collected: 09/19/22 1139    Specimen: Aspirate from ET Suction Updated: 09/21/22 1014     Respiratory Culture Light growth (2+) Candida albicans      Scant growth (1+) Normal Respiratory Xochilt     Gram Stain Rare (1+) WBCs seen      Rare (1+) Mixed bacterial morphotypes seen on Gram Stain      Rare (1+) Budding yeast      Rare (1+) Epithelial cells seen    Blood Gas, Arterial - [[id number]]  (Abnormal) Collected: 09/20/22 0533    Specimen: Arterial Blood Updated: 09/20/22 0534     Site Right Brachial     Rishabh's Test N/A     pH, Arterial 7.359 pH units      pCO2, Arterial 47.5 mm Hg      Comment: 83 Value above reference range        pO2, Arterial 79.0 mm Hg      Comment: 84 Value below reference range        HCO3, Arterial 26.8 mmol/L      Comment: 83 Value above reference range        Base Excess, Arterial 0.9 mmol/L      O2 Saturation, Arterial 95.4 %      Hemoglobin, Blood Gas 11.2 g/dL      Comment: 84 Value below reference range        Temperature 37.0 C      Barometric Pressure for Blood Gas 739 mmHg      Modality Ventilator     FIO2 50 %      Ventilator Mode VC/AC     Set Tidal Volume 500     Set Mech Resp Rate 16.0     PEEP 7.5     Collected by [id number]     Comment: Meter: [id number]     :  [id number]        pCO2, Temperature Corrected 47.5 mm Hg      pH, Temp Corrected 7.359 pH Units      pO2, Temperature Corrected 79.0 mm Hg     Urine Culture - Urine, Urine, Clean Catch [[id number]]  (Abnormal) Collected: 09/18/22 1153    Specimen: Urine, Clean Catch Updated: 09/19/22 2056     Urine Culture 25,000 CFU/mL Mixed Gram Positive Xochilt    Narrative:      Specimen contains mixed organisms of questionable pathogenicity suggestive of contamination. If symptoms persist, suggest recollection.  Colonization of the urinary tract without infection is common. Treatment is discouraged unless the patient is symptomatic,  pregnant, or undergoing an invasive urologic procedure.    MRSA Screen, PCR (Inpatient) - Swab, Nares [[id number]]  (Normal) Collected: 09/19/22 0944    Specimen: Swab from Nares Updated: 09/19/22 1748     MRSA PCR No MRSA Detected    Narrative:      The negative predictive value of this diagnostic test is high and should only be used to consider de-escalating anti-MRSA therapy. A positive result may indicate colonization with MRSA and must be correlated clinically.    Respiratory Panel PCR w/COVID-19(SARS-CoV-2) VERONA/FLEX/BRUCE/PAD/COR/MAD/NICOLE In-House, NP Swab in UTM/VTM, 3-4 HR TAT - Swab, Nasopharynx [[id number]]  (Normal) Collected: 09/19/22 0357    Specimen: Swab from Nasopharynx Updated: 09/19/22 1305     ADENOVIRUS, PCR Not Detected     Coronavirus 229E Not Detected     Coronavirus HKU1 Not Detected     Coronavirus NL63 Not Detected     Coronavirus OC43 Not Detected     COVID19 Not Detected     Human Metapneumovirus Not Detected     Human Rhinovirus/Enterovirus Not Detected     Influenza A PCR Not Detected     Influenza B PCR Not Detected     Parainfluenza Virus 1 Not Detected     Parainfluenza Virus 2 Not Detected     Parainfluenza Virus 3 Not Detected     Parainfluenza Virus 4 Not Detected     RSV, PCR Not Detected     Bordetella pertussis pcr Not Detected     Bordetella parapertussis PCR Not Detected     Chlamydophila pneumoniae PCR Not Detected     Mycoplasma pneumo by PCR Not Detected    Narrative:      In the setting of a positive respiratory panel with a viral infection PLUS a negative procalcitonin without other underlying concern for bacterial infection, consider observing off antibiotics or discontinuation of antibiotics and continue supportive care. If the respiratory panel is positive for atypical bacterial infection (Bordetella pertussis, Chlamydophila pneumoniae, or Mycoplasma pneumoniae), consider antibiotic de-escalation to target atypical bacterial infection.    Lactic Acid, Plasma [[id number]]   (Normal) Collected: 09/19/22 0944    Specimen: Blood Updated: 09/19/22 1014     Lactate 1.7 mmol/L     CK [[id number]]  (Abnormal) Collected: 09/19/22 0441    Specimen: Blood Updated: 09/19/22 0708     Creatine Kinase 355 U/L     Sedimentation Rate [[id number]]  (Normal) Collected: 09/19/22 0441    Specimen: Blood Updated: 09/19/22 0444     Sed Rate 20 mm/hr     Hemoglobin & Hematocrit, Blood [[id number]]  (Normal) Collected: 09/19/22 0144    Specimen: Blood Updated: 09/19/22 0149     Hemoglobin 13.3 g/dL      Hematocrit 40.5 %     CK [[id number]]  (Abnormal) Collected: 09/18/22 1202    Specimen: Blood from Arm, Right Updated: 09/18/22 1235     Creatine Kinase 318 U/L     Urinalysis, Microscopic Only - Urine, Clean Catch [[id number]]  (Abnormal) Collected: 09/18/22 1153    Specimen: Urine, Clean Catch Updated: 09/18/22 1222     RBC, UA 3-5 /HPF      WBC, UA 13-20 /HPF      Bacteria, UA Trace /HPF      Squamous Epithelial Cells, UA None Seen /HPF      Hyaline Casts, UA None Seen /LPF      Methodology Manual Light Microscopy    Urinalysis With Microscopic If Indicated (No Culture) - Urine, Clean Catch [[id number]]  (Abnormal) Collected: 09/18/22 1153    Specimen: Urine, Clean Catch Updated: 09/18/22 1216     Color, UA Yellow     Appearance, UA Clear     pH, UA 6.0     Specific Gravity, UA 1.025     Glucose, UA Negative     Ketones, UA Negative     Bilirubin, UA Negative     Blood, UA Negative     Protein, UA Negative     Leuk Esterase, UA Small (1+)     Nitrite, UA Negative     Urobilinogen, UA 0.2 E.U./dL        Imaging Results (Most Recent)     Procedure Component Value Units Date/Time    XR Chest 1 View [[id number]] Collected: 09/29/22 1417     Updated: 09/29/22 1421    Narrative:      XR CHEST 1 VW-: 9/29/2022 2:13 PM     INDICATION:   Atelectasis and pneumonia. Follow-up. Weakness 1 week following a fall.  Worsening shortness of air today      COMPARISON:    09/28/2022.     FINDINGS:  Single Portable AP view(s) of the  chest. There is rotation to the right.  Cardiac silhouette is stable with a tortuous and ectatic aorta  demonstrating atherosclerotic change. Slight improvement in lung  volumes. There is no pneumothorax effusion or dense consolidation.  Perihilar atelectasis/scarring. Gaseous distention of the stomach and  upper abdominal small bowel.       Impression:         1. Improvement in lung aeration. No new consolidation or effusion. No  other significant change.     This report was finalized on 9/29/2022 2:19 PM by Dr. Arun Walker MD.       XR Chest 1 View [[id number]] Collected: 09/28/22 0837     Updated: 09/28/22 0841    Narrative:      XR CHEST 1 VW-: 9/28/2022 8:00 AM     INDICATION:   Follow-up of atelectasis and pneumonia. Persistent weakness 1 week after  a fall.      COMPARISON:    09/24/2022.     FINDINGS:  Single Portable AP view(s) of the chest. There is rotation to the right.  The thoracic aorta is tortuous and ectatic and atherosclerotic. Shallow  lung expansion with low lung volumes and bronchovascular crowding. There  is no pneumothorax. Probable combination of bibasilar  atelectasis/scarring. Postop changes left shoulder.       Impression:         1. Shallow lung expansion with bronchovascular crowding and probable  bibasilar atelectasis. No pneumothorax.     This report was finalized on 9/28/2022 8:39 AM by Dr. Arun Walker MD.       XR Chest 1 View [[id number]] Collected: 09/24/22 0604     Updated: 09/24/22 0607    Narrative:      CR Chest 1 Vw    INDICATION:   Follow-up pneumonia     COMPARISON:    9/21/2022    FINDINGS:  Portable AP view(s) of the chest.    The endotracheal tube has been removed. The feeding tube is been removed.    There is mild bibasilar atelectasis with elevation left hemidiaphragm. This has improved. Heart size is normal.       Impression:      Decreasing bibasilar atelectasis.    Endotracheal tube and Dobbhoff tube have been removed    Signer Name: Andrew Mccall MD   Signed:  9/24/2022 6:04 AM   Workstation Name: [email or web address]-    [facility]    XR Abdomen KUB [[id number]] Collected: 09/22/22 0702     Updated: 09/22/22 0704    Narrative:      CR Abdomen 1 Vw    INDICATION:   Follow-up ileus.    COMPARISON:   Plain films 9/19/2022.    FINDINGS:  AP radiograph of the abdomen. 3 supinefilm(s)    There is moderate to large amount of gas within large and small bowel loops. Colonic gas seen to the level the rectum. There are surgical clips in the pelvis and there is been previous hernia repair. Several of the small bowel loops are mildly distended  best appreciated in the left abdomen. This could be due to ileus but early or partial small bowel obstruction is not excluded. On comparison to the prior, the appearance is worse. Consider correlation with supine and erect view to evaluate for fluid  level or free air.      Impression:        1. Probably interval worsening in the appearance of the bowel with moderate to large amount of gas in the colon and small bowel. There is colonic gas to the level the rectum. There is also mild disproportionate distention of small bowel loops in the  left-sided abdomen. This appears to be worsening. Findings could be due to worsening ileus but early or partial small bowel obstruction is in the differential. Suggest correlation with supine and erect views of the abdomen/follow-up    Signer Name: Michelle Slaughter MD   Signed: 9/22/2022 7:02 AM   Workstation [facility]: [facility]    [facility]    XR Chest 1 View [[id number]] Collected: 09/21/22 0551     Updated: 09/21/22 0553    Narrative:      CR Chest 1 Vw    INDICATION:   Respiratory failure     COMPARISON:    9/20/2022    FINDINGS:  Portable AP view(s) of the chest.    Support lines and tubes are unchanged.    The heart and mediastinum are stable. Patchy infiltrate at the right lung base is unchanged. Infiltrate at the left lung base as well increased slightly. The upper lung  fields remain clear. Pulmonary vascular markings are within normal limits.       Impression:      There is increased infiltrate at the left lung base since the previous examination. No other changes are seen.    Signer Name: Vazquez Monet MD   Signed: 9/21/2022 5:51 AM   [facility]: [facility]    [facility]    XR Chest 1 View [[id number]] Collected: 09/20/22 0513     Updated: 09/20/22 0515    Narrative:      CR Chest 1 Vw    INDICATION:   Respiratory failure     COMPARISON:    9/19/2022    FINDINGS:  Portable AP view(s) of the chest.    Support lines and tubes are unchanged.    Heart and mediastinum are stable. Increased lung markings are seen at the right base. This is unchanged and is in degree of inspiration are taken into account. No new infiltrates are seen on either side. Vascular markings are normal.       Impression:      Shallower inspiratory effort compared to the previous exam. Taking this into account, right basilar infiltrates are unchanged.    Signer Name: Vazquez Monet MD   Signed: 9/20/2022 5:13 AM   Work[facility]: [facility]    CT Head Without Contrast [[id number]] Collected: 09/19/22 0434     Updated: 09/19/22 0436    Narrative:      CT Head WO    HISTORY:   Fall with head injury prior to arrival    TECHNIQUE:   Axial unenhanced head CT. Radiation dose reduction techniques included automated exposure control or exposure modulation based on body size. Count of known CT and cardiac nuc med studies performed in previous 12 months: 1.     Time of scan: 4:18 AM    COMPARISON:   9/18/2022    FINDINGS:   No intracranial hemorrhage, mass, or infarct. No hydrocephalus or extra-axial fluid collection. There are senescent changes, including volume loss and nonspecific white matter change, but no acute abnormality is seen. The skull base, calvarium, and  extracranial soft tissues are normal.      Impression:      Senescent changes without  acute abnormality.          Signer Name: Vazquez Monet MD   Signed: 9/19/2022 4:34 AM   Workstation Name: [facility]    Radiology [facility]    XR Chest 1 View [[id number]] Collected: 09/19/22 0429     Updated: 09/19/22 0431    Narrative:      CR Chest 1 Vw    INDICATION:   Dobbhoff tube placement     COMPARISON:    9/19/2022 at 3:25 AM    FINDINGS:  Portable AP view(s) of the chest.    The Dobbhoff tube is in satisfactory position coiled in the mid stomach.    The heart lungs and mediastinum are unchanged.       Impression:      The Dobbhoff tube is now in satisfactory position.    Signer Name: Vazquez Monet MD   Signed: 9/19/2022 4:29 AM   Workstation Name: [facility]    [facility]    XR Chest 1 View [[id number]] Collected: 09/19/22 0403     Updated: 09/19/22 0405    Narrative:      CR Chest 1 Vw    INDICATION:   Feeding tube placement     COMPARISON:    9/19/2022 at 2:53 AM    FINDINGS:  Portable AP view(s) of the chest.    A flexible feeding tube tip is seen coiled over the neck. The tube should be withdrawn and reinsertion attempted. The endotracheal tube remains in good position. Gastric distention and right-sided infiltrates are unchanged.          Impression:      The Dobbhoff tube is incorrectly positioned. The tip is seen over the right side of the neck.    Signer Name: Vazquez Monet MD   Signed: 9/19/2022 4:03 AM   Workstation Name: [facility]    [facility]    XR Chest 1 View [[id number]] Collected: 09/19/22 0351     Updated: 09/19/22 0353    Narrative:      CR Chest 1 Vw    INDICATION:   Hypoxia     COMPARISON:    Chest CT from 9/18/2022    FINDINGS:  Portable AP view(s) of the chest.    An endotracheal tube is seen with the tip in good position above the henrik. The heart and mediastinum are unchanged from the CT yesterday. The stomach is quite distended and this is new. There is patchy infiltrate in the right mid to lower lung  field.  Given the history of vomiting this is suspicious for aspiration pneumonia. No new infiltrates are seen on the left.          Impression:      There are patchy new infiltrates in the right mid to lower lung field since the CT scan yesterday. There is gastric distention that is new. The endotracheal tube is in satisfactory position.    Signer Name: Vazquez Monet MD   Signed: 9/19/2022 3:51 AM   Workstation Name: [facility]    [facility]    XR Abdomen KUB [[id number]] Collected: 09/19/22 0057     Updated: 09/19/22 0059    Narrative:      CR Abdomen 1 Vw    INDICATION:   Vomiting    COMPARISON:   None available    FINDINGS:  AP radiograph(s) of the abdomen. The renal shadows are symmetric. No renal calculi. No bladder calculi.    There is moderate gaseous distention of small and large bowel loops with more severe distention of the stomach. The pattern is nonobstructive. No acute osseous abnormalities. Postoperative changes are seen in the anterior abdominal wall consistent with  hernia repair.      Impression:      Nonspecific gaseous distention of stomach and bowel. Nonobstructive pattern. This could reflect a generalized ileus.    Signer Name: Vazquez Monet MD   Signed: 9/19/2022 12:57 AM   Workstation Name: [facility]    CT Chest Without Contrast Diagnostic [[id number]] Collected: 09/18/22 1255     Updated: 09/18/22 1257    Narrative:      CT CHEST, NONCONTRAST, 9/18/2022    HISTORY:  89-year-old male in the ED after a fall 3 days prior. Head injury. Left-sided rib pain. Reportedly multiple falls recently.    TECHNIQUE:  CT imaging of the chest without IV contrast. Radiation dose reduction techniques included automated exposure control. Radiation audit for CT and nuclear cardiology exams in the last 12 months: 0.    FINDINGS:  There are acute, nondisplaced fractures of the anterior left 6th and 7th ribs with overlying body wall soft tissue  contusion.    No additional acute fracture of ribs, sternum or thoracic spine.    There is no pneumothorax, visible pulmonary contusion or pleural effusion. Lung images show severe diffuse pulmonary emphysema with fibrosis and atelectasis in the lung bases. Trachea and central bronchi are patent.    Tortuous thoracic aorta and aortic arch vessels are normal in caliber and contour. There is no mediastinal hematoma or other intrathoracic fluid collection.    Atheromatous coronary artery calcification.    Limited images through the uppermost abdomen show no active disease. Probable cholelithiasis. No evidence of acute solid organ injury involving liver, pancreas, spleen or kidneys.      Impression:      1.  Acute nondisplaced fractures of the anterior left 6th and 7th ribs.  2.  No evidence of acute traumatic injury within the chest or upper abdomen.  3.  Severe pulmonary emphysema.    Signer Name: Jose De Jesus Hayes MD   Signed: 9/18/2022 12:55 PM   Workstation Name: [facility]    [facility]    CT Head Without Contrast [[id number]] Collected: 09/18/22 1248     Updated: 09/18/22 1250    Narrative:      CT HEAD, NONCONTRAST, 9/18/2022    HISTORY:  89-year-old male in the ED after a fall 3 days ago. Struck left side of head. Many recent falls.    TECHNIQUE:  CT imaging of the head without IV contrast. Radiation dose reduction techniques included automated exposure control. Radiation audit for CT and nuclear cardiology exams in the last 12 months: 0.    FINDINGS:  Images through the skull base and lower brain are degraded by patient motion artifact.    No evidence of acute intracranial injury. No visible skull fracture.    Moderate generalized age-appropriate cerebral volume loss. Advanced diffuse chronic small vessel type white matter changes, nonspecific. Old lacunar infarct in the midline manjit. 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 Large benign arachnoid cyst in the midline  posterior fossa  measuring 6.5 x 3.2 x 5.9 cm.    No evidence of acute intracranial hemorrhage. No mass effect, cerebral edema or suspicious extra-axial fluid collection. At least partial opacification of ethmoid sinuses.      Impression:      1.  Motion limited examination.  2.  No gross evidence of acute intracranial injury. No skull fracture.  3.  Diffuse chronic changes as noted above.  4.  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 Large midline posterior fossa benign arachnoid cyst.    Signer Name: Jose De Jesus Hayes MD   Signed: 9/18/2022 12:48 PM   Workstation Name: [facility]    [facility] [place]          PROCEDURES: None    Condition on Discharge: Stable    Physical Exam at Discharge  Vital Signs  Temp:  [96.7 °F (35.9 °C)-98.5 °F (36.9 °C)] 97.9 °F (36.6 °C)  Heart Rate:  [55-77] 73  Resp:  [16-24] 24  BP: (114-133)/(59-65) 114/59   Body mass index is 27.61 kg/m².      Physical Exam  Vitals reviewed.   Constitutional:       Comments: Elderly, chronically ill appearance   HENT:      Head: Normocephalic and atraumatic.      Mouth/Throat:      Mouth: Mucous membranes are moist.   Eyes:      Extraocular Movements: Extraocular movements intact.      Pupils: Pupils are equal, round, and reactive to light.      Comments: Bilateral conjunctival erythema   Cardiovascular:      Rate and Rhythm: Normal rate. Rhythm irregular.   Pulmonary:      Effort: Pulmonary effort is normal. No respiratory distress.      Breath sounds: Normal breath sounds. No wheezing or rales.   Abdominal:      General: Abdomen is flat. Bowel sounds are normal. There is no distension.      Palpations: Abdomen is soft.      Tenderness: There is no abdominal tenderness. There is no guarding.   Musculoskeletal:         General: No swelling.   Skin:     General: Skin is warm and dry.      Capillary Refill: Capillary refill takes less than 2 seconds.      Comments: Healing ecchymoses left face/neck, bilateral upper  extremities   Neurological:      General: No focal deficit present.      Mental Status: He is alert.      Comments: Oriented to self and place not date and time       [facility]    Discharge Diet:      Dietary Orders (From admission, onward)     Start     Ordered    09/26/22 1600  Dietary Nutrition Supplement: Other (See Comment); magic cup  3 Times Daily      Comments: Give pt as snack mid am, mid afternoon, evening snacks   Question Answer Comment   Select Supplement: Other (See Comment)    Other magic cup        09/26/22 1136    09/26/22 0939  Diet Soft Texture; Chopped; Thin; Cardiac; Small Feedings  Diet Effective Now        Comments: Small feedings due to reflux and pt will eat most everything on his plate that is served to him.   Question Answer Comment   Diet Texture / Consistency Soft Texture    Select Texture: Chopped    Fluid Consistency Thin    Common Modifiers Cardiac    Other Modifiers: Small Feedings        09/26/22 0941                Activity at Discharge:  As tolerated    Pre-discharge education  Medications, follow-up    Follow-up Appointments  Future Appointments   Date Time Provider Department Center   11/30/2022  1:45 PM Charlie Trinh MD [facility]     Additional Instructions for the Follow-ups that You Need to Schedule     Discharge Follow-up with PCP   As directed       Currently Documented PCP:    Charlie Trinh MD    PCP Phone Number:    [phone]     Follow Up Details: 1 week         Discharge Follow-up with Specified Provider: Pulmonary; 2 Weeks   As directed      To: Pulmonary    Follow Up: 2 Weeks         Discharge Follow-up with Specified Provider: Urology; 1 Month   As directed      To: Urology    Follow Up: 1 Month               Test Results Pending at Discharge: To be followed up by PCP  Pending Labs     Order Current Status    COVID PRE-OP / PRE-PROCEDURE SCREENING ORDER (NO ISOLATION) - Swab, Nasal Cavity In process     "COVID-19,[employer] IN-HOUSE,Nasal Swab No Transport Media 3-4 HR TAT - Swab, Nasal Cavity In process    Procalcitonin In process           Kimber Vital, APRN  09/29/22  15:21 EDT    Time: Discharge Over 30 min (if over 30 minutes give explanation as to why it took greater than 30 minutes)  Secondary to:   Coordination of care/follow up  Medication reconciliation  D/W patient and family    As of April 2021, as required by the Federal 21st Century Cures Act, medical records (including provider notes and laboratory/imaging results) are to be made available to patients and/or their designees as soon as the documents are signed/resulted. While the intention is to ensure transparency and to engage patients in their healthcare, this immediate access may create unintended consequences because this document uses language intended for communication between medical providers for interpretation with the entirety of the patient's clinical picture in mind. It is recommended that patients and/or their designees review all available information with their primary or specialist providers for explanation and to avoid misinterpretation of this information.     \"Dictated utilizing Dragon dictation\"        Electronically signed by Ronda Cárdenas DO at 09/29/22 1627       "

## 2022-11-03 ENCOUNTER — HOME HEALTH ADMISSION (OUTPATIENT)
Dept: HOME HEALTH SERVICES | Facility: HOME HEALTHCARE | Age: 87
End: 2022-11-03

## 2022-11-30 ENCOUNTER — OFFICE VISIT (OUTPATIENT)
Dept: FAMILY MEDICINE CLINIC | Facility: CLINIC | Age: 87
End: 2022-11-30

## 2022-11-30 VITALS
WEIGHT: 184.9 LBS | TEMPERATURE: 97.1 F | BODY MASS INDEX: 27.39 KG/M2 | HEART RATE: 79 BPM | HEIGHT: 69 IN | DIASTOLIC BLOOD PRESSURE: 78 MMHG | OXYGEN SATURATION: 94 % | SYSTOLIC BLOOD PRESSURE: 122 MMHG

## 2022-11-30 DIAGNOSIS — E03.9 ACQUIRED HYPOTHYROIDISM: Primary | ICD-10-CM

## 2022-11-30 DIAGNOSIS — F01.50 VASCULAR DEMENTIA WITHOUT BEHAVIORAL DISTURBANCE: ICD-10-CM

## 2022-11-30 PROCEDURE — 99213 OFFICE O/P EST LOW 20 MIN: CPT | Performed by: FAMILY MEDICINE

## 2022-11-30 RX ORDER — RIVASTIGMINE 9.5 MG/24H
PATCH, EXTENDED RELEASE TRANSDERMAL
COMMUNITY
Start: 2022-11-20

## 2022-11-30 RX ORDER — MULTIVITAMIN WITH IRON
TABLET ORAL
COMMUNITY
Start: 2022-11-01

## 2022-11-30 RX ORDER — FLUTICASONE FUROATE, UMECLIDINIUM BROMIDE AND VILANTEROL TRIFENATATE 100; 62.5; 25 UG/1; UG/1; UG/1
POWDER RESPIRATORY (INHALATION)
COMMUNITY
Start: 2022-11-20 | End: 2023-01-11 | Stop reason: SDUPTHER

## 2022-11-30 NOTE — PROGRESS NOTES
Subjective   Pawel Goldberg is a 89 y.o. male who is here for   Chief Complaint   Patient presents with   • Hypothyroidism          • Dementia   .   Answers for HPI/ROS submitted by the patient on 11/29/2022  What is the primary reason for your visit?: Shortness of Breath      Hypothyroidism  Pertinent negatives include no abdominal pain, chest pain, fever, headaches, neck pain, rash, sore throat, swollen glands or vomiting.   Dementia  Pertinent negatives include no abdominal pain, chest pain, fever, headaches, neck pain, rash, sore throat, swollen glands or vomiting.   Shortness of Breath  This is a chronic problem. The current episode started more than 1 year ago. The problem occurs constantly. The problem has been gradually worsening. The average episode lasts 20 Years. Associated symptoms include leg swelling, PND, sputum production, syncope and wheezing. Pertinent negatives include no abdominal pain, chest pain, claudication, coryza, ear pain, fever, headaches, hemoptysis, leg pain, neck pain, orthopnea, rash, rhinorrhea, sore throat, swollen glands or vomiting.        The following portions of the patient's history were reviewed and updated as appropriate: allergies, current medications, past medical history, past social history, past surgical history and problem list.    Review of Systems   Constitutional: Negative for fever.   HENT: Negative for ear pain, rhinorrhea and sore throat.    Respiratory: Positive for sputum production, shortness of breath and wheezing. Negative for hemoptysis.    Cardiovascular: Positive for leg swelling, syncope and PND. Negative for chest pain, orthopnea and claudication.   Gastrointestinal: Negative for abdominal pain and vomiting.   Musculoskeletal: Negative for neck pain.   Skin: Negative for rash.   Neurological: Negative for headaches.     Pawel is brought in by the daughter.  Pawel's dementia is getting worse.  Also poor vision and poor hearing  He has frequent  "falls.  Had a pretty significant fall on September 18.  Was hospitalized at [facility] for quite some time.  Unfortunately he had a severe respiratory event had to be on a ventilator for couple days.  Currently he is in long-term skilled nursing facility.  His appetite is some improved.  No longer on oxygen.  Working with therapy.    Current outpatient and discharge medications have been reconciled for the patient.  Reviewed by: Charlie Trinh MD      Objective   Vitals:    11/30/22 1313   BP: 122/78   BP Location: Left arm   Patient Position: Sitting   Cuff Size: Adult   Pulse: 79   Temp: 97.1 °F (36.2 °C)   SpO2: 94%   Weight: 83.9 kg (184 lb 14.4 oz)   Height: 175.3 cm (69\")      Physical Exam  Cardiovascular:      Rate and Rhythm: Normal rate.   Pulmonary:      Effort: No respiratory distress.   Neurological:      Mental Status: He is alert.      Motor: Weakness present.      Gait: Gait abnormal.         Assessment & Plan   Diagnoses and all orders for this visit:    1. Acquired hypothyroidism (Primary)    2. Vascular dementia without behavioral disturbance (HCC)    Reviewed update medication list  He will remain at skilled nursing facility for the next few weeks.    There are no Patient Instructions on file for this visit.    Medications Discontinued During This Encounter   Medication Reason   • arformoterol (BROVANA) 15 MCG/2ML nebulizer solution *Therapy completed   • bisacodyl (DULCOLAX) 5 MG EC tablet *Therapy completed   • docusate sodium 100 MG capsule *Therapy completed   • ipratropium-albuterol (DUO-NEB) 0.5-2.5 mg/3 ml nebulizer Duplicate order   • miconazole (MICOTIN) 2 % powder *Therapy completed   • ondansetron (ZOFRAN) 4 MG tablet *Therapy completed   • polyethylene glycol (MIRALAX) 17 g packet *Therapy completed   • polyvinyl alcohol (LIQUIFILM) 1.4 % ophthalmic solution *Therapy completed   • Probiotic Product (PROBIOTIC COLON SUPPORT PO) *Therapy completed   • sodium chloride 7 % " nebulizer solution nebulizer solution *Therapy completed   • tobramycin 0.3 % solution ophthalmic solution *Therapy completed   • atorvastatin (LIPITOR) 20 MG tablet         Return in about 6 months (around 5/30/2023) for Medicare Wellness visit.    Dr. Charlie Trinh  [place], [place]

## 2022-12-01 ENCOUNTER — TELEPHONE (OUTPATIENT)
Dept: FAMILY MEDICINE CLINIC | Facility: CLINIC | Age: 87
End: 2022-12-01

## 2022-12-01 NOTE — TELEPHONE ENCOUNTER
Marielos with [facility] home health called asking for verbal order to continue seeing patient for PT 2 x week for 2 weeks.

## 2022-12-21 ENCOUNTER — TELEPHONE (OUTPATIENT)
Dept: FAMILY MEDICINE CLINIC | Facility: CLINIC | Age: 87
End: 2022-12-21

## 2022-12-21 ENCOUNTER — APPOINTMENT (OUTPATIENT)
Dept: GENERAL RADIOLOGY | Facility: HOSPITAL | Age: 87
End: 2022-12-21

## 2022-12-21 ENCOUNTER — HOSPITAL ENCOUNTER (EMERGENCY)
Facility: HOSPITAL | Age: 87
Discharge: HOME | End: 2022-12-22
Attending: EMERGENCY MEDICINE | Admitting: EMERGENCY MEDICINE

## 2022-12-21 ENCOUNTER — APPOINTMENT (OUTPATIENT)
Dept: CT IMAGING | Facility: HOSPITAL | Age: 87
End: 2022-12-21

## 2022-12-21 DIAGNOSIS — N39.0 ACUTE UTI: Primary | ICD-10-CM

## 2022-12-21 DIAGNOSIS — K59.00 CONSTIPATION, UNSPECIFIED CONSTIPATION TYPE: ICD-10-CM

## 2022-12-21 LAB
ALBUMIN SERPL-MCNC: 3.5 G/DL (ref 3.5–5.2)
ALBUMIN/GLOB SERPL: 1.1 G/DL
ALP SERPL-CCNC: 52 U/L (ref 39–117)
ALT SERPL W P-5'-P-CCNC: 12 U/L (ref 1–41)
ANION GAP SERPL CALCULATED.3IONS-SCNC: 9.3 MMOL/L (ref 5–15)
AST SERPL-CCNC: 18 U/L (ref 1–40)
BACTERIA UR QL AUTO: ABNORMAL /HPF
BASOPHILS # BLD AUTO: 0.04 10*3/MM3 (ref 0–0.2)
BASOPHILS NFR BLD AUTO: 0.4 % (ref 0–1.5)
BILIRUB SERPL-MCNC: 0.2 MG/DL (ref 0–1.2)
BILIRUB UR QL STRIP: NEGATIVE
BUN SERPL-MCNC: 25 MG/DL (ref 8–23)
BUN/CREAT SERPL: 32.9 (ref 7–25)
CALCIUM SPEC-SCNC: 9 MG/DL (ref 8.6–10.5)
CHLORIDE SERPL-SCNC: 96 MMOL/L (ref 98–107)
CLARITY UR: ABNORMAL
CO2 SERPL-SCNC: 24.7 MMOL/L (ref 22–29)
COLOR UR: YELLOW
CREAT SERPL-MCNC: 0.76 MG/DL (ref 0.76–1.27)
DEPRECATED RDW RBC AUTO: 49.1 FL (ref 37–54)
EGFRCR SERPLBLD CKD-EPI 2021: 85.9 ML/MIN/1.73
EOSINOPHIL # BLD AUTO: 0.24 10*3/MM3 (ref 0–0.4)
EOSINOPHIL NFR BLD AUTO: 2.2 % (ref 0.3–6.2)
ERYTHROCYTE [DISTWIDTH] IN BLOOD BY AUTOMATED COUNT: 14.3 % (ref 12.3–15.4)
GLOBULIN UR ELPH-MCNC: 3.3 GM/DL
GLUCOSE SERPL-MCNC: 105 MG/DL (ref 65–99)
GLUCOSE UR STRIP-MCNC: NEGATIVE MG/DL
HCT VFR BLD AUTO: 37.6 % (ref 37.5–51)
HGB BLD-MCNC: 12.5 G/DL (ref 13–17.7)
HGB UR QL STRIP.AUTO: ABNORMAL
HYALINE CASTS UR QL AUTO: ABNORMAL /LPF
IMM GRANULOCYTES # BLD AUTO: 0.03 10*3/MM3 (ref 0–0.05)
IMM GRANULOCYTES NFR BLD AUTO: 0.3 % (ref 0–0.5)
KETONES UR QL STRIP: NEGATIVE
LEUKOCYTE ESTERASE UR QL STRIP.AUTO: ABNORMAL
LYMPHOCYTES # BLD AUTO: 1.71 10*3/MM3 (ref 0.7–3.1)
LYMPHOCYTES NFR BLD AUTO: 15.5 % (ref 19.6–45.3)
MCH RBC QN AUTO: 31 PG (ref 26.6–33)
MCHC RBC AUTO-ENTMCNC: 33.2 G/DL (ref 31.5–35.7)
MCV RBC AUTO: 93.3 FL (ref 79–97)
MONOCYTES # BLD AUTO: 1.1 10*3/MM3 (ref 0.1–0.9)
MONOCYTES NFR BLD AUTO: 10 % (ref 5–12)
NEUTROPHILS NFR BLD AUTO: 7.92 10*3/MM3 (ref 1.7–7)
NEUTROPHILS NFR BLD AUTO: 71.6 % (ref 42.7–76)
NITRITE UR QL STRIP: POSITIVE
NRBC BLD AUTO-RTO: 0 /100 WBC (ref 0–0.2)
PH UR STRIP.AUTO: 5.5 [PH] (ref 4.5–8)
PLATELET # BLD AUTO: 258 10*3/MM3 (ref 140–450)
PMV BLD AUTO: 10 FL (ref 6–12)
POTASSIUM SERPL-SCNC: 4.6 MMOL/L (ref 3.5–5.2)
PROT SERPL-MCNC: 6.8 G/DL (ref 6–8.5)
PROT UR QL STRIP: ABNORMAL
RBC # BLD AUTO: 4.03 10*6/MM3 (ref 4.14–5.8)
RBC # UR STRIP: ABNORMAL /HPF
REF LAB TEST METHOD: ABNORMAL
SODIUM SERPL-SCNC: 130 MMOL/L (ref 136–145)
SP GR UR STRIP: 1.02 (ref 1–1.03)
SQUAMOUS #/AREA URNS HPF: ABNORMAL /HPF
TROPONIN T SERPL-MCNC: <0.01 NG/ML (ref 0–0.03)
TSH SERPL DL<=0.05 MIU/L-ACNC: 4.15 UIU/ML (ref 0.27–4.2)
UROBILINOGEN UR QL STRIP: ABNORMAL
WBC # UR STRIP: ABNORMAL /HPF
WBC NRBC COR # BLD: 11.04 10*3/MM3 (ref 3.4–10.8)

## 2022-12-21 PROCEDURE — 85025 COMPLETE CBC W/AUTO DIFF WBC: CPT | Performed by: EMERGENCY MEDICINE

## 2022-12-21 PROCEDURE — 70450 CT HEAD/BRAIN W/O DYE: CPT

## 2022-12-21 PROCEDURE — 87186 SC STD MICRODIL/AGAR DIL: CPT | Performed by: EMERGENCY MEDICINE

## 2022-12-21 PROCEDURE — 81001 URINALYSIS AUTO W/SCOPE: CPT | Performed by: EMERGENCY MEDICINE

## 2022-12-21 PROCEDURE — 84484 ASSAY OF TROPONIN QUANT: CPT | Performed by: EMERGENCY MEDICINE

## 2022-12-21 PROCEDURE — 99284 EMERGENCY DEPT VISIT MOD MDM: CPT

## 2022-12-21 PROCEDURE — 93010 ELECTROCARDIOGRAM REPORT: CPT | Performed by: INTERNAL MEDICINE

## 2022-12-21 PROCEDURE — 80053 COMPREHEN METABOLIC PANEL: CPT | Performed by: EMERGENCY MEDICINE

## 2022-12-21 PROCEDURE — 74018 RADEX ABDOMEN 1 VIEW: CPT

## 2022-12-21 PROCEDURE — 84443 ASSAY THYROID STIM HORMONE: CPT | Performed by: EMERGENCY MEDICINE

## 2022-12-21 PROCEDURE — 36415 COLL VENOUS BLD VENIPUNCTURE: CPT

## 2022-12-21 PROCEDURE — 93005 ELECTROCARDIOGRAM TRACING: CPT | Performed by: EMERGENCY MEDICINE

## 2022-12-21 PROCEDURE — 87086 URINE CULTURE/COLONY COUNT: CPT | Performed by: EMERGENCY MEDICINE

## 2022-12-21 PROCEDURE — 87077 CULTURE AEROBIC IDENTIFY: CPT | Performed by: EMERGENCY MEDICINE

## 2022-12-21 RX ORDER — CEFUROXIME AXETIL 500 MG/1
500 TABLET ORAL 2 TIMES DAILY
Qty: 10 TABLET | Refills: 0 | Status: SHIPPED | OUTPATIENT
Start: 2022-12-21

## 2022-12-21 RX ORDER — CEFUROXIME AXETIL 250 MG/1
500 TABLET ORAL ONCE
Status: COMPLETED | OUTPATIENT
Start: 2022-12-21 | End: 2022-12-22

## 2022-12-21 NOTE — TELEPHONE ENCOUNTER
Caller: ADAN CAPELLAN [facility]     Best call back number: [phone]    Who are you requesting to speak with (clinical staff, provider,  specific staff member): CLINICAL STAFF     What was the call regarding: PATIENT HAVING CONSTIPATION FOR LAST FEW DAYS WANTING TO KNOW IF HE CAN TAKE  COLACE AND/OR BISACODYO AND A POWDER LAXATIVE WANTING TO KNOW WHICH MEDICATION SHOULD HE BE TAKING TO HELP      Do you require a callback: YES

## 2022-12-22 VITALS
OXYGEN SATURATION: 92 % | HEIGHT: 69 IN | SYSTOLIC BLOOD PRESSURE: 142 MMHG | RESPIRATION RATE: 18 BRPM | DIASTOLIC BLOOD PRESSURE: 78 MMHG | HEART RATE: 64 BPM | BODY MASS INDEX: 27.25 KG/M2 | TEMPERATURE: 97.5 F | WEIGHT: 184 LBS

## 2022-12-22 LAB — QT INTERVAL: 409 MS

## 2022-12-22 RX ADMIN — CEFUROXIME AXETIL 500 MG: 250 TABLET ORAL at 00:00

## 2022-12-22 NOTE — ED PROVIDER NOTES
Subjective   History of Present Illness  History of Present Illness    Chief complaint: Constipation    Location: Home    Quality/Severity: Patient unsure when he had a normal bowel movement    Timing/Onset/Duration: Uncertain how long constipation is been going on    Modifying Factors: Dulcolax suppository did not seem to make it better    Associated Symptoms: No complaints of headache.  No fever chills or cough.  No sore throat earache or nasal congestion.  No chest pain or shortness of breath.  Patient has been having intermittent abdominal cramping.  No diarrhea or burning on urination    Narrative: This 89-year-old white male with a history of dementia, who was discharged from rehab a week ago, presents with constipation.  The physical therapist also noted that the patient appeared to be weaker and more unsteady of gait.  The physical therapist wanted the patient checked for urinary tract infection    PCP:Charlie Trinh MD   Review of Systems   Constitutional: Negative for chills and fever.   HENT: Negative for congestion, ear pain and sore throat.    Respiratory: Negative for cough and shortness of breath.    Gastrointestinal: Negative for abdominal pain, nausea and vomiting.   Genitourinary: Negative for difficulty urinating.   Musculoskeletal: Negative for back pain.   Skin: Negative for rash.   Neurological: Negative for headaches.   Psychiatric/Behavioral: Positive for confusion (History of dementia).        Medication List      ASK your doctor about these medications    albuterol sulfate  (90 Base) MCG/ACT inhaler  Commonly known as: Ventolin HFA  Inhale 2 puffs Every 4 (Four) Hours As Needed for Wheezing or Shortness of Air.     clopidogrel 75 MG tablet  Commonly known as: PLAVIX  TAKE 1 TABLET DAILY     CRANBERRY PO     ipratropium-albuterol 0.5-2.5 mg/3 ml nebulizer  Commonly known as: DUO-NEB  Take 3 mL by nebulization Every 6 (Six) Hours As Needed for Shortness of Air.     levothyroxine  50 MCG tablet  Commonly known as: SYNTHROID, LEVOTHROID  Take 1 tablet by mouth Daily. Indications: Underactive Thyroid     Magnesium 250 MG tablet     Melatonin 10 MG tablet     MUCUS RELIEF PO     multivitamin with minerals tablet tablet     pantoprazole 40 MG EC tablet  Commonly known as: Protonix  Take 1 tablet by mouth Daily.     rivastigmine 9.5 MG/24HR patch  Commonly known as: EXELON     Trelegy Ellipta 100-62.5-25 MCG/ACT inhaler  Generic drug: Fluticasone-Umeclidin-Vilant     TYLENOL 8 HOUR ARTHRITIS PAIN PO     ZYRTEC PO            Past Medical History:   Diagnosis Date   • Allergic     Seasonal   • Allergic rhinitis    • Arthritis    • Asthma    • Brito esophagus    • Benign prostatic hyperplasia    • Carotid arterial disease (HCC)     Status post right CEA 4/1/14   • COPD (chronic obstructive pulmonary disease) (HCC)    • Coronary artery disease    • Dementia ([facility])    • Dyspnea    • Erectile dysfunction    • GERD (gastroesophageal reflux disease)    • Hematoma of frontal scalp 07/08/2021   • HL (hearing loss)    • Hypothyroidism 1992   • Low back pain    • PAD (peripheral artery disease) ([facility])    • Prostate cancer (HCC)    • Status post implantation of artificial urinary sphincter        • Stroke ([facility])     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   • Urinary tract infection        Allergies   Allergen Reactions   • Bactrim [Sulfamethoxazole-Trimethoprim] Rash       Past Surgical History:   Procedure Laterality Date   • CAROTID ARTERY ANGIOPLASTY  04/01/2014   • COLONOSCOPY  2009   • ENDOSCOPY  2009 2001,2003,2006   • FRACTURE SURGERY     • HERNIA REPAIR  2000   • HERNIA REPAIR  2009   • INGUINAL HERNIA REPAIR     • PARTIAL HIP ARTHROPLASTY Left 07/02/2014   • PROSTATECTOMY  2008   • SHOULDER SURGERY  2005   • SHOULDER SURGERY  2006   • SHOULDER SURGERY  2008       Family History   Problem Relation Age of Onset   • Hypertension Sister    • Thyroid disease Sister    • Stroke  Sister    • Depression Sister    • Heart failure Sister    • Hypertension Brother    • Lung cancer Brother    • COPD Brother    • Alcohol abuse Brother    • Coronary artery disease Son         Son  from MI at age 59        Social History     Socioeconomic History   • Marital status:    • Number of children: 3   Tobacco Use   • Smoking status: Former     Packs/day: 0.00     Years: 15.00     Pack years: 0.00     Types: Cigarettes     Start date: 1947     Quit date: 1962     Years since quittin.0   • Smokeless tobacco: Never   • Tobacco comments:     Quit    1 ppd for 15 years     Substance and Sexual Activity   • Alcohol use: Never   • Drug use: Never   • Sexual activity: Not Currently     Partners: Female     Birth control/protection: None           Objective   Physical Exam  Vitals (The temperature was 97.5 °F, pulse 79, respirations 18, /88, room air pulse ox 93%) and nursing note reviewed.   Constitutional:       Appearance: Normal appearance.   HENT:      Head: Normocephalic and atraumatic.      Right Ear: Tympanic membrane normal.      Left Ear: Tympanic membrane normal.      Nose: Nose normal.      Mouth/Throat:      Pharynx: No oropharyngeal exudate.   Cardiovascular:      Rate and Rhythm: Normal rate and regular rhythm.      Pulses: Normal pulses.      Heart sounds: Normal heart sounds. No murmur heard.    No friction rub. No gallop.   Pulmonary:      Effort: Pulmonary effort is normal.      Breath sounds: Normal breath sounds.   Abdominal:      General: Abdomen is flat. Bowel sounds are normal. There is no distension.      Palpations: Abdomen is soft. There is no mass.      Tenderness: There is no abdominal tenderness. There is no guarding.      Hernia: No hernia is present.   Genitourinary:     Comments: Rectal exam: Semisolid stool, heme-negative, normal tone, no masses  Musculoskeletal:         General: No swelling or tenderness. Normal range of motion.       Cervical back: Normal range of motion and neck supple.   Skin:     General: Skin is warm and dry.      Findings: No rash.   Neurological:      General: No focal deficit present.      Mental Status: He is alert.      Comments: Patient at baseline mental status         Procedures           ED Course  ED Course as of 12/21/22 2209   Wed Dec 21, 2022   2127 The white blood cell count is 11.04.  The hemoglobin is 12.5.  The neutrophil percent is 71% with an absolute neutrophil count of 7.9.  The CBC is otherwise unremarkable [RC]   2128 The urine microscopic shows 6-12 red blood cells, 31-50 white blood cells, 3+ bacteria, nitrite positive, leukocyte large.  The urine will be cultured    The glucose is 105.  The BUN 25 and the sodium 130.  The chloride is 96.  The GFR is 85.  The CMP is otherwise unremarkable [RC]      ED Course User Index  [RC] Pawan Adams MD      21:13 EST, 12/21/22:  The EKG was obtained at 2102 and read by me at 2103.  The EKG shows a normal sinus rhythm with rate of 65.  There is a normal axis with no hypertrophy.  The IN is prolonged at 210 ms.  The QRS and QT intervals are unremarkable.  There is no ectopy.  There is no acute ST elevation or depression  22:16 EST, 12/21/22:  The patient was reassessed.  He has no new complaints.  His vital signs reviewed and are stable abdominal exam: Soft nontender no masses positive bowel sounds.  Neurological exam: Alert, at baseline mental status     22:10 EST, 12/21/22:  The patient's diagnosis of constipation and urinary tract infection were discussed with him.  The patient's urine has been cultured.  The patient will be placed on an antibiotic.  He should follow-up with Dr. Trinh in 1 week.  He should take Colace as needed as directed for constipation.  He should return to the emergency department if there is worsening pain, fever, worse in any way at all.  All the patient's and wife's question were answered the patient will be discharged in good  condition                                MDM    Final diagnoses:   Acute UTI   Constipation, unspecified constipation type       ED Disposition  ED Disposition     None          No follow-up provider specified.       Medication List      No changes were made to your prescriptions during this visit.          Pawan Adams MD  12/21/22 6926

## 2022-12-22 NOTE — DISCHARGE INSTRUCTIONS
Follow-up with Dr. Trinh next week.  Return to the emergency department if there is increased pain, fever, worsening way at all.  Take Colace as needed as directed for constipation

## 2022-12-23 LAB — BACTERIA SPEC AEROBE CULT: ABNORMAL

## 2023-02-16 ENCOUNTER — PATIENT MESSAGE (OUTPATIENT)
Dept: FAMILY MEDICINE CLINIC | Facility: CLINIC | Age: 88
End: 2023-02-16
Payer: MEDICARE

## 2023-02-17 RX ORDER — ALBUTEROL SULFATE 90 UG/1
2 AEROSOL, METERED RESPIRATORY (INHALATION) EVERY 4 HOURS PRN
Qty: 18 G | Refills: 1 | Status: SHIPPED | OUTPATIENT
Start: 2023-02-17 | End: 2023-04-07 | Stop reason: SDUPTHER

## 2023-02-17 NOTE — TELEPHONE ENCOUNTER
From: Pawel Goldberg  To: Charlie Trinh  Sent: 2/16/2023 4:16 PM EST  Subject: Rescue inhaler    Could you send a prescription for Pawel’s albuterol rescue inhaler to [facility] please.

## 2023-04-07 RX ORDER — ALBUTEROL SULFATE 90 UG/1
2 AEROSOL, METERED RESPIRATORY (INHALATION) EVERY 4 HOURS PRN
Qty: 18 G | Refills: 1 | Status: SHIPPED | OUTPATIENT
Start: 2023-04-07

## 2023-04-17 RX ORDER — PANTOPRAZOLE SODIUM 40 MG/1
TABLET, DELAYED RELEASE ORAL
Qty: 90 TABLET | Refills: 3 | Status: SHIPPED | OUTPATIENT
Start: 2023-04-17

## 2023-04-17 RX ORDER — RIVASTIGMINE 9.5 MG/24H
PATCH, EXTENDED RELEASE TRANSDERMAL
Qty: 90 PATCH | Refills: 3 | Status: SHIPPED | OUTPATIENT
Start: 2023-04-17

## 2023-06-13 ENCOUNTER — TELEPHONE (OUTPATIENT)
Dept: FAMILY MEDICINE CLINIC | Facility: CLINIC | Age: 88
End: 2023-06-13
Payer: MEDICARE

## 2023-06-13 NOTE — TELEPHONE ENCOUNTER
Karishma OT with [facility] ([phone]) calling to notify you that the patient had a fall over the weekend.  He slid out of his chair just landing on the the floor.  EMS was called but no injuries were found.  This is just a notification and no return call is needed.

## 2023-08-19 ENCOUNTER — APPOINTMENT (OUTPATIENT)
Dept: GENERAL RADIOLOGY | Facility: HOSPITAL | Age: 88
End: 2023-08-19
Payer: MEDICARE

## 2023-08-19 ENCOUNTER — HOSPITAL ENCOUNTER (OUTPATIENT)
Facility: HOSPITAL | Age: 88
Setting detail: OBSERVATION
Discharge: HOME HEALTH | End: 2023-08-21
Attending: EMERGENCY MEDICINE | Admitting: STUDENT IN AN ORGANIZED HEALTH CARE EDUCATION/TRAINING PROGRAM
Payer: MEDICARE

## 2023-08-19 DIAGNOSIS — J44.1 COPD WITH ACUTE EXACERBATION: Primary | ICD-10-CM

## 2023-08-19 DIAGNOSIS — D72.829 LEUKOCYTOSIS, UNSPECIFIED TYPE: ICD-10-CM

## 2023-08-19 DIAGNOSIS — L03.116 CELLULITIS OF LEFT LOWER EXTREMITY: ICD-10-CM

## 2023-08-19 DIAGNOSIS — R79.89 ELEVATED PROCALCITONIN: ICD-10-CM

## 2023-08-19 DIAGNOSIS — J44.1 COPD EXACERBATION: ICD-10-CM

## 2023-08-19 DIAGNOSIS — R09.02 HYPOXIA: ICD-10-CM

## 2023-08-19 LAB
ALBUMIN SERPL-MCNC: 3.4 G/DL (ref 3.5–5.2)
ALBUMIN/GLOB SERPL: 1 G/DL
ALP SERPL-CCNC: 53 U/L (ref 39–117)
ALT SERPL W P-5'-P-CCNC: 26 U/L (ref 1–41)
ANION GAP SERPL CALCULATED.3IONS-SCNC: 8.9 MMOL/L (ref 5–15)
APTT PPP: 35.5 SECONDS (ref 24.3–38.1)
AST SERPL-CCNC: 31 U/L (ref 1–40)
BACTERIA UR QL AUTO: ABNORMAL /HPF
BASOPHILS # BLD AUTO: 0.04 10*3/MM3 (ref 0–0.2)
BASOPHILS NFR BLD AUTO: 0.3 % (ref 0–1.5)
BILIRUB SERPL-MCNC: 0.5 MG/DL (ref 0–1.2)
BILIRUB UR QL STRIP: NEGATIVE
BUN SERPL-MCNC: 27 MG/DL (ref 8–23)
BUN/CREAT SERPL: 27.6 (ref 7–25)
CALCIUM SPEC-SCNC: 8.8 MG/DL (ref 8.2–9.6)
CHLORIDE SERPL-SCNC: 94 MMOL/L (ref 98–107)
CLARITY UR: ABNORMAL
CO2 SERPL-SCNC: 27.1 MMOL/L (ref 22–29)
COLOR UR: YELLOW
CREAT SERPL-MCNC: 0.98 MG/DL (ref 0.76–1.27)
D-LACTATE SERPL-SCNC: 1.7 MMOL/L (ref 0.5–2)
DEPRECATED RDW RBC AUTO: 48.7 FL (ref 37–54)
EGFRCR SERPLBLD CKD-EPI 2021: 73.3 ML/MIN/1.73
EOSINOPHIL # BLD AUTO: 0.07 10*3/MM3 (ref 0–0.4)
EOSINOPHIL NFR BLD AUTO: 0.5 % (ref 0.3–6.2)
ERYTHROCYTE [DISTWIDTH] IN BLOOD BY AUTOMATED COUNT: 13.8 % (ref 12.3–15.4)
GEN 5 2HR TROPONIN T REFLEX: 23 NG/L
GLOBULIN UR ELPH-MCNC: 3.3 GM/DL
GLUCOSE SERPL-MCNC: 137 MG/DL (ref 65–99)
GLUCOSE UR STRIP-MCNC: NEGATIVE MG/DL
HCT VFR BLD AUTO: 36 % (ref 37.5–51)
HGB BLD-MCNC: 11.7 G/DL (ref 13–17.7)
HGB UR QL STRIP.AUTO: ABNORMAL
HYALINE CASTS UR QL AUTO: ABNORMAL /LPF
IMM GRANULOCYTES # BLD AUTO: 0.07 10*3/MM3 (ref 0–0.05)
IMM GRANULOCYTES NFR BLD AUTO: 0.5 % (ref 0–0.5)
INR PPP: 1.05 (ref 0.9–1.1)
KETONES UR QL STRIP: NEGATIVE
LEUKOCYTE ESTERASE UR QL STRIP.AUTO: ABNORMAL
LYMPHOCYTES # BLD AUTO: 1.44 10*3/MM3 (ref 0.7–3.1)
LYMPHOCYTES NFR BLD AUTO: 9.9 % (ref 19.6–45.3)
MCH RBC QN AUTO: 31.2 PG (ref 26.6–33)
MCHC RBC AUTO-ENTMCNC: 32.5 G/DL (ref 31.5–35.7)
MCV RBC AUTO: 96 FL (ref 79–97)
MONOCYTES # BLD AUTO: 1.36 10*3/MM3 (ref 0.1–0.9)
MONOCYTES NFR BLD AUTO: 9.3 % (ref 5–12)
NEUTROPHILS NFR BLD AUTO: 11.63 10*3/MM3 (ref 1.7–7)
NEUTROPHILS NFR BLD AUTO: 79.5 % (ref 42.7–76)
NITRITE UR QL STRIP: NEGATIVE
NRBC BLD AUTO-RTO: 0 /100 WBC (ref 0–0.2)
NT-PROBNP SERPL-MCNC: 874.7 PG/ML (ref 0–1800)
PH UR STRIP.AUTO: 5.5 [PH] (ref 4.5–8)
PLATELET # BLD AUTO: 239 10*3/MM3 (ref 140–450)
PMV BLD AUTO: 9.4 FL (ref 6–12)
POTASSIUM SERPL-SCNC: 4 MMOL/L (ref 3.5–5.2)
PROCALCITONIN SERPL-MCNC: 5.5 NG/ML (ref 0–0.25)
PROT SERPL-MCNC: 6.7 G/DL (ref 6–8.5)
PROT UR QL STRIP: NEGATIVE
PROTHROMBIN TIME: 13.7 SECONDS (ref 12.1–15)
RBC # BLD AUTO: 3.75 10*6/MM3 (ref 4.14–5.8)
RBC # UR STRIP: ABNORMAL /HPF
REF LAB TEST METHOD: ABNORMAL
SODIUM SERPL-SCNC: 130 MMOL/L (ref 136–145)
SP GR UR STRIP: 1.02 (ref 1–1.03)
SQUAMOUS #/AREA URNS HPF: ABNORMAL /HPF
TROPONIN T DELTA: -1 NG/L
TROPONIN T SERPL HS-MCNC: 24 NG/L
UROBILINOGEN UR QL STRIP: ABNORMAL
WBC # UR STRIP: ABNORMAL /HPF
WBC NRBC COR # BLD: 14.61 10*3/MM3 (ref 3.4–10.8)

## 2023-08-19 PROCEDURE — 87040 BLOOD CULTURE FOR BACTERIA: CPT | Performed by: EMERGENCY MEDICINE

## 2023-08-19 PROCEDURE — 84145 PROCALCITONIN (PCT): CPT | Performed by: EMERGENCY MEDICINE

## 2023-08-19 PROCEDURE — 85730 THROMBOPLASTIN TIME PARTIAL: CPT | Performed by: EMERGENCY MEDICINE

## 2023-08-19 PROCEDURE — 36415 COLL VENOUS BLD VENIPUNCTURE: CPT

## 2023-08-19 PROCEDURE — 71045 X-RAY EXAM CHEST 1 VIEW: CPT

## 2023-08-19 PROCEDURE — 83880 ASSAY OF NATRIURETIC PEPTIDE: CPT | Performed by: EMERGENCY MEDICINE

## 2023-08-19 PROCEDURE — 80053 COMPREHEN METABOLIC PANEL: CPT | Performed by: EMERGENCY MEDICINE

## 2023-08-19 PROCEDURE — 94761 N-INVAS EAR/PLS OXIMETRY MLT: CPT

## 2023-08-19 PROCEDURE — 96365 THER/PROPH/DIAG IV INF INIT: CPT

## 2023-08-19 PROCEDURE — 0202U NFCT DS 22 TRGT SARS-COV-2: CPT | Performed by: STUDENT IN AN ORGANIZED HEALTH CARE EDUCATION/TRAINING PROGRAM

## 2023-08-19 PROCEDURE — 94799 UNLISTED PULMONARY SVC/PX: CPT

## 2023-08-19 PROCEDURE — 87086 URINE CULTURE/COLONY COUNT: CPT | Performed by: EMERGENCY MEDICINE

## 2023-08-19 PROCEDURE — 87077 CULTURE AEROBIC IDENTIFY: CPT | Performed by: EMERGENCY MEDICINE

## 2023-08-19 PROCEDURE — 63710000001 PREDNISONE PER 1 MG: Performed by: STUDENT IN AN ORGANIZED HEALTH CARE EDUCATION/TRAINING PROGRAM

## 2023-08-19 PROCEDURE — 93005 ELECTROCARDIOGRAM TRACING: CPT | Performed by: EMERGENCY MEDICINE

## 2023-08-19 PROCEDURE — 99223 1ST HOSP IP/OBS HIGH 75: CPT | Performed by: STUDENT IN AN ORGANIZED HEALTH CARE EDUCATION/TRAINING PROGRAM

## 2023-08-19 PROCEDURE — 85610 PROTHROMBIN TIME: CPT | Performed by: EMERGENCY MEDICINE

## 2023-08-19 PROCEDURE — 94640 AIRWAY INHALATION TREATMENT: CPT

## 2023-08-19 PROCEDURE — 87186 SC STD MICRODIL/AGAR DIL: CPT | Performed by: EMERGENCY MEDICINE

## 2023-08-19 PROCEDURE — 81001 URINALYSIS AUTO W/SCOPE: CPT | Performed by: EMERGENCY MEDICINE

## 2023-08-19 PROCEDURE — 84484 ASSAY OF TROPONIN QUANT: CPT | Performed by: EMERGENCY MEDICINE

## 2023-08-19 PROCEDURE — 83605 ASSAY OF LACTIC ACID: CPT | Performed by: EMERGENCY MEDICINE

## 2023-08-19 PROCEDURE — G0378 HOSPITAL OBSERVATION PER HR: HCPCS

## 2023-08-19 PROCEDURE — 99284 EMERGENCY DEPT VISIT MOD MDM: CPT

## 2023-08-19 PROCEDURE — 93010 ELECTROCARDIOGRAM REPORT: CPT | Performed by: INTERNAL MEDICINE

## 2023-08-19 PROCEDURE — 85025 COMPLETE CBC W/AUTO DIFF WBC: CPT | Performed by: EMERGENCY MEDICINE

## 2023-08-19 PROCEDURE — 25010000002 CEFTRIAXONE SODIUM-DEXTROSE 1000-3.74 MG-%(50ML) RECONSTITUTED SOLUTION: Performed by: EMERGENCY MEDICINE

## 2023-08-19 RX ORDER — ACETAMINOPHEN 650 MG/1
650 SUPPOSITORY RECTAL EVERY 4 HOURS PRN
Status: DISCONTINUED | OUTPATIENT
Start: 2023-08-19 | End: 2023-08-21 | Stop reason: HOSPADM

## 2023-08-19 RX ORDER — SODIUM CHLORIDE 0.9 % (FLUSH) 0.9 %
10 SYRINGE (ML) INJECTION EVERY 12 HOURS SCHEDULED
Status: DISCONTINUED | OUTPATIENT
Start: 2023-08-19 | End: 2023-08-21 | Stop reason: HOSPADM

## 2023-08-19 RX ORDER — LEVOTHYROXINE SODIUM 0.05 MG/1
50 TABLET ORAL
Status: DISCONTINUED | OUTPATIENT
Start: 2023-08-20 | End: 2023-08-21 | Stop reason: HOSPADM

## 2023-08-19 RX ORDER — BISACODYL 5 MG/1
5 TABLET, DELAYED RELEASE ORAL DAILY PRN
Status: DISCONTINUED | OUTPATIENT
Start: 2023-08-19 | End: 2023-08-21 | Stop reason: HOSPADM

## 2023-08-19 RX ORDER — PREDNISONE 20 MG/1
40 TABLET ORAL
Status: DISCONTINUED | OUTPATIENT
Start: 2023-08-20 | End: 2023-08-21 | Stop reason: HOSPADM

## 2023-08-19 RX ORDER — CHOLECALCIFEROL (VITAMIN D3) 125 MCG
10 CAPSULE ORAL NIGHTLY
Status: DISCONTINUED | OUTPATIENT
Start: 2023-08-20 | End: 2023-08-21 | Stop reason: HOSPADM

## 2023-08-19 RX ORDER — SODIUM CHLORIDE 9 MG/ML
40 INJECTION, SOLUTION INTRAVENOUS AS NEEDED
Status: DISCONTINUED | OUTPATIENT
Start: 2023-08-19 | End: 2023-08-21 | Stop reason: HOSPADM

## 2023-08-19 RX ORDER — SODIUM CHLORIDE 0.9 % (FLUSH) 0.9 %
10 SYRINGE (ML) INJECTION AS NEEDED
Status: DISCONTINUED | OUTPATIENT
Start: 2023-08-19 | End: 2023-08-21 | Stop reason: HOSPADM

## 2023-08-19 RX ORDER — CEFTRIAXONE 1 G/50ML
1000 INJECTION, SOLUTION INTRAVENOUS ONCE
Status: COMPLETED | OUTPATIENT
Start: 2023-08-19 | End: 2023-08-19

## 2023-08-19 RX ORDER — NITROGLYCERIN 0.4 MG/1
0.4 TABLET SUBLINGUAL
Status: DISCONTINUED | OUTPATIENT
Start: 2023-08-19 | End: 2023-08-20

## 2023-08-19 RX ORDER — BISACODYL 10 MG
10 SUPPOSITORY, RECTAL RECTAL DAILY PRN
Status: DISCONTINUED | OUTPATIENT
Start: 2023-08-19 | End: 2023-08-21 | Stop reason: HOSPADM

## 2023-08-19 RX ORDER — ALBUTEROL SULFATE 2.5 MG/3ML
2.5 SOLUTION RESPIRATORY (INHALATION) ONCE
Status: COMPLETED | OUTPATIENT
Start: 2023-08-19 | End: 2023-08-19

## 2023-08-19 RX ORDER — SODIUM CHLORIDE 0.9 % (FLUSH) 0.9 %
10 SYRINGE (ML) INJECTION EVERY 12 HOURS SCHEDULED
Status: DISCONTINUED | OUTPATIENT
Start: 2023-08-20 | End: 2023-08-21 | Stop reason: HOSPADM

## 2023-08-19 RX ORDER — IPRATROPIUM BROMIDE AND ALBUTEROL SULFATE 2.5; .5 MG/3ML; MG/3ML
3 SOLUTION RESPIRATORY (INHALATION) ONCE
Status: COMPLETED | OUTPATIENT
Start: 2023-08-19 | End: 2023-08-19

## 2023-08-19 RX ORDER — ENOXAPARIN SODIUM 100 MG/ML
40 INJECTION SUBCUTANEOUS NIGHTLY
Status: DISCONTINUED | OUTPATIENT
Start: 2023-08-19 | End: 2023-08-19

## 2023-08-19 RX ORDER — CETIRIZINE HYDROCHLORIDE 10 MG/1
10 TABLET ORAL DAILY PRN
COMMUNITY

## 2023-08-19 RX ORDER — SODIUM CHLORIDE 9 MG/ML
75 INJECTION, SOLUTION INTRAVENOUS CONTINUOUS
Status: DISCONTINUED | OUTPATIENT
Start: 2023-08-20 | End: 2023-08-20

## 2023-08-19 RX ORDER — RIVASTIGMINE 4.6 MG/24H
2 PATCH, EXTENDED RELEASE TRANSDERMAL DAILY
Status: DISCONTINUED | OUTPATIENT
Start: 2023-08-20 | End: 2023-08-21 | Stop reason: HOSPADM

## 2023-08-19 RX ORDER — AMOXICILLIN 250 MG
2 CAPSULE ORAL 2 TIMES DAILY
Status: DISCONTINUED | OUTPATIENT
Start: 2023-08-19 | End: 2023-08-21 | Stop reason: HOSPADM

## 2023-08-19 RX ORDER — POLYETHYLENE GLYCOL 3350 17 G/17G
17 POWDER, FOR SOLUTION ORAL DAILY PRN
Status: DISCONTINUED | OUTPATIENT
Start: 2023-08-19 | End: 2023-08-21 | Stop reason: HOSPADM

## 2023-08-19 RX ORDER — IPRATROPIUM BROMIDE AND ALBUTEROL SULFATE 2.5; .5 MG/3ML; MG/3ML
3 SOLUTION RESPIRATORY (INHALATION) EVERY 4 HOURS PRN
Status: DISCONTINUED | OUTPATIENT
Start: 2023-08-19 | End: 2023-08-21 | Stop reason: HOSPADM

## 2023-08-19 RX ORDER — ACETAMINOPHEN 160 MG/5ML
650 SOLUTION ORAL EVERY 4 HOURS PRN
Status: DISCONTINUED | OUTPATIENT
Start: 2023-08-19 | End: 2023-08-21 | Stop reason: HOSPADM

## 2023-08-19 RX ORDER — LANOLIN ALCOHOL/MO/W.PET/CERES
1000 CREAM (GRAM) TOPICAL DAILY
COMMUNITY

## 2023-08-19 RX ORDER — ACETAMINOPHEN 325 MG/1
650 TABLET ORAL EVERY 4 HOURS PRN
Status: DISCONTINUED | OUTPATIENT
Start: 2023-08-19 | End: 2023-08-21 | Stop reason: HOSPADM

## 2023-08-19 RX ORDER — PANTOPRAZOLE SODIUM 40 MG/1
40 TABLET, DELAYED RELEASE ORAL DAILY
Status: DISCONTINUED | OUTPATIENT
Start: 2023-08-20 | End: 2023-08-21 | Stop reason: HOSPADM

## 2023-08-19 RX ORDER — CHOLECALCIFEROL (VITAMIN D3) 125 MCG
5 CAPSULE ORAL NIGHTLY PRN
Status: DISCONTINUED | OUTPATIENT
Start: 2023-08-19 | End: 2023-08-21 | Stop reason: HOSPADM

## 2023-08-19 RX ADMIN — Medication 10 ML: at 23:14

## 2023-08-19 RX ADMIN — Medication 10 MG: at 23:22

## 2023-08-19 RX ADMIN — SENNOSIDES AND DOCUSATE SODIUM 2 TABLET: 50; 8.6 TABLET ORAL at 23:14

## 2023-08-19 RX ADMIN — PREDNISONE 40 MG: 20 TABLET ORAL at 23:22

## 2023-08-19 RX ADMIN — CEFTRIAXONE 1000 MG: 1 INJECTION, SOLUTION INTRAVENOUS at 20:40

## 2023-08-19 RX ADMIN — ALBUTEROL SULFATE 2.5 MG: 2.5 SOLUTION RESPIRATORY (INHALATION) at 21:05

## 2023-08-19 RX ADMIN — IPRATROPIUM BROMIDE AND ALBUTEROL SULFATE 3 ML: .5; 3 SOLUTION RESPIRATORY (INHALATION) at 16:29

## 2023-08-19 RX ADMIN — SODIUM CHLORIDE 75 ML/HR: 9 INJECTION, SOLUTION INTRAVENOUS at 23:29

## 2023-08-19 NOTE — Clinical Note
Level of Care: Telemetry [5]   Diagnosis: COPD with acute exacerbation [797819]   Admitting Physician: BENITEZ VIDAL [[id number]]   Attending Physician: BENITEZ VIDAL [[id number]]

## 2023-08-19 NOTE — ED PROVIDER NOTES
"Subjective   History of Present Illness  Pawel Ashlyn is a 89-year-old white male who presents secondary to shortness of breath.  Onset of dyspnea earlier this week.  Patient is unsure of the exact date of onset.  Patient states he used his metered-dose inhaler 8-10 times today.  Patient was recently prescribed a diuretic secondary to reported fluid in his lungs.  Apparently this was not placed in his pillbox thus he has not been taking it this week.  Patient denies fever, chills, chest pain, back pain, abdominal pain, angina equivalents.  EMS was called to assisted living this afternoon secondary to shortness of breath.  On arrival patient's room air saturation was 86%.  After being placed on 2 L nasal cannula sats increased to the low 90s.  Patient was stable in route.  He presents for evaluation.    Patient was born in [facility].  He is a former smoker.  Patient states he smoked \"rabbit tobacco\".  He stopped smoking at age 30.    History provided by:  Patient and EMS personnel    Review of Systems   Constitutional:  Negative for fever.   HENT:  Negative for rhinorrhea.    Eyes:  Negative for redness.   Respiratory:  Positive for shortness of breath. Negative for cough.    Cardiovascular:  Negative for chest pain.   Gastrointestinal:  Negative for abdominal pain.   Genitourinary:  Negative for dysuria.   Musculoskeletal:  Negative for back pain.   Skin:  Negative for rash.   Neurological:  Negative for syncope.   All other systems reviewed and are negative.    Past Medical History:   Diagnosis Date    Allergic     Seasonal    Allergic rhinitis     Arthritis     Asthma     Brito esophagus     Benign prostatic hyperplasia     Carotid arterial disease     Status post right CEA 4/1/14    COPD (chronic obstructive pulmonary disease)     Coronary artery disease     Dementia     Dyspnea     Erectile dysfunction     GERD (gastroesophageal reflux disease)     Hematoma of frontal scalp 07/08/2021    HL " (hearing loss)     Hypothyroidism     Low back pain     PAD (peripheral artery disease)     Prostate cancer     Status post implantation of artificial urinary sphincter         Stroke     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    Urinary tract infection        Allergies   Allergen Reactions    Bactrim [Sulfamethoxazole-Trimethoprim] Rash       Past Surgical History:   Procedure Laterality Date    CAROTID ARTERY ANGIOPLASTY  2014    COLONOSCOPY  2009    ENDOSCOPY  2009    ,,2006    FRACTURE SURGERY      HERNIA REPAIR  2000    HERNIA REPAIR  2009    INGUINAL HERNIA REPAIR      PARTIAL HIP ARTHROPLASTY Left 2014    PROSTATECTOMY  2008    SHOULDER SURGERY  2005    SHOULDER SURGERY  2006    SHOULDER SURGERY  2008       Family History   Problem Relation Age of Onset    Hypertension Sister     Thyroid disease Sister     Stroke Sister     Depression Sister     Heart failure Sister     Hypertension Brother     Lung cancer Brother     COPD Brother     Alcohol abuse Brother     Coronary artery disease Son         Son  from MI at age 59        Social History     Socioeconomic History    Marital status:     Number of children: 3   Tobacco Use    Smoking status: Former     Packs/day: 0.00     Years: 15.00     Pack years: 0.00     Types: Cigarettes     Start date: 1947     Quit date: 1962     Years since quittin.6    Smokeless tobacco: Never    Tobacco comments:     Quit    1 ppd for 15 years     Substance and Sexual Activity    Alcohol use: Never    Drug use: Never    Sexual activity: Not Currently     Partners: Female     Birth control/protection: None           Objective   Physical Exam  Vitals and nursing note reviewed.   Constitutional:       General: He is not in acute distress.     Appearance: Normal appearance. He is well-developed. He is not ill-appearing, toxic-appearing or diaphoretic.      Comments: 89-year-old white male laying in  bed.  Patient is a bit overweight.  He otherwise appears in good health for age.  Vital signs notable for oxygen saturation 90% on 2 L nasal cannula.  Patient is speaking full sentences without any difficulty.   HENT:      Head: Normocephalic and atraumatic.      Right Ear: Tympanic membrane, ear canal and external ear normal.      Left Ear: Tympanic membrane, ear canal and external ear normal.      Nose: Nose normal.      Mouth/Throat:      Mouth: Mucous membranes are moist.      Pharynx: Oropharynx is clear.   Eyes:      Extraocular Movements: Extraocular movements intact.      Conjunctiva/sclera: Conjunctivae normal.      Pupils: Pupils are equal, round, and reactive to light.   Cardiovascular:      Rate and Rhythm: Normal rate and regular rhythm.      Heart sounds: Normal heart sounds. No murmur heard.    No friction rub. No gallop.   Pulmonary:      Effort: Pulmonary effort is normal. No respiratory distress.      Breath sounds: No stridor. Decreased breath sounds (Slightly decreased in all lung fields.) and wheezing (Slight wheeze at end of expiration present) present. No rhonchi or rales.   Abdominal:      General: There is no distension.      Palpations: Abdomen is soft. There is no mass.      Tenderness: There is no abdominal tenderness. There is no guarding or rebound.      Hernia: No hernia is present.   Musculoskeletal:         General: Normal range of motion.      Cervical back: Normal range of motion and neck supple.      Right lower leg: No deformity. No edema.      Left lower le+ Pitting Edema present.      Left ankle: Swelling present.        Legs:    Skin:     General: Skin is warm and dry.      Findings: Erythema (Left lower extremity as diagrammed.) present. No rash.   Neurological:      General: No focal deficit present.      Mental Status: He is alert and oriented to person, place, and time. He is disoriented.      Cranial Nerves: No cranial nerve deficit.      Deep Tendon Reflexes:  Reflexes are normal and symmetric.      Comments: Patient is oriented to self and place.  He does not know the date.   Psychiatric:         Mood and Affect: Mood normal.         Behavior: Behavior normal.       Procedures       EKG 12-lead  Date 8/19/2023  Time 16: 45  Normal sinus rhythm with PAC  Normal rate  Normal rhythm  Normal axis  Early R wave transition  No ST elevation or depression  Nonspecific T wave flattening  Nonspecific EKG    ED Course  ED Course as of 08/19/23 2104   Sat Aug 19, 2023   1605 Patient has slight expiratory wheeze.  Also noted on physical exam 2+ pitting edema bilateral lower extremities.  Giving a DuoNeb treatment.  Getting EKG, chest x-ray and full set of labs. [SS]   1730 WBC(!): 14.61 [SS]   1730 Hemoglobin(!): 11.7 [SS]   1730 Hematocrit(!): 36.0 [SS]   1731 Sodium(!): 130 [SS]   1731 Chloride(!): 94 [SS]   1731 BUN(!): 27 [SS]   1731 Creatinine: 0.98 [SS]   1731 Glucose(!): 137 [SS]   1731 HS Troponin T(!): 24 [SS]   1731 CBC shows leukocytosis with white count 14.61 K with absolute neutrophil count of 11.63 K.  H&H 11.7 and 36.0.  Sodium and chloride slightly low with sodium of 130, chloride 94.  BUN elevated at 27 with normal creatinine.  Blood sugar 137.  proBNP is unremarkable.  High-sensitivity troponin mildly elevated at 24. [SS]   1731 Chest x-ray shows chronic lung changes.  Nothing acute.  With patient's leukocytosis obtaining UA, lactic acid, procalcitonin and blood cultures. [SS]   1732  UA, blood cultures and lactic acid. [SS]   2027 Appearance, UA(!): Slightly Cloudy [SS]   2027 Blood, UA(!): Trace [SS]   2027 Leukocytes, UA(!): Small (1+) [SS]   2027 Squamous Epithelial Cells, UA(!): 7-12 [SS]   2027 Bacteria, UA(!): 1+ [SS]   2027 WBC, UA(!): 13-20 [SS]   2027 RBC, UA(!): 3-5 [SS]   2028 Urine sample is contaminated with epithelial cells.  However patient does have small leukocytes, 1+ bacteria with 13-20 WBCs.  Elevated procalcitonin at 5.5 concerning for  bacterial infection.  Thus giving a gram of Rocephin.  Calling hospitalist to discuss admission. [SS]   2100 Discussed with Dr. Oscar Medrano-hospitalist.  He will admit the patient for further care. [SS]      ED Course User Index  [SS] Catalino Smith MD      Labs Reviewed   COMPREHENSIVE METABOLIC PANEL - Abnormal; Notable for the following components:       Result Value    Glucose 137 (*)     BUN 27 (*)     Sodium 130 (*)     Chloride 94 (*)     Albumin 3.4 (*)     BUN/Creatinine Ratio 27.6 (*)     All other components within normal limits    Narrative:     GFR Normal >60  Chronic Kidney Disease <60  Kidney Failure <15    The GFR formula is only valid for adults with stable renal function between ages 18 and 70.   TROPONIN - Abnormal; Notable for the following components:    HS Troponin T 24 (*)     All other components within normal limits    Narrative:     High Sensitive Troponin T Reference Range:  <10.0 ng/L- Negative Female for AMI  <15.0 ng/L- Negative Male for AMI  >=10 - Abnormal Female indicating possible myocardial injury.  >=15 - Abnormal Male indicating possible myocardial injury.   Clinicians would have to utilize clinical acumen, EKG, Troponin, and serial changes to determine if it is an Acute Myocardial Infarction or myocardial injury due to an underlying chronic condition.        CBC WITH AUTO DIFFERENTIAL - Abnormal; Notable for the following components:    WBC 14.61 (*)     RBC 3.75 (*)     Hemoglobin 11.7 (*)     Hematocrit 36.0 (*)     Neutrophil % 79.5 (*)     Lymphocyte % 9.9 (*)     Neutrophils, Absolute 11.63 (*)     Monocytes, Absolute 1.36 (*)     Immature Grans, Absolute 0.07 (*)     All other components within normal limits   HIGH SENSITIVITIY TROPONIN T 2HR - Abnormal; Notable for the following components:    HS Troponin T 23 (*)     All other components within normal limits    Narrative:     High Sensitive Troponin T Reference Range:  <10.0 ng/L- Negative Female for AMI  <15.0  "ng/L- Negative Male for AMI  >=10 - Abnormal Female indicating possible myocardial injury.  >=15 - Abnormal Male indicating possible myocardial injury.   Clinicians would have to utilize clinical acumen, EKG, Troponin, and serial changes to determine if it is an Acute Myocardial Infarction or myocardial injury due to an underlying chronic condition.        URINALYSIS W/ MICROSCOPIC IF INDICATED (NO CULTURE) - Abnormal; Notable for the following components:    Appearance, UA Slightly Cloudy (*)     Blood, UA Trace (*)     Leuk Esterase, UA Small (1+) (*)     All other components within normal limits   PROCALCITONIN - Abnormal; Notable for the following components:    Procalcitonin 5.50 (*)     All other components within normal limits    Narrative:     As a Marker for Sepsis (Non-Neonates):    1. <0.5 ng/mL represents a low risk of severe sepsis and/or septic shock.  2. >2 ng/mL represents a high risk of severe sepsis and/or septic shock.    As a Marker for Lower Respiratory Tract Infections that require antibiotic therapy:    PCT on Admission    Antibiotic Therapy       6-12 Hrs later    >0.5                Strongly Recommended  >0.25 - <0.5        Recommended   0.1 - 0.25          Discouraged              Remeasure/reassess PCT  <0.1                Strongly Discouraged     Remeasure/reassess PCT    As 28 day mortality risk marker: \"Change in Procalcitonin Result\" (>80% or <=80%) if Day 0 (or Day 1) and Day 4 values are available. Refer to http://www.[employer]-pct-calculator.com    Change in PCT <=80%  A decrease of PCT levels below or equal to 80% defines a positive change in PCT test result representing a higher risk for 28-day all-cause mortality of patients diagnosed with severe sepsis for septic shock.    Change in PCT >80%  A decrease of PCT levels of more than 80% defines a negative change in PCT result representing a lower risk for 28-day all-cause mortality of patients diagnosed with severe sepsis or septic " shock.      URINALYSIS, MICROSCOPIC ONLY - Abnormal; Notable for the following components:    RBC, UA 3-5 (*)     WBC, UA 13-20 (*)     Bacteria, UA 1+ (*)     Squamous Epithelial Cells, UA 7-12 (*)     All other components within normal limits   BNP (IN-HOUSE) - Normal    Narrative:     Among patients with dyspnea, NT-proBNP is highly sensitive for the detection of acute congestive heart failure. In addition NT-proBNP of <300 pg/ml effectively rules out acute congestive heart failure with 99% negative predictive value.     PROTIME-INR - Normal    Narrative:     Therapeutic Ranges for INR: 2.0-3.0 (PT 20-30)                              2.5-3.5 (PT 25-34)   APTT - Normal    Narrative:     PTT = The equivalent PTT values for the therapeutic range of heparin levels at 0.1 to 0.7 U/ml are 53 to 110 seconds.     LACTIC ACID, PLASMA - Normal   BLOOD CULTURE   BLOOD CULTURE   URINE CULTURE   CBC AND DIFFERENTIAL    Narrative:     The following orders were created for panel order CBC & Differential.  Procedure                               Abnormality         Status                     ---------                               -----------         ------                     CBC Auto Differential[[id number]]        Abnormal            Final result                 Please view results for these tests on the individual orders.     XR Chest 1 View    Result Date: 8/19/2023  Narrative: XR CHEST 1 VW-: 8/19/2023 5:09 PM  INDICATION: Worsening shortness of air for the past week  COMPARISON:  9/29/2020.  FINDINGS: 2 portable AP view(s) of the chest. There is rotation to the left. The aorta is tortuous ectatic and atherosclerotic. The appearance is unchanged for technical factors. Shallow lung expansion with bronchovascular crowding. Chronic eventration/elevation of the left hemidiaphragm with gaseous distention of stomach and bowel subjacent to the left hemidiaphragm. No new effusion or dense consolidation. No pneumothorax. There is  some probable atelectasis/scarring in the perihilar and lower lung zones bilaterally.      Impression:  1. Chronic lung changes. No superimposed active disease or significant interval change.  This report was finalized on 8/19/2023 5:05 PM by Dr. Arun Walker MD.          My differential diagnosis for dyspnea includes but is not limited to:  Asthma, COPD, COVID-19, pneumonia, pulmonary embolus, acute respiratory distress syndrome, RSV, pneumothorax, pleural effusion, pulmonary fibrosis, congestive heart failure, myocardial infarction, DKA, uremia, acidosis, sepsis, anemia, drug related, hyperventilation, CNS disease                                    Medical Decision Making  Amount and/or Complexity of Data Reviewed  Labs: ordered. Decision-making details documented in ED Course.  Radiology: ordered.  ECG/medicine tests: ordered.    Risk  Prescription drug management.        Final diagnoses:   COPD with acute exacerbation   Hypoxia   Leukocytosis, unspecified type   Elevated procalcitonin   Cellulitis of left lower extremity       ED Disposition  ED Disposition       ED Disposition   Decision to Admit    Condition   --    Comment   Level of Care: Telemetry [5]   Admitting Physician: BENITEZ VIDAL [[id number]]   Attending Physician: BENITEZ VIDAL [[id number]]   Patient Class: Observation [104]                 No follow-up provider specified.       Medication List      No changes were made to your prescriptions during this visit.            Catalino Smith MD  08/19/23 2443

## 2023-08-20 ENCOUNTER — APPOINTMENT (OUTPATIENT)
Dept: ULTRASOUND IMAGING | Facility: HOSPITAL | Age: 88
End: 2023-08-20
Payer: MEDICARE

## 2023-08-20 LAB
ANION GAP SERPL CALCULATED.3IONS-SCNC: 10.5 MMOL/L (ref 5–15)
B PARAPERT DNA SPEC QL NAA+PROBE: NOT DETECTED
B PERT DNA SPEC QL NAA+PROBE: NOT DETECTED
BUN SERPL-MCNC: 24 MG/DL (ref 8–23)
BUN/CREAT SERPL: 24.5 (ref 7–25)
C PNEUM DNA NPH QL NAA+NON-PROBE: NOT DETECTED
CALCIUM SPEC-SCNC: 8.7 MG/DL (ref 8.2–9.6)
CHLORIDE SERPL-SCNC: 97 MMOL/L (ref 98–107)
CO2 SERPL-SCNC: 24.5 MMOL/L (ref 22–29)
CREAT SERPL-MCNC: 0.98 MG/DL (ref 0.76–1.27)
DEPRECATED RDW RBC AUTO: 49.4 FL (ref 37–54)
EGFRCR SERPLBLD CKD-EPI 2021: 73.3 ML/MIN/1.73
ERYTHROCYTE [DISTWIDTH] IN BLOOD BY AUTOMATED COUNT: 13.7 % (ref 12.3–15.4)
FLUAV SUBTYP SPEC NAA+PROBE: NOT DETECTED
FLUBV RNA ISLT QL NAA+PROBE: NOT DETECTED
GLUCOSE SERPL-MCNC: 137 MG/DL (ref 65–99)
HADV DNA SPEC NAA+PROBE: NOT DETECTED
HBA1C MFR BLD: 5.6 % (ref 4.8–5.6)
HCOV 229E RNA SPEC QL NAA+PROBE: NOT DETECTED
HCOV HKU1 RNA SPEC QL NAA+PROBE: NOT DETECTED
HCOV NL63 RNA SPEC QL NAA+PROBE: NOT DETECTED
HCOV OC43 RNA SPEC QL NAA+PROBE: NOT DETECTED
HCT VFR BLD AUTO: 34.8 % (ref 37.5–51)
HGB BLD-MCNC: 11.2 G/DL (ref 13–17.7)
HMPV RNA NPH QL NAA+NON-PROBE: NOT DETECTED
HPIV1 RNA ISLT QL NAA+PROBE: NOT DETECTED
HPIV2 RNA SPEC QL NAA+PROBE: NOT DETECTED
HPIV3 RNA NPH QL NAA+PROBE: NOT DETECTED
HPIV4 P GENE NPH QL NAA+PROBE: NOT DETECTED
M PNEUMO IGG SER IA-ACNC: NOT DETECTED
MCH RBC QN AUTO: 31.1 PG (ref 26.6–33)
MCHC RBC AUTO-ENTMCNC: 32.2 G/DL (ref 31.5–35.7)
MCV RBC AUTO: 96.7 FL (ref 79–97)
PLATELET # BLD AUTO: 220 10*3/MM3 (ref 140–450)
PMV BLD AUTO: 10.3 FL (ref 6–12)
POTASSIUM SERPL-SCNC: 4.3 MMOL/L (ref 3.5–5.2)
PROCALCITONIN SERPL-MCNC: 4.42 NG/ML (ref 0–0.25)
QT INTERVAL: 381 MS
RBC # BLD AUTO: 3.6 10*6/MM3 (ref 4.14–5.8)
RHINOVIRUS RNA SPEC NAA+PROBE: NOT DETECTED
RSV RNA NPH QL NAA+NON-PROBE: NOT DETECTED
SARS-COV-2 RNA NPH QL NAA+NON-PROBE: NOT DETECTED
SODIUM SERPL-SCNC: 132 MMOL/L (ref 136–145)
WBC NRBC COR # BLD: 11.04 10*3/MM3 (ref 3.4–10.8)

## 2023-08-20 PROCEDURE — 85027 COMPLETE CBC AUTOMATED: CPT | Performed by: STUDENT IN AN ORGANIZED HEALTH CARE EDUCATION/TRAINING PROGRAM

## 2023-08-20 PROCEDURE — 99232 SBSQ HOSP IP/OBS MODERATE 35: CPT | Performed by: HOSPITALIST

## 2023-08-20 PROCEDURE — 63710000001 PREDNISONE PER 1 MG: Performed by: STUDENT IN AN ORGANIZED HEALTH CARE EDUCATION/TRAINING PROGRAM

## 2023-08-20 PROCEDURE — 93971 EXTREMITY STUDY: CPT

## 2023-08-20 PROCEDURE — G0378 HOSPITAL OBSERVATION PER HR: HCPCS

## 2023-08-20 PROCEDURE — 97161 PT EVAL LOW COMPLEX 20 MIN: CPT

## 2023-08-20 PROCEDURE — 97165 OT EVAL LOW COMPLEX 30 MIN: CPT

## 2023-08-20 PROCEDURE — 83036 HEMOGLOBIN GLYCOSYLATED A1C: CPT | Performed by: STUDENT IN AN ORGANIZED HEALTH CARE EDUCATION/TRAINING PROGRAM

## 2023-08-20 PROCEDURE — 94799 UNLISTED PULMONARY SVC/PX: CPT

## 2023-08-20 PROCEDURE — 80048 BASIC METABOLIC PNL TOTAL CA: CPT | Performed by: STUDENT IN AN ORGANIZED HEALTH CARE EDUCATION/TRAINING PROGRAM

## 2023-08-20 PROCEDURE — 96361 HYDRATE IV INFUSION ADD-ON: CPT

## 2023-08-20 PROCEDURE — 84145 PROCALCITONIN (PCT): CPT | Performed by: STUDENT IN AN ORGANIZED HEALTH CARE EDUCATION/TRAINING PROGRAM

## 2023-08-20 RX ORDER — ALBUTEROL SULFATE 2.5 MG/3ML
2.5 SOLUTION RESPIRATORY (INHALATION)
Status: DISCONTINUED | OUTPATIENT
Start: 2023-08-20 | End: 2023-08-21 | Stop reason: HOSPADM

## 2023-08-20 RX ORDER — IPRATROPIUM BROMIDE AND ALBUTEROL SULFATE 2.5; .5 MG/3ML; MG/3ML
3 SOLUTION RESPIRATORY (INHALATION)
Status: DISCONTINUED | OUTPATIENT
Start: 2023-08-20 | End: 2023-08-20

## 2023-08-20 RX ORDER — CEFTRIAXONE 1 G/50ML
1000 INJECTION, SOLUTION INTRAVENOUS EVERY 24 HOURS
Status: DISCONTINUED | OUTPATIENT
Start: 2023-08-20 | End: 2023-08-20

## 2023-08-20 RX ORDER — AMOXICILLIN AND CLAVULANATE POTASSIUM 875; 125 MG/1; MG/1
1 TABLET, FILM COATED ORAL EVERY 12 HOURS SCHEDULED
Status: DISCONTINUED | OUTPATIENT
Start: 2023-08-20 | End: 2023-08-21 | Stop reason: HOSPADM

## 2023-08-20 RX ORDER — BUDESONIDE AND FORMOTEROL FUMARATE DIHYDRATE 160; 4.5 UG/1; UG/1
2 AEROSOL RESPIRATORY (INHALATION)
Status: DISCONTINUED | OUTPATIENT
Start: 2023-08-20 | End: 2023-08-21 | Stop reason: HOSPADM

## 2023-08-20 RX ADMIN — Medication 10 ML: at 20:20

## 2023-08-20 RX ADMIN — Medication 10 MG: at 20:30

## 2023-08-20 RX ADMIN — BUDESONIDE AND FORMOTEROL FUMARATE DIHYDRATE 2 PUFF: 160; 4.5 AEROSOL RESPIRATORY (INHALATION) at 19:44

## 2023-08-20 RX ADMIN — AMOXICILLIN AND CLAVULANATE POTASSIUM 1 TABLET: 875; 125 TABLET, FILM COATED ORAL at 20:18

## 2023-08-20 RX ADMIN — ALBUTEROL SULFATE 2.5 MG: 2.5 SOLUTION RESPIRATORY (INHALATION) at 19:35

## 2023-08-20 RX ADMIN — Medication 10 ML: at 09:08

## 2023-08-20 RX ADMIN — Medication 10 ML: at 20:21

## 2023-08-20 RX ADMIN — SENNOSIDES AND DOCUSATE SODIUM 2 TABLET: 50; 8.6 TABLET ORAL at 20:18

## 2023-08-20 RX ADMIN — ALBUTEROL SULFATE 2.5 MG: 2.5 SOLUTION RESPIRATORY (INHALATION) at 15:54

## 2023-08-20 RX ADMIN — PREDNISONE 40 MG: 20 TABLET ORAL at 09:08

## 2023-08-20 RX ADMIN — LEVOTHYROXINE SODIUM 50 MCG: 50 TABLET ORAL at 06:22

## 2023-08-20 RX ADMIN — SENNOSIDES AND DOCUSATE SODIUM 2 TABLET: 50; 8.6 TABLET ORAL at 09:00

## 2023-08-20 RX ADMIN — RIVASTIGMINE 2 PATCH: 4.6 PATCH TRANSDERMAL at 09:04

## 2023-08-20 RX ADMIN — PANTOPRAZOLE SODIUM 40 MG: 40 TABLET, DELAYED RELEASE ORAL at 09:00

## 2023-08-20 RX ADMIN — SODIUM CHLORIDE 75 ML/HR: 9 INJECTION, SOLUTION INTRAVENOUS at 13:03

## 2023-08-20 NOTE — PLAN OF CARE
Goal Outcome Evaluation:  Plan of Care Reviewed With: patient           Outcome Evaluation: PT evaluation completed.  Patient oriented to name only, required mod/max A x 2 for sit to/from stand transfers with rolling walker, min A x 2 for stand pivot transfer to recliner.  Patient required min A x 2 for static standing balance with rolling walker for UE support.  Recommend SNF/short term rehab at discharge to improve safety with functional mobility prior to return home to assisted living facility.  Patient would benefit from PT services while in hospital to increase strength, balance and progress functional mobility.      Anticipated Discharge Disposition (PT): skilled nursing facility

## 2023-08-20 NOTE — THERAPY EVALUATION
Acute Care - Physical Therapy Initial Evaluation  CORTNEY Childers     Patient Name: Pawel Goldberg  : 1932  MRN: [id number]  Today's Date: 2023   Onset of Illness/Injury or Date of Surgery: 23  Visit Dx:     ICD-10-CM ICD-9-CM   1. COPD with acute exacerbation  J44.1 491.21   2. Hypoxia  R09.02 799.02   3. Leukocytosis, unspecified type  D72.829 288.60   4. Elevated procalcitonin  R79.89 790.99   5. Cellulitis of left lower extremity  L03.116 682.6     Patient Active Problem List   Diagnosis    Brito's esophagus without dysplasia    Obstruction of carotid artery    Stenosis of carotid artery    EEG abnormality without seizure    Hereditary and idiopathic peripheral neuropathy    Mixed hyperlipidemia    Acquired hypothyroidism    Low back pain with sciatica    Lumbar canal stenosis    Degenerative arthritis of lumbar spine    Osteoarthritis of hip    History of repair of hip joint    Syncope    Temporary cerebral vascular dysfunction    History of prostate cancer    Dementia associated with other underlying disease without behavioral disturbance    Arthritis    PAD (peripheral artery disease)    Medicare annual wellness visit, subsequent    Essential hypertension    Medication monitoring encounter    Status post implantation of artificial urinary sphincter    Moderate asthma with acute exacerbation    Vascular dementia without behavioral disturbance    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    At high risk for falls    Insomnia due to medical condition    Chronic UTI    Frequent falls    COPD with acute exacerbation    COPD exacerbation     Past Medical History:   Diagnosis Date    Allergic     Seasonal    Allergic rhinitis     Arthritis     Asthma     Brito esophagus     Benign prostatic hyperplasia     Carotid arterial disease     Status post right CEA 14    COPD (chronic obstructive pulmonary disease)     Coronary artery disease     Dementia      "Dyspnea     Erectile dysfunction     GERD (gastroesophageal reflux disease)     Hematoma of frontal scalp 07/08/2021    HL (hearing loss)     Hypothyroidism 1992    Low back pain     PAD (peripheral artery disease)     Prostate cancer     Status post implantation of artificial urinary sphincter         Stroke     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    Urinary tract infection      Past Surgical History:   Procedure Laterality Date    CAROTID ARTERY ANGIOPLASTY  04/01/2014    COLONOSCOPY  2009    ENDOSCOPY  2009    2001,2003,2006    EYE SURGERY      FRACTURE SURGERY      HERNIA REPAIR  2000    HERNIA REPAIR  2009    INGUINAL HERNIA REPAIR      PARTIAL HIP ARTHROPLASTY Left 07/02/2014    PROSTATECTOMY  2008    SHOULDER SURGERY  2005    SHOULDER SURGERY  2006    SHOULDER SURGERY  2008    TEAR DUCT SURGERY      URINARY SPHINCTER IMPLANT       PT Assessment (last 12 hours)       PT Evaluation and Treatment       Row Name 08/20/23 0850          Physical Therapy Time and Intention    Subjective Information no complaints  -     Document Type evaluation  -     Mode of Treatment physical therapy  -     Patient Effort good  -       Row Name 08/20/23 0850          General Information    Patient Profile Reviewed yes  -     Onset of Illness/Injury or Date of Surgery 08/19/23  -     Referring Physician Dr. Medrano  -     Patient Observations alert;cooperative;agree to therapy  -     Patient/Family/Caregiver Comments/Observations Patient sitting edge of bed with nursing,, agreed to evaluation  -     Prior Level of Function --  Per daughter was walking \"far\" with rolling walker at assisted living facility until yesterday when became ill  -     Equipment Currently Used at Home other (see comments)  per daughter, uses 3 wheel walker  -     Pertinent History of Current Functional Problem Pt admitted to hospital with decreased O2 sats and generalized weakness, diagnosed with UTI, " "hypoxia and cellulitis L LE. Patient on 3 L O2, room air at baseline. Daughter reports frequent falls at home  -     Existing Precautions/Restrictions fall  -     Limitations/Impairments safety/cognitive  -     Risks Reviewed patient:;LOB  -     Benefits Reviewed patient:;improve function;increase strength  -     Barriers to Rehab previous functional deficit;cognitive status  -       Row Name 08/20/23 0850          Living Environment    Current Living Arrangements assisted living facility  -     People in Home spouse  -[facility] Name 08/20/23 0850          Home Use of Assistive/Adaptive Equipment    Equipment Currently Used at Home --  3 wheeled walker  -[facility] Name 08/20/23 0850          Pain    Pre/Posttreatment Pain Comment no c/o pain  -[facility] Name 08/20/23 0850          Cognition    Affect/Mental Status (Cognition) confused  -     Orientation Status (Cognition) oriented to;person  \"I don't even know where I am.  I don't know whats going on.\"  -     Follows Commands (Cognition) WFL  -     Personal Safety Interventions gait belt;nonskid shoes/slippers when out of bed;supervised activity  -       Row Name 08/20/23 0850          Range of Motion Comprehensive    Comment, General Range of Motion unable to assess due to impaired cognition - within functional limits upon visual exam during functional mobility, B LE  -[facility] Name 08/20/23 0850          Strength Comprehensive (MMT)    Comment, General Manual Muscle Testing (MMT) Assessment unable to formally assess due to impaired cognition - impaired LE strength bilateral per visual exam based on difficulty performing transfers/functional mobility.  -[facility] Name 08/20/23 0850          Bed Mobility    Comment, (Bed Mobility) sitting EOB with nursing upon arrival  -[facility] Name 08/20/23 0850          Transfers    Transfers stand-sit transfer  -[facility] Name 08/20/23 0850          Sit-Stand Transfer    Sit-Stand " Henning (Transfers) moderate assist (50% patient effort);maximum assist (25% patient effort);verbal cues;2 person assist  -     Assistive Device (Sit-Stand Transfers) walker, front-wheeled  -       Row Name 08/20/23 0850          Stand-Sit Transfer    Stand-Sit Henning (Transfers) verbal cues;moderate assist (50% patient effort);maximum assist (25% patient effort);2 person assist  -     Assistive Device (Stand-Sit Transfers) walker, front-wheeled  -       Row Name 08/20/23 0850          Stand Pivot/Stand Step Transfer    Stand Pivot/Stand Step Henning (Transfers) verbal cues;minimum assist (75% patient effort);2 person assist  -     Assistive Device (Stand Pivot Stand Step Transfer) walker, front-wheeled  -       Row Name 08/20/23 0850          Gait/Stairs (Locomotion)    Comment, (Gait/Stairs) unsafe to attempt further ambulation today due to impaired cognition with physical difficulties performing transfers.  -[facility] Name 08/20/23 0850          Safety Issues, Functional Mobility    Safety Issues Affecting Function (Mobility) insight into deficits/self-awareness;judgment;positioning of assistive device;problem-solving;sequencing abilities  -[facility] Name 08/20/23 0850          Balance    Comment, Balance min A x 2 for static standing balance with rolling walker for UE support  -[facility] Name 08/20/23 0850          Plan of Care Review    Plan of Care Reviewed With patient  -     Outcome Evaluation PT evaluation completed.  Patient oriented to name only, required mod/max A x 2 for sit to/from stand transfers with rolling walker, min A x 2 for stand pivot transfer to recliner.  Patient required min A x 2 for static standing balance with rolling walker for UE support.  Recommend SNF/short term rehab at discharge to improve safety with functional mobility prior to return home to assisted living facility.  Patient would benefit from PT services while in hospital to increase  strength, balance and progress functional mobility.  -       Row Name 08/20/23 0850          Positioning and Restraints    Pre-Treatment Position in bed  -     Post Treatment Position chair  -     In Chair reclined;call light within reach;encouraged to call for assist;notified nsg;exit alarm on  -       Row Name 08/20/23 0850          Therapy Assessment/Plan (PT)    Patient/Family Therapy Goals Statement (PT) none stated  -     PT Diagnosis (PT) impaired functional mobility, impaired cognition, high fall risk  -     Rehab Potential (PT) fair, will monitor progress closely  -     Criteria for Skilled Interventions Met (PT) yes;meets criteria;skilled treatment is necessary  -     Therapy Frequency (PT) 6 times/wk  -     Predicted Duration of Therapy Intervention (PT) 5 days  -     Problem List (PT) problems related to;balance;cognition;mobility;strength  -     Activity Limitations Related to Problem List (PT) unable to ambulate safely;unable to transfer safely  -       Row Name 08/20/23 0850          PT Evaluation Complexity    History, PT Evaluation Complexity 1-2 personal factors and/or comorbidities  -     Examination of Body Systems (PT Eval Complexity) 1-2 elements  -     Clinical Presentation (PT Evaluation Complexity) stable  -     Clinical Decision Making (PT Evaluation Complexity) low complexity  -     Overall Complexity (PT Evaluation Complexity) low complexity  -       Row Name 08/20/23 0850          Therapy Plan Review/Discharge Plan (PT)    Therapy Plan Review (PT) evaluation/treatment results reviewed;risks/benefits reviewed;current/potential barriers reviewed;participants voiced agreement with care plan;participants included;patient  -       Row Name 08/20/23 0850          Physical Therapy Goals    Bed Mobility Goal Selection (PT) bed mobility, PT goal 1  -     Transfer Goal Selection (PT) transfer, PT goal 1  -     Gait Training Goal Selection (PT) gait training,  PT goal 1  -       Row Name 08/20/23 0850          Bed Mobility Goal 1 (PT)    Activity/Assistive Device (Bed Mobility Goal 1, PT) sit to supine/supine to sit  -     Garfield Level/Cues Needed (Bed Mobility Goal 1, PT) standby assist  -     Time Frame (Bed Mobility Goal 1, PT) 5 days  -     Progress/Outcomes (Bed Mobility Goal 1, PT) new goal  -       Row Name 08/20/23 0850          Transfer Goal 1 (PT)    Activity/Assistive Device (Transfer Goal 1, PT) sit-to-stand/stand-to-sit;walker, rolling  -     Garfield Level/Cues Needed (Transfer Goal 1, PT) minimum assist (75% or more patient effort);verbal cues required  -     Time Frame (Transfer Goal 1, PT) 5 days  -     Progress/Outcome (Transfer Goal 1, PT) new goal  -       Row Name 08/20/23 0850          Gait Training Goal 1 (PT)    Activity/Assistive Device (Gait Training Goal 1, PT) gait (walking locomotion);assistive device use;walker, rolling  -     Garfield Level (Gait Training Goal 1, PT) verbal cues required;contact guard required  -     Distance (Gait Training Goal 1, PT) 50  -     Time Frame (Gait Training Goal 1, PT) 5 days  -     Progress/Outcome (Gait Training Goal 1, PT) new goal  -               User Key  (r) = Recorded By, (t) = Taken By, (c) = Cosigned By      Initials Name Provider Type     Carine Crowder, PT Physical Therapist                    Physical Therapy Education       Title: PT OT SLP Therapies (In Progress)       Topic: Physical Therapy (In Progress)       Point: Mobility training (Done)       Learning Progress Summary             Patient Acceptance, E,TB, VU by  at 8/20/2023 1121                         Point: Home exercise program (Not Started)       Learner Progress:  Not documented in this visit.                              User Key       Initials Effective Dates Name Provider Type [facility] 06/16/21 -  Carine Crowder, PT Physical Therapist PT                  PT  Recommendation and Plan  Anticipated Discharge Disposition (PT): skilled nursing facility  Planned Therapy Interventions (PT): balance training, bed mobility training, gait training, home exercise program, patient/family education, strengthening, transfer training  Therapy Frequency (PT): 6 times/wk  Plan of Care Reviewed With: patient  Outcome Evaluation: PT evaluation completed.  Patient oriented to name only, required mod/max A x 2 for sit to/from stand transfers with rolling walker, min A x 2 for stand pivot transfer to recliner.  Patient required min A x 2 for static standing balance with rolling walker for UE support.  Recommend SNF/short term rehab at discharge to improve safety with functional mobility prior to return home to assisted living facility.  Patient would benefit from PT services while in hospital to increase strength, balance and progress functional mobility.   Outcome Measures       Row Name 08/20/23 1100 08/20/23 0851          How much help from another person do you currently need...    Turning from your back to your side while in flat bed without using bedrails? 2  -LH --     Moving from lying on back to sitting on the side of a flat bed without bedrails? 1  -LH --     Moving to and from a bed to a chair (including a wheelchair)? 2  -LH --     Standing up from a chair using your arms (e.g., wheelchair, bedside chair)? 2  -LH --     Climbing 3-5 steps with a railing? 1  -LH --     To walk in hospital room? 1  -LH --     AM-PAC 6 Clicks Score (PT) 9  -LH --        How much help from another is currently needed...    Putting on and taking off regular lower body clothing? -- 2  -JJ     Bathing (including washing, rinsing, and drying) -- 2  -JJ     Toileting (which includes using toilet bed pan or urinal) -- 1  -JJ     Putting on and taking off regular upper body clothing -- 3  -JJ     Taking care of personal grooming (such as brushing teeth) -- 3  -JJ     Eating meals -- 3  -JJ     AM-PAC 6  Clicks Score (OT) -- 14  -ABY        Functional Assessment    Outcome Measure Options AM-PAC 6 Clicks Basic Mobility (PT)  - AM-PAC 6 Clicks Daily Activity (OT)  -               User Key  (r) = Recorded By, (t) = Taken By, (c) = Cosigned By      Initials Name Provider Type     Carine Crowder, PT Physical Therapist    Keiko Treivno, OTR Occupational Therapist                     Time Calculation:    PT Charges       Row Name 08/20/23 1124             Time Calculation    Start Time 0850  -      Stop Time 0908  -      Time Calculation (min) 18 min  -      PT Received On 08/20/23  -      PT - Next Appointment 08/21/23  -                User Key  (r) = Recorded By, (t) = Taken By, (c) = Cosigned By      Initials Name Provider Type     Carine Crowder, PT Physical Therapist                  Therapy Charges for Today       Code Description Service Date Service Provider Modifiers Qty    08237863269 HC PT EVAL LOW COMPLEXITY 1 8/20/2023 Carine Crowder, PT GP 1            PT G-Codes  Outcome Measure Options: AM-PAC 6 Clicks Basic Mobility (PT)  AM-PAC 6 Clicks Score (PT): 9  AM-PAC 6 Clicks Score (OT): 14    Carine Crowder PT  8/20/2023

## 2023-08-20 NOTE — CASE MANAGEMENT/SOCIAL WORK
Continued Stay Note  CORTNEY Childers     Patient Name: Pawel Goldberg  MRN: [id number]  Today's Date: 8/20/2023    Admit Date: 8/19/2023    Plan: Return to [facility] assisted living   Discharge Plan       Row Name 08/20/23 1412       Plan    Plan Return to [facility] assisted living    Plan Comments CCP spoke to pts daughter, pt confused, introduced self, role and reviewed face sheet.  Pt lives at [facility] and will return there on dc.  He is independent with personal care and is a great eater.  The facility cooks, cleans, offers therapy, and  some medications.  Daughter prefills his medication container and the nurses make sure he gets the meds as ordered.  Pt has a 3 wheel walker and uses a nebulizer.  He has been to [facility] before and had Home Health before.  His pharmacy is [facility] and the family assist with medications.  His plan is to return to assisted living.  Family will transport him back.  I called Jm at the facility and on dc she will need Med list, DC summary, pt will need a new nebulizer and a O2 sat monitor. CCP will follow. Thanks, Shelby CATES                   Discharge Codes    No documentation.                       Shelby Finch

## 2023-08-20 NOTE — PLAN OF CARE
Goal Outcome Evaluation:  Plan of Care Reviewed With: patient        Progress: improving  Outcome Evaluation: Rested well during the night. Remains afebrile. No complaints of pain or discomfort. No shortness of breath or difficulty breathing. Patient is from [facility]. Alert and oriented. Remains sinus rhythm on telemetry. VSS. Patient placed on 3L of oxygen, tolerating well.

## 2023-08-20 NOTE — DISCHARGE PLACEMENT REQUEST
"AshlynTaras (89 y.o. Male)       Date of Birth   12/31/1932    Social Security Number       Address   [address] [place] [address]    Home Phone   [phone]    MRN   [id number]       [facility]    Marital Status                               Admission Date   8/19/23    Admission Type   Emergency    Admitting Provider   Oscar Medrano MD    Attending Provider   Oscar Medrano MD    Department, Room/Bed   [facility] MED SURG, 1406/1       Discharge Date       Discharge Disposition       Discharge Destination                                 Attending Provider: Oscar Medrano MD    Allergies: Bactrim [Sulfamethoxazole-trimethoprim]    Isolation: None   Infection: COVID (rule out) (08/19/23)   Code Status: No CPR    Ht: 175.3 cm (69\")   Wt: 88 kg (194 lb)    Admission Cmt: None   Principal Problem: COPD with acute exacerbation [J44.1]                   Active Insurance as of 8/19/2023       Primary Coverage       Payor Plan Insurance Group Employer/Plan Group    [facility] MEDICARE REPLACEMENT [facility] MEDICARE REPLACEMENT [id number]       Payor Plan Address Payor Plan Phone Number Payor Plan Fax Number Effective Dates    [address] [address]   1/1/2023 - None Entered    [facility] [id number]         Subscriber Name Subscriber Birth Date Member ID       TARAS ROCHA 12/31/1932 [id number]                     Emergency Contacts        (Rel.) Home Phone Work Phone Mobile Phone    Debra Rocha (Spouse) -- -- [phone]    Joy Hayden (Daughter) [phone] -- [phone]    Jyotsna Clements (Daughter) [phone] -- [phone]                "

## 2023-08-20 NOTE — PROGRESS NOTES
"[facility] Clinical Pharmacy Services: Enoxaparin Consult    Pawel MORENO Ashlyn has a pharmacy consult to dose prophylactic enoxaparin per Dr. Medrano's request.     Indication: VTE Prophylaxis  Home Anticoagulation: N/A     Relevant clinical data and objective history reviewed:  89 y.o. male 175.3 cm (69\") 88 kg (194 lb)   Body mass index is 28.65 kg/mý.   Results from last 7 days   Lab Units 08/19/23  1641   PLATELETS 10*3/mm3 239     Estimated Creatinine Clearance: 55 mL/min (by C-G formula based on SCr of 0.98 mg/dL).    Assessment/Plan    Will start patient on 40mg subcutaneous every 24 hours, adjusted for renal function. Consult order will be discontinued but pharmacy will continue to follow.     Nick Keating III, [facility]  Clinical Pharmacist    "

## 2023-08-20 NOTE — THERAPY EVALUATION
Acute Care - Occupational Therapy Initial Evaluation  C[facility] Childers     Patient Name: Pawel Goldberg  : 1932  MRN: [id number]  Today's Date: 2023  Onset of Illness/Injury or Date of Surgery: 23  Date of Referral to OT: 23  Referring Physician: Dr page    Admit Date: 2023       ICD-10-CM ICD-9-CM   1. COPD with acute exacerbation  J44.1 491.21   2. Hypoxia  R09.02 799.02   3. Leukocytosis, unspecified type  D72.829 288.60   4. Elevated procalcitonin  R79.89 790.99   5. Cellulitis of left lower extremity  L03.116 682.6     Patient Active Problem List   Diagnosis    Brito's esophagus without dysplasia    Obstruction of carotid artery    Stenosis of carotid artery    EEG abnormality without seizure    Hereditary and idiopathic peripheral neuropathy    Mixed hyperlipidemia    Acquired hypothyroidism    Low back pain with sciatica    Lumbar canal stenosis    Degenerative arthritis of lumbar spine    Osteoarthritis of hip    History of repair of hip joint    Syncope    Temporary cerebral vascular dysfunction    History of prostate cancer    Dementia associated with other underlying disease without behavioral disturbance    Arthritis    PAD (peripheral artery disease)    Medicare annual wellness visit, subsequent    Essential hypertension    Medication monitoring encounter    Status post implantation of artificial urinary sphincter    Moderate asthma with acute exacerbation    Vascular dementia without behavioral disturbance    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    At high risk for falls    Insomnia due to medical condition    Chronic UTI    Frequent falls    COPD with acute exacerbation    COPD exacerbation     Past Medical History:   Diagnosis Date    Allergic     Seasonal    Allergic rhinitis     Arthritis     Asthma     Brito esophagus     Benign prostatic hyperplasia     Carotid arterial disease     Status post right CEA 14    COPD  (chronic obstructive pulmonary disease)     Coronary artery disease     Dementia     Dyspnea     Erectile dysfunction     GERD (gastroesophageal reflux disease)     Hematoma of frontal scalp 07/08/2021    HL (hearing loss)     Hypothyroidism 1992    Low back pain     PAD (peripheral artery disease)     Prostate cancer     Status post implantation of artificial urinary sphincter         Stroke     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    Urinary tract infection      Past Surgical History:   Procedure Laterality Date    CAROTID ARTERY ANGIOPLASTY  04/01/2014    COLONOSCOPY  2009    ENDOSCOPY  2009    2001,2003,2006    EYE SURGERY      FRACTURE SURGERY      HERNIA REPAIR  2000    HERNIA REPAIR  2009    INGUINAL HERNIA REPAIR      PARTIAL HIP ARTHROPLASTY Left 07/02/2014    PROSTATECTOMY  2008    SHOULDER SURGERY  2005    SHOULDER SURGERY  2006    SHOULDER SURGERY  2008    TEAR DUCT SURGERY      URINARY SPHINCTER IMPLANT           OT ASSESSMENT FLOWSHEET (last 12 hours)       OT Evaluation and Treatment       Row Name 08/20/23 0851                   OT Time and Intention    Subjective Information no complaints  -JJ        Document Type evaluation  -JJ        Mode of Treatment occupational therapy  -JJ        Patient Effort good  -JJ           General Information    Patient Profile Reviewed yes  -JJ        Onset of Illness/Injury or Date of Surgery 08/19/23  -JJ        Referring Physician Dr page  -JJ        Patient/Family/Caregiver Comments/Observations pt sandy DONOVAN with nursing staff, agreed to evaluation  -JJ        Prior Level of Function --  see pertinent hx of current problem  -JJ        Equipment Currently Used at Home --  daughter states pt ambulates with 3 wheel walker  -JJ        Pertinent History of Current Functional Problem pt admitted to hospital with decreased O2 sats and generalized weakness. pt dx with UTI, hypoxia and cellulitis L LE. pt on 3 L O2, room air at baseline.  daughter states frequent falls at home  -JJ        Existing Precautions/Restrictions fall  -JJ        Limitations/Impairments safety/cognitive  -JJ        Risks Reviewed patient:;LOB  -JJ        Benefits Reviewed patient:;improve function;increase independence  -JJ        Barriers to Rehab cognitive status;previous functional deficit  -J           Living Environment    Current Living Arrangements assisted living facility  -JJ        Home Accessibility stairs to enter home  -JJ        People in Home spouse  -J           Pain Assessment    Pre/Posttreatment Pain Comment no co pain  -JJ           Cognition    Orientation Status (Cognition) oriented x 3  -JJ        Follows Commands (Cognition) WFL  -J        Personal Safety Interventions gait belt;nonskid shoes/slippers when out of bed  -JJ           Range of Motion Comprehensive    Comment, General Range of Motion unable to formally assess 2 cognition B UE AROm appeared functional within activity  -JJ           Strength Comprehensive (MMT)    Comment, General Manual Muscle Testing (MMT) Assessment unable to formally assess B Ue strength appeared functional within activity  -JJ           Lower Body Dressing Assessment/Training    Comment, (Lower Body Dressing) anticipate pt will require max assist for lb adls , unsure of assist level at baseline  -           Bed Mobility    Comment, (Bed Mobility) sitting EOB with nursing staff  -           Transfer Assessment/Treatment    Transfers sit-stand transfer;stand-sit transfer;stand pivot/stand step transfer  -           Sit-Stand Transfer    Sit-Stand [place] (Transfers) moderate assist (50% patient effort);maximum assist (25% patient effort);verbal cues;2 person assist  -        Assistive Device (Sit-Stand Transfers) walker, front-wheeled  -           Stand-Sit Transfer    Stand-Sit [place] (Transfers) verbal cues;moderate assist (50% patient effort);maximum assist (25% patient effort);2 person assist   "-           Stand Pivot/Stand Step Transfer    Stand Pivot/Stand Step [place] (Transfers) verbal cues;minimum assist (75% patient effort);2 person assist  -        Assistive Device (Stand Pivot Stand Step Transfer) walker, front-wheeled  -           Safety Issues, Functional Mobility    Safety Issues Affecting Function (Mobility) insight into deficits/self-awareness;positioning of assistive device;problem-solving;safety precaution awareness;judgment;safety precautions follow-through/compliance;sequencing abilities  -           Balance    Comment, Balance pt required min assist x 2 for static standing balance with RW.  -           Plan of Care Review    Plan of Care Reviewed With patient  -        Outcome Evaluation OT evaluation completed. pt required mod-max assist x 2 for functional transfers from EOB with RW. pt required min assist x 2 for static standing balance with RW and required min assist x 2 for stand pivot transfers to recliner chair with RW. pt required cues for safety and sequencing throughout evaluation. pt states \"I dont even know where Im at.\" anticipate pt will require max assist for lb adls 2 cognition and weakness. [facility] pt would benefit from OT services to address transfers, adls and balance. will see pt during hospitalization at this time would rec SNF prior to return to assisted living facility  -           Positioning and Restraints    Pre-Treatment Position in bed  -        Post Treatment Position chair  -        In Chair reclined;call light within reach;encouraged to call for assist;exit alarm on;notified [facility]           Therapy Assessment/Plan (OT)    Date of Referral to OT 08/19/23  -        Patient/Family Therapy Goal Statement (OT) pt unable to state goals at this time 2 confusion  -J        OT Diagnosis weakness sp hospitalization  -        Rehab Potential (OT) fair, will monitor progress closely "  -        Criteria for Skilled Therapeutic Interventions Met (OT) yes;skilled treatment is necessary  -JJ        Therapy Frequency (OT) 5 times/wk  -J        Predicted Duration of Therapy Intervention (OT) x 1 week  -        Problem List (OT) balance;cognition;coordination;mobility;strength;inability to direct their own care  -J        Activity Limitations Related to Problem List (OT) unable to ambulate safely;unable to transfer safely;BADLs not performed adequately or safely;IADLs not performed adequately or safely  -J        Planned Therapy Interventions (OT) BADL retraining;functional balance retraining;transfer/mobility retraining;patient/caregiver education/training  -J           Evaluation Complexity (OT)    Overall Complexity of Evaluation (OT) low complexity  -J           Therapy Plan Review/Discharge Plan (OT)    Anticipated Discharge Disposition (OT) skilled nursing facility  -           Transfer Goal 1 (OT)    Activity/Assistive Device (Transfer Goal 1, OT) toilet;commode, 3-in-1;walker, rolling  -JJ        [place] Level/Cues Needed (Transfer Goal 1, OT) minimum assist (75% or more patient effort);verbal cues required  -JJ        Time Frame (Transfer Goal 1, OT) 1 week  -JJ        Strategies/Barriers (Transfers Goal 1, OT) cognition  -JJ        Progress/Outcome (Transfer Goal 1, OT) --  new goal  -JJ           Dressing Goal 1 (OT)    Activity/Device (Dressing Goal 1, OT) upper body dressing  -JJ        [place]/Cues Needed (Dressing Goal 1, OT) supervision required;verbal cues required  -JJ        Time Frame (Dressing Goal 1, OT) 1 week  -JJ        Strategies/Barriers (Dressing Goal 1, OT) cognition  -JJ        Progress/Outcome (Dressing Goal 1, OT) --  new goal  -JJ           Balance Goal 1 (OT)    Activity/Assistive Device (Balance Goal 1, OT) standing, static;walker, rolling  x 2-3 minutes to increase caregiver I with adls  -JJ        [place] Level/Cues Needed (Balance Goal 1,  OT) standby assist;verbal cues required  -        Time Frame (Balance Goal 1, OT) 1 week  -        Barriers (Balance Goal 1, OT) cognition  -        Progress/Outcomes (Balance Goal 1, OT) --  new goal  -                  User Key  (r) = Recorded By, (t) = Taken By, (c) = Cosigned By      Initials Name Effective Dates    Keiko Barker, OTR 06/16/21 -                      Occupational Therapy Education       Title: PT OT SLP Therapies (In Progress)       Topic: Occupational Therapy (In Progress)       Point: ADL training (Done)       Description:   Instruct learner(s) on proper safety adaptation and remediation techniques during self care or transfers.   Instruct in proper use of assistive devices.                  Learning Progress Summary             Patient Acceptance, E,TB, VU by  at 8/20/2023 1022    Comment: pt educated on adls, safety with functional transfers and balance                         Point: Home exercise program (Not Started)       Description:   Instruct learner(s) on appropriate technique for monitoring, assisting and/or progressing therapeutic exercises/activities.                  Learner Progress:  Not documented in this visit.                              User Key       Initials Effective Dates Name Provider Type Discipline     06/16/21 -  Keiko Feliz, OTR Occupational Therapist OT                      OT Recommendation and Plan  Planned Therapy Interventions (OT): BADL retraining, functional balance retraining, transfer/mobility retraining, patient/caregiver education/training  Therapy Frequency (OT): 5 times/wk  Plan of Care Review  Plan of Care Reviewed With: patient  Outcome Evaluation: OT evaluation completed. pt required mod-max assist x 2 for functional transfers from EOB with RW. pt required min assist x 2 for static standing balance with RW and required min assist x 2 for stand pivot transfers to recliner chair with RW. pt required cues for safety  "and sequencing throughout evaluation. pt states \"I dont even know where Im at.\" anticipate pt will require max assist for lb adls 2 cognition and weakness. unsure of pts prior level of function at [facility] living facility. pt would benefit from OT services to address transfers, adls and balance. will see pt during hospitalization at this time would rec SNF prior to return to assisted living facility  Plan of Care Reviewed With: patient  Outcome Evaluation: OT evaluation completed. pt required mod-max assist x 2 for functional transfers from EOB with RW. pt required min assist x 2 for static standing balance with RW and required min assist x 2 for stand pivot transfers to recliner chair with RW. pt required cues for safety and sequencing throughout evaluation. pt states \"I dont even know where Im at.\" anticipate pt will require max assist for lb adls 2 cognition and weakness. unsure of pts prior level of function at [facility] living facility. pt would benefit from OT services to address transfers, adls and balance. will see pt during hospitalization at this time would rec SNF prior to return to assisted living facility     Outcome Measures       Row Name 08/20/23 0851             How much help from another is currently needed...    Putting on and taking off regular lower body clothing? 2  -JJ      Bathing (including washing, rinsing, and drying) 2  -JJ      Toileting (which includes using toilet bed pan or urinal) 1  -JJ      Putting on and taking off regular upper body clothing 3  -JJ      Taking care of personal grooming (such as brushing teeth) 3  -JJ      Eating meals 3  -JJ      AM-PAC 6 Clicks Score (OT) 14  -JJ         Functional Assessment    Outcome Measure Options AM-PAC 6 Clicks Daily Activity (OT)  -JJ                User Key  (r) = Recorded By, (t) = Taken By, (c) = Cosigned By      Initials Name Provider Type    Keiko Trevino, OTR Occupational Therapist                    Time Calculation:    " Time Calculation- OT       Row Name 08/20/23 1025             Time Calculation- OT    OT Start Time 0850  -      OT Stop Time 0908  -      OT Time Calculation (min) 18 min  -                User Key  (r) = Recorded By, (t) = Taken By, (c) = Cosigned By      Initials Name Provider Type    Keiko Trevino, TONIE Occupational Therapist                  Therapy Charges for Today       Code Description Service Date Service Provider Modifiers Qty    [id number]  OT EVAL LOW COMPLEXITY 1 8/20/2023 Keiko Feliz OTR GO 1                 TONIE Mccarthy  8/20/2023

## 2023-08-20 NOTE — PLAN OF CARE
Goal Outcome Evaluation:  Plan of Care Reviewed With: patient        Progress: improving  Outcome Evaluation: vss. pt orientation improving throughout day. alert to self and location and situation at this time. O2 requirement decreased, currently on 1L per n/c. tele in place, nsr HR 60's. iv s/l. augmentin to start this p.m. brief in place. family at bedside. resp. panel negative. no other complaints at this time.

## 2023-08-20 NOTE — PLAN OF CARE
"Goal Outcome Evaluation:  Plan of Care Reviewed With: patient           Outcome Evaluation: OT evaluation completed. pt required mod-max assist x 2 for functional transfers from EOB with RW. pt required min assist x 2 for static standing balance with RW and required min assist x 2 for stand pivot transfers to recliner chair with RW. pt required cues for safety and sequencing throughout evaluation. pt states \"I dont even know where Im at.\" anticipate pt will require max assist for lb adls 2 cognition and weakness. [facility] p[facility]. pt would benefit from OT services to address transfers, adls and balance. will see pt during hospitalization at this time would rec SNF prior to return to assisted living facility      Anticipated Discharge Disposition (OT): skilled nursing facility  "

## 2023-08-20 NOTE — PROGRESS NOTES
"Hospitalist Team      Patient Care Team:  Charlie Trinh MD as PCP - General (Family Medicine)  Hardik Senior MD as Consulting Physician (Urology)  MAURO Peters MD as Consulting Physician (Ophthalmology)  Brant Polanco MD as Consulting Physician (Pulmonary Disease)  Charlie Goldstein DO as Consulting Physician (Neurology)      Chief Complaint: Hypoxia    Subjective    No acute events overnight.  Mr. Goldberg reports he feels well except for his \"tooth infection\".  Daughter at bedside notes he has a good appetite and PO intake.  No reported chest pain or dyspnea.    Objective    Vital Signs  Temp:  [97.2 øF (36.2 øC)-98.7 øF (37.1 øC)] 97.8 øF (36.6 øC)  Heart Rate:  [65-82] 65  Resp:  [16-24] 18  BP: ()/(51-70) 149/70  Oxygen Therapy  SpO2: 92 %  Pulse Oximetry Type: Continuous  Device (Oxygen Therapy): nasal cannula  Flow (L/min): 2  Probe Placed On (Pulse Ox): Right:, finger}    Flowsheet Rows      Flowsheet Row First Filed Value   Admission Height 175.3 cm (69\") Documented at 08/19/2023 1546   Admission Weight 88 kg (194 lb) Documented at 08/19/2023 1546            Physical Exam:    General: Appears stated age in no acute distress.  HEENT: No anterior or submandibular nodes appreciated.  Lungs: Breath sounds are clear throughout all fields.  No wheeze or rales appreciated.  CV: Regular rate and rhythm.  No murmurs appreciated.  Radial pulses are 2+ and symmetric.  Abdomen: Soft and non-tender w/ active bowel sounds.  MSK: No C/C.  Slight non-pitting edema of the LLE.  Skin: No suspicious rash.  Neuro: CN II-XII grossly intact.  Psych: Pleasant affect.    Results Review:     I reviewed the patient's new clinical results.    Lab Results (last 24 hours)       Procedure Component Value Units Date/Time    Respiratory Panel PCR w/COVID-19(SARS-CoV-2) [facility]LEX/[facility]UCE/[facility]/NICOLE In-House, NP Swab in UTM/VTM, 3-4 HR TAT - Swab, Nasopharynx [[id number]]  (Normal) Collected: 08/19/23 2315    " Specimen: Swab from Nasopharynx Updated: 08/20/23 1351     ADENOVIRUS, PCR Not Detected     Coronavirus 229E Not Detected     Coronavirus HKU1 Not Detected     Coronavirus NL63 Not Detected     Coronavirus OC43 Not Detected     COVID19 Not Detected     Human Metapneumovirus Not Detected     Human Rhinovirus/Enterovirus Not Detected     Influenza A PCR Not Detected     Influenza B PCR Not Detected     Parainfluenza Virus 1 Not Detected     Parainfluenza Virus 2 Not Detected     Parainfluenza Virus 3 Not Detected     Parainfluenza Virus 4 Not Detected     RSV, PCR Not Detected     Bordetella pertussis pcr Not Detected     Bordetella parapertussis PCR Not Detected     Chlamydophila pneumoniae PCR Not Detected     Mycoplasma pneumo by PCR Not Detected    Narrative:      In the setting of a positive respiratory panel with a viral infection PLUS a negative procalcitonin without other underlying concern for bacterial infection, consider observing off antibiotics or discontinuation of antibiotics and continue supportive care. If the respiratory panel is positive for atypical bacterial infection (Bordetella pertussis, Chlamydophila pneumoniae, or Mycoplasma pneumoniae), consider antibiotic de-escalation to target atypical bacterial infection.    Hemoglobin A1c [[id number]]  (Normal) Collected: 08/20/23 0329    Specimen: Blood Updated: 08/20/23 1224     Hemoglobin A1C 5.60 %     Narrative:      Hemoglobin A1C Ranges:    Increased Risk for Diabetes  5.7% to 6.4%  Diabetes                     >= 6.5%  Diabetic Goal                < 7.0%    Basic Metabolic Panel [[id number]]  (Abnormal) Collected: 08/20/23 0329    Specimen: Blood Updated: 08/20/23 0510     Glucose 137 mg/dL      BUN 24 mg/dL      Creatinine 0.98 mg/dL      Sodium 132 mmol/L      Potassium 4.3 mmol/L      Chloride 97 mmol/L      CO2 24.5 mmol/L      Calcium 8.7 mg/dL      BUN/Creatinine Ratio 24.5     Anion Gap 10.5 mmol/L      eGFR 73.3 mL/min/1.73     Narrative:   "    GFR Normal >60  Chronic Kidney Disease <60  Kidney Failure <15    The GFR formula is only valid for adults with stable renal function between ages 18 and 70.    Procalcitonin [[id number]]  (Abnormal) Collected: 08/20/23 0329    Specimen: Blood Updated: 08/20/23 0503     Procalcitonin 4.42 ng/mL     Narrative:      As a Marker for Sepsis (Non-Neonates):    1. <0.5 ng/mL represents a low risk of severe sepsis and/or septic shock.  2. >2 ng/mL represents a high risk of severe sepsis and/or septic shock.    As a Marker for Lower Respiratory Tract Infections that require antibiotic therapy:    PCT on Admission    Antibiotic Therapy       6-12 Hrs later    >0.5                Strongly Recommended  >0.25 - <0.5        Recommended   0.1 - 0.25          Discouraged              Remeasure/reassess PCT  <0.1                Strongly Discouraged     Remeasure/reassess PCT    As 28 day mortality risk marker: \"Change in Procalcitonin Result\" (>80% or <=80%) if Day 0 (or Day 1) and Day 4 values are available. Refer to http://[email or web address]pct-calculator.com    Change in PCT <=80%  A decrease of PCT levels below or equal to 80% defines a positive change in PCT test result representing a higher risk for 28-day all-cause mortality of patients diagnosed with severe sepsis for septic shock.    Change in PCT >80%  A decrease of PCT levels of more than 80% defines a negative change in PCT result representing a lower risk for 28-day all-cause mortality of patients diagnosed with severe sepsis or septic shock.       CBC (No Diff) [[id number]]  (Abnormal) Collected: 08/20/23 0329    Specimen: Blood Updated: 08/20/23 0439     WBC 11.04 10*3/mm3      RBC 3.60 10*6/mm3      Hemoglobin 11.2 g/dL      Hematocrit 34.8 %      MCV 96.7 fL      MCH 31.1 pg      MCHC 32.2 g/dL      RDW 13.7 %      RDW-SD 49.4 fl      MPV 10.3 fL      Platelets 220 10*3/mm3     Urine Culture - Urine, Urine, Clean Catch [[id number]] Collected: 08/19/23 1930    Specimen: " Urine, Clean Catch Updated: 08/19/23 2033    Urinalysis, Microscopic Only - Urine, Clean Catch [[id number]]  (Abnormal) Collected: 08/19/23 1930    Specimen: Urine, Clean Catch Updated: 08/19/23 2020     RBC, UA 3-5 /HPF      WBC, UA 13-20 /HPF      Bacteria, UA 1+ /HPF      Squamous Epithelial Cells, UA 7-12 /HPF      Hyaline Casts, UA 0-2 /LPF      Methodology Manual Light Microscopy    Urinalysis With Microscopic If Indicated (No Culture) - Urine, Clean Catch [[id number]]  (Abnormal) Collected: 08/19/23 1930    Specimen: Urine, Clean Catch Updated: 08/19/23 2013     Color, UA Yellow     Appearance, UA Slightly Cloudy     pH, UA 5.5     Specific Gravity, UA 1.020     Glucose, UA Negative     Ketones, UA Negative     Bilirubin, UA Negative     Blood, UA Trace     Protein, UA Negative     Leuk Esterase, UA Small (1+)     Nitrite, UA Negative     Urobilinogen, UA 0.2 E.U./dL    High Sensitivity Troponin T 2Hr [[id number]]  (Abnormal) Collected: 08/19/23 1837    Specimen: Blood from Arm, Left Updated: 08/19/23 1905     HS Troponin T 23 ng/L      Troponin T Delta -1 ng/L     Narrative:      High Sensitive Troponin T Reference Range:  <10.0 ng/L- Negative Female for AMI  <15.0 ng/L- Negative Male for AMI  >=10 - Abnormal Female indicating possible myocardial injury.  >=15 - Abnormal Male indicating possible myocardial injury.   Clinicians would have to utilize clinical acumen, EKG, Troponin, and serial changes to determine if it is an Acute Myocardial Infarction or myocardial injury due to an underlying chronic condition.         Lactic Acid, Plasma [[id number]]  (Normal) Collected: 08/19/23 1834    Specimen: Blood Updated: 08/19/23 1900     Lactate 1.7 mmol/L     Blood Culture - Blood, Arm, Left [[id number]] Collected: 08/19/23 1837    Specimen: Blood from Arm, Left Updated: 08/19/23 1844    Blood Culture - Blood, Arm, Right [[id number]] Collected: 08/19/23 1834    Specimen: Blood from Arm, Right Updated: 08/19/23 1843     "Procalcitonin [[id number]]  (Abnormal) Collected: 08/19/23 1641    Specimen: Blood Updated: 08/19/23 1802     Procalcitonin 5.50 ng/mL     Narrative:      As a Marker for Sepsis (Non-Neonates):    1. <0.5 ng/mL represents a low risk of severe sepsis and/or septic shock.  2. >2 ng/mL represents a high risk of severe sepsis and/or septic shock.    As a Marker for Lower Respiratory Tract Infections that require antibiotic therapy:    PCT on Admission    Antibiotic Therapy       6-12 Hrs later    >0.5                Strongly Recommended  >0.25 - <0.5        Recommended   0.1 - 0.25          Discouraged              Remeasure/reassess PCT  <0.1                Strongly Discouraged     Remeasure/reassess PCT    As 28 day mortality risk marker: \"Change in Procalcitonin Result\" (>80% or <=80%) if Day 0 (or Day 1) and Day 4 values are available. Refer to http://www.[employer]-pct-calculator.com    Change in PCT <=80%  A decrease of PCT levels below or equal to 80% defines a positive change in PCT test result representing a higher risk for 28-day all-cause mortality of patients diagnosed with severe sepsis for septic shock.    Change in PCT >80%  A decrease of PCT levels of more than 80% defines a negative change in PCT result representing a lower risk for 28-day all-cause mortality of patients diagnosed with severe sepsis or septic shock.       BNP [[id number]]  (Normal) Collected: 08/19/23 1641    Specimen: Blood Updated: 08/19/23 1714     proBNP 874.7 pg/mL     Narrative:      Among patients with dyspnea, NT-proBNP is highly sensitive for the detection of acute congestive heart failure. In addition NT-proBNP of <300 pg/ml effectively rules out acute congestive heart failure with 99% negative predictive value.      High Sensitivity Troponin T [[id number]]  (Abnormal) Collected: 08/19/23 1641    Specimen: Blood Updated: 08/19/23 1711     HS Troponin T 24 ng/L     Narrative:      High Sensitive Troponin T Reference Range:  <10.0 " ng/L- Negative Female for AMI  <15.0 ng/L- Negative Male for AMI  >=10 - Abnormal Female indicating possible myocardial injury.  >=15 - Abnormal Male indicating possible myocardial injury.   Clinicians would have to utilize clinical acumen, EKG, Troponin, and serial changes to determine if it is an Acute Myocardial Infarction or myocardial injury due to an underlying chronic condition.         Comprehensive Metabolic Panel [[id number]]  (Abnormal) Collected: 08/19/23 1641    Specimen: Blood Updated: 08/19/23 1708     Glucose 137 mg/dL      BUN 27 mg/dL      Creatinine 0.98 mg/dL      Sodium 130 mmol/L      Potassium 4.0 mmol/L      Chloride 94 mmol/L      CO2 27.1 mmol/L      Calcium 8.8 mg/dL      Total Protein 6.7 g/dL      Albumin 3.4 g/dL      ALT (SGPT) 26 U/L      AST (SGOT) 31 U/L      Alkaline Phosphatase 53 U/L      Total Bilirubin 0.5 mg/dL      Globulin 3.3 gm/dL      A/G Ratio 1.0 g/dL      BUN/Creatinine Ratio 27.6     Anion Gap 8.9 mmol/L      eGFR 73.3 mL/min/1.73     Narrative:      GFR Normal >60  Chronic Kidney Disease <60  Kidney Failure <15    The GFR formula is only valid for adults with stable renal function between ages 18 and 70.    Protime-INR [[id number]]  (Normal) Collected: 08/19/23 1641    Specimen: Blood Updated: 08/19/23 1700     Protime 13.7 Seconds      INR 1.05    Narrative:      Therapeutic Ranges for INR: 2.0-3.0 (PT 20-30)                              2.5-3.5 (PT 25-34)    aPTT [[id number]]  (Normal) Collected: 08/19/23 1641    Specimen: Blood Updated: 08/19/23 1700     PTT 35.5 seconds     Narrative:      PTT = The equivalent PTT values for the therapeutic range of heparin levels at 0.1 to 0.7 U/ml are 53 to 110 seconds.      CBC & Differential [[id number]]  (Abnormal) Collected: 08/19/23 1641    Specimen: Blood Updated: 08/19/23 1652    Narrative:      The following orders were created for panel order CBC & Differential.  Procedure                               Abnormality          Status                     ---------                               -----------         ------                     CBC Auto Differential[[id number]]        Abnormal            Final result                 Please view results for these tests on the individual orders.    CBC Auto Differential [[id number]]  (Abnormal) Collected: 08/19/23 1641    Specimen: Blood Updated: 08/19/23 1652     WBC 14.61 10*3/mm3      RBC 3.75 10*6/mm3      Hemoglobin 11.7 g/dL      Hematocrit 36.0 %      MCV 96.0 fL      MCH 31.2 pg      MCHC 32.5 g/dL      RDW 13.8 %      RDW-SD 48.7 fl      MPV 9.4 fL      Platelets 239 10*3/mm3      Neutrophil % 79.5 %      Lymphocyte % 9.9 %      Monocyte % 9.3 %      Eosinophil % 0.5 %      Basophil % 0.3 %      Immature Grans % 0.5 %      Neutrophils, Absolute 11.63 10*3/mm3      Lymphocytes, Absolute 1.44 10*3/mm3      Monocytes, Absolute 1.36 10*3/mm3      Eosinophils, Absolute 0.07 10*3/mm3      Basophils, Absolute 0.04 10*3/mm3      Immature Grans, Absolute 0.07 10*3/mm3      nRBC 0.0 /100 WBC             Imaging Results (Last 24 Hours)       Procedure Component Value Units Date/Time    US Venous Doppler Lower Extremity Left (duplex) [[id number]] Collected: 08/20/23 1230     Updated: 08/20/23 1236    Narrative:      Left lower extremity venous Doppler ultrasound     HISTORY: Left lower extremity swelling for 2 weeks. No known injury.     FINDINGS: Real-time ultrasonography of the left lower extremity deep  venous system was performed with 2D grayscale compression imaging color  flow Doppler imaging and waveform analysis. There is also imaging of the  left greater saphenous vein. Normal flow and compressibility is seen  throughout the left lower extremity deep venous system including the  common femoral vein superficial femoral vein popliteal vein, anterior  tibial vein posterior tibial vein and peroneal veins. Greater and lesser  saphenous veins also unremarkable.     Technologist notes a Baker's cyst.  It measures about 4.6 x 2.5 x 1.4 cm  dimension.       Impression:      1. No evidence for acute appearing deep venous thrombosis left lower  extremity deep venous system. Saphenous veins unremarkable.  2. Baker's cyst left popliteal fossa.     This report was finalized on 8/20/2023 12:34 PM by Dr. Michelle Slaughter MD.       XR Chest 1 View [[id number]] Collected: 08/19/23 1704     Updated: 08/19/23 1707    Narrative:      XR CHEST 1 VW-: 8/19/2023 5:09 PM     INDICATION:   Worsening shortness of air for the past week     COMPARISON:    9/29/2020.     FINDINGS:  2 portable AP view(s) of the chest. There is rotation to the left. The  aorta is tortuous ectatic and atherosclerotic. The appearance is  unchanged for technical factors. Shallow lung expansion with  bronchovascular crowding. Chronic eventration/elevation of the left  hemidiaphragm with gaseous distention of stomach and bowel subjacent to  the left hemidiaphragm. No new effusion or dense consolidation. No  pneumothorax. There is some probable atelectasis/scarring in the  perihilar and lower lung zones bilaterally.       Impression:         1. Chronic lung changes. No superimposed active disease or significant  interval change.     This report was finalized on 8/19/2023 5:05 PM by Dr. Arun Walker MD.               X-ray reviewed personally by physician.      Medication Review:   I have reviewed the patient's current medication list    Current Facility-Administered Medications:     acetaminophen (TYLENOL) tablet 650 mg, 650 mg, Oral, Q4H PRN **OR** acetaminophen (TYLENOL) 160 MG/5ML solution 650 mg, 650 mg, Oral, Q4H PRN **OR** acetaminophen (TYLENOL) suppository 650 mg, 650 mg, Rectal, Q4H PRN, Oscar Medrano MD    amoxicillin-clavulanate (AUGMENTIN) 875-125 MG per tablet 1 tablet, 1 tablet, Oral, Q12H, Roni Resendiz MD    sennosides-docusate (PERICOLACE) 8.6-50 MG per tablet 2 tablet, 2 tablet, Oral, BID, 2 tablet at 08/20/23 0900 **AND**  polyethylene glycol (MIRALAX) packet 17 g, 17 g, Oral, Daily PRN **AND** bisacodyl (DULCOLAX) EC tablet 5 mg, 5 mg, Oral, Daily PRN **AND** bisacodyl (DULCOLAX) suppository 10 mg, 10 mg, Rectal, Daily PRN, Oscar Medrano MD    Calcium Replacement - Follow Nurse / BPA Driven Protocol, , Does not apply, PRN, Oscar Medrano MD    ipratropium-albuterol (DUO-NEB) nebulizer solution 3 mL, 3 mL, Nebulization, Q4H PRN, Oscar Medrano MD    levothyroxine (SYNTHROID, LEVOTHROID) tablet 50 mcg, 50 mcg, Oral, Q AM, Oscar Medrano MD, 50 mcg at 08/20/23 0622    Magnesium Standard Dose Replacement - Follow Nurse / BPA Driven Protocol, , Does not apply, PRN, Oscar Medrano MD    melatonin tablet 10 mg, 10 mg, Oral, Nightly, Oscar Medrano MD, 10 mg at 08/19/23 2322    melatonin tablet 5 mg, 5 mg, Oral, Nightly PRN, Oscar Medrano MD    nitroglycerin (NITROSTAT) SL tablet 0.4 mg, 0.4 mg, Sublingual, Q5 Min PRN, Oscar Medrano MD    pantoprazole (PROTONIX) EC tablet 40 mg, 40 mg, Oral, Daily, Oscar Medrano MD, 40 mg at 08/20/23 0900    Phosphorus Replacement - Follow Nurse / BPA Driven Protocol, , Does not apply, PRN, Oscar Medrano MD    Potassium Replacement - Follow Nurse / BPA Driven Protocol, , Does not apply, PRN, Oscar Medrano MD    predniSONE (DELTASONE) tablet 40 mg, 40 mg, Oral, Daily With Breakfast, Oscar Medrano MD, 40 mg at 08/20/23 0908    rivastigmine (EXELON) 4.6 MG/24HR patch 2 patch, 2 patch, Transdermal, Daily, Oscar Medrano MD, 2 patch at 08/20/23 0904    [COMPLETED] Insert Peripheral IV, , , Once **AND** sodium chloride 0.9 % flush 10 mL, 10 mL, Intravenous, PRN, Oscar Medraon MD    sodium chloride 0.9 % flush 10 mL, 10 mL, Intravenous, Q12H, Oscar Medrano MD, 10 mL at 08/20/23 0908    sodium chloride 0.9 % flush 10 mL, 10 mL, Intravenous, PRN, Oscar Medrano MD    sodium chloride 0.9 % flush 10  mL, 10 mL, Intravenous, Q12H, Oscar Medrano MD, 10 mL at 08/20/23 0908    sodium chloride 0.9 % flush 10 mL, 10 mL, Intravenous, PRN, Oscar Medrano MD    sodium chloride 0.9 % infusion 40 mL, 40 mL, Intravenous, PRN, Oscar Medrano MD    sodium chloride 0.9 % infusion 40 mL, 40 mL, Intravenous, PRN, Oscar Medrano MD    sodium chloride 0.9 % infusion, 75 mL/hr, Intravenous, Continuous, Oscar Medrano MD, Last Rate: 75 mL/hr at 08/20/23 1303, 75 mL/hr at 08/20/23 1303      Assessment & Plan     Hypoxia: Does not appear to be AECOPD.  I've weaned him down to 1L.  Will repeat chest film in A.M.  I suspect procalcitonin is elevated due to abscess.  Check walk ox in A.M.  Dental Abscess: I reviewed [facility] and still recommending Augmentin over Amoxicillin so I've made this change.  PCT as described above.  Recommend keeping his appointment w/ dentist for extraction on Tuesday.  Hypothyroidism: Continue current replacement dose.  TSH was normal.  Chronic HFpEF: He is tolerating PO so will D/C IVF.  ProBNP was negative.  Dementia: Appears at baseline.  CAD: No acute issues.  Antiplatelet therapy on hold.    Plan for disposition: Anticipate home tomorrow.    Roni Resendiz MD  08/20/23  14:42 EDT

## 2023-08-20 NOTE — H&P
[facility] HOSPITALIST     PCP: Charlie Trinh MD    CODE status: DNR    CHIEF COMPLAINT: Shortness of breath    HISTORY OF PRESENT ILLNESS:  Patient is a 89-year-old male with a history of asthma, COPD, CAD, dementia, GERD, hypothyroidism presenting with shortness of breath.  He resides in assisted living who called EMS earlier today when patient symptoms began and was found to have an O2 sat of 86% at that time.  After receiving O2 NC, saturations have improved into the 90s.  In the ER, he also had an elevated white blood count at 14.61, hemoglobin 11.7, normal creatinine, CXR nonacute, EKG nonacute, troponin mildly elevated but stable.  Procalcitonin was elevated at 5.5.  Symptoms responded to DuoNebs and O2 NC in the ER.  Patient was also noted to have a wound on the left lower extremity.  He was st dementia:    Hypothyroidism:    Arted on Rocephin and admitted to observation.    On the floor, patient is alert, oriented to self, interactive, pleasant.  He denies pain.  He currently denies shortness of breath while on O2 at 3 L.  He also denies abdominal pain, pain with urination.  He does report left lower extremity swelling for the past couple of weeks.  He also indicates that he has an implanted tooth abscess that is scheduled to be removed 8/23/2023 that he is taking amoxicillin for and has also been instructed to discontinue Plavix until after the surgery.  He has not taken Plavix for the last 2 days.  He also reports being switched from MDIs to nebulizer treatment in assisted living due to difficulty in managing the MDIs.    Past Medical History:   Diagnosis Date    Allergic     Seasonal    Allergic rhinitis     Arthritis     Asthma     Brito esophagus     Benign prostatic hyperplasia     Carotid arterial disease     Status post right CEA 4/1/14    COPD (chronic obstructive pulmonary disease)     Coronary artery disease     Dementia     Dyspnea     Erectile dysfunction     GERD  (gastroesophageal reflux disease)     Hematoma of frontal scalp 2021    HL (hearing loss)     Hypothyroidism 1992    Low back pain     PAD (peripheral artery disease)     Prostate cancer     Status post implantation of artificial urinary sphincter         Stroke     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    Urinary tract infection      Past Surgical History:   Procedure Laterality Date    CAROTID ARTERY ANGIOPLASTY  2014    COLONOSCOPY  2009    ENDOSCOPY  2009    ,,2006    EYE SURGERY      FRACTURE SURGERY      HERNIA REPAIR  2000    HERNIA REPAIR  2009    INGUINAL HERNIA REPAIR      PARTIAL HIP ARTHROPLASTY Left 2014    PROSTATECTOMY  2008    SHOULDER SURGERY  2005    SHOULDER SURGERY  2006    SHOULDER SURGERY  2008    TEAR DUCT SURGERY      URINARY SPHINCTER IMPLANT       Family History   Problem Relation Age of Onset    Hypertension Sister     Thyroid disease Sister     Stroke Sister     Depression Sister     Heart failure Sister     Hypertension Brother     Lung cancer Brother     COPD Brother     Alcohol abuse Brother     Coronary artery disease Son         Son  from MI at age 59      Social History     Tobacco Use    Smoking status: Former     Packs/day: 0.00     Years: 15.00     Pack years: 0.00     Types: Cigarettes     Start date: 1947     Quit date: 1962     Years since quittin.6    Smokeless tobacco: Never    Tobacco comments:     Quit 's   1 ppd for 15 years     Vaping Use    Vaping Use: Never used   Substance Use Topics    Alcohol use: Never    Drug use: Never     Medications Prior to Admission   Medication Sig Dispense Refill Last Dose    Acetaminophen (TYLENOL 8 HOUR ARTHRITIS PAIN PO) 2 (Two) Times a Day.       albuterol sulfate  (90 Base) MCG/ACT inhaler Inhale 2 puffs Every 4 (Four) Hours As Needed for Wheezing. Indications: Reversible Disease of Blockage in Breathing Passages 18 g 1     cetirizine (zyrTEC) 10 MG  tablet Take 1 tablet by mouth Daily As Needed for Allergies.       clopidogrel (PLAVIX) 75 MG tablet TAKE 1 TABLET DAILY 90 tablet 3 8/19/2023 at 0800    CRANBERRY PO Take 1 tablet by mouth As Needed.       levothyroxine (SYNTHROID, LEVOTHROID) 50 MCG tablet Take 1 tablet by mouth Daily. Indications: Underactive Thyroid 90 tablet 3 8/19/2023 at 0800    LUTEIN PO Take 20 mg by mouth Daily.   8/19/2023 at 0800    Magnesium 250 MG tablet Take 400 mg by mouth Daily.   8/19/2023 at 0800    Melatonin 10 MG tablet Take  by mouth Every Night.   8/18/2023 at 2100    Multiple Vitamins-Minerals (MULTIVITAMIN ADULT PO) Take  by mouth.   8/19/2023 at 0800    pantoprazole (PROTONIX) 40 MG EC tablet TAKE 1 TABLET DAILY (Patient taking differently: Take 1 tablet by mouth 2 (Two) Times a Day.) 90 tablet 3     rivastigmine (EXELON) 9.5 MG/24HR patch APPLY 1 PATCH ON THE SKIN AS  DIRECTED BY PROVIDER DAILY FOR  VASCULAR DEMENTIA/CVA 90 patch 3 8/18/2023 at 2100    Trelegy Ellipta 100-62.5-25 MCG/ACT inhaler Inhale 1 puff Daily. Indications: Chronic Obstructive Lung Disease 180 each 3 8/19/2023 at 0800    vitamin B-12 (CYANOCOBALAMIN) 1000 MCG tablet Take 1 tablet by mouth Daily.   8/19/2023 at 0800    vitamin D3 125 MCG (5000 UT) capsule capsule Take 1 capsule by mouth Daily.   8/19/2023 at 0800     Allergies:  Bactrim [sulfamethoxazole-trimethoprim]    Immunization History   Administered Date(s) Administered    COVID-19 (PFIZER) Purple Cap Monovalent 02/01/2021, 02/22/2021, 09/25/2021    Covid-19 (Pfizer) Gray Cap Monovalent 06/01/2022    FLUAD TRI 65YR+ 10/16/2019    Fluad Quad 65+ 10/16/2020    Fluzone High Dose =>65 Years ([employer] ONLY) 10/07/2015, 10/18/2016, 10/06/2017, 10/17/2018, 10/16/2019    Fluzone High-Dose 65+yrs 10/14/2021    Pneumococcal Conjugate 13-Valent (PCV13) 10/18/2016    Pneumococcal Polysaccharide (PPSV23) 10/13/2003, 01/01/2008, 05/11/2008    Shingrix 08/23/2022    Td (TDVAX) 10/13/2003    Tdap 07/20/2010,  "07/08/2021    Zostavax 07/20/2010, 05/11/2016       REVIEW OF SYSTEMS:  Please see the above history of present illness for pertinent positives and negatives.  The remainder of the patient's systems have been reviewed and are negative.     Vital Signs  Temp:  [97.2 øF (36.2 øC)-98.7 øF (37.1 øC)] 97.2 øF (36.2 øC)  Heart Rate:  [67-82] 71  Resp:  [16-24] 16  BP: (108-127)/(57-69) 108/69  Flowsheet Rows      Flowsheet Row First Filed Value   Admission Height 175.3 cm (69\") Documented at 08/19/2023 1546   Admission Weight 88 kg (194 lb) Documented at 08/19/2023 1546               Physical Exam:  General: Patient is awake and alert. Well developed and well nourished. No acute distress noted.   HENT: Head is atraumatic, normocephalic. Hearing is grossly intact. Nose is without obvious congestion and appears patent. Neck is supple and trachea is midline.   Eyes: Vision is grossly intact. Pupils appear equal and round.   Cardiovascular: Heart has regular rate and rhythm with grade 2/6 holosystolic murmur, no rubs or gallops noted.   Respiratory: Very faint wheezes noted on auscultation, no rhonchi or rales.   Abdominal/GI: Soft, nontender, bowel sounds present. No rebound or guarding present.   Extremities: 2+ pitting edema in LLE only extending to the knee.  No pain on palpation.  Musculoskeletal: Spontaneous movement of bilateral upper and lower extremities against gravity noted. No signs of injury or deformity noted.   Skin: Warm and dry.   Psych: Mood and affect are appropriate. Cooperative with exam.   Neuro: No facial asymmetry noted. No focal deficits noted, hearing and vision are grossly intact.    Emotional Behavior: Some deficits noted in:   Judgment and Insight   Mental Status   Memory  Mood and Affect: neither of the following found acutely        Depression                 Anxiety     Debilities: Physical weakness noted on exam, no signs of the following found    Handicaps     Disabilities     Agitation  "        Results Review:    I reviewed the patient's new clinical results.  Lab Results (most recent)       Procedure Component Value Units Date/Time    Urine Culture - Urine, Urine, Clean Catch [[id number]] Collected: 08/19/23 1930    Specimen: Urine, Clean Catch Updated: 08/19/23 2033    Urinalysis, Microscopic Only - Urine, Clean Catch [[id number]]  (Abnormal) Collected: 08/19/23 1930    Specimen: Urine, Clean Catch Updated: 08/19/23 2020     RBC, UA 3-5 /HPF      WBC, UA 13-20 /HPF      Bacteria, UA 1+ /HPF      Squamous Epithelial Cells, UA 7-12 /HPF      Hyaline Casts, UA 0-2 /LPF      Methodology Manual Light Microscopy    Urinalysis With Microscopic If Indicated (No Culture) - Urine, Clean Catch [[id number]]  (Abnormal) Collected: 08/19/23 1930    Specimen: Urine, Clean Catch Updated: 08/19/23 2013     Color, UA Yellow     Appearance, UA Slightly Cloudy     pH, UA 5.5     Specific Gravity, UA 1.020     Glucose, UA Negative     Ketones, UA Negative     Bilirubin, UA Negative     Blood, UA Trace     Protein, UA Negative     Leuk Esterase, UA Small (1+)     Nitrite, UA Negative     Urobilinogen, UA 0.2 E.U./dL    High Sensitivity Troponin T 2Hr [[id number]]  (Abnormal) Collected: 08/19/23 1837    Specimen: Blood from Arm, Left Updated: 08/19/23 1905     HS Troponin T 23 ng/L      Troponin T Delta -1 ng/L     Narrative:      High Sensitive Troponin T Reference Range:  <10.0 ng/L- Negative Female for AMI  <15.0 ng/L- Negative Male for AMI  >=10 - Abnormal Female indicating possible myocardial injury.  >=15 - Abnormal Male indicating possible myocardial injury.   Clinicians would have to utilize clinical acumen, EKG, Troponin, and serial changes to determine if it is an Acute Myocardial Infarction or myocardial injury due to an underlying chronic condition.         Lactic Acid, Plasma [[id number]]  (Normal) Collected: 08/19/23 1834    Specimen: Blood Updated: 08/19/23 1900     Lactate 1.7 mmol/L     Blood Culture -  "Blood, Arm, Left [[id number]] Collected: 08/19/23 1837    Specimen: Blood from Arm, Left Updated: 08/19/23 1844    Blood Culture - Blood, Arm, Right [[id number]] Collected: 08/19/23 1834    Specimen: Blood from Arm, Right Updated: 08/19/23 1843    Procalcitonin [[id number]]  (Abnormal) Collected: 08/19/23 1641    Specimen: Blood Updated: 08/19/23 1802     Procalcitonin 5.50 ng/mL     Narrative:      As a Marker for Sepsis (Non-Neonates):    1. <0.5 ng/mL represents a low risk of severe sepsis and/or septic shock.  2. >2 ng/mL represents a high risk of severe sepsis and/or septic shock.    As a Marker for Lower Respiratory Tract Infections that require antibiotic therapy:    PCT on Admission    Antibiotic Therapy       6-12 Hrs later    >0.5                Strongly Recommended  >0.25 - <0.5        Recommended   0.1 - 0.25          Discouraged              Remeasure/reassess PCT  <0.1                Strongly Discouraged     Remeasure/reassess PCT    As 28 day mortality risk marker: \"Change in Procalcitonin Result\" (>80% or <=80%) if Day 0 (or Day 1) and Day 4 values are available. Refer to http://www.[facility]-pct-calculator.com    Change in PCT <=80%  A decrease of PCT levels below or equal to 80% defines a positive change in PCT test result representing a higher risk for 28-day all-cause mortality of patients diagnosed with severe sepsis for septic shock.    Change in PCT >80%  A decrease of PCT levels of more than 80% defines a negative change in PCT result representing a lower risk for 28-day all-cause mortality of patients diagnosed with severe sepsis or septic shock.       BNP [[id number]]  (Normal) Collected: 08/19/23 1641    Specimen: Blood Updated: 08/19/23 1714     proBNP 874.7 pg/mL     Narrative:      Among patients with dyspnea, NT-proBNP is highly sensitive for the detection of acute congestive heart failure. In addition NT-proBNP of <300 pg/ml effectively rules out acute congestive heart failure with 99% " negative predictive value.      High Sensitivity Troponin T [[id number]]  (Abnormal) Collected: 08/19/23 1641    Specimen: Blood Updated: 08/19/23 1711     HS Troponin T 24 ng/L     Narrative:      High Sensitive Troponin T Reference Range:  <10.0 ng/L- Negative Female for AMI  <15.0 ng/L- Negative Male for AMI  >=10 - Abnormal Female indicating possible myocardial injury.  >=15 - Abnormal Male indicating possible myocardial injury.   Clinicians would have to utilize clinical acumen, EKG, Troponin, and serial changes to determine if it is an Acute Myocardial Infarction or myocardial injury due to an underlying chronic condition.         Comprehensive Metabolic Panel [[id number]]  (Abnormal) Collected: 08/19/23 1641    Specimen: Blood Updated: 08/19/23 1708     Glucose 137 mg/dL      BUN 27 mg/dL      Creatinine 0.98 mg/dL      Sodium 130 mmol/L      Potassium 4.0 mmol/L      Chloride 94 mmol/L      CO2 27.1 mmol/L      Calcium 8.8 mg/dL      Total Protein 6.7 g/dL      Albumin 3.4 g/dL      ALT (SGPT) 26 U/L      AST (SGOT) 31 U/L      Alkaline Phosphatase 53 U/L      Total Bilirubin 0.5 mg/dL      Globulin 3.3 gm/dL      A/G Ratio 1.0 g/dL      BUN/Creatinine Ratio 27.6     Anion Gap 8.9 mmol/L      eGFR 73.3 mL/min/1.73     Narrative:      GFR Normal >60  Chronic Kidney Disease <60  Kidney Failure <15    The GFR formula is only valid for adults with stable renal function between ages 18 and 70.    Protime-INR [[id number]]  (Normal) Collected: 08/19/23 1641    Specimen: Blood Updated: 08/19/23 1700     Protime 13.7 Seconds      INR 1.05    Narrative:      Therapeutic Ranges for INR: 2.0-3.0 (PT 20-30)                              2.5-3.5 (PT 25-34)    aPTT [[id number]]  (Normal) Collected: 08/19/23 1641    Specimen: Blood Updated: 08/19/23 1700     PTT 35.5 seconds     Narrative:      PTT = The equivalent PTT values for the therapeutic range of heparin levels at 0.1 to 0.7 U/ml are 53 to 110 seconds.      CBC &  Differential [[id number]]  (Abnormal) Collected: 08/19/23 1641    Specimen: Blood Updated: 08/19/23 1652    Narrative:      The following orders were created for panel order CBC & Differential.  Procedure                               Abnormality         Status                     ---------                               -----------         ------                     CBC Auto Differential[[id number]]        Abnormal            Final result                 Please view results for these tests on the individual orders.    CBC Auto Differential [[id number]]  (Abnormal) Collected: 08/19/23 1641    Specimen: Blood Updated: 08/19/23 1652     WBC 14.61 10*3/mm3      RBC 3.75 10*6/mm3      Hemoglobin 11.7 g/dL      Hematocrit 36.0 %      MCV 96.0 fL      MCH 31.2 pg      MCHC 32.5 g/dL      RDW 13.8 %      RDW-SD 48.7 fl      MPV 9.4 fL      Platelets 239 10*3/mm3      Neutrophil % 79.5 %      Lymphocyte % 9.9 %      Monocyte % 9.3 %      Eosinophil % 0.5 %      Basophil % 0.3 %      Immature Grans % 0.5 %      Neutrophils, Absolute 11.63 10*3/mm3      Lymphocytes, Absolute 1.44 10*3/mm3      Monocytes, Absolute 1.36 10*3/mm3      Eosinophils, Absolute 0.07 10*3/mm3      Basophils, Absolute 0.04 10*3/mm3      Immature Grans, Absolute 0.07 10*3/mm3      nRBC 0.0 /100 WBC             Imaging Results (Most Recent)       Procedure Component Value Units Date/Time    XR Chest 1 View [[id number]] Collected: 08/19/23 1704     Updated: 08/19/23 1707    Narrative:      XR CHEST 1 VW-: 8/19/2023 5:09 PM     INDICATION:   Worsening shortness of air for the past week     COMPARISON:    9/29/2020.     FINDINGS:  2 portable AP view(s) of the chest. There is rotation to the left. The  aorta is tortuous ectatic and atherosclerotic. The appearance is  unchanged for technical factors. Shallow lung expansion with  bronchovascular crowding. Chronic eventration/elevation of the left  hemidiaphragm with gaseous distention of stomach and bowel subjacent  to  the left hemidiaphragm. No new effusion or dense consolidation. No  pneumothorax. There is some probable atelectasis/scarring in the  perihilar and lower lung zones bilaterally.       Impression:         1. Chronic lung changes. No superimposed active disease or significant  interval change.     This report was finalized on 8/19/2023 5:05 PM by Dr. Arun Walker MD.             reviewed    ECG/EMG Results (most recent)       Procedure Component Value Units Date/Time    ECG 12 Lead Dyspnea [[id number]] Collected: 08/19/23 1645     Updated: 08/19/23 1647     QT Interval 381 ms     Narrative:      HEART RATE= 76  bpm  RR Interval= 796  ms  NJ Interval= 78  ms  P Horizontal Axis=   deg  P Front Axis= 0  deg  QRSD Interval= 82  ms  QT Interval= 381  ms  QRS Axis= 25  deg  T Wave Axis= 82  deg  - BORDERLINE ECG -  Sinus rhythm  Atrial premature complex  Short NJ interval  Electronically Signed By:   Date and Time of Study: 2023-08-19 16:45:59          reviewed    Assessment & Plan     Patient is a 89-year-old male with a history of asthma, COPD, CAD, dementia, GERD, hypothyroidism presenting with shortness of breath observed at assisted living earlier today, was found to be hypoxic in the ER, and now requiring oxygen to maintain his O2 saturation.  Labs also identified a leukocytosis with elevated Pro-Gasper and UA suggestive of UTI.  Patient also reports a known abscess on his dental implant and is scheduled for dental surgery for removal 8/23/2023.  He has been taking amoxicillin for this and has held his Plavix since 8/16/2023.    COPD exacerbation/hypoxia: Patient developed shortness of breath/hypoxia earlier today.  Now requiring 3L O2 NC to maintain O2 sats.  He reports using albuterol nebulizer at home.  He responded well to DuoNeb's treatment in ER.  This is being continued on the floor.  He reports transitioning away from MDIs to nebulizers due to difficulty using MDIs in assisted living.  Procalcitonin elevated  at 5.5 in the ER.  Also mild leukocytosis.  Rocephin was started.  Also, a course of steroids and maintenance IV fluids started on the floor.  Respiratory pathogen panel ordered on the floor as well.    UTI: UA in ER showing WBC, 1+ bacteria.  Patient reports chronic UTI secondary to enlarged prostate and artificial urinary sphincter, but he is asymptomatic.  Patient was started on Rocephin in the ER.    Cellulitis: Patient reports history of left dorsal foot wound about 2 weeks ago which is now healed, however, he reports left-sided lower extremity edema for the past 2 weeks.  No pain, erythema, or other signs of infection on exam, however there is 2+ pitting edema to the knee.  No clear sign of cellulitis.  Doppler ordered to rule out DVT.  Monitor.    Dental abscess: Patient reports being on amoxicillin for this recently and is now having Plavix held for dental surgery 8/23/2023.    CAD/carotid artery disease: Patient normally on Plavix, however, being held for upcoming dental surgery.    Dementia: Symptoms primarily appear to be related to orientation.  He is generally interactive, pleasant, and has fairly good recall of recent events and personal medical history.  Continue current rivastigmine.    Hypothyroidism: Check thyroid levels.  Continue current Synthroid.    Diet: Regular diet.    DVT PPX: SCDs, ambulate with assistance multiple times daily.  Continuation of Plavix and/or anticoagulation is not indicated due to patient's upcoming dental procedure 8/23/2023.  Plavix has been held since 8/17/2023.    Disposition: Expect discharge back to assisted living facility after improvement in medical condition and symptoms.    Please note: Every effort was made to accurately obtain patient's home medications. This was done utilizing extensive chart review, ED documentation, recent pharmacy records, and where possible thorough discussion with the patient if medications were known by the patient. I have reviewed and  edited the patient's home medications as per my efforts above and current med list can be found within home medications portion of this electronic medical record and is accurate as possible as of 08/20/23     I discussed the patient's findings and my recommendations with patient and daughter who was in the room.    Oscar Medrano MD  08/20/23  00:07 EDT    Time: 75 minutes were spent reviewing the patient's medical record, history of current illness, interviewing family member, conducting a focused exam, developing a treatment plan, discussing management with respiratory therapy, placing orders, and documentation in the medical record.    At [facility], we believe that sharing information builds trust and better relationships. You are receiving this note because you recently visited [facility]. It is possible you will see health information before a provider has talked with you about it. This kind of information can be easy to misunderstand. To help you fully understand what it means for your health, we urge you to discuss this note with your provider.

## 2023-08-21 ENCOUNTER — APPOINTMENT (OUTPATIENT)
Dept: GENERAL RADIOLOGY | Facility: HOSPITAL | Age: 88
End: 2023-08-21
Payer: MEDICARE

## 2023-08-21 ENCOUNTER — READMISSION MANAGEMENT (OUTPATIENT)
Dept: CALL CENTER | Facility: HOSPITAL | Age: 88
End: 2023-08-21
Payer: MEDICARE

## 2023-08-21 VITALS
HEIGHT: 69 IN | BODY MASS INDEX: 29.19 KG/M2 | DIASTOLIC BLOOD PRESSURE: 72 MMHG | WEIGHT: 197.09 LBS | RESPIRATION RATE: 20 BRPM | HEART RATE: 86 BPM | OXYGEN SATURATION: 95 % | TEMPERATURE: 100 F | SYSTOLIC BLOOD PRESSURE: 130 MMHG

## 2023-08-21 PROBLEM — J44.1 COPD EXACERBATION: Status: RESOLVED | Noted: 2023-08-19 | Resolved: 2023-08-21

## 2023-08-21 PROBLEM — J44.1 COPD WITH ACUTE EXACERBATION: Status: RESOLVED | Noted: 2023-08-19 | Resolved: 2023-08-21

## 2023-08-21 LAB
ALBUMIN SERPL-MCNC: 3 G/DL (ref 3.5–5.2)
ANION GAP SERPL CALCULATED.3IONS-SCNC: 10.7 MMOL/L (ref 5–15)
BASOPHILS # BLD AUTO: 0.02 10*3/MM3 (ref 0–0.2)
BASOPHILS NFR BLD AUTO: 0.2 % (ref 0–1.5)
BUN SERPL-MCNC: 22 MG/DL (ref 8–23)
BUN/CREAT SERPL: 25.9 (ref 7–25)
CALCIUM SPEC-SCNC: 8.5 MG/DL (ref 8.2–9.6)
CHLORIDE SERPL-SCNC: 100 MMOL/L (ref 98–107)
CO2 SERPL-SCNC: 23.3 MMOL/L (ref 22–29)
CREAT SERPL-MCNC: 0.85 MG/DL (ref 0.76–1.27)
DEPRECATED RDW RBC AUTO: 49.6 FL (ref 37–54)
EGFRCR SERPLBLD CKD-EPI 2021: 82.5 ML/MIN/1.73
EOSINOPHIL # BLD AUTO: 0 10*3/MM3 (ref 0–0.4)
EOSINOPHIL NFR BLD AUTO: 0 % (ref 0.3–6.2)
ERYTHROCYTE [DISTWIDTH] IN BLOOD BY AUTOMATED COUNT: 13.9 % (ref 12.3–15.4)
GLUCOSE SERPL-MCNC: 130 MG/DL (ref 65–99)
HCT VFR BLD AUTO: 32.5 % (ref 37.5–51)
HGB BLD-MCNC: 10.4 G/DL (ref 13–17.7)
IMM GRANULOCYTES # BLD AUTO: 0.06 10*3/MM3 (ref 0–0.05)
IMM GRANULOCYTES NFR BLD AUTO: 0.5 % (ref 0–0.5)
LYMPHOCYTES # BLD AUTO: 0.97 10*3/MM3 (ref 0.7–3.1)
LYMPHOCYTES NFR BLD AUTO: 8.8 % (ref 19.6–45.3)
MCH RBC QN AUTO: 31 PG (ref 26.6–33)
MCHC RBC AUTO-ENTMCNC: 32 G/DL (ref 31.5–35.7)
MCV RBC AUTO: 96.7 FL (ref 79–97)
MONOCYTES # BLD AUTO: 1.3 10*3/MM3 (ref 0.1–0.9)
MONOCYTES NFR BLD AUTO: 11.8 % (ref 5–12)
NEUTROPHILS NFR BLD AUTO: 78.7 % (ref 42.7–76)
NEUTROPHILS NFR BLD AUTO: 8.69 10*3/MM3 (ref 1.7–7)
NRBC BLD AUTO-RTO: 0 /100 WBC (ref 0–0.2)
PHOSPHATE SERPL-MCNC: 2.7 MG/DL (ref 2.5–4.5)
PLATELET # BLD AUTO: 225 10*3/MM3 (ref 140–450)
PMV BLD AUTO: 10.1 FL (ref 6–12)
POTASSIUM SERPL-SCNC: 3.9 MMOL/L (ref 3.5–5.2)
RBC # BLD AUTO: 3.36 10*6/MM3 (ref 4.14–5.8)
SODIUM SERPL-SCNC: 134 MMOL/L (ref 136–145)
WBC NRBC COR # BLD: 11.04 10*3/MM3 (ref 3.4–10.8)

## 2023-08-21 PROCEDURE — 97535 SELF CARE MNGMENT TRAINING: CPT

## 2023-08-21 PROCEDURE — 63710000001 PREDNISONE PER 1 MG: Performed by: STUDENT IN AN ORGANIZED HEALTH CARE EDUCATION/TRAINING PROGRAM

## 2023-08-21 PROCEDURE — 85025 COMPLETE CBC W/AUTO DIFF WBC: CPT | Performed by: HOSPITALIST

## 2023-08-21 PROCEDURE — G0378 HOSPITAL OBSERVATION PER HR: HCPCS

## 2023-08-21 PROCEDURE — 94799 UNLISTED PULMONARY SVC/PX: CPT

## 2023-08-21 PROCEDURE — 94664 DEMO&/EVAL PT USE INHALER: CPT

## 2023-08-21 PROCEDURE — 71046 X-RAY EXAM CHEST 2 VIEWS: CPT

## 2023-08-21 PROCEDURE — 94618 PULMONARY STRESS TESTING: CPT

## 2023-08-21 PROCEDURE — 80069 RENAL FUNCTION PANEL: CPT | Performed by: HOSPITALIST

## 2023-08-21 RX ORDER — IPRATROPIUM BROMIDE AND ALBUTEROL SULFATE 2.5; .5 MG/3ML; MG/3ML
3 SOLUTION RESPIRATORY (INHALATION) EVERY 4 HOURS PRN
Qty: 360 ML | Refills: 0 | Status: SHIPPED | OUTPATIENT
Start: 2023-08-21 | End: 2023-08-25 | Stop reason: SDUPTHER

## 2023-08-21 RX ORDER — AMOXICILLIN AND CLAVULANATE POTASSIUM 875; 125 MG/1; MG/1
1 TABLET, FILM COATED ORAL EVERY 12 HOURS SCHEDULED
Qty: 8 TABLET | Refills: 0 | Status: SHIPPED | OUTPATIENT
Start: 2023-08-21 | End: 2023-08-25

## 2023-08-21 RX ORDER — PREDNISONE 20 MG/1
40 TABLET ORAL
Qty: 4 TABLET | Refills: 0 | Status: SHIPPED | OUTPATIENT
Start: 2023-08-22 | End: 2023-08-25

## 2023-08-21 RX ADMIN — ALBUTEROL SULFATE 2.5 MG: 2.5 SOLUTION RESPIRATORY (INHALATION) at 02:04

## 2023-08-21 RX ADMIN — ALBUTEROL SULFATE 2.5 MG: 2.5 SOLUTION RESPIRATORY (INHALATION) at 08:04

## 2023-08-21 RX ADMIN — BUDESONIDE AND FORMOTEROL FUMARATE DIHYDRATE 2 PUFF: 160; 4.5 AEROSOL RESPIRATORY (INHALATION) at 08:12

## 2023-08-21 RX ADMIN — LEVOTHYROXINE SODIUM 50 MCG: 50 TABLET ORAL at 06:20

## 2023-08-21 RX ADMIN — AMOXICILLIN AND CLAVULANATE POTASSIUM 1 TABLET: 875; 125 TABLET, FILM COATED ORAL at 08:23

## 2023-08-21 RX ADMIN — RIVASTIGMINE 2 PATCH: 4.6 PATCH TRANSDERMAL at 08:25

## 2023-08-21 RX ADMIN — SENNOSIDES AND DOCUSATE SODIUM 2 TABLET: 50; 8.6 TABLET ORAL at 08:24

## 2023-08-21 RX ADMIN — TIOTROPIUM BROMIDE INHALATION SPRAY 2 PUFF: 3.12 SPRAY, METERED RESPIRATORY (INHALATION) at 08:12

## 2023-08-21 RX ADMIN — PREDNISONE 40 MG: 20 TABLET ORAL at 08:23

## 2023-08-21 RX ADMIN — PANTOPRAZOLE SODIUM 40 MG: 40 TABLET, DELAYED RELEASE ORAL at 08:24

## 2023-08-21 RX ADMIN — Medication 10 ML: at 08:24

## 2023-08-21 NOTE — CASE MANAGEMENT/SOCIAL WORK
"Continued Stay Note  [facility]     Patient Name: Pawel Goldberg  MRN: [id number]  Today's Date: 8/21/2023    Admit Date: 8/19/2023    Plan: [facility]   Discharge Plan       Row Name 08/21/23 1027       Plan    Plan [facility] assisted living    Patient/Family in Agreement with Plan yes    Plan Comments CM followed up with patient and his daughter Joy at bedside regarding discharge needs. Patient is currently resting bed and voiced no concerns. Daughter confirmed that patient does need another nebulizer. She states he had one about two to three years ago and it broke at a nursing home he was in. She states the assisted living facility has been trying to get him one for about three months. CM informed daughter that insurance might not cover the cost of a new one and she states \"well he has to have one so that's OK\". CM provided patient with a nebulizer and pulse oximetry to check oxygen sats at home. Daughter states she will transport prot patient back to his assisted living apartment and had no other questions or concerns regarding discharge plans. CM will follow.                   Discharge Codes    No documentation.                 Expected Discharge Date and Time       Expected Discharge Date Expected Discharge Time    Aug 21, 2023               Kiya Currie RN    "

## 2023-08-21 NOTE — PROCEDURES
Exercise Oximetry    Patient Name:Pawel Goldberg   MRN: [id number]   Date: 08/21/23             ROOM AIR BASELINE   SpO2% 94   Heart Rate 84   Blood Pressure      EXERCISE ON ROOM AIR SpO2%     1 MINUTE 94     2 MINUTES 93     3 MINUTES 95     4 MINUTES 95     5 MINUTES 94     6 MINUTES 95                Distance Walked  320ft    Dyspnea (John Scale)      Fatigue (John Scale)      SpO2% Post Exercise  95    HR Post Exercise  84    Time to Recovery        Comments: Head probe/walker and gait belt used with walk.

## 2023-08-21 NOTE — DISCHARGE SUMMARY
Date of Admission: 8/19/2023    Date of Discharge:  8/21/2023    Length of stay:  LOS: 0 days     Presenting Problem:   Hypoxia [R09.02]  COPD with acute exacerbation [J44.1]  Cellulitis of left lower extremity [L03.116]  Elevated procalcitonin [R79.89]  Leukocytosis, unspecified type [D72.829]      Active Diagnosis During Hospital Stay/Discharge Diagnoses/Course by Diagnoses:           Acute hypoxia: Does not appear to be AECOPD.  CXR on day of discharge showed no active disease and he was weaned to room air.  Walking oximetry revealed no need for O2  Dental Abscess: Continue p.o. antibiotics and has follow-up with dentist on Tuesday for dental extraction  Hypothyroidism: Continue current replacement dose.    Chronic HFpEF: No exacerbation  Dementia: Appears at baseline.  CAD: No acute issues.  Can resume home therapy        Hospital Course  Patient is a 89 y.o. male presented with issues as noted above.  He was admitted he did well.  He was stabilized and felt able to return to his half-way facility and has follow-up as noted above..        Procedures Performed: As noted above    08/21 0850 Walking Oximetry    Consults:   Consults       No orders found from 7/21/2023 to 8/20/2023.            Pertinent Test Results:     Results from last 7 days   Lab Units 08/21/23  0351 08/20/23  0329 08/19/23  1641   WBC 10*3/mm3 11.04* 11.04* 14.61*   HEMOGLOBIN g/dL 10.4* 11.2* 11.7*   HEMATOCRIT % 32.5* 34.8* 36.0*   PLATELETS 10*3/mm3 225 220 239     Results from last 7 days   Lab Units 08/21/23  0351 08/20/23  0329 08/19/23  1641   SODIUM mmol/L 134* 132* 130*   POTASSIUM mmol/L 3.9 4.3 4.0   CHLORIDE mmol/L 100 97* 94*   CO2 mmol/L 23.3 24.5 27.1   BUN mg/dL 22 24* 27*   CREATININE mg/dL 0.85 0.98 0.98   CALCIUM mg/dL 8.5 8.7 8.8   BILIRUBIN mg/dL  --   --  0.5   ALK PHOS U/L  --   --  53   ALT (SGPT) U/L  --   --  26   AST (SGOT) U/L  --   --  31   GLUCOSE mg/dL 130* 137* 137*       Microbiology Results (last 10 days)        Procedure Component Value - Date/Time    Respiratory Panel PCR w/COVID-19(SARS-CoV-2) VERONA/FLEX/BRUCE/PAD/COR/MAD/NICOLE In-House, NP Swab in UTM/VTM, 3-4 HR TAT - Swab, Nasopharynx [[id number]]  (Normal) Collected: 08/19/23 2315    Lab Status: Final result Specimen: Swab from Nasopharynx Updated: 08/20/23 1351     ADENOVIRUS, PCR Not Detected     Coronavirus 229E Not Detected     Coronavirus HKU1 Not Detected     Coronavirus NL63 Not Detected     Coronavirus OC43 Not Detected     COVID19 Not Detected     Human Metapneumovirus Not Detected     Human Rhinovirus/Enterovirus Not Detected     Influenza A PCR Not Detected     Influenza B PCR Not Detected     Parainfluenza Virus 1 Not Detected     Parainfluenza Virus 2 Not Detected     Parainfluenza Virus 3 Not Detected     Parainfluenza Virus 4 Not Detected     RSV, PCR Not Detected     Bordetella pertussis pcr Not Detected     Bordetella parapertussis PCR Not Detected     Chlamydophila pneumoniae PCR Not Detected     Mycoplasma pneumo by PCR Not Detected    Narrative:      In the setting of a positive respiratory panel with a viral infection PLUS a negative procalcitonin without other underlying concern for bacterial infection, consider observing off antibiotics or discontinuation of antibiotics and continue supportive care. If the respiratory panel is positive for atypical bacterial infection (Bordetella pertussis, Chlamydophila pneumoniae, or Mycoplasma pneumoniae), consider antibiotic de-escalation to target atypical bacterial infection.    Blood Culture - Blood, Arm, Left [[id number]]  (Normal) Collected: 08/19/23 1837    Lab Status: Preliminary result Specimen: Blood from Arm, Left Updated: 08/20/23 1846     Blood Culture No growth at 24 hours    Blood Culture - Blood, Arm, Right [[id number]]  (Normal) Collected: 08/19/23 1834    Lab Status: Preliminary result Specimen: Blood from Arm, Right Updated: 08/20/23 1846     Blood Culture No growth at 24 hours            Results  for orders placed during the hospital encounter of 08/26/21    Adult Transthoracic Echo Complete w/ Color, Spectral and Contrast if Necessary Per Protocol    Interpretation Summary  ú Estimated left ventricular EF = 70% Left ventricular systolic function is normal.  ú Left ventricular diastolic function is consistent with (grade I) impaired relaxation.  ú There is calcification of the aortic valve. Trace aortic valve regurgitation is present.      Imaging Results (All)       Procedure Component Value Units Date/Time    XR Chest PA & Lateral [[id number]] Collected: 08/21/23 0902     Updated: 08/21/23 0905    Narrative:      PA AND LATERAL CHEST, 8/21/2023 9:38 AM     HISTORY:  Hypoxia.  COPD.; J44.1-Chronic obstructive pulmonary disease with  (acute) exacerbation; R09.02-Hypoxemia; D72.829-Elevated white blood  cell count, unspecified; R79.89-Other specified abnormal findings of  blood chemistry; L03.116-Cellulitis of left lower limb   hypoxia and  COPD     COMPARISON:  8/19/2023     TECHNIQUE:  PA and lateral upright chest series.     FINDINGS:  Heart size normal. Left mid diaphragm is chronically elevated. There are  no infiltrates. Bones are negative with degenerative changes left and  right shoulders    Impression:      No change. No active disease     This report was finalized on 8/21/2023 9:03 AM by Dr. Andrew Mccall MD.       US Venous Doppler Lower Extremity Left (duplex) [[id number]] Collected: 08/20/23 1230     Updated: 08/20/23 1236    Narrative:      Left lower extremity venous Doppler ultrasound     HISTORY: Left lower extremity swelling for 2 weeks. No known injury.     FINDINGS: Real-time ultrasonography of the left lower extremity deep  venous system was performed with 2D grayscale compression imaging color  flow Doppler imaging and waveform analysis. There is also imaging of the  left greater saphenous vein. Normal flow and compressibility is seen  throughout the left lower extremity deep venous system  including the  common femoral vein superficial femoral vein popliteal vein, anterior  tibial vein posterior tibial vein and peroneal veins. Greater and lesser  saphenous veins also unremarkable.     Technologist notes a Baker's cyst. It measures about 4.6 x 2.5 x 1.4 cm  dimension.       Impression:      1. No evidence for acute appearing deep venous thrombosis left lower  extremity deep venous system. Saphenous veins unremarkable.  2. Baker's cyst left popliteal fossa.     This report was finalized on 8/20/2023 12:34 PM by Dr. Michelle Slaughter MD.       XR Chest 1 View [[id number]] Collected: 08/19/23 1704     Updated: 08/19/23 1707    Narrative:      XR CHEST 1 VW-: 8/19/2023 5:09 PM     INDICATION:   Worsening shortness of air for the past week     COMPARISON:    9/29/2020.     FINDINGS:  2 portable AP view(s) of the chest. There is rotation to the left. The  aorta is tortuous ectatic and atherosclerotic. The appearance is  unchanged for technical factors. Shallow lung expansion with  bronchovascular crowding. Chronic eventration/elevation of the left  hemidiaphragm with gaseous distention of stomach and bowel subjacent to  the left hemidiaphragm. No new effusion or dense consolidation. No  pneumothorax. There is some probable atelectasis/scarring in the  perihilar and lower lung zones bilaterally.       Impression:         1. Chronic lung changes. No superimposed active disease or significant  interval change.     This report was finalized on 8/19/2023 5:05 PM by Dr. Arun Walker MD.                 Condition on Discharge: Stable    Vital Signs  Temp:  [97.5 øF (36.4 øC)-100 øF (37.8 øC)] 100 øF (37.8 øC)  Heart Rate:  [61-92] 61  Resp:  [16-24] 20  BP: (109-174)/(58-77) 130/72    Physical Exam:  Physical Exam  Vitals reviewed.   Cardiovascular:      Rate and Rhythm: Normal rate.   Pulmonary:      Effort: No respiratory distress.   Abdominal:      General: There is no distension.   Neurological:      Mental  Status: He is alert. Mental status is at baseline.       Emotional Behavior:          Depression none               Anxiety none    Debilities:     Agitation none    Discharge Disposition  Home-Health Care [facility]    Discharge Medications     Discharge Medications        New Medications        Instructions Start Date   amoxicillin-clavulanate 875-125 MG per tablet  Commonly known as: AUGMENTIN   1 tablet, Oral, Every 12 Hours Scheduled      predniSONE 20 MG tablet  Commonly known as: DELTASONE   40 mg, Oral, Daily With Breakfast   Start Date: August 22, 2023            Changes to Medications        Instructions Start Date   pantoprazole 40 MG EC tablet  Commonly known as: PROTONIX  What changed: when to take this   TAKE 1 TABLET DAILY             Continue These Medications        Instructions Start Date   albuterol sulfate  (90 Base) MCG/ACT inhaler  Commonly known as: PROVENTIL HFA;VENTOLIN HFA;PROAIR HFA   2 puffs, Inhalation, Every 4 Hours PRN      cetirizine 10 MG tablet  Commonly known as: zyrTEC   10 mg, Oral, Daily PRN      clopidogrel 75 MG tablet  Commonly known as: PLAVIX   TAKE 1 TABLET DAILY      CRANBERRY PO   1 tablet, Oral, As Needed      levothyroxine 50 MCG tablet  Commonly known as: SYNTHROID, LEVOTHROID   50 mcg, Oral, Daily      LUTEIN PO   20 mg, Oral, Daily      Magnesium 250 MG tablet   400 mg, Oral, Daily      Melatonin 10 MG tablet   Oral, Nightly      multivitamin with minerals tablet tablet   Oral      rivastigmine 9.5 MG/24HR patch  Commonly known as: EXELON   APPLY 1 PATCH ON THE SKIN AS  DIRECTED BY PROVIDER DAILY FOR  VASCULAR DEMENTIA/CVA      Trelegy Ellipta 100-62.5-25 MCG/ACT inhaler  Generic drug: Fluticasone-Umeclidin-Vilant   1 puff, Inhalation, Daily - RT      TYLENOL 8 HOUR ARTHRITIS PAIN PO   2 Times Daily      vitamin B-12 1000 MCG tablet  Commonly known as: CYANOCOBALAMIN   1,000 mcg, Oral, Daily      vitamin D3 125 MCG (5000 UT) capsule capsule   5,000 Units, Oral,  Daily               Discharge Diet:   Diet Instructions       Diet: Regular/House Diet; Regular Texture (IDDSI 7); Thin (IDDSI 0)      Discharge Diet: Regular/House Diet    Texture: Regular Texture (IDDSI 7)    Fluid Consistency: Thin (IDDSI 0)            Activity at Discharge:     Follow-up Appointments  No future appointments.  Additional Instructions for the Follow-ups that You Need to Schedule       Discharge Follow-up with PCP   As directed       Currently Documented PCP:    Charlie Trinh MD    PCP Phone Number:    [phone]     Follow Up Details: one week                Test Results Pending at Discharge  Pending Labs       Order Current Status    Urine Culture - Urine, Urine, Clean Catch In process    Blood Culture - Blood, Arm, Left Preliminary result    Blood Culture - Blood, Arm, Right Preliminary result             Risk for Readmission (LACE) Score: 8 (8/21/2023  6:00 AM)          Time: Discharge 32 min with face-to-face history exam, writing of prescriptions, and documenting discharge data including care coordination with the nursing staff.      Electronically signed by Cal Cantu DO, 08/21/23, 09:42 EDT.

## 2023-08-21 NOTE — THERAPY DISCHARGE NOTE
Acute Care - Occupational Therapy Discharge   Daria Cotton    Patient Name: Pawel Goldberg  : 1932    MRN: [id number]                              Today's Date: 2023       Admit Date: 2023    Visit Dx:     ICD-10-CM ICD-9-CM   1. COPD with acute exacerbation  J44.1 491.21   2. Hypoxia  R09.02 799.02   3. Leukocytosis, unspecified type  D72.829 288.60   4. Elevated procalcitonin  R79.89 790.99   5. Cellulitis of left lower extremity  L03.116 682.6   6. 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    Obstruction of carotid artery    Stenosis of carotid artery    EEG abnormality without seizure    Hereditary and idiopathic peripheral neuropathy    Mixed hyperlipidemia    Acquired hypothyroidism    Low back pain with sciatica    Lumbar canal stenosis    Degenerative arthritis of lumbar spine    Osteoarthritis of hip    History of repair of hip joint    Syncope    Temporary cerebral vascular dysfunction    History of prostate cancer    Dementia associated with other underlying disease without behavioral disturbance    Arthritis    PAD (peripheral artery disease)    Medicare annual wellness visit, subsequent    Essential hypertension    Medication monitoring encounter    Status post implantation of artificial urinary sphincter    Moderate asthma with acute exacerbation    Vascular dementia without behavioral disturbance    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    At high risk for falls    Insomnia due to medical condition    Chronic UTI    Frequent falls     Past Medical History:   Diagnosis Date    Allergic     Seasonal    Allergic rhinitis     Arthritis     Asthma     Brito esophagus     Benign prostatic hyperplasia     Carotid arterial disease     Status post right CEA 14    COPD (chronic obstructive pulmonary disease)     Coronary artery disease     Dementia     Dyspnea     Erectile  dysfunction     GERD (gastroesophageal reflux disease)     Hematoma of frontal scalp 07/08/2021    HL (hearing loss)     Hypothyroidism 1992    Low back pain     PAD (peripheral artery disease)     Prostate cancer     Status post implantation of artificial urinary sphincter         Stroke     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    Urinary tract infection      Past Surgical History:   Procedure Laterality Date    CAROTID ARTERY ANGIOPLASTY  04/01/2014    COLONOSCOPY  2009    ENDOSCOPY  2009    2001,2003,2006    EYE SURGERY      FRACTURE SURGERY      HERNIA REPAIR  2000    HERNIA REPAIR  2009    INGUINAL HERNIA REPAIR      PARTIAL HIP ARTHROPLASTY Left 07/02/2014    PROSTATECTOMY  2008    SHOULDER SURGERY  2005    SHOULDER SURGERY  2006    SHOULDER SURGERY  2008    TEAR DUCT SURGERY      URINARY SPHINCTER IMPLANT        General Information       Row Name 08/21/23 1103          OT Time and Intention    Document Type discharge evaluation/summary  -SD     Mode of Treatment occupational therapy  -SD       Row Name 08/21/23 1103          General Information    Existing Precautions/Restrictions fall  -SD       Row Name 08/21/23 1106          Cognition    Orientation Status (Cognition) oriented to;person;place  -SD               User Key  (r) = Recorded By, (t) = Taken By, (c) = Cosigned By      Initials Name Provider Type    SD Jose Luis Ayala OTR Occupational Therapist                   Mobility/ADL's       Row Name 08/21/23 1103          Bed Mobility    Bed Mobility supine-sit;sit-supine  -SD     Supine-Sit [place] (Bed Mobility) minimum assist (75% patient effort)  -SD     Sit-Supine [place] (Bed Mobility) contact guard  -SD     Assistive Device (Bed Mobility) bed rails  -SD       Row Name 08/21/23 1103          Transfers    Transfers sit-stand transfer;stand-sit transfer  -SD       Row Name 08/21/23 1103          Sit-Stand Transfer    Sit-Stand [place] (Transfers) contact  guard;verbal cues  -SD     Assistive Device (Sit-Stand Transfers) walker, front-wheeled  -SD     Comment, (Sit-Stand Transfer) verbal cues for hand placement when standing from EOB for safety.  -SD       Row Name 08/21/23 1103          Stand-Sit Transfer    Stand-Sit [place] (Transfers) contact guard;verbal cues  -SD     Assistive Device (Stand-Sit Transfers) walker, front-wheeled  -SD       Row Name 08/21/23 1103          Functional Mobility    Functional Mobility- Ind. Level contact guard assist  -SD     Functional Mobility- Device walker, front-wheeled  -SD     Functional Mobility-Distance (Feet) 50  in room mobility  -SD     Functional Mobility- Comment pt with significant improvement with mobility compared to therapy notes from 8-20-23.  -SD       Row Name 08/21/23 1103          Activities of Daily Living    BADL Assessment/Intervention --  pt declined adl activity this am.  -SD               User Key  (r) = Recorded By, (t) = Taken By, (c) = Cosigned By      Initials Name Provider Type    Jose Luis Turner OTR Occupational Therapist                   Obj/Interventions       Row Name 08/21/23 1107          Balance    Comment, Balance I with sitting balance and CGA for standing balance using rolling walker for support.  -SD               User Key  (r) = Recorded By, (t) = Taken By, (c) = Cosigned By      Initials Name Provider Type    Jose Luis Turner OTR Occupational Therapist                   Goals/Plan       Row Name 08/21/23 1113          Transfer Goal 1 (OT)    Activity/Assistive Device (Transfer Goal 1, OT) toilet;commode, 3-in-1;walker, rolling  -SD     [place] Level/Cues Needed (Transfer Goal 1, OT) minimum assist (75% or more patient effort);verbal cues required  -SD     Time Frame (Transfer Goal 1, OT) 1 week  -SD     Progress/Outcome (Transfer Goal 1, OT) goal met  -SD       Row Name 08/21/23 1113          Dressing Goal 1 (OT)    Activity/Device (Dressing Goal 1, OT) upper body  "dressing  -SD     [place]/Cues Needed (Dressing Goal 1, OT) supervision required;verbal cues required  -SD     Time Frame (Dressing Goal 1, OT) 1 week  -SD     Strategies/Barriers (Dressing Goal 1, OT) cognition  -SD     Progress/Outcome (Dressing Goal 1, OT) discharged from facility  pt declined adl activity.  -SD               User Key  (r) = Recorded By, (t) = Taken By, (c) = Cosigned By      Initials Name Provider Type    Jose Luis Turner, OTR Occupational Therapist                   Clinical Impression       Row Name 08/21/23 1108          Pain Assessment    Pretreatment Pain Rating 0/10 - no pain  -SD     Posttreatment Pain Rating 0/10 - no pain  -SD       Row Name 08/21/23 1108          Plan of Care Review    Plan of Care Reviewed With patient  -SD     Outcome Evaluation OT: Pt with improved independence with transfers and mobility compared to 8-20-23. He managed bed mobility with min assist and transfers/in room mobility with CGA using a rolling walker for support. Pt oriented x 2. He stated his memory \"isn't what it used to be\" and would frequently repeat himself.  Pt stated he was in the hospital, but he was not sure of the location. Rec supervision with basic daily tasks at the assisted living facility to ensure adequate hygiene and safety due to his cognitive status. Plan is to return to facility today with staff support.  -SD       Row Name 08/21/23 1108          Therapy Plan Review/Discharge Plan (OT)    Anticipated Discharge Disposition (OT) assisted living  -SD       Row Name 08/21/23 1108          Positioning and Restraints    Pre-Treatment Position in bed  -SD     Post Treatment Position bed  -SD     In Bed supine;call light within reach;encouraged to call for assist;exit alarm on  -SD               User Key  (r) = Recorded By, (t) = Taken By, (c) = Cosigned By      Initials Name Provider Type    Jose Luis Turner, OTR Occupational Therapist                   Outcome Measures       Row " Name 08/21/23 1102          How much help from another is currently needed...    Putting on and taking off regular lower body clothing? 2  -SD     Bathing (including washing, rinsing, and drying) 2  -SD     Toileting (which includes using toilet bed pan or urinal) 2  -SD     Putting on and taking off regular upper body clothing 4  -SD     Taking care of personal grooming (such as brushing teeth) 4  -SD     Eating meals 4  -SD     AM-PAC 6 Clicks Score (OT) 18  -SD       Row Name 08/21/23 1102          Functional Assessment    Outcome Measure Options AM-PAC 6 Clicks Daily Activity (OT)  -SD               User Key  (r) = Recorded By, (t) = Taken By, (c) = Cosigned By      Initials Name Provider Type    SD Jose Luis Ayala OTR Occupational Therapist                  Occupational Therapy Education       Title: PT OT SLP Therapies (Resolved)       Topic: Occupational Therapy (Resolved)       Point: ADL training (Resolved)       Description:   Instruct learner(s) on proper safety adaptation and remediation techniques during self care or transfers.   Instruct in proper use of assistive devices.                  Learning Progress Summary             Patient Acceptance, E,TB, VU by  at 8/20/2023 1022    Comment: pt educated on adls, safety with functional transfers and balance                         Point: Home exercise program (Resolved)       Description:   Instruct learner(s) on appropriate technique for monitoring, assisting and/or progressing therapeutic exercises/activities.                  Learner Progress:  Not documented in this visit.                              User Key       Initials Effective Dates Name Provider Type Discipline     06/16/21 -  Keiko Feliz OTR Occupational Therapist OT                  OT Recommendation and Plan     Plan of Care Review  Plan of Care Reviewed With: patient  Outcome Evaluation: OT: Pt with improved independence with transfers and mobility compared to 8-20-23.  "He managed bed mobility with min assist and transfers/in room mobility with CGA using a rolling walker for support. Pt oriented x 2. He stated his memory \"isn't what it used to be\" and would frequently repeat himself.  Pt stated he was in the hospital, but he was not sure of the location. Rec supervision with basic daily tasks at the assisted living facility to ensure adequate hygiene and safety due to his cognitive status. Plan is to return to facility today with staff support.       Time Calculation:         Time Calculation- OT       Row Name 08/21/23 1102             Time Calculation- OT    OT Start Time 0905  -SD      OT Stop Time 0930  -SD      OT Time Calculation (min) 25 min  -SD         Timed Charges    24796 - OT Self Care/Mgmt Minutes 25  -SD         Total Minutes    Timed Charges Total Minutes 25  -SD       Total Minutes 25  -SD                User Key  (r) = Recorded By, (t) = Taken By, (c) = Cosigned By      Initials Name Provider Type    SD Jose Luis Ayala OTR Occupational Therapist                  Therapy Charges for Today       Code Description Service Date Service Provider Modifiers Qty    [id number]  OT SELF CARE/MGMT/TRAIN EA 15 MIN 8/21/2023 Jose Luis Ayala OTR GO 2               OT Discharge Summary  Anticipated Discharge Disposition (OT): assisted living  Reason for Discharge: Discharge from facility  Outcomes Achieved: Patient able to partially acheive established goals  Discharge Destination: Assisted Living Facility    TONIE Mohan  8/21/2023  "

## 2023-08-21 NOTE — OUTREACH NOTE
Prep Survey      Flowsheet Row Responses   [facility] facility patient discharged from? [place]   Is LACE score < 7 ? No   Eligibility [facility]   Date of Admission 08/19/23   Date of Discharge 08/21/23   Discharge Disposition Home-Health Care S[facility]   Discharge diagnosis COPD with acute exacerbation-Dementia   Does the patient have one of the following disease processes/diagnoses(primary or secondary)? COPD   Does the patient have Home health ordered? No   Is there a DME ordered? No   Prep survey completed? Yes            ANDRA BELL - Registered Nurse

## 2023-08-21 NOTE — PLAN OF CARE
Problem: Adult Inpatient Plan of Care  Goal: Plan of Care Review  Outcome: Ongoing, Progressing  Flowsheets (Taken 8/21/2023 0209)  Progress: improving  Plan of Care Reviewed With: patient  Outcome Evaluation: O2 1l NC, breath sounds clear, dim.     Problem: Respiratory Compromise COPD (Chronic Obstructive Pulmonary Disease)  Goal: Effective Oxygenation and Ventilation  Intervention: Optimize Oxygenation and Ventilation  Recent Flowsheet Documentation  Taken 8/21/2023 0204 by Taz Romero RRT  Airway/Ventilation Management: pulmonary hygiene promoted   Goal Outcome Evaluation:  Plan of Care Reviewed With: patient        Progress: improving  Outcome Evaluation: O2 1l NC, breath sounds clear, dim.

## 2023-08-21 NOTE — PLAN OF CARE
Goal Outcome Evaluation:           Progress: improving  Outcome Evaluation: O2 1L NC, VSS, pt disoriented to situation, Ax1 w/ walker and gait belt. Bed alarm placed

## 2023-08-21 NOTE — PLAN OF CARE
"  Problem: Adult Inpatient Plan of Care  Goal: Plan of Care Review  Recent Flowsheet Documentation  Taken 8/21/2023 1108 by Jose Luis Ayala OTR  Plan of Care Reviewed With: patient  Outcome Evaluation: OT: Pt with improved independence with transfers and mobility compared to 8-20-23. He managed bed mobility with min assist and transfers/in room mobility with CGA using a rolling walker for support. Pt oriented x 2. He stated his memory \"isn't what it used to be\" and would frequently repeat himself.  Pt stated he was in the hospital, but he was not sure of the location. Rec supervision with basic daily tasks at the assisted living facility to ensure adequate hygiene and safety due to his cognitive status. Plan is to return to facility today with staff support.     "

## 2023-08-21 NOTE — CASE MANAGEMENT/SOCIAL WORK
Case Management Discharge Note      Final Note: Discharged home.         Selected Continued Care - Discharged on 8/21/2023 Admission date: 8/19/2023 - Discharge disposition: Home-Health Care Svc      Destination    No services have been selected for the patient.                Durable Medical Equipment    No services have been selected for the patient.                Dialysis/Infusion    No services have been selected for the patient.                Home Medical Care    No services have been selected for the patient.                Therapy    No services have been selected for the patient.                Community Resources    No services have been selected for the patient.                Community & DME    No services have been selected for the patient.                         Final Discharge Disposition Code: 01 - home or self-care

## 2023-08-22 ENCOUNTER — HOSPITAL ENCOUNTER (EMERGENCY)
Facility: HOSPITAL | Age: 88
Discharge: HOME | End: 2023-08-22
Attending: EMERGENCY MEDICINE | Admitting: EMERGENCY MEDICINE
Payer: MEDICARE

## 2023-08-22 ENCOUNTER — TRANSITIONAL CARE MANAGEMENT TELEPHONE ENCOUNTER (OUTPATIENT)
Dept: CALL CENTER | Facility: HOSPITAL | Age: 88
End: 2023-08-22
Payer: MEDICARE

## 2023-08-22 ENCOUNTER — APPOINTMENT (OUTPATIENT)
Dept: CT IMAGING | Facility: HOSPITAL | Age: 88
End: 2023-08-22
Payer: MEDICARE

## 2023-08-22 ENCOUNTER — APPOINTMENT (OUTPATIENT)
Dept: GENERAL RADIOLOGY | Facility: HOSPITAL | Age: 88
End: 2023-08-22
Payer: MEDICARE

## 2023-08-22 VITALS
RESPIRATION RATE: 20 BRPM | TEMPERATURE: 97.6 F | HEART RATE: 62 BPM | DIASTOLIC BLOOD PRESSURE: 78 MMHG | OXYGEN SATURATION: 91 % | SYSTOLIC BLOOD PRESSURE: 153 MMHG | BODY MASS INDEX: 29.18 KG/M2 | HEIGHT: 69 IN | WEIGHT: 197 LBS

## 2023-08-22 DIAGNOSIS — J44.9 CHRONIC OBSTRUCTIVE PULMONARY DISEASE, UNSPECIFIED COPD TYPE: Primary | ICD-10-CM

## 2023-08-22 LAB
ALBUMIN SERPL-MCNC: 3.3 G/DL (ref 3.5–5.2)
ALBUMIN/GLOB SERPL: 1 G/DL
ALP SERPL-CCNC: 39 U/L (ref 39–117)
ALT SERPL W P-5'-P-CCNC: 19 U/L (ref 1–41)
ANION GAP SERPL CALCULATED.3IONS-SCNC: 9.3 MMOL/L (ref 5–15)
AST SERPL-CCNC: 16 U/L (ref 1–40)
BACTERIA UR QL AUTO: ABNORMAL /HPF
BASOPHILS # BLD AUTO: 0.03 10*3/MM3 (ref 0–0.2)
BASOPHILS NFR BLD AUTO: 0.4 % (ref 0–1.5)
BILIRUB SERPL-MCNC: 0.3 MG/DL (ref 0–1.2)
BILIRUB UR QL STRIP: NEGATIVE
BUN SERPL-MCNC: 25 MG/DL (ref 8–23)
BUN/CREAT SERPL: 30.1 (ref 7–25)
CALCIUM SPEC-SCNC: 9.3 MG/DL (ref 8.2–9.6)
CHLORIDE SERPL-SCNC: 98 MMOL/L (ref 98–107)
CLARITY UR: CLEAR
CO2 SERPL-SCNC: 25.7 MMOL/L (ref 22–29)
COLOR UR: YELLOW
CREAT SERPL-MCNC: 0.83 MG/DL (ref 0.76–1.27)
D DIMER PPP FEU-MCNC: 1.12 MCGFEU/ML (ref 0–0.9)
DEPRECATED RDW RBC AUTO: 48 FL (ref 37–54)
EGFRCR SERPLBLD CKD-EPI 2021: 83.1 ML/MIN/1.73
EOSINOPHIL # BLD AUTO: 0 10*3/MM3 (ref 0–0.4)
EOSINOPHIL NFR BLD AUTO: 0 % (ref 0.3–6.2)
ERYTHROCYTE [DISTWIDTH] IN BLOOD BY AUTOMATED COUNT: 13.7 % (ref 12.3–15.4)
GEN 5 2HR TROPONIN T REFLEX: 14 NG/L
GLOBULIN UR ELPH-MCNC: 3.2 GM/DL
GLUCOSE SERPL-MCNC: 133 MG/DL (ref 65–99)
GLUCOSE UR STRIP-MCNC: NEGATIVE MG/DL
HCT VFR BLD AUTO: 35.1 % (ref 37.5–51)
HGB BLD-MCNC: 11.2 G/DL (ref 13–17.7)
HGB UR QL STRIP.AUTO: NEGATIVE
HYALINE CASTS UR QL AUTO: ABNORMAL /LPF
IMM GRANULOCYTES # BLD AUTO: 0.09 10*3/MM3 (ref 0–0.05)
IMM GRANULOCYTES NFR BLD AUTO: 1.2 % (ref 0–0.5)
KETONES UR QL STRIP: NEGATIVE
LEUKOCYTE ESTERASE UR QL STRIP.AUTO: ABNORMAL
LYMPHOCYTES # BLD AUTO: 0.63 10*3/MM3 (ref 0.7–3.1)
LYMPHOCYTES NFR BLD AUTO: 8.4 % (ref 19.6–45.3)
MCH RBC QN AUTO: 30.3 PG (ref 26.6–33)
MCHC RBC AUTO-ENTMCNC: 31.9 G/DL (ref 31.5–35.7)
MCV RBC AUTO: 94.9 FL (ref 79–97)
MONOCYTES # BLD AUTO: 0.42 10*3/MM3 (ref 0.1–0.9)
MONOCYTES NFR BLD AUTO: 5.6 % (ref 5–12)
NEUTROPHILS NFR BLD AUTO: 6.33 10*3/MM3 (ref 1.7–7)
NEUTROPHILS NFR BLD AUTO: 84.4 % (ref 42.7–76)
NITRITE UR QL STRIP: NEGATIVE
NRBC BLD AUTO-RTO: 0 /100 WBC (ref 0–0.2)
NT-PROBNP SERPL-MCNC: 1337 PG/ML (ref 0–1800)
PH UR STRIP.AUTO: 6 [PH] (ref 4.5–8)
PLATELET # BLD AUTO: 281 10*3/MM3 (ref 140–450)
PMV BLD AUTO: 9.3 FL (ref 6–12)
POTASSIUM SERPL-SCNC: 4.4 MMOL/L (ref 3.5–5.2)
PROCALCITONIN SERPL-MCNC: 1 NG/ML (ref 0–0.25)
PROT SERPL-MCNC: 6.5 G/DL (ref 6–8.5)
PROT UR QL STRIP: NEGATIVE
RBC # BLD AUTO: 3.7 10*6/MM3 (ref 4.14–5.8)
RBC # UR STRIP: ABNORMAL /HPF
REF LAB TEST METHOD: ABNORMAL
SODIUM SERPL-SCNC: 133 MMOL/L (ref 136–145)
SP GR UR STRIP: 1.02 (ref 1–1.03)
SQUAMOUS #/AREA URNS HPF: ABNORMAL /HPF
TROPONIN T DELTA: 1 NG/L
TROPONIN T SERPL HS-MCNC: 13 NG/L
UROBILINOGEN UR QL STRIP: ABNORMAL
WBC # UR STRIP: ABNORMAL /HPF
WBC NRBC COR # BLD: 7.5 10*3/MM3 (ref 3.4–10.8)

## 2023-08-22 PROCEDURE — 25510000001 IOPAMIDOL PER 1 ML: Performed by: EMERGENCY MEDICINE

## 2023-08-22 PROCEDURE — 81001 URINALYSIS AUTO W/SCOPE: CPT

## 2023-08-22 PROCEDURE — 85379 FIBRIN DEGRADATION QUANT: CPT

## 2023-08-22 PROCEDURE — 25010000002 METHYLPREDNISOLONE PER 125 MG: Performed by: EMERGENCY MEDICINE

## 2023-08-22 PROCEDURE — 83880 ASSAY OF NATRIURETIC PEPTIDE: CPT

## 2023-08-22 PROCEDURE — 36415 COLL VENOUS BLD VENIPUNCTURE: CPT

## 2023-08-22 PROCEDURE — 93010 ELECTROCARDIOGRAM REPORT: CPT | Performed by: INTERNAL MEDICINE

## 2023-08-22 PROCEDURE — 80053 COMPREHEN METABOLIC PANEL: CPT

## 2023-08-22 PROCEDURE — 99285 EMERGENCY DEPT VISIT HI MDM: CPT

## 2023-08-22 PROCEDURE — 84145 PROCALCITONIN (PCT): CPT

## 2023-08-22 PROCEDURE — 94640 AIRWAY INHALATION TREATMENT: CPT

## 2023-08-22 PROCEDURE — 84484 ASSAY OF TROPONIN QUANT: CPT

## 2023-08-22 PROCEDURE — 96374 THER/PROPH/DIAG INJ IV PUSH: CPT

## 2023-08-22 PROCEDURE — 85025 COMPLETE CBC W/AUTO DIFF WBC: CPT

## 2023-08-22 PROCEDURE — 93005 ELECTROCARDIOGRAM TRACING: CPT

## 2023-08-22 PROCEDURE — 71275 CT ANGIOGRAPHY CHEST: CPT

## 2023-08-22 RX ORDER — METHYLPREDNISOLONE SODIUM SUCCINATE 125 MG/2ML
125 INJECTION, POWDER, LYOPHILIZED, FOR SOLUTION INTRAMUSCULAR; INTRAVENOUS ONCE
Status: COMPLETED | OUTPATIENT
Start: 2023-08-22 | End: 2023-08-22

## 2023-08-22 RX ORDER — IPRATROPIUM BROMIDE AND ALBUTEROL SULFATE 2.5; .5 MG/3ML; MG/3ML
3 SOLUTION RESPIRATORY (INHALATION) ONCE
Status: COMPLETED | OUTPATIENT
Start: 2023-08-22 | End: 2023-08-22

## 2023-08-22 RX ORDER — SODIUM CHLORIDE 0.9 % (FLUSH) 0.9 %
10 SYRINGE (ML) INJECTION AS NEEDED
Status: DISCONTINUED | OUTPATIENT
Start: 2023-08-22 | End: 2023-08-23 | Stop reason: HOSPADM

## 2023-08-22 RX ADMIN — IPRATROPIUM BROMIDE AND ALBUTEROL SULFATE 3 ML: .5; 3 SOLUTION RESPIRATORY (INHALATION) at 21:23

## 2023-08-22 RX ADMIN — METHYLPREDNISOLONE SODIUM SUCCINATE 125 MG: 125 INJECTION, POWDER, FOR SOLUTION INTRAMUSCULAR; INTRAVENOUS at 21:13

## 2023-08-22 RX ADMIN — IOPAMIDOL 100 ML: 755 INJECTION, SOLUTION INTRAVENOUS at 19:23

## 2023-08-22 NOTE — OUTREACH NOTE
Call Center TCM Note      Flowsheet Row Responses   [facility] patient discharged from? [place]   Does the patient have one of the following disease processes/diagnoses(primary or secondary)? COPD   TCM attempt successful? No   Unsuccessful attempts Attempt 1  [ verbal daughter states he is currently having oral surgery.]            Marielos Davidson LPN    2023, 11:28 EDT

## 2023-08-22 NOTE — ED PROVIDER NOTES
EMERGENCY DEPARTMENT ENCOUNTER      Room Number: 03/03    History is provided by the patient, no translation services needed    HPI:    Chief complaint: Cough    Location: Chest    Quality/Severity: Patient denies any pain at this time.    Timing/Duration: 1 week    Modifying Factors: None    Associated Symptoms: Shortness of air    Narrative: Pt is a 89 y.o. male who presents complaining of a cough and shortness of air x1 week.  He advises that he was recently seen in the ED and admitted to the hospital for COPD with an acute exacerbation and hypoxia.  He was discharged yesterday and returning to his assisted living facility.  He states that he has continued to have a cough which results in him feeling short of breath.  His cough is dry and nonproductive.  He denies any fevers or chills.  He denies any chest pain, neck pain, back pain.  He has not had any headaches, dizziness, or vision changes.  He denies any known sick person contacts.      PMD: Charlie Trinh MD    REVIEW OF SYSTEMS  Review of Systems   Constitutional:  Negative for chills and fever.   Eyes:  Negative for visual disturbance.   Respiratory:  Positive for cough and shortness of breath.    Cardiovascular:  Negative for chest pain and palpitations.   Gastrointestinal:  Negative for abdominal pain, blood in stool, constipation, diarrhea, nausea and vomiting.   Genitourinary:  Negative for difficulty urinating, dysuria, flank pain and hematuria.   Musculoskeletal:  Negative for back pain and neck pain.   Skin:  Negative for color change, pallor, rash and wound.   Neurological:  Negative for dizziness, syncope, weakness, numbness and headaches.   Psychiatric/Behavioral:  Negative for confusion. The patient is not nervous/anxious.        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 10/18/2016    Obstruction of carotid artery 03/29/2014    Stenosis of carotid artery 04/01/2014     EEG abnormality without seizure 05/27/2015    Hereditary and idiopathic peripheral neuropathy 05/27/2015    Mixed hyperlipidemia 10/18/2016    Acquired hypothyroidism 10/18/2016    Low back pain with sciatica 06/14/2016    Lumbar canal stenosis 10/18/2016    Degenerative arthritis of lumbar spine 10/18/2016    Osteoarthritis of hip 04/21/2014    History of repair of hip joint 07/02/2014    Syncope 09/24/2014    Temporary cerebral vascular dysfunction 03/29/2014    History of prostate cancer     Dementia associated with other underlying disease without behavioral disturbance     Arthritis     PAD (peripheral artery disease) 10/18/2016    Medicare annual wellness visit, subsequent 05/11/2017    Essential hypertension 10/06/2017    Medication monitoring encounter 10/06/2017    Status post implantation of artificial urinary sphincter 09/24/2018    Moderate asthma with acute exacerbation 11/21/2018    Vascular dementia without behavioral disturbance 11/23/2018    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 03/29/2014    At high risk for falls 12/18/2019    Insomnia due to medical condition 11/16/2020    Chronic UTI 06/01/2022    Frequent falls 09/18/2022     Resolved Ambulatory Problems     Diagnosis Date Noted    Intracranial swelling 03/30/2014    Gastroesophageal reflux disease 10/18/2016    Lower urinary tract infectious disease 09/24/2014    Lumbar radiculopathy 10/18/2016    GERD (gastroesophageal reflux disease)     Brito esophagus     Routine adult health maintenance 10/18/2016    Shortness of breath 04/11/2018    Paralysis 03/30/2014    History of stroke 12/12/2018    Cold hands and feet 08/16/2019    Acute pain of left knee 11/20/2019    Fall 07/08/2021    Hematoma of frontal scalp 07/08/2021    COPD with acute exacerbation 08/19/2023    COPD exacerbation 08/19/2023     Past Medical History:   Diagnosis Date    Allergic     Allergic rhinitis     Asthma     Benign prostatic  hyperplasia     Carotid arterial disease     COPD (chronic obstructive pulmonary disease)     Coronary artery disease     Dementia     Dyspnea     Erectile dysfunction     HL (hearing loss)     Hypothyroidism 1992    Low back pain     Prostate cancer     Stroke     Urinary tract infection        PAST SURGICAL HISTORY  Past Surgical History:   Procedure Laterality Date    CAROTID ARTERY ANGIOPLASTY  2014    COLONOSCOPY  2009    ENDOSCOPY  2009    ,,2006    EYE SURGERY      FRACTURE SURGERY      HERNIA REPAIR  2000    HERNIA REPAIR  2009    INGUINAL HERNIA REPAIR      PARTIAL HIP ARTHROPLASTY Left 2014    PROSTATECTOMY  2008    SHOULDER SURGERY  2005    SHOULDER SURGERY  2006    SHOULDER SURGERY  2008    TEAR DUCT SURGERY      URINARY SPHINCTER IMPLANT         FAMILY HISTORY  Family History   Problem Relation Age of Onset    Hypertension Sister     Thyroid disease Sister     Stroke Sister     Depression Sister     Heart failure Sister     Hypertension Brother     Lung cancer Brother     COPD Brother     Alcohol abuse Brother     Coronary artery disease Son         Son  from MI at age 59        SOCIAL HISTORY  Social History     Socioeconomic History    Marital status:     Number of children: 3   Tobacco Use    Smoking status: Former     Packs/day: 0.00     Years: 15.00     Pack years: 0.00     Types: Cigarettes     Start date: 1947     Quit date: 1962     Years since quittin.6    Smokeless tobacco: Never    Tobacco comments:     Quit 's   1 ppd for 15 years     Vaping Use    Vaping Use: Never used   Substance and Sexual Activity    Alcohol use: Never    Drug use: Never    Sexual activity: Not Currently     Partners: Female     Birth control/protection: None       ALLERGIES  Bactrim [sulfamethoxazole-trimethoprim]    No current facility-administered medications for this encounter.    Current Outpatient Medications:     Acetaminophen (TYLENOL 8 HOUR ARTHRITIS PAIN  PO), 2 (Two) Times a Day., Disp: , Rfl:     albuterol sulfate  (90 Base) MCG/ACT inhaler, Inhale 2 puffs Every 4 (Four) Hours As Needed for Wheezing. Indications: Reversible Disease of Blockage in Breathing Passages, Disp: 18 g, Rfl: 1    amoxicillin-clavulanate (AUGMENTIN) 875-125 MG per tablet, Take 1 tablet by mouth Every 12 (Twelve) Hours for 8 doses. Indications: Dental Abscess, Disp: 8 tablet, Rfl: 0    cetirizine (zyrTEC) 10 MG tablet, Take 1 tablet by mouth Daily As Needed for Allergies., Disp: , Rfl:     clopidogrel (PLAVIX) 75 MG tablet, TAKE 1 TABLET DAILY, Disp: 90 tablet, Rfl: 3    CRANBERRY PO, Take 1 tablet by mouth As Needed., Disp: , Rfl:     ipratropium-albuterol (DUO-NEB) 0.5-2.5 mg/3 ml nebulizer, Take 3 mL by nebulization Every 4 (Four) Hours As Needed for Shortness of Air., Disp: 360 mL, Rfl: 0    levothyroxine (SYNTHROID, LEVOTHROID) 50 MCG tablet, Take 1 tablet by mouth Daily. Indications: Underactive Thyroid, Disp: 90 tablet, Rfl: 3    LUTEIN PO, Take 20 mg by mouth Daily., Disp: , Rfl:     Magnesium 250 MG tablet, Take 400 mg by mouth Daily., Disp: , Rfl:     Melatonin 10 MG tablet, Take  by mouth Every Night., Disp: , Rfl:     Multiple Vitamins-Minerals (MULTIVITAMIN ADULT PO), Take  by mouth., Disp: , Rfl:     pantoprazole (PROTONIX) 40 MG EC tablet, TAKE 1 TABLET DAILY (Patient taking differently: Take 1 tablet by mouth 2 (Two) Times a Day.), Disp: 90 tablet, Rfl: 3    predniSONE (DELTASONE) 20 MG tablet, Take 2 tablets by mouth Daily With Breakfast for 2 doses., Disp: 4 tablet, Rfl: 0    rivastigmine (EXELON) 9.5 MG/24HR patch, APPLY 1 PATCH ON THE SKIN AS  DIRECTED BY PROVIDER DAILY FOR  VASCULAR DEMENTIA/CVA, Disp: 90 patch, Rfl: 3    Trelegy Ellipta 100-62.5-25 MCG/ACT inhaler, Inhale 1 puff Daily. Indications: Chronic Obstructive Lung Disease, Disp: 180 each, Rfl: 3    vitamin B-12 (CYANOCOBALAMIN) 1000 MCG tablet, Take 1 tablet by mouth Daily., Disp: , Rfl:     vitamin D3  125 MCG (5000 UT) capsule capsule, Take 1 capsule by mouth Daily., Disp: , Rfl:     PHYSICAL EXAM  ED Triage Vitals   Temp Heart Rate Resp BP SpO2   08/22/23 1727 08/22/23 1726 08/22/23 1726 08/22/23 1728 08/22/23 1726   97.6 øF (36.4 øC) 70 18 162/89 91 %      Temp src Heart Rate Source Patient Position BP Location FiO2 (%)   -- -- -- -- --              Physical Exam  Vitals and nursing note reviewed.   Constitutional:       General: He is not in acute distress.     Appearance: Normal appearance. He is not ill-appearing, toxic-appearing or diaphoretic.   HENT:      Head: Normocephalic and atraumatic.      Nose: Nose normal. No congestion or rhinorrhea.      Mouth/Throat:      Mouth: Mucous membranes are moist.      Pharynx: Oropharynx is clear.   Eyes:      General: No scleral icterus.        Right eye: No discharge.         Left eye: No discharge.      Extraocular Movements: Extraocular movements intact.      Conjunctiva/sclera: Conjunctivae normal.      Pupils: Pupils are equal, round, and reactive to light.   Cardiovascular:      Rate and Rhythm: Normal rate and regular rhythm.      Heart sounds: Normal heart sounds.     No friction rub.   Pulmonary:      Effort: Pulmonary effort is normal. No respiratory distress.      Breath sounds: No stridor. Wheezing (Mild, diffuse) present. No rhonchi or rales.   Chest:      Chest wall: No tenderness.   Abdominal:      General: Bowel sounds are normal. There is no distension.      Palpations: Abdomen is soft. There is no mass.      Tenderness: There is no abdominal tenderness. There is no guarding or rebound.   Musculoskeletal:         General: No swelling, tenderness, deformity or signs of injury. Normal range of motion.      Cervical back: Normal range of motion and neck supple. No rigidity.      Right lower leg: No edema.      Left lower leg: No edema.   Skin:     General: Skin is warm and dry.      Coloration: Skin is not jaundiced or pale.      Findings: No bruising,  erythema, lesion or rash.   Neurological:      Mental Status: He is alert and oriented to person, place, and time.      Motor: No weakness.      Coordination: Coordination normal.   Psychiatric:         Mood and Affect: Mood and affect normal.         LAB RESULTS  Lab Results (last 24 hours)       Procedure Component Value Units Date/Time    Urinalysis With Microscopic If Indicated (No Culture) - Urine, Clean Catch [[id number]]  (Abnormal) Collected: 08/22/23 1758    Specimen: Urine, Clean Catch Updated: 08/22/23 1838     Color, UA Yellow     Appearance, UA Clear     pH, UA 6.0     Specific Gravity, UA 1.020     Glucose, UA Negative     Ketones, UA Negative     Bilirubin, UA Negative     Blood, UA Negative     Protein, UA Negative     Leuk Esterase, UA Trace     Nitrite, UA Negative     Urobilinogen, UA 0.2 E.U./dL    BNP [[id number]]  (Normal) Collected: 08/22/23 1758    Specimen: Blood Updated: 08/22/23 1852     proBNP 1,337.0 pg/mL     Narrative:      Among patients with dyspnea, NT-proBNP is highly sensitive for the detection of acute congestive heart failure. In addition NT-proBNP of <300 pg/ml effectively rules out acute congestive heart failure with 99% negative predictive value.      D-dimer, Quantitative [[id number]]  (Abnormal) Collected: 08/22/23 1758    Specimen: Blood Updated: 08/22/23 1830     D-Dimer, Quantitative 1.12 MCGFEU/mL     Narrative:      According to the assay 's published package insert, a normal (<0.50 MCGFEU/mL) D-dimer result in conjunction with a non-high clinical probability assessment, excludes deep vein thrombosis (DVT) and pulmonary embolism (PE) with high sensitivity.    D-dimer values increase with age and this can make VTE exclusion of an older population difficult. To address this, the [employer], based on best available evidence and recent guidelines, recommends that clinicians use age-adjusted D-dimer thresholds in patients greater than 50 years  "of age with: a) a low probability of PE who do not meet all Pulmonary Embolism Rule Out Criteria, or b) in those with intermediate probability of PE.   The formula for an age-adjusted D-dimer cut-off is \"age/100\".  For example, a 60 year old patient would have an age-adjusted cut-off of 0.60 MCGFEU/mL and an 80 year old 0.80 MCGFEU/mL.    Procalcitonin [[id number]]  (Abnormal) Collected: 08/22/23 1758    Specimen: Blood Updated: 08/22/23 1916     Procalcitonin 1.00 ng/mL     Narrative:      As a Marker for Sepsis (Non-Neonates):    1. <0.5 ng/mL represents a low risk of severe sepsis and/or septic shock.  2. >2 ng/mL represents a high risk of severe sepsis and/or septic shock.    As a Marker for Lower Respiratory Tract Infections that require antibiotic therapy:    PCT on Admission    Antibiotic Therapy       6-12 Hrs later    >0.5                Strongly Recommended  >0.25 - <0.5        Recommended   0.1 - 0.25          Discouraged              Remeasure/reassess PCT  <0.1                Strongly Discouraged     Remeasure/reassess PCT    As 28 day mortality risk marker: \"Change in Procalcitonin Result\" (>80% or <=80%) if Day 0 (or Day 1) and Day 4 values are available. Refer to http://www.[facility]-pct-calculator.com    Change in PCT <=80%  A decrease of PCT levels below or equal to 80% defines a positive change in PCT test result representing a higher risk for 28-day all-cause mortality of patients diagnosed with severe sepsis for septic shock.    Change in PCT >80%  A decrease of PCT levels of more than 80% defines a negative change in PCT result representing a lower risk for 28-day all-cause mortality of patients diagnosed with severe sepsis or septic shock.       Urinalysis, Microscopic Only - Urine, Clean Catch [[id number]]  (Abnormal) Collected: 08/22/23 1758    Specimen: Urine, Clean Catch Updated: 08/22/23 1856     RBC, UA None Seen /HPF      WBC, UA 13-20 /HPF      Bacteria, UA None Seen /HPF      Squamous " Epithelial Cells, UA 3-6 /HPF      Hyaline Casts, UA None Seen /LPF      Methodology Manual Light Microscopy    CBC & Differential [[id number]]  (Abnormal) Collected: 08/22/23 1835    Specimen: Blood Updated: 08/22/23 1843    Narrative:      The following orders were created for panel order CBC & Differential.  Procedure                               Abnormality         Status                     ---------                               -----------         ------                     CBC Auto Differential[[id number]]        Abnormal            Final result                 Please view results for these tests on the individual orders.    CBC Auto Differential [[id number]]  (Abnormal) Collected: 08/22/23 1835    Specimen: Blood Updated: 08/22/23 1843     WBC 7.50 10*3/mm3      RBC 3.70 10*6/mm3      Hemoglobin 11.2 g/dL      Hematocrit 35.1 %      MCV 94.9 fL      MCH 30.3 pg      MCHC 31.9 g/dL      RDW 13.7 %      RDW-SD 48.0 fl      MPV 9.3 fL      Platelets 281 10*3/mm3      Neutrophil % 84.4 %      Lymphocyte % 8.4 %      Monocyte % 5.6 %      Eosinophil % 0.0 %      Basophil % 0.4 %      Immature Grans % 1.2 %      Neutrophils, Absolute 6.33 10*3/mm3      Lymphocytes, Absolute 0.63 10*3/mm3      Monocytes, Absolute 0.42 10*3/mm3      Eosinophils, Absolute 0.00 10*3/mm3      Basophils, Absolute 0.03 10*3/mm3      Immature Grans, Absolute 0.09 10*3/mm3      nRBC 0.0 /100 WBC     Comprehensive Metabolic Panel [[id number]]  (Abnormal) Collected: 08/22/23 1850    Specimen: Blood Updated: 08/22/23 1903     Glucose 133 mg/dL      BUN 25 mg/dL      Creatinine 0.83 mg/dL      Sodium 133 mmol/L      Potassium 4.4 mmol/L      Chloride 98 mmol/L      CO2 25.7 mmol/L      Calcium 9.3 mg/dL      Total Protein 6.5 g/dL      Albumin 3.3 g/dL      ALT (SGPT) 19 U/L      AST (SGOT) 16 U/L      Alkaline Phosphatase 39 U/L      Total Bilirubin 0.3 mg/dL      Globulin 3.2 gm/dL      A/G Ratio 1.0 g/dL      BUN/Creatinine Ratio 30.1      Anion Gap 9.3 mmol/L      eGFR 83.1 mL/min/1.73     Narrative:      GFR Normal >60  Chronic Kidney Disease <60  Kidney Failure <15    The GFR formula is only valid for adults with stable renal function between ages 18 and 70.    High Sensitivity Troponin T [[id number]]  (Normal) Collected: 08/22/23 1850    Specimen: Blood Updated: 08/22/23 1906     HS Troponin T 13 ng/L     Narrative:      High Sensitive Troponin T Reference Range:  <10.0 ng/L- Negative Female for AMI  <15.0 ng/L- Negative Male for AMI  >=10 - Abnormal Female indicating possible myocardial injury.  >=15 - Abnormal Male indicating possible myocardial injury.   Clinicians would have to utilize clinical acumen, EKG, Troponin, and serial changes to determine if it is an Acute Myocardial Infarction or myocardial injury due to an underlying chronic condition.         High Sensitivity Troponin T 2Hr [[id number]]  (Normal) Collected: 08/22/23 2045    Specimen: Blood Updated: 08/22/23 2110     HS Troponin T 14 ng/L      Troponin T Delta 1 ng/L     Narrative:      High Sensitive Troponin T Reference Range:  <10.0 ng/L- Negative Female for AMI  <15.0 ng/L- Negative Male for AMI  >=10 - Abnormal Female indicating possible myocardial injury.  >=15 - Abnormal Male indicating possible myocardial injury.   Clinicians would have to utilize clinical acumen, EKG, Troponin, and serial changes to determine if it is an Acute Myocardial Infarction or myocardial injury due to an underlying chronic condition.                   I ordered the above labs and reviewed the results    RADIOLOGY  CT Angiogram Chest    Result Date: 8/22/2023  CTA OF THE CHEST  HISTORY: 4-day history of cough and shortness of breath. Elevated D-dimer.  COMPARISON: 9/18/2022  TECHNIQUE: CTA of the chest. Radiation dose reduction techniques included automated exposure control or exposure modulation based on body size. Radiation audit for CT and nuclear cardiology exams in the last 12 months: 3.   FINDINGS: There is satisfactory opacification of the pulmonary arteries. No intraluminal filling defects are demonstrated. There is no evidence of pulmonary emboli.  The thoracic aorta is ectatic with atherosclerotic calcification. No evidence of dissection.  The lungs demonstrate evidence of chronic obstructive pulmonary disease. No acute infiltrates. No endobronchial lesions.  The heart size is enlarged. Coronary artery calcifications demonstrated. Elevation of the left hemidiaphragm.  No pleural fluid. No pneumothorax. Regional osseous structures show no acute or aggressive bone lesions.       1. No evidence of pulmonary embolus. 2. Chronic obstructive pulmonary disease. No acute infiltrates.  3. Atherosclerosis of the thoracic aorta and coronary arteries. Cardiomegaly.   This report was finalized on 8/22/2023 7:50 PM by Dr. Michael Viramontes MD.       I ordered the above radiologic testing and reviewed the results    PROCEDURES  Procedures      PROGRESS AND CONSULTS  ED Course as of 08/23/23 1319   Tue Aug 22, 2023   1744 The patient appears mildly short of breath with prolonged conversation.  He ranges between 88 and 91% on room air while laying in the bed.  He is also complaining of a nonproductive cough.  Will order labs and imaging to evaluate further. []   1833 EKG         EKG time / Interpretation time: 1810 / 1812  Rhythm/Rate: Sinus, 59   MT: 203  QRS, axis: 16  QTc 416  ST and T waves: No acute ST segment changes or T wave abnormalities.  EKG Tracing Interpreted Contemporaneously by me, independently viewed by me and MD.   []   1957 Turning the patient over to Dr. ePrkins at this time. []   2108 Patient tolerated ambulation with oxygen saturation dropping to 87% but actually increasing back to 90% while still walking and patient stated he did not feel short of breath.  Patient is wheezing with exertion and will need a DuoNeb as well as Solu-Medrol. []   2149 Respiratory therapist gave patient  nebulizer remember patient from recent hospitalization and states that his finger pulse oximeter never read right knee and use a head pulse oximeter.  She change this out and patient's oxygen saturation is now 92% on room air.  Patient states he feels better after nebulizer. []   2150   Will discharge patient home with follow-up as needed with primary care physician and/or can return emergency room for new persistent or worsening symptoms. []      ED Course User Index  [] Puja Churchill PA-C  [] Tremayne Perkins MD           MEDICAL DECISION MAKING    MDM     Amount and/or Complexity of Data Reviewed  Clinical lab tests: reviewed  Tests in the radiology section of [facility]: reviewed  Tests in the medicine section of [facility]: reviewed  Decide to obtain previous medical records or to obtain history from someone other than the patient: yes         My differential diagnosis for cough includes but is not limited to:  Upper respiratory infection, bronchitis, pneumonia, COPD exacerbation, cough variant asthma, cardiac asthma, coronary artery disease, congestive heart failure, bacterial, viral or lung infections, lung cancer, aspiration pneumonitis, aspiration of foreign body and Covid -19     DIAGNOSIS  Final diagnoses:   Chronic obstructive pulmonary disease, unspecified COPD type       Latest Documented Vital Signs:  As of 13:19 EDT  BP- 153/78 HR- 62 Temp- 97.6 øF (36.4 øC) O2 sat- 91%    DISPOSITION  Pt discharged    Discussed pertinent findings with the patient/family.  Patient/Family voiced understanding of need to follow-up for recheck and further testing as needed.  Return to the Emergency Department warnings were given.         Medication List        Changed      pantoprazole 40 MG EC tablet  Commonly known as: PROTONIX  TAKE 1 TABLET DAILY  What changed: when to take this                   Follow-up Information       Schedule an appointment as soon as possible for a visit  with Charlie Trinh MD.     Specialty: Family Medicine  Contact information:  [address] [facility] [address]  [phone]               Go to  [facility] EMERGENCY DEPARTMENT.    Specialty: Emergency Medicine  Why: As needed  Contact information:  [address]  [place] [address] [phone]                             Dictated utilizing Dragon dictation     Puja Churchill PA-C  08/23/23 8439

## 2023-08-22 NOTE — ED TRIAGE NOTES
Pt to ED via ambulance from [facility] assisted living. Per EMS pt with low O2 sat at home. EMS states that pt was 90% on RA. Pt hx COPD.     Pt reports cough and SOB. Pt was recently admitted to this hospital.

## 2023-08-22 NOTE — OUTREACH NOTE
Call Center TCM Note      Flowsheet Row Responses   [facility] patient discharged from? [place]   Does the patient have one of the following disease processes/diagnoses(primary or secondary)? COPD   TCM attempt successful? No   Unsuccessful attempts Attempt 2  [Daughter advised to call tomorrow due to father having oral surgery today]            Marielos Davidson LPN    8/22/2023, 11:34 EDT

## 2023-08-23 LAB
BACTERIA SPEC AEROBE CULT: ABNORMAL
QT INTERVAL: 421 MS

## 2023-08-23 NOTE — ED NOTES
"Pt ambulated with walker. Pt O2 87-90% on room air during ambulation. Pt with audible wheezes during ambulation. Pt denies SOB. Family at bedside states \"I think he just needs a breathing treatment and  his asthma medicines and I think he will be fine\". MD Gregorio aware.   "

## 2023-08-24 ENCOUNTER — TRANSITIONAL CARE MANAGEMENT TELEPHONE ENCOUNTER (OUTPATIENT)
Dept: CALL CENTER | Facility: HOSPITAL | Age: 88
End: 2023-08-24
Payer: MEDICARE

## 2023-08-24 LAB
BACTERIA SPEC AEROBE CULT: NORMAL
BACTERIA SPEC AEROBE CULT: NORMAL

## 2023-08-24 NOTE — OUTREACH NOTE
Call Center TCM Note      Flowsheet Row Responses   [facility] patient discharged from? [place]   Does the patient have one of the following disease processes/diagnoses(primary or secondary)? COPD   TCM attempt successful? Yes   Call start time 0807   Call end time 0814   Discharge diagnosis COPD with acute exacerbation-Dementia   Meds reviewed with patient/caregiver? Yes   Is the patient having any side effects they believe may be caused by any medication additions or changes? No   Does the patient have all medications ordered at discharge? Yes   Is the patient taking all medications as directed (includes completed medication regime)? Yes   Does the patient have an appointment with their PCP within 7-14 days of discharge? Yes   Has home health visited the patient within 72 hours of discharge? N/A   Pulse Ox monitoring Intermittent   Pulse Ox device source Patient   O2 Sat: education provided Sat levels   Psychosocial issues? No   Did the patient receive a copy of their discharge instructions? Yes   Nursing interventions Reviewed instructions with patient   What is the patient's perception of their health status since discharge? Improving   If the patient is a current smoker, are they able to teach back resources for cessation? Not a smoker   Is the patient/caregiver able to teach back the hierarchy of who to call/visit for symptoms/problems? PCP, Specialist, Home health nurse, Urgent Care, ED, 911 Yes   Is the patient able to teach back COPD zones? Yes   Nursing interventions Education provided on various zones   Patient reports what zone on this call? Green Zone   Green Zone Reports doing well, Breathing without shortness of breath, Usual activity and exercise level, Usual amounts of cough and phlegm/mucous, Slept well last night, Appetite is good   Green Zone interventions: Take daily medications   TCM call completed? Yes   Call end time 0814   Would this patient benefit from a Referral to [facility] Social Work?  No   Is the patient interested in additional calls from an ambulatory ? No            Sita Diaz LPN    8/24/2023, 08:17 EDT

## 2023-08-25 ENCOUNTER — OFFICE VISIT (OUTPATIENT)
Dept: FAMILY MEDICINE CLINIC | Facility: CLINIC | Age: 88
End: 2023-08-25
Payer: MEDICARE

## 2023-08-25 VITALS
DIASTOLIC BLOOD PRESSURE: 78 MMHG | TEMPERATURE: 98.7 F | WEIGHT: 187 LBS | HEIGHT: 69 IN | BODY MASS INDEX: 27.7 KG/M2 | OXYGEN SATURATION: 90 % | HEART RATE: 75 BPM | SYSTOLIC BLOOD PRESSURE: 138 MMHG

## 2023-08-25 DIAGNOSIS — J96.11 CHRONIC RESPIRATORY FAILURE WITH HYPOXIA: Primary | ICD-10-CM

## 2023-08-25 RX ORDER — PANTOPRAZOLE SODIUM 40 MG/1
40 TABLET, DELAYED RELEASE ORAL DAILY
Qty: 90 TABLET | Refills: 3
Start: 2023-08-25

## 2023-08-25 RX ORDER — IPRATROPIUM BROMIDE AND ALBUTEROL SULFATE 2.5; .5 MG/3ML; MG/3ML
3 SOLUTION RESPIRATORY (INHALATION) 4 TIMES DAILY
Qty: 360 ML | Refills: 5 | Status: SHIPPED | OUTPATIENT
Start: 2023-08-25 | End: 2024-02-21

## 2023-08-25 RX ORDER — CHLORHEXIDINE GLUCONATE 0.12 MG/ML
RINSE ORAL
COMMUNITY
Start: 2023-08-10

## 2023-08-25 NOTE — PROGRESS NOTES
"  Subjective   Pawel Goldberg is a 89 y.o. male who is here for   Chief Complaint   Patient presents with    Hospital Follow Up Visit   .     History of Present Illness     Pawel brought in by his daughter for hospital follow-up.  Admitted to [facility] last week due to exacerbation of chronic lung disease hypoxia.  Overall in a state of decline.  Lives in an assisted-living facility.  After he went home staff noticed him to be short of breath lethargic sats were low again.  Sent him back to emergency room on 22nd  Not admitted that time sent home.  Brought in by his daughter today  Need to get him evaluated for oxygen therapy.  He now does have a home nebulizer.  We will start DuoNebs 4 times a day  Discussed with daughter he would certainly qualify for palliative care if she would like to speak with the palliative care staff      The following portions of the patient's history were reviewed and updated as appropriate: allergies, current medications, past medical history, past social history, past surgical history, and problem list.    Review of Systems    Objective   Vitals:    08/25/23 1302   BP: 138/78   Pulse: 75   Temp: 98.7 øF (37.1 øC)   SpO2: 90%   Weight: 84.8 kg (187 lb)   Height: 175.3 cm (69.02\")      Physical Exam  Constitutional:       Appearance: He is ill-appearing.   Pulmonary:      Breath sounds: Wheezing present.       Assessment & Plan   Diagnoses and all orders for this visit:    1. Chronic respiratory failure with hypoxia (Primary)  -     ipratropium-albuterol (DUO-NEB) 0.5-2.5 mg/3 ml nebulizer; Take 3 mL by nebulization 4 (Four) Times a Day for 180 days. Indications: Chronic Obstructive Lung Disease  Dispense: 360 mL; Refill: 5  -     6 Minute Walk Test; Future  -     Ambulatory Referral to Home Hospice    Other orders  -     pantoprazole (PROTONIX) 40 MG EC tablet; Take 1 tablet by mouth Daily.  Dispense: 90 tablet; Refill: 3    Refill DuoNebs 4 times a day    Try to get him " qualified for oxygen with a 6-minute walk.    Palliative care referral    There are no Patient Instructions on file for this visit.    Medications Discontinued During This Encounter   Medication Reason    Acetaminophen (TYLENOL 8 HOUR ARTHRITIS PAIN PO) *Therapy completed    amoxicillin-clavulanate (AUGMENTIN) 875-125 MG per tablet *Therapy completed    predniSONE (DELTASONE) 20 MG tablet *Therapy completed    ipratropium-albuterol (DUO-NEB) 0.5-2.5 mg/3 ml nebulizer Reorder    pantoprazole (PROTONIX) 40 MG EC tablet Reorder        No follow-ups on file.    Dr. Charlie Trinh  [place], [place]

## 2023-08-27 ENCOUNTER — HOSPITAL ENCOUNTER (EMERGENCY)
Facility: HOSPITAL | Age: 88
Discharge: HOME | End: 2023-08-27
Attending: EMERGENCY MEDICINE | Admitting: EMERGENCY MEDICINE
Payer: MEDICARE

## 2023-08-27 VITALS
TEMPERATURE: 97.9 F | DIASTOLIC BLOOD PRESSURE: 65 MMHG | BODY MASS INDEX: 27.7 KG/M2 | SYSTOLIC BLOOD PRESSURE: 129 MMHG | HEART RATE: 58 BPM | WEIGHT: 187 LBS | OXYGEN SATURATION: 93 % | HEIGHT: 69 IN | RESPIRATION RATE: 18 BRPM
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
## 2023-08-27 DIAGNOSIS — R09.02 HYPOXIA: ICD-10-CM

## 2023-08-27 DIAGNOSIS — Z13.9 ENCOUNTER FOR MEDICAL SCREENING EXAMINATION: ICD-10-CM

## 2023-08-27 PROCEDURE — 94618 PULMONARY STRESS TESTING: CPT

## 2023-08-27 PROCEDURE — 99283 EMERGENCY DEPT VISIT LOW MDM: CPT

## 2023-08-27 NOTE — ED NOTES
Pt to ED via ambulance from [facility]. EMS called for low O2. Pt O2 on room air was 90-92% on RA per EMS on arrival. Pt with unwitnessed fall at 0430. Pt with no complaints at this time. Pt on plavix.

## 2023-08-27 NOTE — PROCEDURES
Exercise Oximetry    Patient Name:Pawel Goldberg   MRN: [id number]   Date: 08/27/23             ROOM AIR Baseline at rest   SpO2%                 93   Heart Rate                  55   Blood Pressure       Oxygen Baseline at rest   Oxygen (LPM)    SpO2%     Heart Rate     Blood Pressure       EXERCISE  SpO2% HR Supplemental Oxygen, LPM   1 MINUTE       92       72    2 MINUTES       90       76    3 MINUTES       94       74    4 MINUTES       91       77    5 MINUTES       92       80    6 MINUTES       93        80      Recovery       Time to Baseline/Recovery (minutes)   1 min.   SpO2 %         93   HR          62   Oxygen              Room air     Distance Walked (meters)        52.21       Pre Post   Dyspnea (John Scale)               5               10   Fatigue (John Scale)               5                 10     Comments:

## 2023-08-27 NOTE — ED PROVIDER NOTES
Subjective   History of Present Illness  Patient presents with a complaint of low oxygen.  This is a chronic problem for the patient and he has been working with his PCP.  Patient is scheduled to have an exercise O2 test to possibly get home oxygen set up.  Patient's never had a sleep study but that is also in consideration.  Patient did get up in the night to use the bathroom and said his legs got weak as he was walking there and he fell down.  Patient said it was a controlled fall and nothing was injured.  No head injury or loss of consciousness.  This was unwitnessed.  Patient is currently stable and his daughter is here with him.  Daughter reports that she has been taking him to see his doctor and checking on him regularly.  She has not seen any signs of altered mental status or distress currently and the patient.  She felt that the work-ups recently were very adequate and she feels today patient is back to his baseline because they did give him a breathing treatment prior to EMSs arrival.  On EMSs arrival the patient's oxygen saturation was 90-92 on room air.  No additional therapy prior to arrival.    Review of Systems   All other systems reviewed and are negative.    Past Medical History:   Diagnosis Date    Allergic     Seasonal    Allergic rhinitis     Arthritis     Asthma     Brito esophagus     Benign prostatic hyperplasia     Carotid arterial disease     Status post right CEA 4/1/14    COPD (chronic obstructive pulmonary disease)     Coronary artery disease     Dementia     Dyspnea     Erectile dysfunction     GERD (gastroesophageal reflux disease)     Hematoma of frontal scalp 07/08/2021    HL (hearing loss)     Hypothyroidism 1992    Low back pain     PAD (peripheral artery disease)     Prostate cancer     Status post implantation of artificial urinary sphincter         Stroke     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    Urinary tract infection        Allergies   Allergen  Reactions    Bactrim [Sulfamethoxazole-Trimethoprim] Rash       Past Surgical History:   Procedure Laterality Date    CAROTID ARTERY ANGIOPLASTY  2014    COLONOSCOPY  2009    ENDOSCOPY  2009    ,2003,2006    EYE SURGERY      FRACTURE SURGERY      HERNIA REPAIR  2000    HERNIA REPAIR  2009    INGUINAL HERNIA REPAIR      PARTIAL HIP ARTHROPLASTY Left 2014    PROSTATECTOMY  2008    SHOULDER SURGERY  2005    SHOULDER SURGERY  2006    SHOULDER SURGERY  2008    TEAR DUCT SURGERY      URINARY SPHINCTER IMPLANT         Family History   Problem Relation Age of Onset    Hypertension Sister     Thyroid disease Sister     Stroke Sister     Depression Sister     Heart failure Sister     Hypertension Brother     Lung cancer Brother     COPD Brother     Alcohol abuse Brother     Coronary artery disease Son         Son  from MI at age 59        Social History     Socioeconomic History    Marital status:     Number of children: 3   Tobacco Use    Smoking status: Former     Packs/day: 0.00     Years: 15.00     Pack years: 0.00     Types: Cigarettes     Start date: 1947     Quit date: 1962     Years since quittin.7    Smokeless tobacco: Never    Tobacco comments:     Quit 's   1 ppd for 15 years     Vaping Use    Vaping Use: Never used   Substance and Sexual Activity    Alcohol use: Never    Drug use: Never    Sexual activity: Not Currently     Partners: Female     Birth control/protection: None           Objective   Physical Exam  Vitals and nursing note reviewed.   HENT:      Head: Normocephalic.      Nose: Nose normal. No congestion or rhinorrhea.      Mouth/Throat:      Mouth: Mucous membranes are moist.   Eyes:      Conjunctiva/sclera: Conjunctivae normal.   Cardiovascular:      Rate and Rhythm: Normal rate and regular rhythm.      Pulses:           Radial pulses are 2+ on the right side and 2+ on the left side.      Heart sounds: Normal heart sounds.   Pulmonary:      Effort:  Pulmonary effort is normal.      Breath sounds: Normal breath sounds. No stridor. No wheezing, rhonchi or rales.   Abdominal:      General: Abdomen is flat.   Musculoskeletal:         General: No swelling, tenderness, deformity or signs of injury. Normal range of motion.      Cervical back: Neck supple.      Right lower leg: No edema.      Left lower leg: No edema.   Lymphadenopathy:      Cervical: No cervical adenopathy.   Skin:     General: Skin is warm and dry.      Capillary Refill: Capillary refill takes 2 to 3 seconds.   Neurological:      Mental Status: He is alert and oriented to person, place, and time.   Psychiatric:         Mood and Affect: Mood normal.         Behavior: Behavior normal.       Procedures           ED Course  ED Course as of 08/29/23 0842   Sun Aug 27, 2023   1129 Discussed patient with Dr. Woody and he and I agree that the best thing we could do for patient is go ahead and get the walking O2 test and see if patient will qualify for oxygen today.  This has been ordered. [AW]   1254 Patient pursuing palliative care and resides at assisted living facility where his family would like him to stay. He is DNR/DNI. They  brought him in today hoping to get him oxygen, unfortunately he does not qualify per walking oximetry. If patient requires oxygen for comfort, this can be arranged via palliative care. Family requested no labs/imaging.  [MN]      ED Course User Index  [AW] Luke Sharma MD  [MN] Charlie Love, DO                                           Medical Decision Making  Ddx pneumonia, bronchitis, upper respiratory infection, pneumothorax, heart failure, pulmonary edema    1350 Pt seen again prior to d/c.  Vitals stable and pt. in NAD. Non-toxic. Comfortable. Ambulating without difficulty.  Tolerating po.  Relaxed breathing.  Patient did a walking pulse ox and did not drop low enough to qualify for oxygen.  Discussed with family member and they are going to follow-up tomorrow  with primary care.  All questions personally answered at the bedside and all d/c instructions personally reviewed with pt.  Discussed the importance of close outpt. f/u and pt. understands this and agrees to do so.  Pt agrees to return to ED immediately for any new, persistent, or worsening symptoms.    EMR Dragon/Transcription disclaimer:  Much of this encounter note is an electronic transcription/translation of spoken language to printed text, aka voice recognition.  The electronic translation of spoken language may permit erroneous or at times nonsensical words or phrases to be inadvertently transcribed; although I have reviewed the note for such errors, some may still exist so please interpret based on surrounding text content.          Problems Addressed:  Encounter for medical screening examination: acute illness or injury  Fall, initial encounter: acute illness or injury  Hypoxia: acute illness or injury        Final diagnoses:   Fall, initial encounter   Hypoxia   Encounter for medical screening examination       ED Disposition  ED Disposition       ED Disposition   Discharge    Condition   Stable    Comment   --               Charlie Trinh MD  [address] [id number] [phone]    In 1 day           Medication List      No changes were made to your prescriptions during this visit.            Luke Sharma MD  08/29/23 9627

## 2023-09-21 ENCOUNTER — APPOINTMENT (OUTPATIENT)
Dept: GENERAL RADIOLOGY | Facility: HOSPITAL | Age: 88
End: 2023-09-21
Payer: MEDICARE

## 2023-09-21 ENCOUNTER — HOSPITAL ENCOUNTER (EMERGENCY)
Facility: HOSPITAL | Age: 88
Discharge: HOME | End: 2023-09-21
Attending: STUDENT IN AN ORGANIZED HEALTH CARE EDUCATION/TRAINING PROGRAM
Payer: MEDICARE

## 2023-09-21 VITALS
WEIGHT: 180 LBS | BODY MASS INDEX: 26.66 KG/M2 | RESPIRATION RATE: 24 BRPM | HEART RATE: 60 BPM | SYSTOLIC BLOOD PRESSURE: 128 MMHG | OXYGEN SATURATION: 92 % | TEMPERATURE: 97.7 F | HEIGHT: 69 IN | DIASTOLIC BLOOD PRESSURE: 63 MMHG

## 2023-09-21 DIAGNOSIS — J44.9 CHRONIC OBSTRUCTIVE PULMONARY DISEASE, UNSPECIFIED COPD TYPE: ICD-10-CM

## 2023-09-21 DIAGNOSIS — N39.0 URINARY TRACT INFECTION WITHOUT HEMATURIA, SITE UNSPECIFIED: Primary | ICD-10-CM

## 2023-09-21 LAB
BACTERIA UR QL AUTO: ABNORMAL /HPF
BILIRUB UR QL STRIP: NEGATIVE
CLARITY UR: ABNORMAL
COLOR UR: YELLOW
GLUCOSE UR STRIP-MCNC: NEGATIVE MG/DL
HGB UR QL STRIP.AUTO: ABNORMAL
HYALINE CASTS UR QL AUTO: ABNORMAL /LPF
KETONES UR QL STRIP: NEGATIVE
LEUKOCYTE ESTERASE UR QL STRIP.AUTO: ABNORMAL
NITRITE UR QL STRIP: POSITIVE
PH UR STRIP.AUTO: 6.5 [PH] (ref 4.5–8)
PROT UR QL STRIP: NEGATIVE
RBC # UR STRIP: ABNORMAL /HPF
REF LAB TEST METHOD: ABNORMAL
SP GR UR STRIP: 1.01 (ref 1–1.03)
SQUAMOUS #/AREA URNS HPF: ABNORMAL /HPF
UROBILINOGEN UR QL STRIP: ABNORMAL
WBC # UR STRIP: ABNORMAL /HPF

## 2023-09-21 PROCEDURE — 81001 URINALYSIS AUTO W/SCOPE: CPT

## 2023-09-21 PROCEDURE — 71045 X-RAY EXAM CHEST 1 VIEW: CPT

## 2023-09-21 PROCEDURE — 99283 EMERGENCY DEPT VISIT LOW MDM: CPT

## 2023-09-21 PROCEDURE — 94640 AIRWAY INHALATION TREATMENT: CPT

## 2023-09-21 RX ORDER — IPRATROPIUM BROMIDE AND ALBUTEROL SULFATE 2.5; .5 MG/3ML; MG/3ML
3 SOLUTION RESPIRATORY (INHALATION) ONCE
Status: COMPLETED | OUTPATIENT
Start: 2023-09-21 | End: 2023-09-21

## 2023-09-21 RX ORDER — CEFUROXIME AXETIL 500 MG/1
500 TABLET ORAL 2 TIMES DAILY
Qty: 20 TABLET | Refills: 0 | Status: SHIPPED | OUTPATIENT
Start: 2023-09-21 | End: 2023-10-01

## 2023-09-21 RX ORDER — CEFUROXIME AXETIL 250 MG/1
500 TABLET ORAL ONCE
Status: COMPLETED | OUTPATIENT
Start: 2023-09-21 | End: 2023-09-21

## 2023-09-21 RX ADMIN — IPRATROPIUM BROMIDE AND ALBUTEROL SULFATE 3 ML: .5; 3 SOLUTION RESPIRATORY (INHALATION) at 07:03

## 2023-09-21 RX ADMIN — CEFUROXIME AXETIL 500 MG: 250 TABLET, FILM COATED ORAL at 07:30

## 2023-09-21 NOTE — ED PROVIDER NOTES
Subjective   History of Present Illness  Pt is a 89 y.o. male with PMH as listed who presents for   Chief Complaint   Patient presents with    Urinary Frequency     [facility] reported urinary incontinence and frequency with foul smelling urine; pt reports dysuria    Dysuria       Patient states that he has had increased dysuria and urinary frequency for the past few days.  His caregiver at his care facility had him brought to the ED for further evaluation and management.  Denies any fever, altered mental status, any other new complaints.  He does have a mild cough which she states has been present for the months associated with his COPD.  He was given a breathing treatment prior to arrival, he has some mild wheezing currently.  Denies any chest pain or shortness of breath, has not had any fever, abdominal pain, nausea, vomiting.  No other new complaints.    Review of Systems    Past Medical History:   Diagnosis Date    Allergic     Seasonal    Allergic rhinitis     Arthritis     Asthma     Brito esophagus     Benign prostatic hyperplasia     Carotid arterial disease     Status post right CEA 4/1/14    COPD (chronic obstructive pulmonary disease)     Coronary artery disease     Dementia     Dyspnea     Erectile dysfunction     GERD (gastroesophageal reflux disease)     Hematoma of frontal scalp 07/08/2021    HL (hearing loss)     Hypothyroidism 1992    Low back pain     PAD (peripheral artery disease)     Prostate cancer     Status post implantation of artificial urinary sphincter         Stroke     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    Urinary tract infection        Allergies   Allergen Reactions    Bactrim [Sulfamethoxazole-Trimethoprim] Rash       Past Surgical History:   Procedure Laterality Date    CAROTID ARTERY ANGIOPLASTY  04/01/2014    COLONOSCOPY  2009    ENDOSCOPY  2009    2001,2003,2006    EYE SURGERY      FRACTURE SURGERY       HERNIA REPAIR  2000    HERNIA REPAIR  2009    INGUINAL HERNIA REPAIR      PARTIAL HIP ARTHROPLASTY Left 2014    PROSTATECTOMY  2008    SHOULDER SURGERY  2005    SHOULDER SURGERY  2006    SHOULDER SURGERY  2008    TEAR DUCT SURGERY      URINARY SPHINCTER IMPLANT         Family History   Problem Relation Age of Onset    Hypertension Sister     Thyroid disease Sister     Stroke Sister     Depression Sister     Heart failure Sister     Hypertension Brother     Lung cancer Brother     COPD Brother     Alcohol abuse Brother     Coronary artery disease Son         Son  from MI at age 59        Social History     Socioeconomic History    Marital status:     Number of children: 3   Tobacco Use    Smoking status: Former     Packs/day: 0.00     Years: 15.00     Pack years: 0.00     Types: Cigarettes     Start date: 1947     Quit date: 1962     Years since quittin.7    Smokeless tobacco: Never    Tobacco comments:     Quit 1960's   1 ppd for 15 years     Vaping Use    Vaping Use: Never used   Substance and Sexual Activity    Alcohol use: Never    Drug use: Never    Sexual activity: Not Currently     Partners: Female     Birth control/protection: None           Objective   Physical Exam  Constitutional:       Appearance: Normal appearance.   HENT:      Head: Normocephalic and atraumatic.      Mouth/Throat:      Mouth: Mucous membranes are moist.      Pharynx: Oropharynx is clear.   Eyes:      Conjunctiva/sclera: Conjunctivae normal.   Cardiovascular:      Rate and Rhythm: Normal rate and regular rhythm.   Pulmonary:      Effort: Pulmonary effort is normal.      Breath sounds: Wheezing (mild b/l) present.   Abdominal:      General: Abdomen is flat.   Musculoskeletal:      Cervical back: Neck supple.   Skin:     General: Skin is warm and dry.   Neurological:      Mental Status: He is alert.   Psychiatric:         Mood and Affect: Mood normal.       Procedures           ED Course  ED Course  as of 09/21/23 0804   Thu Sep 21, 2023   0659 Patient presents for dysuria and increased urinary frequency.  States that he has had a cough for the past few months associated with his COPD, this is not new.  No new shortness of breath or chest pain.  Exam is unremarkable, normal vitals, mild wheezing with normal O2 saturation.  Will obtain UA as well as chest x-ray due to wheeze with reported cough.  Patient given breathing treatment in the ED for his COPD. [JF]   0757 UA significant for UTI, given first dose of antibiotic here in the ED.  Patient also still denying any shortness of breath after breathing treatment, cough is improved.  Discussed with daughter at bedside plan for discharge with PCP follow-up for reassessment of his UTI and further management of his COPD.  Patient's daughter bedside states that his COPD is currently at baseline with mild wheezing at rest.  All questions answered, patient ready for discharge. [JF]      ED Course User Index  [JF] Anil Nuñez MD                                           Medical Decision Making  My differential diagnosis for dysuria includes but is not limited to urinary tract infection, hematuria, excessive caffeine intake, excessive soft drink intake, urinary retention, kidney stones, bladder stones and interstitial cystitis.        Problems Addressed:  Chronic obstructive pulmonary disease, unspecified COPD type: complicated acute illness or injury  Urinary tract infection without hematuria, site unspecified: complicated acute illness or injury    Amount and/or Complexity of Data Reviewed  Labs: ordered.     Details: UA ordered.  UA significant for UTI, given dose of cefuroxime in the ED.  Radiology: ordered.     Details: Order chest x-ray due to patient having cough and history of COPD, screening to ensure he does not have a developing pneumonia.  No acute pulmonary process based on my interpretation.    Risk  Prescription drug management.        Final  diagnoses:   Urinary tract infection without hematuria, site unspecified   Chronic obstructive pulmonary disease, unspecified COPD type       ED Disposition  ED Disposition       ED Disposition   Discharge    Condition   Stable    Comment   --               Charlie Trinh MD  [address] [facility] [address] [phone]    Schedule an appointment as soon as possible for a visit in 3 days  For re-evaluation         Medication List        New Prescriptions      cefuroxime 500 MG tablet  Commonly known as: CEFTIN  Take 1 tablet by mouth 2 (Two) Times a Day for 10 days.               Where to Get Your Medications        These medications were sent to [id number] [place] - [address]  [phone]  - [phone]   [address] [place] [address]      Phone: [phone]   cefuroxime 500 MG tablet            Anil Nuñez MD  09/21/23 3591

## 2023-09-21 NOTE — ED TRIAGE NOTES
Pt with audible wheezes on arrival; hx copd; hx inhaler use; pt denies feeling SOB; 93% room air on arrival

## 2023-10-29 DIAGNOSIS — E03.9 ACQUIRED HYPOTHYROIDISM: ICD-10-CM

## 2023-10-30 RX ORDER — LEVOTHYROXINE SODIUM 0.05 MG/1
50 TABLET ORAL DAILY
Qty: 90 TABLET | Refills: 3 | Status: SHIPPED | OUTPATIENT
Start: 2023-10-30

## 2023-12-08 RX ORDER — CLOPIDOGREL BISULFATE 75 MG/1
75 TABLET ORAL DAILY
Qty: 90 TABLET | Refills: 3 | Status: SHIPPED | OUTPATIENT
Start: 2023-12-08

## 2023-12-18 ENCOUNTER — HOSPITAL ENCOUNTER (EMERGENCY)
Facility: HOSPITAL | Age: 88
Discharge: NURSING, REHAB OR LONG-TERM CARE | End: 2023-12-18
Attending: EMERGENCY MEDICINE | Admitting: EMERGENCY MEDICINE
Payer: MEDICARE

## 2023-12-18 ENCOUNTER — APPOINTMENT (OUTPATIENT)
Dept: GENERAL RADIOLOGY | Facility: HOSPITAL | Age: 88
End: 2023-12-18
Payer: MEDICARE

## 2023-12-18 VITALS
RESPIRATION RATE: 22 BRPM | WEIGHT: 180 LBS | BODY MASS INDEX: 26.66 KG/M2 | HEART RATE: 70 BPM | DIASTOLIC BLOOD PRESSURE: 99 MMHG | TEMPERATURE: 98.1 F | SYSTOLIC BLOOD PRESSURE: 157 MMHG | HEIGHT: 69 IN | OXYGEN SATURATION: 90 %

## 2023-12-18 DIAGNOSIS — J44.1 COPD EXACERBATION: Primary | ICD-10-CM

## 2023-12-18 LAB
ALBUMIN SERPL-MCNC: 3.9 G/DL (ref 3.5–5.2)
ALBUMIN/GLOB SERPL: 1.2 G/DL
ALP SERPL-CCNC: 44 U/L (ref 39–117)
ALT SERPL W P-5'-P-CCNC: 17 U/L (ref 1–41)
ANION GAP SERPL CALCULATED.3IONS-SCNC: 6.7 MMOL/L (ref 5–15)
APTT PPP: 30.5 SECONDS (ref 24.3–38.1)
AST SERPL-CCNC: 22 U/L (ref 1–40)
BASOPHILS # BLD AUTO: 0.06 10*3/MM3 (ref 0–0.2)
BASOPHILS NFR BLD AUTO: 0.6 % (ref 0–1.5)
BILIRUB SERPL-MCNC: 0.2 MG/DL (ref 0–1.2)
BUN SERPL-MCNC: 26 MG/DL (ref 8–23)
BUN/CREAT SERPL: 24.8 (ref 7–25)
CALCIUM SPEC-SCNC: 9.4 MG/DL (ref 8.2–9.6)
CHLORIDE SERPL-SCNC: 99 MMOL/L (ref 98–107)
CO2 SERPL-SCNC: 30.3 MMOL/L (ref 22–29)
CREAT SERPL-MCNC: 1.05 MG/DL (ref 0.76–1.27)
D-LACTATE SERPL-SCNC: 1.7 MMOL/L (ref 0.5–2)
DEPRECATED RDW RBC AUTO: 51.4 FL (ref 37–54)
EGFRCR SERPLBLD CKD-EPI 2021: 67.4 ML/MIN/1.73
EOSINOPHIL # BLD AUTO: 0.4 10*3/MM3 (ref 0–0.4)
EOSINOPHIL NFR BLD AUTO: 3.9 % (ref 0.3–6.2)
ERYTHROCYTE [DISTWIDTH] IN BLOOD BY AUTOMATED COUNT: 14.8 % (ref 12.3–15.4)
FLUAV RNA RESP QL NAA+PROBE: NOT DETECTED
FLUBV RNA RESP QL NAA+PROBE: NOT DETECTED
GLOBULIN UR ELPH-MCNC: 3.3 GM/DL
GLUCOSE SERPL-MCNC: 103 MG/DL (ref 65–99)
HCT VFR BLD AUTO: 40.5 % (ref 37.5–51)
HGB BLD-MCNC: 12.7 G/DL (ref 13–17.7)
IMM GRANULOCYTES # BLD AUTO: 0.05 10*3/MM3 (ref 0–0.05)
IMM GRANULOCYTES NFR BLD AUTO: 0.5 % (ref 0–0.5)
INR PPP: 1.01 (ref 0.9–1.1)
LYMPHOCYTES # BLD AUTO: 2.52 10*3/MM3 (ref 0.7–3.1)
LYMPHOCYTES NFR BLD AUTO: 24.7 % (ref 19.6–45.3)
MAGNESIUM SERPL-MCNC: 1.9 MG/DL (ref 1.6–2.4)
MCH RBC QN AUTO: 29.7 PG (ref 26.6–33)
MCHC RBC AUTO-ENTMCNC: 31.4 G/DL (ref 31.5–35.7)
MCV RBC AUTO: 94.6 FL (ref 79–97)
MONOCYTES # BLD AUTO: 0.99 10*3/MM3 (ref 0.1–0.9)
MONOCYTES NFR BLD AUTO: 9.7 % (ref 5–12)
NEUTROPHILS NFR BLD AUTO: 6.17 10*3/MM3 (ref 1.7–7)
NEUTROPHILS NFR BLD AUTO: 60.6 % (ref 42.7–76)
NRBC BLD AUTO-RTO: 0 /100 WBC (ref 0–0.2)
PLATELET # BLD AUTO: 254 10*3/MM3 (ref 140–450)
PMV BLD AUTO: 10.6 FL (ref 6–12)
POTASSIUM SERPL-SCNC: 4.3 MMOL/L (ref 3.5–5.2)
PROT SERPL-MCNC: 7.2 G/DL (ref 6–8.5)
PROTHROMBIN TIME: 13.3 SECONDS (ref 12.1–15)
QT INTERVAL: 396 MS
QTC INTERVAL: 451 MS
RBC # BLD AUTO: 4.28 10*6/MM3 (ref 4.14–5.8)
SARS-COV-2 RNA RESP QL NAA+PROBE: NOT DETECTED
SODIUM SERPL-SCNC: 136 MMOL/L (ref 136–145)
WBC NRBC COR # BLD AUTO: 10.19 10*3/MM3 (ref 3.4–10.8)

## 2023-12-18 PROCEDURE — 71045 X-RAY EXAM CHEST 1 VIEW: CPT

## 2023-12-18 PROCEDURE — 25010000002 METHYLPREDNISOLONE PER 125 MG: Performed by: EMERGENCY MEDICINE

## 2023-12-18 PROCEDURE — 96374 THER/PROPH/DIAG INJ IV PUSH: CPT

## 2023-12-18 PROCEDURE — 85610 PROTHROMBIN TIME: CPT | Performed by: EMERGENCY MEDICINE

## 2023-12-18 PROCEDURE — 83605 ASSAY OF LACTIC ACID: CPT | Performed by: EMERGENCY MEDICINE

## 2023-12-18 PROCEDURE — 87636 SARSCOV2 & INF A&B AMP PRB: CPT | Performed by: EMERGENCY MEDICINE

## 2023-12-18 PROCEDURE — 99284 EMERGENCY DEPT VISIT MOD MDM: CPT

## 2023-12-18 PROCEDURE — 83735 ASSAY OF MAGNESIUM: CPT | Performed by: EMERGENCY MEDICINE

## 2023-12-18 PROCEDURE — 85025 COMPLETE CBC W/AUTO DIFF WBC: CPT | Performed by: EMERGENCY MEDICINE

## 2023-12-18 PROCEDURE — 85730 THROMBOPLASTIN TIME PARTIAL: CPT | Performed by: EMERGENCY MEDICINE

## 2023-12-18 PROCEDURE — 94640 AIRWAY INHALATION TREATMENT: CPT

## 2023-12-18 PROCEDURE — 36415 COLL VENOUS BLD VENIPUNCTURE: CPT

## 2023-12-18 PROCEDURE — 80053 COMPREHEN METABOLIC PANEL: CPT | Performed by: EMERGENCY MEDICINE

## 2023-12-18 PROCEDURE — 93005 ELECTROCARDIOGRAM TRACING: CPT | Performed by: EMERGENCY MEDICINE

## 2023-12-18 RX ORDER — METHYLPREDNISOLONE SODIUM SUCCINATE 125 MG/2ML
125 INJECTION, POWDER, LYOPHILIZED, FOR SOLUTION INTRAMUSCULAR; INTRAVENOUS ONCE
Status: COMPLETED | OUTPATIENT
Start: 2023-12-18 | End: 2023-12-18

## 2023-12-18 RX ORDER — BENZONATATE 100 MG/1
100 CAPSULE ORAL 3 TIMES DAILY PRN
Qty: 21 CAPSULE | Refills: 0 | Status: SHIPPED | OUTPATIENT
Start: 2023-12-18 | End: 2023-12-25

## 2023-12-18 RX ORDER — AZITHROMYCIN 250 MG/1
TABLET, FILM COATED ORAL
Qty: 6 TABLET | Refills: 0 | Status: SHIPPED | OUTPATIENT
Start: 2023-12-18

## 2023-12-18 RX ORDER — IPRATROPIUM BROMIDE AND ALBUTEROL SULFATE 2.5; .5 MG/3ML; MG/3ML
3 SOLUTION RESPIRATORY (INHALATION) ONCE
Status: COMPLETED | OUTPATIENT
Start: 2023-12-18 | End: 2023-12-18

## 2023-12-18 RX ORDER — METHYLPREDNISOLONE 4 MG/1
TABLET ORAL
Qty: 21 TABLET | Refills: 0 | Status: SHIPPED | OUTPATIENT
Start: 2023-12-18

## 2023-12-18 RX ADMIN — IPRATROPIUM BROMIDE AND ALBUTEROL SULFATE 3 ML: .5; 3 SOLUTION RESPIRATORY (INHALATION) at 18:59

## 2023-12-18 RX ADMIN — METHYLPREDNISOLONE SODIUM SUCCINATE 125 MG: 125 INJECTION, POWDER, FOR SOLUTION INTRAMUSCULAR; INTRAVENOUS at 18:51

## 2023-12-18 NOTE — ED PROVIDER NOTES
Subjective   History of Present Illness  89-year-old male brought to emergency room via ambulance for cough x 3 weeks that staff states that that was blood-tinged today.  Otherwise patient is at baseline according to EMS.  Patient has past medical history of dementia and COPD.  Patient states he does not feel any more short of breath than normal.  Patient denies fever headache visual changes neck pain jaw pain sore throat diaphoresis shortness of breath chest pain anorexia nausea vomiting diarrhea or dysuria.  He does not remember a coughing episode or any blood-tinged sputum.  Patient is oriented to person and place but not time.      Review of Systems   Constitutional:  Negative for activity change, appetite change, chills, diaphoresis, fatigue and fever.   HENT:  Negative for congestion, sinus pressure, sneezing and sore throat.    Eyes:  Negative for photophobia and visual disturbance.   Respiratory:  Positive for cough. Negative for shortness of breath.    Cardiovascular:  Negative for chest pain, palpitations and leg swelling.   Gastrointestinal:  Negative for abdominal distention, abdominal pain, diarrhea, nausea and vomiting.   Genitourinary:  Negative for dysuria and flank pain.   Musculoskeletal:  Negative for arthralgias, back pain and myalgias.   Skin:  Negative for rash.   Neurological:  Negative for dizziness, weakness and headaches.   Psychiatric/Behavioral:  Negative for behavioral problems and confusion.        Past Medical History:   Diagnosis Date    Allergic     Seasonal    Allergic rhinitis     Arthritis     Asthma     Brito esophagus     Benign prostatic hyperplasia     Carotid arterial disease     Status post right CEA 4/1/14    COPD (chronic obstructive pulmonary disease)     Coronary artery disease     Dementia     Dyspnea     Erectile dysfunction     GERD (gastroesophageal reflux disease)     Hematoma of frontal scalp 07/08/2021    HL (hearing loss)     Hypothyroidism 1992    Low back  pain     PAD (peripheral artery disease)     Prostate cancer     Status post implantation of artificial urinary sphincter         Stroke     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    Urinary tract infection        Allergies   Allergen Reactions    Bactrim [Sulfamethoxazole-Trimethoprim] Rash       Past Surgical History:   Procedure Laterality Date    CAROTID ARTERY ANGIOPLASTY  2014    COLONOSCOPY  2009    ENDOSCOPY  2009    ,,    EYE SURGERY      FRACTURE SURGERY      HERNIA REPAIR  2000    HERNIA REPAIR  2009    INGUINAL HERNIA REPAIR      PARTIAL HIP ARTHROPLASTY Left 2014    PROSTATECTOMY  2008    SHOULDER SURGERY      SHOULDER SURGERY  2006    SHOULDER SURGERY  2008    TEAR DUCT SURGERY      URINARY SPHINCTER IMPLANT         Family History   Problem Relation Age of Onset    Hypertension Sister     Thyroid disease Sister     Stroke Sister     Depression Sister     Heart failure Sister     Hypertension Brother     Lung cancer Brother     COPD Brother     Alcohol abuse Brother     Coronary artery disease Son         Son  from MI at age 59        Social History     Socioeconomic History    Marital status:     Number of children: 3   Tobacco Use    Smoking status: Former     Packs/day: 0.00     Years: 15.00     Additional pack years: 0.00     Total pack years: 0.00     Types: Cigarettes     Start date: 1947     Quit date: 1962     Years since quittin.0    Smokeless tobacco: Never    Tobacco comments:     Quit    1 ppd for 15 years     Vaping Use    Vaping Use: Never used   Substance and Sexual Activity    Alcohol use: Never    Drug use: Never    Sexual activity: Not Currently     Partners: Female     Birth control/protection: None           Objective   Physical Exam  Vitals and nursing note reviewed.   Constitutional:       General: He is not in acute distress.     Appearance: Normal appearance. He is not toxic-appearing or  diaphoretic.      Comments: Pleasantly demented 89-year-old male in no distress.  Patient is able to speak in full sentences.  Patient is on 2 L nasal cannula   HENT:      Head: Normocephalic and atraumatic.      Mouth/Throat:      Mouth: Mucous membranes are moist.   Eyes:      Conjunctiva/sclera: Conjunctivae normal.   Cardiovascular:      Rate and Rhythm: Normal rate and regular rhythm.      Pulses: Normal pulses.   Pulmonary:      Effort: Pulmonary effort is normal. No respiratory distress.      Breath sounds: Decreased breath sounds and wheezing present. No rhonchi or rales.   Abdominal:      General: Abdomen is flat. There is no distension.      Tenderness: There is no abdominal tenderness.   Musculoskeletal:         General: No swelling or signs of injury. Normal range of motion.      Cervical back: Normal range of motion and neck supple.      Right lower leg: No edema.      Left lower leg: No edema.   Skin:     General: Skin is warm and dry.      Findings: No rash.   Neurological:      General: No focal deficit present.      Mental Status: He is alert. Mental status is at baseline.      GCS: GCS eye subscore is 4. GCS verbal subscore is 5. GCS motor subscore is 6.      Cranial Nerves: Cranial nerves 2-12 are intact.   Psychiatric:         Mood and Affect: Mood normal.         Behavior: Behavior normal.         Procedures           ED Course  ED Course as of 12/18/23 1914   Mon Dec 18, 2023   1840 Chest x-ray:     IMPRESSION:  Impression: No acute infiltrates. No significant change from prior study.     Signer Name: Michael Viramontes MD   Signed: 12/18/2023 6:37 PM EST  [facility]   [BH]   1840 WBC: 10.19 [BH]   1840 Hemoglobin(!): 12.7 [BH]   1840 Hematocrit: 40.5 [BH]   1840 Platelets: 254 [BH]   1840 Glucose(!): 103 [BH]   1840 BUN(!): 26 [BH]   1840 Creatinine: 1.05 [BH]   1840 Sodium: 136 [BH]   1840 Potassium: 4.3 [BH]   1840 Chloride: 99 [BH]   1840 Protime: 13.3 [BH]   1840 INR:  1.01 []   1840 PTT: 30.5 []   1840 Lactate: 1.7 []   1840 Magnesium: 1.9 []   1841 COVID19: Not Detected []   1841 Influenza A PCR: Not Detected []   1841 Influenza B PCR: Not Detected []   1907 Patient reassessment.  Discussed with him lab and radiologic findings specifically no acute abnormality noted.  Will give patient breathing treatment and steroids and then will most likely discharge him back to nursing home with cough medicine and follow-up with primary care as needed and/or can return the emergency room for new persistent or worsening symptoms.  Of note patient did have 1 episode of spastic coughing that was nonproductive and lasted for approximately 5 seconds.  Patient states he understands agrees with plan. []   1909 EKG time is 1804  Normal sinus rhythm  Heart rate 78  Axes are normal  Intervals are normal  No acute ST abnormalities noted []      ED Course User Index  [] Tremayne Perkins MD                                             Medical Decision Making  My differential diagnosis for cough includes but is not limited to:  Upper respiratory infection, bronchitis, pneumonia, COPD exacerbation, cough variant asthma, cardiac asthma, coronary artery disease, congestive heart failure, bacterial, viral or lung infections, lung cancer, aspiration pneumonitis, aspiration of foreign body and Covid -19           Amount and/or Complexity of Data Reviewed  Labs: ordered. Decision-making details documented in ED Course.  Radiology: ordered. Decision-making details documented in ED Course.  ECG/medicine tests: ordered. Decision-making details documented in ED Course.        Final diagnoses:   COPD exacerbation       ED Disposition  ED Disposition       ED Disposition   Discharge    Condition   Stable    Comment   --               Charlie Trinh MD  [address] [facility] [address] [phone]    Schedule an appointment as soon as possible for a visit       [facility]  [place] EMERGENCY DEPARTMENT  [address] [place] [id number] [phone]  Go to   As needed         Medication List        New Prescriptions      azithromycin 250 MG tablet  Commonly known as: Zithromax Z-Cole  Take 2 tablets by mouth on day 1, then 1 tablet daily on days 2-5     benzonatate 100 MG capsule  Commonly known as: TESSALON  Take 1 capsule by mouth 3 (Three) Times a Day As Needed for Cough for up to 7 days.     methylPREDNISolone 4 MG dose pack  Commonly known as: MEDROL  Take as directed on package instructions.               Where to Get Your Medications        These medications were sent to [id number] - [place] - [address] - [phone]  - [phone] FX  [address] [facility] [address] [place] [address]      Phone: [phone]   azithromycin 250 MG tablet  benzonatate 100 MG capsule  methylPREDNISolone 4 MG dose pack            Tremayne Perkins MD  12/18/23 1914

## 2024-01-02 DIAGNOSIS — J96.11 CHRONIC RESPIRATORY FAILURE WITH HYPOXIA: ICD-10-CM

## 2024-01-02 RX ORDER — IPRATROPIUM BROMIDE AND ALBUTEROL SULFATE 2.5; .5 MG/3ML; MG/3ML
SOLUTION RESPIRATORY (INHALATION)
Qty: 1080 ML | Refills: 3 | Status: SHIPPED | OUTPATIENT
Start: 2024-01-02

## 2024-02-23 DIAGNOSIS — J43.1 PANLOBULAR EMPHYSEMA: ICD-10-CM

## 2024-02-25 RX ORDER — FLUTICASONE FUROATE, UMECLIDINIUM BROMIDE AND VILANTEROL TRIFENATATE 100; 62.5; 25 UG/1; UG/1; UG/1
POWDER RESPIRATORY (INHALATION)
Qty: 180 EACH | Refills: 3 | Status: SHIPPED | OUTPATIENT
Start: 2024-02-25

## 2024-03-07 ENCOUNTER — TELEPHONE (OUTPATIENT)
Dept: FAMILY MEDICINE CLINIC | Facility: CLINIC | Age: 89
End: 2024-03-07

## 2024-03-07 NOTE — TELEPHONE ENCOUNTER
Please notify facility that Dr. Trinh would have to evaluate him to be able to get physical therapy to evaluate him.  It might be better if the nurse practitioner onsite could place the orders.  Medicare would require a face-to-face visit to order a physical therapist.

## 2024-03-07 NOTE — TELEPHONE ENCOUNTER
Caller: Jyotsna Clements    Relationship: Emergency Contact    Best call back number: [phone]    Who are you requesting to speak with (clinical staff, provider,  specific staff member): CLINICAL STAFF    What was the call regarding: STATED THAT THE PATIENT IS IN AN ASSISTED LIVING FACILITY AT THIS TIME. STATED THAT THEY WERE WANTING TO HAVE A PHYSICAL THERAPIST COME IN AND EVALUATE HIS CAPABILITIES TO DETERMINE IF HE IS ABLE TO STAY IN THE FACILITY OR NOT. STATED THAT THEY NEED THIS ORDERED TO GET IT DONE. STATED THAT THE STATE HAD INFORMED THEM THEY NEED TO GET THE ORDER. STATED THAT THE PATIENT HAS A NURSE PRACTITIONER THAT SEES THEM IN THE FACILITY AND THEY WERE JUST NOT SURE OF WHO TO CONTACT BETWEEN DR. MITTAL OR THE NURSE THEIR. PLEASE CALL AND ADVISE      27-Dec-2022 13:48

## 2024-03-28 ENCOUNTER — OFFICE VISIT (OUTPATIENT)
Dept: FAMILY MEDICINE CLINIC | Facility: CLINIC | Age: 89
End: 2024-03-28
Payer: MEDICARE

## 2024-03-28 VITALS
TEMPERATURE: 97.5 F | HEART RATE: 71 BPM | HEIGHT: 69 IN | DIASTOLIC BLOOD PRESSURE: 70 MMHG | WEIGHT: 198 LBS | BODY MASS INDEX: 29.33 KG/M2 | SYSTOLIC BLOOD PRESSURE: 112 MMHG | OXYGEN SATURATION: 95 %

## 2024-03-28 DIAGNOSIS — R29.6 FREQUENT FALLS: ICD-10-CM

## 2024-03-28 DIAGNOSIS — F01.50 VASCULAR DEMENTIA WITHOUT BEHAVIORAL DISTURBANCE: Primary | ICD-10-CM

## 2024-03-28 RX ORDER — RIVASTIGMINE 13.3 MG/24H
1 PATCH, EXTENDED RELEASE TRANSDERMAL DAILY
Qty: 90 PATCH | Refills: 3 | Status: SHIPPED | OUTPATIENT
Start: 2024-03-28

## 2024-03-28 RX ORDER — TRAZODONE HYDROCHLORIDE 100 MG/1
100 TABLET ORAL
COMMUNITY
Start: 2023-12-13

## 2024-03-28 NOTE — PROGRESS NOTES
"  Subjective   Pawel Goldberg is a 89 y.o. male who is here for   Chief Complaint   Patient presents with    evaluation to reamin in assited living     Possible referral for PT/OT   .     History of Present Illness     Pawel is brought in by wife and daughter.  Overall he is doing fairly well with his age of 89 multiple medical problems and dementia.  He is enjoying living at [facility] assisted living facility  The facility just recently had their inspection by the [facility] department.  The  is requiring a assessment if Pawel is still safe to live in the facility.  Requesting physical therapy and occupational therapy evaluation for in-home safety.  Pawel has a history of multiple falls    The following portions of the patient's history were reviewed and updated as appropriate: allergies, current medications, past medical history, past social history, past surgical history, and problem list.    Review of Systems    Objective   Vitals:    03/28/24 1051   BP: 112/70   Pulse: 71   Temp: 97.5 °F (36.4 °C)   SpO2: 95%   Weight: 89.8 kg (198 lb)   Height: 175 cm (68.9\")      Physical Exam  Neurological:      Motor: Weakness present.      Coordination: Coordination abnormal.      Gait: Gait abnormal.   Psychiatric:         Speech: Speech is delayed.         Cognition and Memory: Cognition is impaired. Memory is impaired.         Assessment & Plan   Diagnoses and all orders for this visit:    1. Vascular dementia without behavioral disturbance (Primary)  -     Ambulatory Referral to Home Health  -     rivastigmine (Exelon) 13.3 MG/24HR patch; Place 1 patch on the skin as directed by provider Daily. Indications: Dementia  Dispense: 90 patch; Refill: 3    2. Frequent falls  -     Ambulatory Referral to Home Health    Request [facility] home health department PT and OT perform the required assessments at his assisted living apartment.    We will also increase his Exelon patch    There are no " Patient Instructions on file for this visit.    Medications Discontinued During This Encounter   Medication Reason    methylPREDNISolone (MEDROL) 4 MG dose pack *Therapy completed    azithromycin (Zithromax Z-Cole) 250 MG tablet *Therapy completed    chlorhexidine (PERIDEX) 0.12 % solution *Therapy completed    Multiple Vitamins-Minerals (MULTIVITAMIN ADULT PO) *Therapy completed    rivastigmine (EXELON) 9.5 MG/24HR patch Dose adjustment        No follow-ups on file.    Dr. Charlie Trinh  [place], [place]    Answers submitted by the patient for this visit:  Primary Reason for Visit (Submitted on 3/21/2024)  What is the primary reason for your visit?: Other  Other (Submitted on 3/21/2024)  Please describe your symptoms.: I am required to have someone check my ability to live in an assisted living facility.  Have you had these symptoms before?: No  How long have you been having these symptoms?: 5-7 days  Please list any medications you are currently taking for this condition.: None  Please describe any probable cause for these symptoms. : N/A

## 2024-04-02 ENCOUNTER — TELEPHONE (OUTPATIENT)
Dept: FAMILY MEDICINE CLINIC | Facility: CLINIC | Age: 89
End: 2024-04-02

## 2024-04-02 NOTE — TELEPHONE ENCOUNTER
Caller: Jyotsna Clements    Relationship: Emergency Contact    Best call back number: [phone]    What is the best time to reach you: ANYTIME     Who are you requesting to speak with (clinical staff, provider,  specific staff member): CLINICAL     What was the call regarding: PATIENTS DAUGHTER WOULD LIKE TO KNOW WHERE THE REFERRAL WAS PLACED FOR THE PATIENT TO GO TO PHYSICAL THERAPY.     PLEASE ADVISE

## 2024-04-04 ENCOUNTER — TELEPHONE (OUTPATIENT)
Dept: FAMILY MEDICINE CLINIC | Facility: CLINIC | Age: 89
End: 2024-04-04
Payer: MEDICARE

## 2024-04-04 NOTE — TELEPHONE ENCOUNTER
Junior,PT with [facility] called to inform they will be seeing patient once a week for 8 weeks to do Home health and PT.

## 2024-04-05 ENCOUNTER — APPOINTMENT (OUTPATIENT)
Dept: GENERAL RADIOLOGY | Facility: HOSPITAL | Age: 89
End: 2024-04-05
Payer: MEDICARE

## 2024-04-05 ENCOUNTER — HOSPITAL ENCOUNTER (EMERGENCY)
Facility: HOSPITAL | Age: 89
Discharge: HOME | End: 2024-04-05
Attending: EMERGENCY MEDICINE
Payer: MEDICARE

## 2024-04-05 VITALS
BODY MASS INDEX: 29.62 KG/M2 | RESPIRATION RATE: 20 BRPM | SYSTOLIC BLOOD PRESSURE: 150 MMHG | WEIGHT: 200 LBS | HEART RATE: 64 BPM | DIASTOLIC BLOOD PRESSURE: 84 MMHG | OXYGEN SATURATION: 91 % | HEIGHT: 69 IN | TEMPERATURE: 98.3 F

## 2024-04-05 DIAGNOSIS — J44.1 COPD EXACERBATION: ICD-10-CM

## 2024-04-05 DIAGNOSIS — R05.1 ACUTE COUGH: Primary | ICD-10-CM

## 2024-04-05 LAB
FLUAV RNA RESP QL NAA+PROBE: NOT DETECTED
FLUBV RNA RESP QL NAA+PROBE: NOT DETECTED
SARS-COV-2 RNA RESP QL NAA+PROBE: NOT DETECTED

## 2024-04-05 PROCEDURE — 87636 SARSCOV2 & INF A&B AMP PRB: CPT | Performed by: EMERGENCY MEDICINE

## 2024-04-05 PROCEDURE — 25010000002 METHYLPREDNISOLONE PER 125 MG: Performed by: EMERGENCY MEDICINE

## 2024-04-05 PROCEDURE — 96374 THER/PROPH/DIAG INJ IV PUSH: CPT

## 2024-04-05 PROCEDURE — 94640 AIRWAY INHALATION TREATMENT: CPT

## 2024-04-05 PROCEDURE — 99283 EMERGENCY DEPT VISIT LOW MDM: CPT

## 2024-04-05 PROCEDURE — 71045 X-RAY EXAM CHEST 1 VIEW: CPT

## 2024-04-05 RX ORDER — ALBUTEROL SULFATE 90 UG/1
AEROSOL, METERED RESPIRATORY (INHALATION)
Qty: 36 G | Refills: 9 | Status: SHIPPED | OUTPATIENT
Start: 2024-04-05

## 2024-04-05 RX ORDER — METHYLPREDNISOLONE SODIUM SUCCINATE 125 MG/2ML
125 INJECTION, POWDER, LYOPHILIZED, FOR SOLUTION INTRAMUSCULAR; INTRAVENOUS ONCE
Status: COMPLETED | OUTPATIENT
Start: 2024-04-05 | End: 2024-04-05

## 2024-04-05 RX ORDER — METHYLPREDNISOLONE 4 MG/1
TABLET ORAL
Qty: 21 TABLET | Refills: 0 | Status: SHIPPED | OUTPATIENT
Start: 2024-04-05

## 2024-04-05 RX ORDER — AZITHROMYCIN 250 MG/1
TABLET, FILM COATED ORAL
Qty: 6 TABLET | Refills: 0 | Status: SHIPPED | OUTPATIENT
Start: 2024-04-05

## 2024-04-05 RX ORDER — BENZONATATE 100 MG/1
100 CAPSULE ORAL 3 TIMES DAILY PRN
Qty: 21 CAPSULE | Refills: 0 | Status: SHIPPED | OUTPATIENT
Start: 2024-04-05 | End: 2024-04-12

## 2024-04-05 RX ORDER — ALBUTEROL SULFATE 90 UG/1
2 AEROSOL, METERED RESPIRATORY (INHALATION) EVERY 4 HOURS PRN
Qty: 8 G | Refills: 0 | Status: SHIPPED | OUTPATIENT
Start: 2024-04-05

## 2024-04-05 RX ORDER — IPRATROPIUM BROMIDE AND ALBUTEROL SULFATE 2.5; .5 MG/3ML; MG/3ML
3 SOLUTION RESPIRATORY (INHALATION) ONCE
Status: COMPLETED | OUTPATIENT
Start: 2024-04-05 | End: 2024-04-05

## 2024-04-05 RX ADMIN — METHYLPREDNISOLONE SODIUM SUCCINATE 125 MG: 125 INJECTION, POWDER, FOR SOLUTION INTRAMUSCULAR; INTRAVENOUS at 18:37

## 2024-04-05 RX ADMIN — IPRATROPIUM BROMIDE AND ALBUTEROL SULFATE 3 ML: .5; 3 SOLUTION RESPIRATORY (INHALATION) at 18:21

## 2024-04-05 NOTE — ED PROVIDER NOTES
Subjective   History of Present Illness  89-year-old male presents emergency room complaining of cough and shortness of breath.  Patient has COPD.  Patient has been having coughing fits at times causing him to feel like he was going to pass out or turn red or blue in the face because he cannot stop coughing.  Patient had a breathing treatment this morning as well as at noon and at 4 PM with some improvement after use.  Patient is less concerned about his shortness of breath and more concerned about the coughing fits.  Patient has no other complaints      Review of Systems   Constitutional:  Negative for activity change, appetite change, chills, diaphoresis, fatigue and fever.   HENT:  Negative for congestion, sinus pressure, sneezing and sore throat.    Eyes:  Negative for photophobia and visual disturbance.   Respiratory:  Positive for cough, shortness of breath and wheezing.    Cardiovascular:  Negative for chest pain, palpitations and leg swelling.   Gastrointestinal:  Negative for abdominal distention, abdominal pain, diarrhea, nausea and vomiting.   Genitourinary:  Negative for dysuria and flank pain.   Musculoskeletal:  Negative for arthralgias, back pain and myalgias.   Skin:  Negative for rash.   Neurological:  Negative for dizziness, weakness and headaches.   Psychiatric/Behavioral:  Negative for behavioral problems and confusion.        Past Medical History:   Diagnosis Date    Allergic     Seasonal    Allergic rhinitis     Arthritis     Asthma     Brito esophagus     Benign prostatic hyperplasia     Carotid arterial disease     Status post right CEA 4/1/14    COPD (chronic obstructive pulmonary disease)     Coronary artery disease     Dementia     Dyspnea     Erectile dysfunction     GERD (gastroesophageal reflux disease)     Hematoma of frontal scalp 07/08/2021    HL (hearing loss)     Hypothyroidism 1992    Low back pain     PAD (peripheral artery disease)     Prostate cancer     Status post  implantation of artificial urinary sphincter         Stroke     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    Urinary tract infection        Allergies   Allergen Reactions    Bactrim [Sulfamethoxazole-Trimethoprim] Rash       Past Surgical History:   Procedure Laterality Date    CAROTID ARTERY ANGIOPLASTY  2014    COLONOSCOPY  2009    ENDOSCOPY  2009    ,,    EYE SURGERY      FRACTURE SURGERY      HERNIA REPAIR  2000    HERNIA REPAIR      INGUINAL HERNIA REPAIR      PARTIAL HIP ARTHROPLASTY Left 2014    PROSTATECTOMY  2008    SHOULDER SURGERY  2005    SHOULDER SURGERY  2006    SHOULDER SURGERY  2008    TEAR DUCT SURGERY      URINARY SPHINCTER IMPLANT         Family History   Problem Relation Age of Onset    Hypertension Sister     Thyroid disease Sister     Stroke Sister     Depression Sister     Heart failure Sister     Hypertension Brother     Lung cancer Brother     COPD Brother     Alcohol abuse Brother     Coronary artery disease Son         Son  from MI at age 59        Social History     Socioeconomic History    Marital status:     Number of children: 3   Tobacco Use    Smoking status: Former     Current packs/day: 0.00     Types: Cigarettes     Start date: 1947     Quit date: 1962     Years since quittin.3    Smokeless tobacco: Never    Tobacco comments:     Quit 1960's   1 ppd for 15 years     Vaping Use    Vaping status: Never Used   Substance and Sexual Activity    Alcohol use: Never    Drug use: Never    Sexual activity: Not Currently     Partners: Female     Birth control/protection: None           Objective   Physical Exam  Vitals and nursing note reviewed.   Constitutional:       General: He is not in acute distress.     Appearance: Normal appearance. He is not toxic-appearing or diaphoretic.      Comments: 89-year-old male in no acute distress.  Patient is able to speak in full sentences without difficulty   HENT:       Head: Normocephalic and atraumatic.      Mouth/Throat:      Mouth: Mucous membranes are moist.   Eyes:      Conjunctiva/sclera: Conjunctivae normal.   Cardiovascular:      Rate and Rhythm: Normal rate and regular rhythm.      Pulses: Normal pulses.   Pulmonary:      Effort: Pulmonary effort is normal. No respiratory distress.      Breath sounds: Wheezing present. No rales.      Comments: Patient has some mild wheezing left greater than right.  Abdominal:      General: Abdomen is flat. There is no distension.      Tenderness: There is no abdominal tenderness.   Musculoskeletal:         General: No swelling or signs of injury. Normal range of motion.      Cervical back: Normal range of motion and neck supple.   Skin:     General: Skin is warm and dry.      Findings: No rash.   Neurological:      General: No focal deficit present.      Mental Status: He is alert and oriented to person, place, and time.   Psychiatric:         Mood and Affect: Mood normal.         Behavior: Behavior normal.         Procedures           ED Course  ED Course as of 04/05/24 1930 Fri Apr 05, 2024 1851 COVID19: Not Detected []   1851 Influenza A PCR: Not Detected []   1851 Influenza B PCR: Not Detected []   1851 Chest x-ray:  IMPRESSION:  Impression: Vascular congestion.        Electronically Signed: Michael Moss MD    4/5/2024 6:39 PM EDT    Workstation ID: [id number]   []   1916 Patient's lab and radiologic findings show no acute abnormality.  Patient improved after DuoNeb.  Will treat patient with cough medicine as well as steroids and antibiotics and follow-up with primary care for COPD exacerbation.  Patient can also return the emergency room for new persistent or worsening symptoms. []      ED Course User Index  [] Tremayne Perkins MD                                             Medical Decision Making  My differential diagnosis for cough includes but is not limited to:  Upper respiratory infection, bronchitis,  pneumonia, COPD exacerbation, cough variant asthma, cardiac asthma, coronary artery disease, congestive heart failure, bacterial, viral or lung infections, lung cancer, aspiration pneumonitis, aspiration of foreign body and Covid -19           Amount and/or Complexity of Data Reviewed  Radiology: ordered. Decision-making details documented in ED Course.    Risk  Prescription drug management.        Final diagnoses:   Acute cough   COPD exacerbation       ED Disposition  ED Disposition       ED Disposition   Discharge    Condition   Stable    Comment   --               Charlie Trinh MD  [address] [facility] [address] [phone]    Schedule an appointment as soon as possible for a visit       [facility] EMERGENCY DEPARTMENT  [address] [facility] [place] [address] [phone]  Go to   As needed         Medication List        New Prescriptions      azithromycin 250 MG tablet  Commonly known as: Zithromax Z-Cole  Take 2 tablets by mouth on day 1, then 1 tablet daily on days 2-5     benzonatate 100 MG capsule  Commonly known as: TESSALON  Take 1 capsule by mouth 3 (Three) Times a Day As Needed for Cough for up to 7 days.     methylPREDNISolone 4 MG dose pack  Commonly known as: MEDROL  Take as directed on package instructions.               Where to Get Your Medications        These medications were sent to [facility] - [place] - [address] - [phone]  - [phone] FX  [address] [facility] [address] [place] [address]      Phone: [phone]   azithromycin 250 MG tablet  benzonatate 100 MG capsule  methylPREDNISolone 4 MG dose pack            Tremayne Perkins MD  04/05/24 7117

## 2024-04-09 RX ORDER — PANTOPRAZOLE SODIUM 40 MG/1
40 TABLET, DELAYED RELEASE ORAL DAILY
Qty: 90 TABLET | Refills: 3 | Status: SHIPPED | OUTPATIENT
Start: 2024-04-09

## 2024-04-17 ENCOUNTER — HOSPITAL ENCOUNTER (INPATIENT)
Facility: HOSPITAL | Age: 89
LOS: 3 days | Discharge: HOME HEALTH | DRG: 177 | End: 2024-04-21
Attending: STUDENT IN AN ORGANIZED HEALTH CARE EDUCATION/TRAINING PROGRAM | Admitting: INTERNAL MEDICINE
Payer: MEDICARE

## 2024-04-17 ENCOUNTER — APPOINTMENT (OUTPATIENT)
Dept: GENERAL RADIOLOGY | Facility: HOSPITAL | Age: 89
DRG: 177 | End: 2024-04-17
Payer: MEDICARE

## 2024-04-17 ENCOUNTER — APPOINTMENT (OUTPATIENT)
Dept: CT IMAGING | Facility: HOSPITAL | Age: 89
DRG: 177 | End: 2024-04-17
Payer: MEDICARE

## 2024-04-17 DIAGNOSIS — R09.02 HYPOXIA: ICD-10-CM

## 2024-04-17 DIAGNOSIS — J96.01 ACUTE HYPOXEMIC RESPIRATORY FAILURE DUE TO COVID-19: ICD-10-CM

## 2024-04-17 DIAGNOSIS — U07.1 ACUTE HYPOXEMIC RESPIRATORY FAILURE DUE TO COVID-19: ICD-10-CM

## 2024-04-17 DIAGNOSIS — J18.9 PNEUMONIA OF LEFT LOWER LOBE DUE TO INFECTIOUS ORGANISM: Primary | ICD-10-CM

## 2024-04-17 LAB
ALBUMIN SERPL-MCNC: 3.4 G/DL (ref 3.5–5.2)
ALBUMIN/GLOB SERPL: 1.1 G/DL
ALP SERPL-CCNC: 42 U/L (ref 39–117)
ALT SERPL W P-5'-P-CCNC: 21 U/L (ref 1–41)
ANION GAP SERPL CALCULATED.3IONS-SCNC: 10.9 MMOL/L (ref 5–15)
AST SERPL-CCNC: 26 U/L (ref 1–40)
BASOPHILS # BLD AUTO: 0.04 10*3/MM3 (ref 0–0.2)
BASOPHILS NFR BLD AUTO: 0.2 % (ref 0–1.5)
BILIRUB SERPL-MCNC: 0.5 MG/DL (ref 0–1.2)
BUN SERPL-MCNC: 28 MG/DL (ref 8–23)
BUN/CREAT SERPL: 26.7 (ref 7–25)
CALCIUM SPEC-SCNC: 8.9 MG/DL (ref 8.2–9.6)
CHLORIDE SERPL-SCNC: 97 MMOL/L (ref 98–107)
CO2 SERPL-SCNC: 26.1 MMOL/L (ref 22–29)
CREAT SERPL-MCNC: 1.05 MG/DL (ref 0.76–1.27)
D-LACTATE SERPL-SCNC: 0.9 MMOL/L (ref 0.5–2)
D-LACTATE SERPL-SCNC: 2.4 MMOL/L (ref 0.5–2)
DEPRECATED RDW RBC AUTO: 50.4 FL (ref 37–54)
EGFRCR SERPLBLD CKD-EPI 2021: 67 ML/MIN/1.73
EOSINOPHIL # BLD AUTO: 0.03 10*3/MM3 (ref 0–0.4)
EOSINOPHIL NFR BLD AUTO: 0.2 % (ref 0.3–6.2)
ERYTHROCYTE [DISTWIDTH] IN BLOOD BY AUTOMATED COUNT: 15.2 % (ref 12.3–15.4)
FLUAV SUBTYP SPEC NAA+PROBE: NOT DETECTED
FLUBV RNA ISLT QL NAA+PROBE: NOT DETECTED
GEN 5 2HR TROPONIN T REFLEX: 24 NG/L
GLOBULIN UR ELPH-MCNC: 3.1 GM/DL
GLUCOSE BLDC GLUCOMTR-MCNC: 168 MG/DL (ref 70–130)
GLUCOSE SERPL-MCNC: 129 MG/DL (ref 65–99)
HBA1C MFR BLD: 6 % (ref 4.8–5.6)
HCT VFR BLD AUTO: 37.6 % (ref 37.5–51)
HGB BLD-MCNC: 12.1 G/DL (ref 13–17.7)
IMM GRANULOCYTES # BLD AUTO: 0.08 10*3/MM3 (ref 0–0.05)
IMM GRANULOCYTES NFR BLD AUTO: 0.5 % (ref 0–0.5)
LYMPHOCYTES # BLD AUTO: 0.99 10*3/MM3 (ref 0.7–3.1)
LYMPHOCYTES NFR BLD AUTO: 6.1 % (ref 19.6–45.3)
MCH RBC QN AUTO: 29.2 PG (ref 26.6–33)
MCHC RBC AUTO-ENTMCNC: 32.2 G/DL (ref 31.5–35.7)
MCV RBC AUTO: 90.8 FL (ref 79–97)
MONOCYTES # BLD AUTO: 1.1 10*3/MM3 (ref 0.1–0.9)
MONOCYTES NFR BLD AUTO: 6.8 % (ref 5–12)
NEUTROPHILS NFR BLD AUTO: 13.89 10*3/MM3 (ref 1.7–7)
NEUTROPHILS NFR BLD AUTO: 86.2 % (ref 42.7–76)
NRBC BLD AUTO-RTO: 0 /100 WBC (ref 0–0.2)
NT-PROBNP SERPL-MCNC: 901.3 PG/ML (ref 0–1800)
PLATELET # BLD AUTO: 230 10*3/MM3 (ref 140–450)
PMV BLD AUTO: 10.2 FL (ref 6–12)
POTASSIUM SERPL-SCNC: 4 MMOL/L (ref 3.5–5.2)
PROCALCITONIN SERPL-MCNC: 3.82 NG/ML (ref 0–0.25)
PROT SERPL-MCNC: 6.5 G/DL (ref 6–8.5)
QT INTERVAL: 366 MS
QTC INTERVAL: 441 MS
RBC # BLD AUTO: 4.14 10*6/MM3 (ref 4.14–5.8)
SARS-COV-2 RNA RESP QL NAA+PROBE: NOT DETECTED
SODIUM SERPL-SCNC: 134 MMOL/L (ref 136–145)
TROPONIN T DELTA: -4 NG/L
TROPONIN T SERPL HS-MCNC: 28 NG/L
TSH SERPL DL<=0.05 MIU/L-ACNC: 1.94 UIU/ML (ref 0.27–4.2)
WBC NRBC COR # BLD AUTO: 16.13 10*3/MM3 (ref 3.4–10.8)

## 2024-04-17 PROCEDURE — 83605 ASSAY OF LACTIC ACID: CPT | Performed by: STUDENT IN AN ORGANIZED HEALTH CARE EDUCATION/TRAINING PROGRAM

## 2024-04-17 PROCEDURE — 94761 N-INVAS EAR/PLS OXIMETRY MLT: CPT

## 2024-04-17 PROCEDURE — 25010000002 ONDANSETRON PER 1 MG: Performed by: STUDENT IN AN ORGANIZED HEALTH CARE EDUCATION/TRAINING PROGRAM

## 2024-04-17 PROCEDURE — 94640 AIRWAY INHALATION TREATMENT: CPT

## 2024-04-17 PROCEDURE — 84484 ASSAY OF TROPONIN QUANT: CPT | Performed by: NURSE PRACTITIONER

## 2024-04-17 PROCEDURE — 84484 ASSAY OF TROPONIN QUANT: CPT | Performed by: STUDENT IN AN ORGANIZED HEALTH CARE EDUCATION/TRAINING PROGRAM

## 2024-04-17 PROCEDURE — 25810000003 SODIUM CHLORIDE 0.9 % SOLUTION: Performed by: STUDENT IN AN ORGANIZED HEALTH CARE EDUCATION/TRAINING PROGRAM

## 2024-04-17 PROCEDURE — G0378 HOSPITAL OBSERVATION PER HR: HCPCS

## 2024-04-17 PROCEDURE — 84443 ASSAY THYROID STIM HORMONE: CPT | Performed by: NURSE PRACTITIONER

## 2024-04-17 PROCEDURE — 0202U NFCT DS 22 TRGT SARS-COV-2: CPT | Performed by: NURSE PRACTITIONER

## 2024-04-17 PROCEDURE — 80053 COMPREHEN METABOLIC PANEL: CPT | Performed by: NURSE PRACTITIONER

## 2024-04-17 PROCEDURE — 80053 COMPREHEN METABOLIC PANEL: CPT | Performed by: STUDENT IN AN ORGANIZED HEALTH CARE EDUCATION/TRAINING PROGRAM

## 2024-04-17 PROCEDURE — 71045 X-RAY EXAM CHEST 1 VIEW: CPT

## 2024-04-17 PROCEDURE — 70450 CT HEAD/BRAIN W/O DYE: CPT

## 2024-04-17 PROCEDURE — 85025 COMPLETE CBC W/AUTO DIFF WBC: CPT | Performed by: STUDENT IN AN ORGANIZED HEALTH CARE EDUCATION/TRAINING PROGRAM

## 2024-04-17 PROCEDURE — 93010 ELECTROCARDIOGRAM REPORT: CPT | Performed by: INTERNAL MEDICINE

## 2024-04-17 PROCEDURE — 83880 ASSAY OF NATRIURETIC PEPTIDE: CPT | Performed by: STUDENT IN AN ORGANIZED HEALTH CARE EDUCATION/TRAINING PROGRAM

## 2024-04-17 PROCEDURE — 25010000002 CEFTRIAXONE PER 250 MG: Performed by: STUDENT IN AN ORGANIZED HEALTH CARE EDUCATION/TRAINING PROGRAM

## 2024-04-17 PROCEDURE — 36415 COLL VENOUS BLD VENIPUNCTURE: CPT

## 2024-04-17 PROCEDURE — 83036 HEMOGLOBIN GLYCOSYLATED A1C: CPT | Performed by: NURSE PRACTITIONER

## 2024-04-17 PROCEDURE — 94799 UNLISTED PULMONARY SVC/PX: CPT

## 2024-04-17 PROCEDURE — 85025 COMPLETE CBC W/AUTO DIFF WBC: CPT | Performed by: NURSE PRACTITIONER

## 2024-04-17 PROCEDURE — 93005 ELECTROCARDIOGRAM TRACING: CPT | Performed by: STUDENT IN AN ORGANIZED HEALTH CARE EDUCATION/TRAINING PROGRAM

## 2024-04-17 PROCEDURE — 93005 ELECTROCARDIOGRAM TRACING: CPT | Performed by: NURSE PRACTITIONER

## 2024-04-17 PROCEDURE — 25010000002 METHYLPREDNISOLONE PER 125 MG: Performed by: STUDENT IN AN ORGANIZED HEALTH CARE EDUCATION/TRAINING PROGRAM

## 2024-04-17 PROCEDURE — 80061 LIPID PANEL: CPT | Performed by: NURSE PRACTITIONER

## 2024-04-17 PROCEDURE — 87040 BLOOD CULTURE FOR BACTERIA: CPT | Performed by: STUDENT IN AN ORGANIZED HEALTH CARE EDUCATION/TRAINING PROGRAM

## 2024-04-17 PROCEDURE — 87636 SARSCOV2 & INF A&B AMP PRB: CPT | Performed by: STUDENT IN AN ORGANIZED HEALTH CARE EDUCATION/TRAINING PROGRAM

## 2024-04-17 PROCEDURE — 82948 REAGENT STRIP/BLOOD GLUCOSE: CPT

## 2024-04-17 PROCEDURE — 84145 PROCALCITONIN (PCT): CPT | Performed by: STUDENT IN AN ORGANIZED HEALTH CARE EDUCATION/TRAINING PROGRAM

## 2024-04-17 PROCEDURE — 85610 PROTHROMBIN TIME: CPT | Performed by: NURSE PRACTITIONER

## 2024-04-17 PROCEDURE — 99223 1ST HOSP IP/OBS HIGH 75: CPT | Performed by: NURSE PRACTITIONER

## 2024-04-17 PROCEDURE — 99285 EMERGENCY DEPT VISIT HI MDM: CPT

## 2024-04-17 RX ORDER — METHYLPREDNISOLONE SODIUM SUCCINATE 125 MG/2ML
125 INJECTION, POWDER, LYOPHILIZED, FOR SOLUTION INTRAMUSCULAR; INTRAVENOUS ONCE
Status: COMPLETED | OUTPATIENT
Start: 2024-04-17 | End: 2024-04-17

## 2024-04-17 RX ORDER — DIPHENOXYLATE HYDROCHLORIDE AND ATROPINE SULFATE 2.5; .025 MG/1; MG/1
1 TABLET ORAL NIGHTLY
COMMUNITY

## 2024-04-17 RX ORDER — ONDANSETRON 2 MG/ML
4 INJECTION INTRAMUSCULAR; INTRAVENOUS EVERY 6 HOURS PRN
Status: DISCONTINUED | OUTPATIENT
Start: 2024-04-17 | End: 2024-04-21 | Stop reason: HOSPADM

## 2024-04-17 RX ORDER — ONDANSETRON 2 MG/ML
4 INJECTION INTRAMUSCULAR; INTRAVENOUS ONCE
Status: COMPLETED | OUTPATIENT
Start: 2024-04-17 | End: 2024-04-17

## 2024-04-17 RX ORDER — DOCUSATE SODIUM 100 MG/1
100 CAPSULE, LIQUID FILLED ORAL DAILY
Status: DISCONTINUED | OUTPATIENT
Start: 2024-04-18 | End: 2024-04-21 | Stop reason: HOSPADM

## 2024-04-17 RX ORDER — ACETAMINOPHEN 325 MG/1
650 TABLET ORAL EVERY 4 HOURS PRN
Status: DISCONTINUED | OUTPATIENT
Start: 2024-04-17 | End: 2024-04-21 | Stop reason: HOSPADM

## 2024-04-17 RX ORDER — SODIUM CHLORIDE 9 MG/ML
100 INJECTION, SOLUTION INTRAVENOUS CONTINUOUS
Status: DISCONTINUED | OUTPATIENT
Start: 2024-04-17 | End: 2024-04-18

## 2024-04-17 RX ORDER — SODIUM CHLORIDE 0.9 % (FLUSH) 0.9 %
10 SYRINGE (ML) INJECTION AS NEEDED
Status: DISCONTINUED | OUTPATIENT
Start: 2024-04-17 | End: 2024-04-21 | Stop reason: HOSPADM

## 2024-04-17 RX ORDER — IPRATROPIUM BROMIDE AND ALBUTEROL SULFATE 2.5; .5 MG/3ML; MG/3ML
3 SOLUTION RESPIRATORY (INHALATION)
Status: DISCONTINUED | OUTPATIENT
Start: 2024-04-17 | End: 2024-04-21 | Stop reason: HOSPADM

## 2024-04-17 RX ORDER — SODIUM CHLORIDE 0.9 % (FLUSH) 0.9 %
10 SYRINGE (ML) INJECTION EVERY 12 HOURS SCHEDULED
Status: DISCONTINUED | OUTPATIENT
Start: 2024-04-17 | End: 2024-04-21 | Stop reason: HOSPADM

## 2024-04-17 RX ORDER — PANTOPRAZOLE SODIUM 40 MG/1
40 TABLET, DELAYED RELEASE ORAL DAILY
Status: DISCONTINUED | OUTPATIENT
Start: 2024-04-18 | End: 2024-04-21 | Stop reason: HOSPADM

## 2024-04-17 RX ORDER — CHOLECALCIFEROL (VITAMIN D3) 125 MCG
5 CAPSULE ORAL NIGHTLY PRN
Status: DISCONTINUED | OUTPATIENT
Start: 2024-04-17 | End: 2024-04-21 | Stop reason: HOSPADM

## 2024-04-17 RX ORDER — IPRATROPIUM BROMIDE AND ALBUTEROL SULFATE 2.5; .5 MG/3ML; MG/3ML
3 SOLUTION RESPIRATORY (INHALATION) ONCE
Status: COMPLETED | OUTPATIENT
Start: 2024-04-17 | End: 2024-04-17

## 2024-04-17 RX ORDER — RIVASTIGMINE 9.5 MG/24H
1 PATCH, EXTENDED RELEASE TRANSDERMAL DAILY
COMMUNITY

## 2024-04-17 RX ORDER — SODIUM CHLORIDE 9 MG/ML
40 INJECTION, SOLUTION INTRAVENOUS AS NEEDED
Status: DISCONTINUED | OUTPATIENT
Start: 2024-04-17 | End: 2024-04-21 | Stop reason: HOSPADM

## 2024-04-17 RX ORDER — ACETAMINOPHEN 160 MG/5ML
650 SOLUTION ORAL EVERY 4 HOURS PRN
Status: DISCONTINUED | OUTPATIENT
Start: 2024-04-17 | End: 2024-04-21 | Stop reason: HOSPADM

## 2024-04-17 RX ORDER — ACETAMINOPHEN 650 MG/1
650 SUPPOSITORY RECTAL EVERY 4 HOURS PRN
Status: DISCONTINUED | OUTPATIENT
Start: 2024-04-17 | End: 2024-04-21 | Stop reason: HOSPADM

## 2024-04-17 RX ORDER — ONDANSETRON 4 MG/1
4 TABLET, ORALLY DISINTEGRATING ORAL EVERY 6 HOURS PRN
Status: DISCONTINUED | OUTPATIENT
Start: 2024-04-17 | End: 2024-04-21 | Stop reason: HOSPADM

## 2024-04-17 RX ORDER — CLOPIDOGREL BISULFATE 75 MG/1
75 TABLET ORAL DAILY
Status: DISCONTINUED | OUTPATIENT
Start: 2024-04-18 | End: 2024-04-21 | Stop reason: HOSPADM

## 2024-04-17 RX ORDER — IPRATROPIUM BROMIDE AND ALBUTEROL SULFATE 2.5; .5 MG/3ML; MG/3ML
3 SOLUTION RESPIRATORY (INHALATION) EVERY 4 HOURS PRN
Status: DISCONTINUED | OUTPATIENT
Start: 2024-04-17 | End: 2024-04-21 | Stop reason: HOSPADM

## 2024-04-17 RX ORDER — DOCUSATE SODIUM 100 MG/1
100 CAPSULE, LIQUID FILLED ORAL DAILY
COMMUNITY

## 2024-04-17 RX ORDER — MAGNESIUM OXIDE 400 MG/1
400 TABLET ORAL NIGHTLY
COMMUNITY

## 2024-04-17 RX ORDER — METHYLPREDNISOLONE SODIUM SUCCINATE 40 MG/ML
40 INJECTION, POWDER, LYOPHILIZED, FOR SOLUTION INTRAMUSCULAR; INTRAVENOUS EVERY 12 HOURS
Status: DISCONTINUED | OUTPATIENT
Start: 2024-04-17 | End: 2024-04-18

## 2024-04-17 RX ORDER — BISACODYL 5 MG/1
5 TABLET, DELAYED RELEASE ORAL DAILY PRN
Status: DISCONTINUED | OUTPATIENT
Start: 2024-04-17 | End: 2024-04-21 | Stop reason: HOSPADM

## 2024-04-17 RX ORDER — GUAIFENESIN 600 MG/1
1200 TABLET, EXTENDED RELEASE ORAL 2 TIMES DAILY
Status: DISCONTINUED | OUTPATIENT
Start: 2024-04-17 | End: 2024-04-21 | Stop reason: HOSPADM

## 2024-04-17 RX ORDER — CETIRIZINE HYDROCHLORIDE 10 MG/1
10 TABLET ORAL DAILY PRN
Status: DISCONTINUED | OUTPATIENT
Start: 2024-04-17 | End: 2024-04-21 | Stop reason: HOSPADM

## 2024-04-17 RX ORDER — NITROGLYCERIN 0.4 MG/1
0.4 TABLET SUBLINGUAL
Status: DISCONTINUED | OUTPATIENT
Start: 2024-04-17 | End: 2024-04-21 | Stop reason: HOSPADM

## 2024-04-17 RX ORDER — AMOXICILLIN 250 MG
2 CAPSULE ORAL 2 TIMES DAILY PRN
Status: DISCONTINUED | OUTPATIENT
Start: 2024-04-17 | End: 2024-04-21 | Stop reason: HOSPADM

## 2024-04-17 RX ORDER — LEVOTHYROXINE SODIUM 0.05 MG/1
50 TABLET ORAL
Status: DISCONTINUED | OUTPATIENT
Start: 2024-04-18 | End: 2024-04-21 | Stop reason: HOSPADM

## 2024-04-17 RX ORDER — BISACODYL 10 MG
10 SUPPOSITORY, RECTAL RECTAL DAILY PRN
Status: DISCONTINUED | OUTPATIENT
Start: 2024-04-17 | End: 2024-04-21 | Stop reason: HOSPADM

## 2024-04-17 RX ORDER — POLYETHYLENE GLYCOL 3350 17 G/17G
17 POWDER, FOR SOLUTION ORAL DAILY PRN
Status: DISCONTINUED | OUTPATIENT
Start: 2024-04-17 | End: 2024-04-21 | Stop reason: HOSPADM

## 2024-04-17 RX ORDER — ASCORBIC ACID, THIAMINE, RIBOFLAVIN, NIACINAMIDE, PYRIDOXINE, FOLIC ACID, COBALAMIN, BIOTIN, PANTOTHENIC ACID 100; 1.5; 1.7; 20; 10; 1; 6; 300; 1 MG/1; MG/1; MG/1; MG/1; MG/1; MG/1; UG/1; UG/1; MG/1
15 TABLET, COATED ORAL DAILY
Status: DISCONTINUED | OUTPATIENT
Start: 2024-04-18 | End: 2024-04-21 | Stop reason: HOSPADM

## 2024-04-17 RX ORDER — ENOXAPARIN SODIUM 100 MG/ML
40 INJECTION SUBCUTANEOUS NIGHTLY
Status: DISCONTINUED | OUTPATIENT
Start: 2024-04-17 | End: 2024-04-21 | Stop reason: HOSPADM

## 2024-04-17 RX ORDER — CITALOPRAM 20 MG/1
20 TABLET ORAL DAILY
Status: DISCONTINUED | OUTPATIENT
Start: 2024-04-18 | End: 2024-04-21 | Stop reason: HOSPADM

## 2024-04-17 RX ORDER — RIVASTIGMINE 4.6 MG/24H
1 PATCH, EXTENDED RELEASE TRANSDERMAL DAILY
Status: DISCONTINUED | OUTPATIENT
Start: 2024-04-18 | End: 2024-04-21 | Stop reason: HOSPADM

## 2024-04-17 RX ORDER — TRAZODONE HYDROCHLORIDE 50 MG/1
100 TABLET ORAL NIGHTLY
Status: DISCONTINUED | OUTPATIENT
Start: 2024-04-17 | End: 2024-04-21 | Stop reason: HOSPADM

## 2024-04-17 RX ADMIN — SODIUM CHLORIDE 1000 ML: 9 INJECTION, SOLUTION INTRAVENOUS at 16:50

## 2024-04-17 RX ADMIN — ONDANSETRON 4 MG: 2 INJECTION INTRAMUSCULAR; INTRAVENOUS at 16:19

## 2024-04-17 RX ADMIN — METHYLPREDNISOLONE SODIUM SUCCINATE 125 MG: 125 INJECTION, POWDER, FOR SOLUTION INTRAMUSCULAR; INTRAVENOUS at 16:15

## 2024-04-17 RX ADMIN — IPRATROPIUM BROMIDE AND ALBUTEROL SULFATE 3 ML: .5; 3 SOLUTION RESPIRATORY (INHALATION) at 16:23

## 2024-04-17 RX ADMIN — IPRATROPIUM BROMIDE AND ALBUTEROL SULFATE 3 ML: .5; 3 SOLUTION RESPIRATORY (INHALATION) at 21:45

## 2024-04-17 RX ADMIN — SODIUM CHLORIDE 1000 MG: 9 INJECTION, SOLUTION INTRAVENOUS at 17:07

## 2024-04-17 NOTE — ED PROVIDER NOTES
"Subjective   History of Present Illness  Pt is a 89 y.o. male with PMH as listed who presents for   Chief Complaint   Patient presents with    Weakness - Generalized     \" Didn't want to get out of bed\", cough, wheezing, left ear pain       Patient is a 89-year-old male presents for generalized weakness, fatigue, cough and wheezing and also complaining of left ear pain.  Patient also had an episode of vomiting and bowel incontinence last night.  He has dried vomitus on his mouth at time of presentation.  Denies any chest pain, shortness of breath, abdominal pain, nausea at this time.  Has no other new complaints.  He had some wheezing earlier today and has history of asthma and COPD.  He was treated with nebulizer at his care facility prior to EMS being called and transported the ED for further management.  Patient found to be hypoxic with SpO2 of 82% on room air initially, he is not on oxygen at baseline.    Review of Systems    Past Medical History:   Diagnosis Date    Allergic     Seasonal    Allergic rhinitis     Arthritis     Asthma     Brito esophagus     Benign prostatic hyperplasia     Carotid arterial disease     Status post right CEA 4/1/14    COPD (chronic obstructive pulmonary disease)     Coronary artery disease     Dementia     Dyspnea     Erectile dysfunction     GERD (gastroesophageal reflux disease)     Hematoma of frontal scalp 07/08/2021    HL (hearing loss)     Hypothyroidism 1992    Low back pain     PAD (peripheral artery disease)     Prostate cancer     Status post implantation of artificial urinary sphincter         Stroke     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    Urinary tract infection        Allergies   Allergen Reactions    Bactrim [Sulfamethoxazole-Trimethoprim] Rash       Past Surgical History:   Procedure Laterality Date    CAROTID ARTERY ANGIOPLASTY  04/01/2014    COLONOSCOPY  2009    ENDOSCOPY  2009    2001,2003,2006    EYE SURGERY      FRACTURE SURGERY "      HERNIA REPAIR  2000    HERNIA REPAIR  2009    INGUINAL HERNIA REPAIR      PARTIAL HIP ARTHROPLASTY Left 2014    PROSTATECTOMY  2008    SHOULDER SURGERY  2005    SHOULDER SURGERY  2006    SHOULDER SURGERY  2008    TEAR DUCT SURGERY      URINARY SPHINCTER IMPLANT         Family History   Problem Relation Age of Onset    Hypertension Sister     Thyroid disease Sister     Stroke Sister     Depression Sister     Heart failure Sister     Hypertension Brother     Lung cancer Brother     COPD Brother     Alcohol abuse Brother     Coronary artery disease Son         Son  from MI at age 59        Social History     Socioeconomic History    Marital status:     Number of children: 3   Tobacco Use    Smoking status: Former     Current packs/day: 0.00     Types: Cigarettes     Start date: 1947     Quit date: 1962     Years since quittin.3    Smokeless tobacco: Never    Tobacco comments:     Quit 1960's   1 ppd for 15 years     Vaping Use    Vaping status: Never Used   Substance and Sexual Activity    Alcohol use: Never    Drug use: Never    Sexual activity: Not Currently     Partners: Female     Birth control/protection: None           Objective   Physical Exam  Constitutional:       Appearance: Normal appearance.   HENT:      Head: Normocephalic and atraumatic.      Right Ear: Tympanic membrane and ear canal normal.      Left Ear: Tympanic membrane and ear canal normal.      Mouth/Throat:      Mouth: Mucous membranes are moist.      Pharynx: Oropharynx is clear.   Eyes:      Conjunctiva/sclera: Conjunctivae normal.   Cardiovascular:      Rate and Rhythm: Normal rate and regular rhythm.   Pulmonary:      Effort: Pulmonary effort is normal.      Breath sounds: Wheezing present.      Comments: Decreased breath sounds bilaterally, R>L  Abdominal:      General: Abdomen is flat.      Palpations: Abdomen is soft.      Tenderness: There is no abdominal tenderness.   Musculoskeletal:      Cervical  back: Neck supple.   Skin:     General: Skin is warm and dry.   Neurological:      Mental Status: He is alert.   Psychiatric:         Mood and Affect: Mood normal.         Procedures           ED Course  ED Course as of 04/17/24 1913 Wed Apr 17, 2024 1547 Patient is a 89-year-old male who presents for generalized weakness, fatigue, left ear pain and shortness of breath.  Exam significant for dried vomitus on the face, diminished breath sounds record of the left.  Will obtain CBC, CMP, troponin, BNP, UA, lactic acid as well as procalcitonin and COVID and flu swab.  Will also obtain EKG and chest x-ray.  Patient treated with DuoNeb and Solu-Medrol as well as Zofran. [JF]   1633 Patient's lactate 2.4, given fluid bolus over 2 hours. [JF]   1910 Chest x-ray shows left lower lobe infiltrate based on my interpretation, patient on Rocephin.  Initial troponin 20 repeat after 2 hours 24 procalcitonin 3.82 white count 16.13.  Patient continued be hypoxic with O2 saturations dipping into the mid to high 80s when asleep on 4 to 5 L.  When awake O2 saturation is within acceptable limits given patient's history of COPD.  Discussed with BERNARD De La Rosa for hospital service she agrees to meet patient for further management.  Discussed with patient and patient's daughter-in-law at bedside who understand and agree with plan of care.  All questions answered. [JF]      ED Course User Index  [JF] Anil Nuñez MD                                             Medical Decision Making  My differential diagnosis includes but is not limited to generalized weakness, electrolyte abnormality, CVA, TIA, Bell's palsy, acute MI, GI bleed, urinary tract infection, systemic infections including sepsis, alcohol abuse, drug abuse including prescription and street drug.      Problems Addressed:  Hypoxia: complicated acute illness or injury  Pneumonia of left lower lobe due to infectious organism: complicated acute illness or injury    Amount  and/or Complexity of Data Reviewed  Labs: ordered. Decision-making details documented in ED Course.  Radiology: ordered.     Details: Chest x-ray shows left lower lobe infiltrate based on my interpretation, elevated hemidiaphragm is at baseline  ECG/medicine tests: ordered.     Details: EKG  4/17/24 at 1557  Rhythm sinus at a rate of 87  Normal axis, normal intervals, no ST changes, isolated Q-wave lead III  EKG interpreted by me contemporaneously by me with care  Discussion of management or test interpretation with external provider(s): Spoke with BERNARD De La Rosa for hospital service who agrees to meet patient for further management.    Risk  Prescription drug management.  Decision regarding hospitalization.        Final diagnoses:   Pneumonia of left lower lobe due to infectious organism   Hypoxia       ED Disposition  ED Disposition       ED Disposition   Decision to Admit    Condition   --    Comment   Level of Care: Telemetry [5]   Diagnosis: Hypoxia [852444]   Admitting Physician: STACY KAUR [[id number]]                 No follow-up provider specified.       Medication List      No changes were made to your prescriptions during this visit.            Anil Nuñez MD  04/17/24 [id number]

## 2024-04-17 NOTE — H&P
"[facility] HOSPITALIST     Charlie Trinh MD    CHIEF COMPLAINT: weakness/fatigue    HISTORY OF PRESENT ILLNESS:  The patient is a 89-year-old male who presented from SNF to ER secondary to generalized weakness, cough, wheezing, fatigue, nausea, vomiting.  In the ER room air sats were 82% he was placed on 5 L nasal cannula with sats above 90%.  Stepdaughter at bedside who is POA provides most of history as patient is unsure of where he is or why.  He does note that he is feeling \"rough\" and stepdaughter notes that today is the first day that he was so sleepy he could not get out of bed.  She also notes that staff at facility felt that he vomited in his sleep last night with remnants of emesis in his mouth when they assessed him.    Significant diagnostics in ER include: WBC 16.13, procalcitonin 3.82, lactate 2.4> 0.9,HS troponin 28> 24    In the ER he was given dose of Rocephin, DuoNeb, Solu-Medrol, Zofran, 1 L fluid bolus and admission was requested    He has a H/O aspiration pneumonia/mucus plugging, asthma/COPD, vascular dementia, CAD, hypothyroidism, chronic anemia, artificial urinary sphincter, carotid artery disease s/p CEA 2014, hypothyroidism, embolic stroke 2014, PAD, chronic elevation left hemidiaphragm    He/step daughter otherwise deny f/c/headache/rhinorrhea/nasal congestion/lightheadedness/syncopal sensation/diarrhea/chest pain/abdominal pain/recent illness/change in bowel habits/no weight change/bloody emesis or bloody stools/change in medications or any other new concerns.    Past Medical History:   Diagnosis Date    Allergic     Seasonal    Allergic rhinitis     Arthritis     Asthma     Brito esophagus     Benign prostatic hyperplasia     Carotid arterial disease     Status post right CEA 4/1/14    COPD (chronic obstructive pulmonary disease)     Coronary artery disease     Dementia     Dyspnea     Erectile dysfunction     GERD (gastroesophageal reflux disease)     Hematoma " of frontal scalp 2021    HL (hearing loss)     Hypothyroidism 1992    Low back pain     PAD (peripheral artery disease)     Prostate cancer     Status post implantation of artificial urinary sphincter         Stroke     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    Urinary tract infection      Past Surgical History:   Procedure Laterality Date    CAROTID ARTERY ANGIOPLASTY  2014    COLONOSCOPY  2009    ENDOSCOPY  2009    ,,2006    EYE SURGERY      FRACTURE SURGERY      HERNIA REPAIR  2000    HERNIA REPAIR  2009    INGUINAL HERNIA REPAIR      PARTIAL HIP ARTHROPLASTY Left 2014    PROSTATECTOMY  2008    SHOULDER SURGERY  2005    SHOULDER SURGERY  2006    SHOULDER SURGERY  2008    TEAR DUCT SURGERY      URINARY SPHINCTER IMPLANT       Family History   Problem Relation Age of Onset    Hypertension Sister     Thyroid disease Sister     Stroke Sister     Depression Sister     Heart failure Sister     Hypertension Brother     Lung cancer Brother     COPD Brother     Alcohol abuse Brother     Coronary artery disease Son         Son  from MI at age 59      Social History     Tobacco Use    Smoking status: Former     Current packs/day: 0.00     Types: Cigarettes     Start date: 1947     Quit date: 1962     Years since quittin.3    Smokeless tobacco: Never    Tobacco comments:     Quit 1960's   1 ppd for 15 years     Vaping Use    Vaping status: Never Used   Substance Use Topics    Alcohol use: Never    Drug use: Never     Medications Prior to Admission   Medication Sig Dispense Refill Last Dose    albuterol sulfate  (90 Base) MCG/ACT inhaler Inhale 2 puffs Every 4 (Four) Hours As Needed for Wheezing or Shortness of Air. 8 g 0 Past Month    cetirizine (zyrTEC) 10 MG tablet Take 1 tablet by mouth Daily As Needed for Allergies.   2024 at 2100    citalopram (CeleXA) 20 MG tablet Take 1 tablet by mouth Daily.   2024 at 2100    clopidogrel  (PLAVIX) 75 MG tablet Take 1 tablet by mouth Daily. 90 tablet 3 4/17/2024 at 0800    docusate sodium (COLACE) 100 MG capsule Take 1 capsule by mouth Daily.   4/17/2024 at 0800    ipratropium-albuterol (DUO-NEB) 0.5-2.5 mg/3 ml nebulizer USES 1 VIAL VIA NEBULIZER 4  TIMES DAILY FOR CHRONIC  OBSTRUCTIVE LUNG DISEASE (Patient taking differently: 3 mL 4 (Four) Times a Day.) 1080 mL 3 4/17/2024 at 0800    levothyroxine (SYNTHROID, LEVOTHROID) 50 MCG tablet TAKE 1 TABLET BY MOUTH DAILY FOR THYROID 90 tablet 3 4/17/2024 at 0800    magnesium oxide (MAG-OX) 400 MG tablet Take 1 tablet by mouth Every Night.   4/16/2024 at 2100    pantoprazole (PROTONIX) 40 MG EC tablet TAKE 1 TABLET BY MOUTH DAILY 90 tablet 3 4/17/2024 at 0800    rivastigmine (Exelon) 13.3 MG/24HR patch Place 1 patch on the skin as directed by provider Daily. Indications: Dementia 90 patch 3 4/17/2024 at 0800    traZODone (DESYREL) 100 MG tablet Take 1 tablet by mouth Every Night.   4/16/2024 at 2100    Trelegy Ellipta 100-62.5-25 MCG/ACT inhaler INHALE 1 INHALATION BY MOUTH  DAILY FOR CHRONIC OBSTRUCTIVE  LUNG DISEASE (Patient taking differently: 1 puff Daily.) 180 each 3 4/17/2024 at 0800    multivitamin (MULTI VITAMIN DAILY PO) Take 1 tablet by mouth Every Night.    at 2100     Allergies:  Bactrim [sulfamethoxazole-trimethoprim]    Immunization History   Administered Date(s) Administered    COVID-19 (PFIZER) Purple Cap Monovalent 02/01/2021, 02/22/2021, 09/25/2021    COVID-19 F23 (PFIZER) 12YRS+ (COMIRNATY) 11/16/2023    Covid-19 (Pfizer) Gray Cap Monovalent 06/01/2022    FLUAD TRI 65YR+ 10/16/2019    Fluad Quad 65+ 10/16/2020    Fluzone High Dose =>65 Years ([employer] ONLY) 10/07/2015, 10/18/2016, 10/06/2017, 10/17/2018, 10/16/2019    Fluzone High-Dose 65+yrs 10/14/2021    Pneumococcal Conjugate 13-Valent (PCV13) 10/18/2016    Pneumococcal Polysaccharide (PPSV23) 10/13/2003, 01/01/2008, 05/11/2008    Shingrix 08/23/2022    Td (TDVAX) 10/13/2003    Tdap  "07/20/2010, 07/08/2021    Zostavax 07/20/2010, 05/11/2016       REVIEW OF SYSTEMS:  Please see the above history of present illness for pertinent positives and negatives.  The remainder of the patient's systems have been reviewed and are negative.     Vital Signs  Temp:  [97.6 °F (36.4 °C)-98.9 °F (37.2 °C)] 98.9 °F (37.2 °C)  Heart Rate:  [69-93] 85  Resp:  [16-29] 26  BP: (111-146)/(61-81) 146/68  Body mass index is 30.11 kg/m².    Flowsheet Rows      Flowsheet Row First Filed Value   Admission Height 175.3 cm (69.02\") Documented at 04/17/2024 1611   Admission Weight 89.4 kg (197 lb) Documented at 04/17/2024 1543               Physical Exam  Vitals reviewed.   Constitutional:       Comments: Elderly   HENT:      Head: Normocephalic and atraumatic.      Mouth/Throat:      Mouth: Mucous membranes are dry.   Eyes:      Extraocular Movements: Extraocular movements intact.      Pupils: Pupils are equal, round, and reactive to light.   Cardiovascular:      Rate and Rhythm: Normal rate and regular rhythm.      Comments: Grade 2/6 systolic murmur  Pulmonary:      Effort: Pulmonary effort is normal.      Comments: No wheezes however very poor air movement throughout  Abdominal:      General: Abdomen is flat. Bowel sounds are normal. There is no distension.      Palpations: Abdomen is soft.      Tenderness: There is no abdominal tenderness. There is no guarding.   Musculoskeletal:         General: No swelling.   Skin:     General: Skin is warm and dry.      Capillary Refill: Capillary refill takes less than 2 seconds.      Findings: No erythema.   Neurological:      General: No focal deficit present.      Mental Status: He is alert.      Comments: Oriented to self and stepdaughter only   Psychiatric:      Comments: Calm, cooperative       Emotional Behavior:    Judgment and Insight: Poor   Mental Status:  Alertness alert   Memory: Poor   Mood and Affect:         Depression none obvious               Anxiety none " obvious    Debilities:   Physical Weakness generalized   Handicaps age   Disabilities unknown   Agitation none     Results Review:    I reviewed the patient's new clinical results.  Lab Results (most recent)       Procedure Component Value Units Date/Time    STAT Lactic Acid, Reflex [[id number]] Collected: 04/17/24 1852    Specimen: Blood from Arm, Left Updated: 04/17/24 1855    High Sensitivity Troponin T 2Hr [[id number]]  (Abnormal) Collected: 04/17/24 1804    Specimen: Blood from Arm, Left Updated: 04/17/24 1836     HS Troponin T 24 ng/L      Troponin T Delta -4 ng/L     Narrative:      High Sensitive Troponin T Reference Range:  <14.0 ng/L- Negative Female for AMI  <22.0 ng/L- Negative Male for AMI  >=14 - Abnormal Female indicating possible myocardial injury.  >=22 - Abnormal Male indicating possible myocardial injury.   Clinicians would have to utilize clinical acumen, EKG, Troponin, and serial changes to determine if it is an Acute Myocardial Infarction or myocardial injury due to an underlying chronic condition.         Blood Culture - Blood, Arm, Left [[id number]] Collected: 04/17/24 1658    Specimen: Blood from Arm, Left Updated: 04/17/24 1714    Blood Culture - Blood, Arm, Left [[id number]] Collected: 04/17/24 1550    Specimen: Blood from Arm, Left Updated: 04/17/24 1713    Procalcitonin [[id number]]  (Abnormal) Collected: 04/17/24 1555    Specimen: Blood Updated: 04/17/24 1653     Procalcitonin 3.82 ng/mL     Narrative:      As a Marker for Sepsis (Non-Neonates):    1. <0.5 ng/mL represents a low risk of severe sepsis and/or septic shock.  2. >2 ng/mL represents a high risk of severe sepsis and/or septic shock.    As a Marker for Lower Respiratory Tract Infections that require antibiotic therapy:    PCT on Admission    Antibiotic Therapy       6-12 Hrs later    >0.5                Strongly Recommended  >0.25 - <0.5        Recommended   0.1 - 0.25          Discouraged              Remeasure/reassess PCT  <0.1   "              Strongly Discouraged     Remeasure/reassess PCT    As 28 day mortality risk marker: \"Change in Procalcitonin Result\" (>80% or <=80%) if Day 0 (or Day 1) and Day 4 values are available. Refer to http://[email or web address]-pct-calculator.[email or web address]    Change in PCT <=80%  A decrease of PCT levels below or equal to 80% defines a positive change in PCT test result representing a higher risk for 28-day all-cause mortality of patients diagnosed with severe sepsis for septic shock.    Change in PCT >80%  A decrease of PCT levels of more than 80% defines a negative change in PCT result representing a lower risk for 28-day all-cause mortality of patients diagnosed with severe sepsis or septic shock.       BNP [[id number]]  (Normal) Collected: 04/17/24 1555    Specimen: Blood Updated: 04/17/24 1633     proBNP 901.3 pg/mL     Narrative:      This assay is used as an aid in the diagnosis of individuals suspected of having heart failure. It can be used as an aid in the diagnosis of acute decompensated heart failure (ADHF) in patients presenting with signs and symptoms of ADHF to the emergency department (ED). In addition, NT-proBNP of <300 pg/mL indicates ADHF is not likely.    Age Range Result Interpretation  NT-proBNP Concentration (pg/mL:      <50             Positive            >450                   Gray                 300-450                    Negative             <300    50-75           Positive            >900                  Gray                300-900                  Negative            <300      >75             Positive            >1800                  Gray                300-1800                  Negative            <300    COVID-19 and FLU A/B PCR, 1 HR TAT - Swab, Nasopharynx [[id number]]  (Normal) Collected: 04/17/24 1605    Specimen: Swab from Nasopharynx Updated: 04/17/24 1628     COVID19 Not Detected     Influenza A PCR Not Detected     Influenza B PCR Not Detected    Narrative:      Fact sheet for providers: " https://www.fda.gov/media/622462/download    Fact sheet for patients: https://www.fda.gov/media/172791/download    Test performed by PCR.    Comprehensive Metabolic Panel [[id number]]  (Abnormal) Collected: 04/17/24 1555    Specimen: Blood Updated: 04/17/24 1625     Glucose 129 mg/dL      BUN 28 mg/dL      Creatinine 1.05 mg/dL      Sodium 134 mmol/L      Potassium 4.0 mmol/L      Chloride 97 mmol/L      CO2 26.1 mmol/L      Calcium 8.9 mg/dL      Total Protein 6.5 g/dL      Albumin 3.4 g/dL      ALT (SGPT) 21 U/L      AST (SGOT) 26 U/L      Alkaline Phosphatase 42 U/L      Total Bilirubin 0.5 mg/dL      Globulin 3.1 gm/dL      A/G Ratio 1.1 g/dL      BUN/Creatinine Ratio 26.7     Anion Gap 10.9 mmol/L      eGFR 67.0 mL/min/1.73     Narrative:      GFR Normal >60  Chronic Kidney Disease <60  Kidney Failure <15    The GFR formula is only valid for adults with stable renal function between ages 18 and 70.    High Sensitivity Troponin T [[id number]]  (Abnormal) Collected: 04/17/24 1555    Specimen: Blood Updated: 04/17/24 1625     HS Troponin T 28 ng/L     Narrative:      High Sensitive Troponin T Reference Range:  <14.0 ng/L- Negative Female for AMI  <22.0 ng/L- Negative Male for AMI  >=14 - Abnormal Female indicating possible myocardial injury.  >=22 - Abnormal Male indicating possible myocardial injury.   Clinicians would have to utilize clinical acumen, EKG, Troponin, and serial changes to determine if it is an Acute Myocardial Infarction or myocardial injury due to an underlying chronic condition.         Lactic Acid, Plasma [[id number]]  (Abnormal) Collected: 04/17/24 1555    Specimen: Blood Updated: 04/17/24 1622     Lactate 2.4 mmol/L     CBC & Differential [[id number]]  (Abnormal) Collected: 04/17/24 1555    Specimen: Blood Updated: 04/17/24 1609    Narrative:      The following orders were created for panel order CBC & Differential.  Procedure                               Abnormality         Status                      ---------                               -----------         ------                     CBC Auto Differential[[id number]]        Abnormal            Final result                 Please view results for these tests on the individual orders.    CBC Auto Differential [[id number]]  (Abnormal) Collected: 04/17/24 1555    Specimen: Blood Updated: 04/17/24 1609     WBC 16.13 10*3/mm3      RBC 4.14 10*6/mm3      Hemoglobin 12.1 g/dL      Hematocrit 37.6 %      MCV 90.8 fL      MCH 29.2 pg      MCHC 32.2 g/dL      RDW 15.2 %      RDW-SD 50.4 fl      MPV 10.2 fL      Platelets 230 10*3/mm3      Neutrophil % 86.2 %      Lymphocyte % 6.1 %      Monocyte % 6.8 %      Eosinophil % 0.2 %      Basophil % 0.2 %      Immature Grans % 0.5 %      Neutrophils, Absolute 13.89 10*3/mm3      Lymphocytes, Absolute 0.99 10*3/mm3      Monocytes, Absolute 1.10 10*3/mm3      Eosinophils, Absolute 0.03 10*3/mm3      Basophils, Absolute 0.04 10*3/mm3      Immature Grans, Absolute 0.08 10*3/mm3      nRBC 0.0 /100 WBC             Imaging Results (Most Recent)       Procedure Component Value Units Date/Time    XR Chest 1 View [[id number]] Collected: 04/17/24 1648     Updated: 04/17/24 1652    Narrative:      XR CHEST 1 VW    Date of Exam: 4/17/2024 4:40 PM EDT    Indication: dyspnea    Comparison: 4/5/2024    Findings: Stable elevation of the left hemidiaphragm with some left basilar atelectasis. Stable appearance of the mediastinum. Calcification of the aortic arch. No pneumothorax or pleural effusion. The clavicles are intact. Prior left rotator cuff   repair. No rib fractures.      Impression:      Stable appearance of the chest.      Electronically Signed: Michael Moss MD    4/17/2024 4:49 PM EDT    Workstation ID: [id number]          reviewed    ECG/EMG Results (most recent)       Procedure Component Value Units Date/Time    ECG 12 Lead Dyspnea [[id number]] Collected: 04/17/24 1557     Updated: 04/17/24 1559     QT Interval 366 ms      QTC  "Interval 441 ms     Narrative:      HEART RATE= 87  bpm  RR Interval= 688  ms  SD Interval= 203  ms  P Horizontal Axis= -33  deg  P Front Axis= 31  deg  QRSD Interval= 74  ms  QT Interval= 366  ms  QTcB= 441  ms  QRS Axis= 19  deg  T Wave Axis= 85  deg  - BORDERLINE ECG -  Sinus rhythm  Probable left atrial enlargement  Electronically Signed By:   Date and Time of Study: 2024-04-17 15:57:12          reviewed    Assessment & Plan   AHRF secondary to suspected LLL pneumonia:   Rule out sepsis:  AECOPD/asthma:   H/O aspiration pneumonia/mucus plugging:  Chronically elevated left hemidiaphragm:  Change to zosyn to cover for pseudomonas/aspiration  Aspiration precautions/fall precautions  Bedside swallow evaluation  Add mucinex/I-S/Acapella/DuoNebs 4 times daily and as needed  Continue Solu-Medrol  Check RVP, sputum culture  Wean oxygen as tolerated  Add home Protonix  Monitor    CAD:  Nothing acute currently, heart murmur noted    Hypothyroidism:  Check TSH, add home levothyroxine    Embolic stroke 2014:  Carotid artery disease s/p CEA 2014:  Continue home Plavix    Chronic normocytic anemia:  Hemoglobin stable at 12.1, no active blood loss noted, recheck in a.m.    Artificial urinary sphincter:  BPH:  Check UA    Vascular dementia:   Add home Exelon  At baseline mentation    PAD: Nothing acute currently    DNR/DNI status    I discussed the patient's findings and my recommendations with patient and stepdaughter/POA at bedside.     Kimber Vital, APRN  04/17/24  20:13 EDT    \"Dictated utilizing Dragon dictation\"    Note disclaimer: At [facility], we believe that sharing information builds trust and better relationships. You are receiving this note because you recently visited [facility]. It is possible you will see health information before a provider has talked with you about it. This kind of information can be easy to misunderstand. To help you fully understand what it means for your health, we urge you to " discuss this note with your provider.

## 2024-04-18 ENCOUNTER — APPOINTMENT (OUTPATIENT)
Dept: CT IMAGING | Facility: HOSPITAL | Age: 89
DRG: 177 | End: 2024-04-18
Payer: MEDICARE

## 2024-04-18 ENCOUNTER — APPOINTMENT (OUTPATIENT)
Dept: MRI IMAGING | Facility: HOSPITAL | Age: 89
DRG: 177 | End: 2024-04-18
Payer: MEDICARE

## 2024-04-18 ENCOUNTER — APPOINTMENT (OUTPATIENT)
Dept: CARDIOLOGY | Facility: HOSPITAL | Age: 89
DRG: 177 | End: 2024-04-18
Payer: MEDICARE

## 2024-04-18 PROBLEM — J96.01 ACUTE RESPIRATORY FAILURE WITH HYPOXIA: Status: ACTIVE | Noted: 2024-04-18

## 2024-04-18 LAB
ALBUMIN SERPL-MCNC: 3 G/DL (ref 3.5–5.2)
ALBUMIN/GLOB SERPL: 1 G/DL
ALP SERPL-CCNC: 33 U/L (ref 39–117)
ALT SERPL W P-5'-P-CCNC: 19 U/L (ref 1–41)
ANION GAP SERPL CALCULATED.3IONS-SCNC: 7.4 MMOL/L (ref 5–15)
ANION GAP SERPL CALCULATED.3IONS-SCNC: 9.5 MMOL/L (ref 5–15)
AORTIC DIMENSIONLESS INDEX: 1 (DI)
AST SERPL-CCNC: 26 U/L (ref 1–40)
B PARAPERT DNA SPEC QL NAA+PROBE: NOT DETECTED
B PERT DNA SPEC QL NAA+PROBE: NOT DETECTED
BASOPHILS # BLD AUTO: 0.01 10*3/MM3 (ref 0–0.2)
BASOPHILS # BLD AUTO: 0.02 10*3/MM3 (ref 0–0.2)
BASOPHILS NFR BLD AUTO: 0.1 % (ref 0–1.5)
BASOPHILS NFR BLD AUTO: 0.1 % (ref 0–1.5)
BH CV ECHO MEAS - AO MAX PG: 5.1 MMHG
BH CV ECHO MEAS - AO MEAN PG: 3 MMHG
BH CV ECHO MEAS - AO ROOT DIAM: 3.6 CM
BH CV ECHO MEAS - AO V2 MAX: 113 CM/SEC
BH CV ECHO MEAS - AO V2 VTI: 24.6 CM
BH CV ECHO MEAS - AVA(I,D): 3.1 CM2
BH CV ECHO MEAS - EDV(CUBED): 132.7 ML
BH CV ECHO MEAS - EDV(MOD-SP2): 149 ML
BH CV ECHO MEAS - EDV(MOD-SP4): 136 ML
BH CV ECHO MEAS - EF(MOD-BP): 62.4 %
BH CV ECHO MEAS - EF(MOD-SP2): 62.4 %
BH CV ECHO MEAS - EF(MOD-SP4): 64.7 %
BH CV ECHO MEAS - ESV(CUBED): 44.4 ML
BH CV ECHO MEAS - ESV(MOD-SP2): 56 ML
BH CV ECHO MEAS - ESV(MOD-SP4): 48 ML
BH CV ECHO MEAS - FS: 30.6 %
BH CV ECHO MEAS - IVS/LVPW: 1.2 CM
BH CV ECHO MEAS - IVSD: 1.2 CM
BH CV ECHO MEAS - LAT PEAK E' VEL: 6.4 CM/SEC
BH CV ECHO MEAS - LV DIASTOLIC VOL/BSA (35-75): 65.4 CM2
BH CV ECHO MEAS - LV MASS(C)D: 213.9 GRAMS
BH CV ECHO MEAS - LV MAX PG: 4.8 MMHG
BH CV ECHO MEAS - LV MEAN PG: 2 MMHG
BH CV ECHO MEAS - LV SYSTOLIC VOL/BSA (12-30): 23.1 CM2
BH CV ECHO MEAS - LV V1 MAX: 110 CM/SEC
BH CV ECHO MEAS - LV V1 VTI: 23.5 CM
BH CV ECHO MEAS - LVIDD: 5.1 CM
BH CV ECHO MEAS - LVIDS: 3.5 CM
BH CV ECHO MEAS - LVOT AREA: 3.3 CM2
BH CV ECHO MEAS - LVOT DIAM: 2.04 CM
BH CV ECHO MEAS - LVPWD: 1 CM
BH CV ECHO MEAS - MED PEAK E' VEL: 7.2 CM/SEC
BH CV ECHO MEAS - MV A DUR: 0.11 SEC
BH CV ECHO MEAS - MV A MAX VEL: 96.2 CM/SEC
BH CV ECHO MEAS - MV DEC SLOPE: 303.7 CM/SEC2
BH CV ECHO MEAS - MV DEC TIME: 203 SEC
BH CV ECHO MEAS - MV E MAX VEL: 82.7 CM/SEC
BH CV ECHO MEAS - MV E/A: 0.86
BH CV ECHO MEAS - MV MAX PG: 4.6 MMHG
BH CV ECHO MEAS - MV MEAN PG: 1.78 MMHG
BH CV ECHO MEAS - MV P1/2T: 78.9 MSEC
BH CV ECHO MEAS - MV V2 VTI: 34.5 CM
BH CV ECHO MEAS - MVA(P1/2T): 2.8 CM2
BH CV ECHO MEAS - MVA(VTI): 2.23 CM2
BH CV ECHO MEAS - RAP SYSTOLE: 3 MMHG
BH CV ECHO MEAS - SV(LVOT): 77.1 ML
BH CV ECHO MEAS - SV(MOD-SP2): 93 ML
BH CV ECHO MEAS - SV(MOD-SP4): 88 ML
BH CV ECHO MEAS - SVI(MOD-SP2): 44.7 ML/M2
BH CV ECHO MEAS - SVI(MOD-SP4): 42.3 ML/M2
BH CV ECHO MEASUREMENTS AVERAGE E/E' RATIO: 12.16
BH CV XLRA - TDI S': 10 CM/SEC
BILIRUB SERPL-MCNC: 0.2 MG/DL (ref 0–1.2)
BILIRUB UR QL STRIP: NEGATIVE
BUN SERPL-MCNC: 24 MG/DL (ref 8–23)
BUN SERPL-MCNC: 25 MG/DL (ref 8–23)
BUN/CREAT SERPL: 27.8 (ref 7–25)
BUN/CREAT SERPL: 27.9 (ref 7–25)
C PNEUM DNA NPH QL NAA+NON-PROBE: NOT DETECTED
CALCIUM SPEC-SCNC: 8.2 MG/DL (ref 8.2–9.6)
CALCIUM SPEC-SCNC: 8.5 MG/DL (ref 8.2–9.6)
CHLORIDE SERPL-SCNC: 100 MMOL/L (ref 98–107)
CHLORIDE SERPL-SCNC: 99 MMOL/L (ref 98–107)
CHOLEST SERPL-MCNC: 137 MG/DL (ref 0–200)
CLARITY UR: CLEAR
CO2 SERPL-SCNC: 24.5 MMOL/L (ref 22–29)
CO2 SERPL-SCNC: 24.6 MMOL/L (ref 22–29)
COLOR UR: YELLOW
CREAT SERPL-MCNC: 0.86 MG/DL (ref 0.76–1.27)
CREAT SERPL-MCNC: 0.9 MG/DL (ref 0.76–1.27)
DEPRECATED RDW RBC AUTO: 51.1 FL (ref 37–54)
DEPRECATED RDW RBC AUTO: 51.8 FL (ref 37–54)
EGFRCR SERPLBLD CKD-EPI 2021: 80.6 ML/MIN/1.73
EGFRCR SERPLBLD CKD-EPI 2021: 81.7 ML/MIN/1.73
EOSINOPHIL # BLD AUTO: 0 10*3/MM3 (ref 0–0.4)
EOSINOPHIL # BLD AUTO: 0 10*3/MM3 (ref 0–0.4)
EOSINOPHIL NFR BLD AUTO: 0 % (ref 0.3–6.2)
EOSINOPHIL NFR BLD AUTO: 0 % (ref 0.3–6.2)
ERYTHROCYTE [DISTWIDTH] IN BLOOD BY AUTOMATED COUNT: 15.3 % (ref 12.3–15.4)
ERYTHROCYTE [DISTWIDTH] IN BLOOD BY AUTOMATED COUNT: 15.3 % (ref 12.3–15.4)
FLUAV SUBTYP SPEC NAA+PROBE: NOT DETECTED
FLUBV RNA ISLT QL NAA+PROBE: NOT DETECTED
GLOBULIN UR ELPH-MCNC: 3 GM/DL
GLUCOSE SERPL-MCNC: 163 MG/DL (ref 65–99)
GLUCOSE SERPL-MCNC: 165 MG/DL (ref 65–99)
GLUCOSE UR STRIP-MCNC: NEGATIVE MG/DL
HADV DNA SPEC NAA+PROBE: NOT DETECTED
HCOV 229E RNA SPEC QL NAA+PROBE: NOT DETECTED
HCOV HKU1 RNA SPEC QL NAA+PROBE: NOT DETECTED
HCOV NL63 RNA SPEC QL NAA+PROBE: NOT DETECTED
HCOV OC43 RNA SPEC QL NAA+PROBE: NOT DETECTED
HCT VFR BLD AUTO: 33.5 % (ref 37.5–51)
HCT VFR BLD AUTO: 35.4 % (ref 37.5–51)
HDLC SERPL-MCNC: 58 MG/DL (ref 40–60)
HGB BLD-MCNC: 10.5 G/DL (ref 13–17.7)
HGB BLD-MCNC: 11.1 G/DL (ref 13–17.7)
HGB UR QL STRIP.AUTO: NEGATIVE
HMPV RNA NPH QL NAA+NON-PROBE: NOT DETECTED
HPIV1 RNA ISLT QL NAA+PROBE: NOT DETECTED
HPIV2 RNA SPEC QL NAA+PROBE: NOT DETECTED
HPIV3 RNA NPH QL NAA+PROBE: NOT DETECTED
HPIV4 P GENE NPH QL NAA+PROBE: NOT DETECTED
IMM GRANULOCYTES # BLD AUTO: 0.07 10*3/MM3 (ref 0–0.05)
IMM GRANULOCYTES # BLD AUTO: 0.08 10*3/MM3 (ref 0–0.05)
IMM GRANULOCYTES NFR BLD AUTO: 0.5 % (ref 0–0.5)
IMM GRANULOCYTES NFR BLD AUTO: 0.7 % (ref 0–0.5)
INR PPP: 1.1 (ref 0.9–1.1)
KETONES UR QL STRIP: NEGATIVE
LDLC SERPL CALC-MCNC: 64 MG/DL (ref 0–100)
LDLC/HDLC SERPL: 1.09 {RATIO}
LEFT ATRIUM VOLUME INDEX: 26.6 ML/M2
LEUKOCYTE ESTERASE UR QL STRIP.AUTO: NEGATIVE
LYMPHOCYTES # BLD AUTO: 0.46 10*3/MM3 (ref 0.7–3.1)
LYMPHOCYTES # BLD AUTO: 0.48 10*3/MM3 (ref 0.7–3.1)
LYMPHOCYTES NFR BLD AUTO: 3.4 % (ref 19.6–45.3)
LYMPHOCYTES NFR BLD AUTO: 4 % (ref 19.6–45.3)
M PNEUMO IGG SER IA-ACNC: NOT DETECTED
MCH RBC QN AUTO: 28.8 PG (ref 26.6–33)
MCH RBC QN AUTO: 29.1 PG (ref 26.6–33)
MCHC RBC AUTO-ENTMCNC: 31.3 G/DL (ref 31.5–35.7)
MCHC RBC AUTO-ENTMCNC: 31.4 G/DL (ref 31.5–35.7)
MCV RBC AUTO: 92 FL (ref 79–97)
MCV RBC AUTO: 92.7 FL (ref 79–97)
MONOCYTES # BLD AUTO: 0.14 10*3/MM3 (ref 0.1–0.9)
MONOCYTES # BLD AUTO: 0.18 10*3/MM3 (ref 0.1–0.9)
MONOCYTES NFR BLD AUTO: 1 % (ref 5–12)
MONOCYTES NFR BLD AUTO: 1.5 % (ref 5–12)
NEUTROPHILS NFR BLD AUTO: 11.32 10*3/MM3 (ref 1.7–7)
NEUTROPHILS NFR BLD AUTO: 12.67 10*3/MM3 (ref 1.7–7)
NEUTROPHILS NFR BLD AUTO: 93.7 % (ref 42.7–76)
NEUTROPHILS NFR BLD AUTO: 95 % (ref 42.7–76)
NITRITE UR QL STRIP: NEGATIVE
NRBC BLD AUTO-RTO: 0 /100 WBC (ref 0–0.2)
NRBC BLD AUTO-RTO: 0 /100 WBC (ref 0–0.2)
PH UR STRIP.AUTO: 5.5 [PH] (ref 4.5–8)
PLATELET # BLD AUTO: 195 10*3/MM3 (ref 140–450)
PLATELET # BLD AUTO: 204 10*3/MM3 (ref 140–450)
PMV BLD AUTO: 10.3 FL (ref 6–12)
PMV BLD AUTO: 10.5 FL (ref 6–12)
POTASSIUM SERPL-SCNC: 4.5 MMOL/L (ref 3.5–5.2)
POTASSIUM SERPL-SCNC: 4.9 MMOL/L (ref 3.5–5.2)
PROCALCITONIN SERPL-MCNC: 2.41 NG/ML (ref 0–0.25)
PROCALCITONIN SERPL-MCNC: 2.63 NG/ML (ref 0–0.25)
PROT SERPL-MCNC: 6 G/DL (ref 6–8.5)
PROT UR QL STRIP: NEGATIVE
PROTHROMBIN TIME: 14.2 SECONDS (ref 12.1–15)
RBC # BLD AUTO: 3.64 10*6/MM3 (ref 4.14–5.8)
RBC # BLD AUTO: 3.82 10*6/MM3 (ref 4.14–5.8)
RHINOVIRUS RNA SPEC NAA+PROBE: NOT DETECTED
RSV RNA NPH QL NAA+NON-PROBE: NOT DETECTED
SARS-COV-2 RNA NPH QL NAA+NON-PROBE: DETECTED
SODIUM SERPL-SCNC: 132 MMOL/L (ref 136–145)
SODIUM SERPL-SCNC: 133 MMOL/L (ref 136–145)
SP GR UR STRIP: 1.01 (ref 1–1.03)
TRIGL SERPL-MCNC: 78 MG/DL (ref 0–150)
TROPONIN T SERPL HS-MCNC: 15 NG/L
UROBILINOGEN UR QL STRIP: NORMAL
VLDLC SERPL-MCNC: 15 MG/DL (ref 5–40)
WBC NRBC COR # BLD AUTO: 12.07 10*3/MM3 (ref 3.4–10.8)
WBC NRBC COR # BLD AUTO: 13.36 10*3/MM3 (ref 3.4–10.8)

## 2024-04-18 PROCEDURE — 97161 PT EVAL LOW COMPLEX 20 MIN: CPT

## 2024-04-18 PROCEDURE — 25010000002 PIPERACILLIN SOD-TAZOBACTAM PER 1 G: Performed by: NURSE PRACTITIONER

## 2024-04-18 PROCEDURE — 80048 BASIC METABOLIC PNL TOTAL CA: CPT | Performed by: NURSE PRACTITIONER

## 2024-04-18 PROCEDURE — 25510000001 IOPAMIDOL PER 1 ML: Performed by: HOSPITALIST

## 2024-04-18 PROCEDURE — 70496 CT ANGIOGRAPHY HEAD: CPT

## 2024-04-18 PROCEDURE — 87205 SMEAR GRAM STAIN: CPT | Performed by: NURSE PRACTITIONER

## 2024-04-18 PROCEDURE — 63710000001 DEXAMETHASONE PER 0.25 MG: Performed by: HOSPITALIST

## 2024-04-18 PROCEDURE — 0042T HC CT CEREBRAL PERFUSION W/WO CONTRAST: CPT

## 2024-04-18 PROCEDURE — 93306 TTE W/DOPPLER COMPLETE: CPT

## 2024-04-18 PROCEDURE — 25010000002 DIPHENHYDRAMINE PER 50 MG: Performed by: NURSE PRACTITIONER

## 2024-04-18 PROCEDURE — 81003 URINALYSIS AUTO W/O SCOPE: CPT | Performed by: NURSE PRACTITIONER

## 2024-04-18 PROCEDURE — 87070 CULTURE OTHR SPECIMN AEROBIC: CPT | Performed by: NURSE PRACTITIONER

## 2024-04-18 PROCEDURE — 25010000002 METHYLPREDNISOLONE PER 40 MG: Performed by: NURSE PRACTITIONER

## 2024-04-18 PROCEDURE — 25510000001 PERFLUTREN (DEFINITY) 8.476 MG IN SODIUM CHLORIDE (PF) 0.9 % 10 ML INJECTION: Performed by: HOSPITALIST

## 2024-04-18 PROCEDURE — 94799 UNLISTED PULMONARY SVC/PX: CPT

## 2024-04-18 PROCEDURE — 97165 OT EVAL LOW COMPLEX 30 MIN: CPT

## 2024-04-18 PROCEDURE — 85025 COMPLETE CBC W/AUTO DIFF WBC: CPT | Performed by: NURSE PRACTITIONER

## 2024-04-18 PROCEDURE — 92610 EVALUATE SWALLOWING FUNCTION: CPT

## 2024-04-18 PROCEDURE — 0 GADOBENATE DIMEGLUMINE 529 MG/ML SOLUTION: Performed by: HOSPITALIST

## 2024-04-18 PROCEDURE — 70553 MRI BRAIN STEM W/O & W/DYE: CPT

## 2024-04-18 PROCEDURE — 25010000002 ENOXAPARIN PER 10 MG: Performed by: NURSE PRACTITIONER

## 2024-04-18 PROCEDURE — A9577 INJ MULTIHANCE: HCPCS | Performed by: HOSPITALIST

## 2024-04-18 PROCEDURE — 99232 SBSQ HOSP IP/OBS MODERATE 35: CPT | Performed by: HOSPITALIST

## 2024-04-18 PROCEDURE — 70498 CT ANGIOGRAPHY NECK: CPT

## 2024-04-18 PROCEDURE — 93306 TTE W/DOPPLER COMPLETE: CPT | Performed by: INTERNAL MEDICINE

## 2024-04-18 PROCEDURE — 94664 DEMO&/EVAL PT USE INHALER: CPT

## 2024-04-18 PROCEDURE — 25810000003 SODIUM CHLORIDE 0.9 % SOLUTION: Performed by: NURSE PRACTITIONER

## 2024-04-18 PROCEDURE — 84145 PROCALCITONIN (PCT): CPT | Performed by: NURSE PRACTITIONER

## 2024-04-18 PROCEDURE — 94761 N-INVAS EAR/PLS OXIMETRY MLT: CPT

## 2024-04-18 PROCEDURE — 84145 PROCALCITONIN (PCT): CPT | Performed by: HOSPITALIST

## 2024-04-18 RX ORDER — DEXAMETHASONE 4 MG/1
6 TABLET ORAL
Qty: 20 TABLET | Refills: 0 | Status: DISCONTINUED | OUTPATIENT
Start: 2024-04-18 | End: 2024-04-21 | Stop reason: HOSPADM

## 2024-04-18 RX ORDER — CEFDINIR 300 MG/1
300 CAPSULE ORAL EVERY 12 HOURS SCHEDULED
Status: DISCONTINUED | OUTPATIENT
Start: 2024-04-18 | End: 2024-04-18

## 2024-04-18 RX ORDER — DIPHENHYDRAMINE HYDROCHLORIDE 50 MG/ML
25 INJECTION INTRAMUSCULAR; INTRAVENOUS ONCE
Status: COMPLETED | OUTPATIENT
Start: 2024-04-18 | End: 2024-04-18

## 2024-04-18 RX ORDER — AMOXICILLIN AND CLAVULANATE POTASSIUM 875; 125 MG/1; MG/1
1 TABLET, FILM COATED ORAL EVERY 12 HOURS SCHEDULED
Status: DISCONTINUED | OUTPATIENT
Start: 2024-04-18 | End: 2024-04-21 | Stop reason: HOSPADM

## 2024-04-18 RX ADMIN — PANTOPRAZOLE SODIUM 40 MG: 40 TABLET, DELAYED RELEASE ORAL at 10:56

## 2024-04-18 RX ADMIN — Medication 400 MG: at 21:36

## 2024-04-18 RX ADMIN — Medication 10 ML: at 21:36

## 2024-04-18 RX ADMIN — NIRMATRELVIR AND RITONAVIR 3 TABLET: KIT at 21:36

## 2024-04-18 RX ADMIN — IPRATROPIUM BROMIDE AND ALBUTEROL SULFATE 3 ML: .5; 3 SOLUTION RESPIRATORY (INHALATION) at 15:47

## 2024-04-18 RX ADMIN — TRAZODONE HYDROCHLORIDE 100 MG: 50 TABLET ORAL at 21:36

## 2024-04-18 RX ADMIN — IOPAMIDOL 50 ML: 755 INJECTION, SOLUTION INTRAVENOUS at 07:26

## 2024-04-18 RX ADMIN — SODIUM CHLORIDE 100 ML/HR: 9 INJECTION, SOLUTION INTRAVENOUS at 01:28

## 2024-04-18 RX ADMIN — GADOBENATE DIMEGLUMINE 18 ML: 529 INJECTION, SOLUTION INTRAVENOUS at 10:30

## 2024-04-18 RX ADMIN — IOPAMIDOL 50 ML: 755 INJECTION, SOLUTION INTRAVENOUS at 05:44

## 2024-04-18 RX ADMIN — GUAIFENESIN 1200 MG: 600 TABLET, EXTENDED RELEASE ORAL at 21:36

## 2024-04-18 RX ADMIN — DEXAMETHASONE 6 MG: 4 TABLET ORAL at 21:36

## 2024-04-18 RX ADMIN — DIPHENHYDRAMINE HYDROCHLORIDE 25 MG: 50 INJECTION, SOLUTION INTRAMUSCULAR; INTRAVENOUS at 04:09

## 2024-04-18 RX ADMIN — IPRATROPIUM BROMIDE AND ALBUTEROL SULFATE 3 ML: .5; 3 SOLUTION RESPIRATORY (INHALATION) at 07:19

## 2024-04-18 RX ADMIN — CLOPIDOGREL BISULFATE 75 MG: 75 TABLET ORAL at 10:56

## 2024-04-18 RX ADMIN — IPRATROPIUM BROMIDE AND ALBUTEROL SULFATE 3 ML: .5; 3 SOLUTION RESPIRATORY (INHALATION) at 12:03

## 2024-04-18 RX ADMIN — DOCUSATE SODIUM 100 MG: 100 CAPSULE, LIQUID FILLED ORAL at 10:57

## 2024-04-18 RX ADMIN — Medication 5 MG: at 21:36

## 2024-04-18 RX ADMIN — SODIUM CHLORIDE 2 ML: 9 INJECTION INTRAMUSCULAR; INTRAVENOUS; SUBCUTANEOUS at 11:34

## 2024-04-18 RX ADMIN — GUAIFENESIN 1200 MG: 600 TABLET, EXTENDED RELEASE ORAL at 10:57

## 2024-04-18 RX ADMIN — CITALOPRAM HYDROBROMIDE 20 MG: 20 TABLET ORAL at 10:57

## 2024-04-18 RX ADMIN — Medication 10 ML: at 01:20

## 2024-04-18 RX ADMIN — ENOXAPARIN SODIUM 40 MG: 40 INJECTION SUBCUTANEOUS at 21:36

## 2024-04-18 RX ADMIN — IPRATROPIUM BROMIDE AND ALBUTEROL SULFATE 3 ML: .5; 3 SOLUTION RESPIRATORY (INHALATION) at 20:44

## 2024-04-18 RX ADMIN — RIVASTIGMINE 1 PATCH: 4.6 PATCH TRANSDERMAL at 09:13

## 2024-04-18 RX ADMIN — PIPERACILLIN SODIUM AND TAZOBACTAM SODIUM 3.38 G: 3; .375 INJECTION, POWDER, LYOPHILIZED, FOR SOLUTION INTRAVENOUS at 01:25

## 2024-04-18 RX ADMIN — METHYLPREDNISOLONE SODIUM SUCCINATE 40 MG: 40 INJECTION, POWDER, FOR SOLUTION INTRAMUSCULAR; INTRAVENOUS at 09:13

## 2024-04-18 RX ADMIN — AMOXICILLIN AND CLAVULANATE POTASSIUM 1 TABLET: 875; 125 TABLET, FILM COATED ORAL at 21:36

## 2024-04-18 RX ADMIN — MULTIVIT AND MINERALS-FERROUS GLUCONATE 9 MG IRON/15 ML ORAL LIQUID 15 ML: at 10:57

## 2024-04-18 RX ADMIN — PIPERACILLIN SODIUM AND TAZOBACTAM SODIUM 3.38 G: 3; .375 INJECTION, POWDER, LYOPHILIZED, FOR SOLUTION INTRAVENOUS at 11:37

## 2024-04-18 NOTE — PROGRESS NOTES
2340 pm CODE STROKE called, patient's mentation changed. Moves extremities but non-verbal which is a change from admission. Slight facial droop noted  Code stroke orders placed  Attempted contact with ROSALBA/Joy, message left to call us back on her VM. No other contact information available  Patient is combative but continues non-verbal

## 2024-04-18 NOTE — THERAPY EVALUATION
Patient Name: Pawel Goldberg  : 1932    MRN: [id number]                              Today's Date: 2024       Admit Date: 2024    Visit Dx:     ICD-10-CM ICD-9-CM   1. Pneumonia of left lower lobe due to infectious organism  J18.9 486   2. 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    Obstruction of carotid artery    Stenosis of carotid artery    EEG abnormality without seizure    Hereditary and idiopathic peripheral neuropathy    Mixed hyperlipidemia    Acquired hypothyroidism    Low back pain with sciatica    Lumbar canal stenosis    Degenerative arthritis of lumbar spine    Osteoarthritis of hip    History of repair of hip joint    Syncope    Temporary cerebral vascular dysfunction    History of prostate cancer    Dementia associated with other underlying disease without behavioral disturbance    Arthritis    PAD (peripheral artery disease)    Medicare annual wellness visit, subsequent    Essential hypertension    Medication monitoring encounter    Status post implantation of artificial urinary sphincter    Moderate asthma with acute exacerbation    Vascular dementia without behavioral disturbance    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    At high risk for falls    Insomnia due to medical condition    Chronic UTI    Frequent falls    Hypoxia     Past Medical History:   Diagnosis Date    Allergic     Seasonal    Allergic rhinitis     Arthritis     Asthma     Brito esophagus     Benign prostatic hyperplasia     Carotid arterial disease     Status post right CEA 14    COPD (chronic obstructive pulmonary disease)     Coronary artery disease     Dementia     Dyspnea     Erectile dysfunction     GERD (gastroesophageal reflux disease)     Hematoma of frontal scalp 2021    HL (hearing loss)     Hypothyroidism     Low back pain     PAD (peripheral artery disease)     Prostate cancer  "    Status post implantation of artificial urinary sphincter         Stroke     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    Urinary tract infection      Past Surgical History:   Procedure Laterality Date    CAROTID ARTERY ANGIOPLASTY  04/01/2014    COLONOSCOPY  2009    ENDOSCOPY  2009    2001,2003,2006    EYE SURGERY      FRACTURE SURGERY      HERNIA REPAIR  2000    HERNIA REPAIR  2009    INGUINAL HERNIA REPAIR      PARTIAL HIP ARTHROPLASTY Left 07/02/2014    PROSTATECTOMY  2008    SHOULDER SURGERY  2005    SHOULDER SURGERY  2006    SHOULDER SURGERY  2008    TEAR DUCT SURGERY      URINARY SPHINCTER IMPLANT        General Information       Row Name 04/18/24 1011          OT Time and Intention    Document Type evaluation  -SD     Mode of Treatment occupational therapy  -SD       Row Name 04/18/24 1011          General Information    Patient Profile Reviewed yes  Pt came to ER secondary to generalized weakness, fatigue, vomiting and left ear pain. Pt dx with pneumonia. While on m/s, pt with status change (facial droop, non-verbal and combative behavior). Code stroke called. Symptoms have resolved.  -SD     Prior Level of Function --  pt reports he is independent with mobility using rolling walker. pt reports he receives assistance from assisted living staff for adl's \"sometimes\"  -SD     Existing Precautions/Restrictions fall  -SD     Barriers to Rehab cognitive status;previous functional deficit  -SD       Row Name 04/18/24 1011          Occupational Profile    Reason for Services/Referral (Occupational Profile) decreased adl status  -SD     Successful Occupations (Occupational Profile) Pt actively participates in basic adls. Staff provides assistance as needed.  -SD     Occupational History/Life Experiences (Occupational Profile) Pt with hx of dementia. Pt reports moving to [facility] a few years ago after having frequent falls at home.  -SD     Environmental Supports and Barriers (Occupational " Profile) [facility] provides assistance with basic adl's as needed. Staff manages meal prep and assists with medication management  -SD     Patient Goals (Occupational Profile) none stated  -SD       Row Name 04/18/24 1011          Living Environment    People in Home other (see comments)  resides in [facility]  -SD       Row Name 04/18/24 1011          Home Main Entrance    Number of Stairs, Main Entrance none  -SD       Row Name 04/18/24 1011          Cognition    Orientation Status (Cognition) oriented to;person  Pt with hx of dementia.  -SD       Row Name 04/18/24 1011          Safety Issues, Functional Mobility    Impairments Affecting Function (Mobility) cognition  -SD     Cognitive Impairments, Mobility Safety/Performance sequencing abilities;insight into deficits/self-awareness  -SD               User Key  (r) = Recorded By, (t) = Taken By, (c) = Cosigned By      Initials Name Provider Type    SD Jose Luis Ayala, OTRUI Occupational Therapist                     Mobility/ADL's       Row Name 04/18/24 1020          Bed Mobility    Bed Mobility supine-sit  -SD     Supine-Sit Robeson (Bed Mobility) minimum assist (75% patient effort)  -SD     Assistive Device (Bed Mobility) bed rails;head of bed elevated  -SD     Comment, (Bed Mobility) pt required verbal cues for bed mobility.  -SD       Row Name 04/18/24 1020          Sit-Stand Transfer    Sit-Stand Robeson (Transfers) minimum assist (75% patient effort);verbal cues  -SD     Assistive Device (Sit-Stand Transfers) walker, front-wheeled  -SD       Row Name 04/18/24 1020          Functional Mobility    Functional Mobility- Ind. Level contact guard assist;minimum assist (75% patient effort)  -SD     Functional Mobility- Device walker, front-wheeled  -SD     Functional Mobility-Distance (Feet) 5  -SD       Row Name 04/18/24 1020          Activities of Daily Living    BADL Assessment/Intervention lower body dressing  -SD       Row Name 04/18/24 1020          Lower Body  Dressing Assessment/Training    Power Level (Lower Body Dressing) lower body dressing skills;don;shoes/slippers;set up;minimum assist (75% patient effort)  -SD     Comment, (Lower Body Dressing) Pt states staff at [facility] provide assist/support for adl's as needed.  -SD               User Key  (r) = Recorded By, (t) = Taken By, (c) = Cosigned By      Initials Name Provider Type    Jose Luis Turner OTR Occupational Therapist                   Obj/Interventions       Row Name 04/18/24 1023          Range of Motion Comprehensive    Comment, General Range of Motion Pt with bilateral shoulder rom at approx 50%. rom wfl distal to shoulders  -SD       [facility] Name 04/18/24 1023          Strength Comprehensive (MMT)    Comment, General Manual Muscle Testing (MMT) Assessment not formally assessed due to cognition (pt had difficulty following simple commands)  -[facility] Name 04/18/24 1023          Balance    Comment, Balance Supervision for sitting balance and CGA/min assist for standing balance using rolling walker for support.  -SD               User Key  (r) = Recorded By, (t) = Taken By, (c) = Cosigned By      Initials Name Provider Type    Jose Luis Turner OTR Occupational Therapist                   Goals/Plan       Row Name 04/18/24 1034          Transfer Goal 1 (OT)    Activity/Assistive Device (Transfer Goal 1, OT) commode, 3-in-1  -SD     Power Level/Cues Needed (Transfer Goal 1, OT) contact guard required  -SD     Time Frame (Transfer Goal 1, OT) by discharge  -SD     Strategies/Barriers (Transfers Goal 1, OT) cognitive status is a barrier for safety  -SD     Progress/Outcome (Transfer Goal 1, OT) new goal  -SD       [facility] Name 04/18/24 1034          Problem Specific Goal 1 (OT)    Problem Specific Goal 1 (OT) Pt to maintain standing balance for 2-3 minutes with CGA using a rolling walker to allow for caregiver support for adl tasks.  -SD     Time Frame (Problem Specific Goal 1, OT) by discharge   -SD     Progress/Outcome (Problem Specific Goal 1, OT) new goal  -SD       Row Name 04/18/24 1034          Therapy Assessment/Plan (OT)    Planned Therapy Interventions (OT) patient/caregiver education/training;BADL retraining;transfer/mobility retraining;functional balance retraining  -SD               User Key  (r) = Recorded By, (t) = Taken By, (c) = Cosigned By      Initials Name Provider Type    Jose Luis Turner OTR Occupational Therapist                   Clinical Impression       Row Name 04/18/24 1024          Pain Assessment    Pretreatment Pain Rating 0/10 - no pain  -SD     Posttreatment Pain Rating 0/10 - no pain  -SD     Pain Intervention(s) Repositioned  -SD       Row Name 04/18/24 1024          Plan of Care Review    Plan of Care Reviewed With patient  -SD     Outcome Evaluation Occupational therapy evaluation completed. Pt brought to ER from assisted living facility due to c/o weakness, fatigue, vomiting and left ear pain. Pt dx with pneumonia. Pt with medical status change while on m/s (facial droop, non-verbal and combative behavior) and code stroke was called. Pt's symptoms have resolved. Pt with hx of dementia. He receives support for adl's as needed from staff at assisted living facility. Pt oriented to person this am. He required cues to follow simple commands throughout evaluation. Pt managed bed mobility with min assist. He was able to don slip on shoes while sitting at EOB with min assist. Pt managed transfers and mobility with min assist using a rolling walker for support. Rec OT services to address safety with transfers and basic adl tasks. Rec pt continue to receive support for adl tasks upon return to assisted living facilty due to his cognitive status and hx of falls.  -SD       Row Name 04/18/24 1024          Therapy Assessment/Plan (OT)    Patient/Family Therapy Goal Statement (OT) [facility]  -SD     Rehab Potential (OT) fair, will monitor progress closely   -SD     Criteria for Skilled Therapeutic Interventions Met (OT) yes;skilled treatment is necessary  -SD     Therapy Frequency (OT) other (see comments)  1-2 visits  -SD     Predicted Duration of Therapy Intervention (OT) anticipate discharge to [facility] in 1-2 days  -SD       Row Name 04/18/24 1024          Therapy Plan Review/Discharge Plan (OT)    Anticipated Discharge Disposition (OT) home with assist  -SD       Row Name 04/18/24 1024          Positioning and Restraints    Pre-Treatment Position in bed  -SD     Post Treatment Position chair  -SD     In Chair notified nsg;reclined;call light within reach;encouraged to call for assist;with family/caregiver  -SD               User Key  (r) = Recorded By, (t) = Taken By, (c) = Cosigned By      Initials Name Provider Type    Jose Luis Turner, OTR Occupational Therapist                   Outcome Measures       Row Name 04/18/24 1036          How much help from another is currently needed...    Putting on and taking off regular lower body clothing? 2  -SD     Bathing (including washing, rinsing, and drying) 2  -SD     Toileting (which includes using toilet bed pan or urinal) 2  -SD     Putting on and taking off regular upper body clothing 3  -SD     Taking care of personal grooming (such as brushing teeth) 3  -SD     Eating meals 4  -SD     AM-PAC 6 Clicks Score (OT) 16  -SD       Row Name 04/18/24 0815 04/18/24 0800       How much help from another person do you currently need...    Turning from your back to your side while in flat bed without using bedrails? 3  -JW 3  -HB    Moving from lying on back to sitting on the side of a flat bed without bedrails? 3  -JW 3  -HB    Moving to and from a bed to a chair (including a wheelchair)? 3  -JW 3  -HB    Standing up from a chair using your arms (e.g., wheelchair, bedside chair)? 3  -JW 3  -HB    Climbing 3-5 steps with a railing? 3  -JW 3  -HB    To walk in hospital room? 3  -JW 3  -HB    AM-PAC 6 Clicks Score (PT) 18  -JW  18  -HB    Highest Level of Mobility Goal 6 --> Walk 10 steps or more  - 6 --> Walk 10 steps or more  -      Row Name 04/18/24 1036 04/18/24 0815       Functional Assessment    Outcome Measure Options AM-PAC 6 Clicks Daily Activity (OT)  -SD AM-PAC 6 Clicks Basic Mobility (PT)  -              User Key  (r) = Recorded By, (t) = Taken By, (c) = Cosigned By      Initials Name Provider Type    SD Jose Luis Ayala, OTR Occupational Therapist    Kimberlyn Sanderson, PT Physical Therapist    Candace Skaggs, RN Registered Nurse                    Occupational Therapy Education       Title: PT OT SLP Therapies (Done)       Topic: Occupational Therapy (Done)       Point: ADL training (Done)       Description:   Instruct learner(s) on proper safety adaptation and remediation techniques during self care or transfers.   Instruct in proper use of assistive devices.                  Learning Progress Summary             Patient Acceptance, E, VU by SD at 4/18/2024 1037    Comment: Education regarding safety with bed mobilty, transfers and mobility. Rec pt has assist/support for adl's/transfers/mobility due to cognitive status and hx of falls.                                         User Key       Initials Effective Dates Name Provider Type Discipline    SD 06/16/21 -  Jose Luis Ayala, OTR Occupational Therapist OT                  OT Recommendation and Plan  Planned Therapy Interventions (OT): patient/caregiver education/training, BADL retraining, transfer/mobility retraining, functional balance retraining  Therapy Frequency (OT): other (see comments) (1-2 visits)  Plan of Care Review  Plan of Care Reviewed With: patient  Outcome Evaluation: Occupational therapy evaluation completed. Pt brought to ER from assisted living facility due to c/o weakness, fatigue, vomiting and left ear pain. Pt dx with pneumonia. Pt with medical status change while on m/s (facial droop, non-verbal and combative behavior) and code stroke was  called. Pt's symptoms have resolved. Pt with hx of dementia. He receives support for adl's as needed from staff at assisted living facility. Pt oriented to person this am. He required cues to follow simple commands throughout evaluation. Pt managed bed mobility with min assist. He was able to don slip on shoes while sitting at EOB with min assist. Pt managed transfers and mobility with min assist using a rolling walker for support. Rec OT services to address safety with transfers and basic adl tasks. Rec pt continue to receive support for adl tasks upon return to assisted living facilty due to his cognitive status and hx of falls.     Time Calculation:   Evaluation Complexity (OT)  Review Occupational Profile/Medical/Therapy History Complexity: brief/low complexity  Assessment, Occupational Performance/Identification of Deficit Complexity: 1-3 performance deficits  Clinical Decision Making Complexity (OT): problem focused assessment/low complexity  Overall Complexity of Evaluation (OT): low complexity     Time Calculation- OT       Row Name 04/18/24 1039             Time Calculation- OT    OT Start Time 0815  -SD      OT Stop Time 0838  -SD      OT Time Calculation (min) 23 min  -SD         Untimed Charges    OT Eval/Re-eval Minutes 23  -SD         Total Minutes    Untimed Charges Total Minutes 23  -SD       Total Minutes 23  -SD                User Key  (r) = Recorded By, (t) = Taken By, (c) = Cosigned By      Initials Name Provider Type    Jose Luis Turner OTR Occupational Therapist                  Therapy Charges for Today       Code Description Service Date Service Provider Modifiers Qty    [id number] HC OT EVAL LOW COMPLEXITY 2 4/18/2024 Jose Luis Ayala OTR GO 1                 TONIE Mohan  4/18/2024

## 2024-04-18 NOTE — PLAN OF CARE
Goal Outcome Evaluation:  Plan of Care Reviewed With: patient, daughter (Dr. Resendiz; Candace, CARMEN)           Outcome Evaluation: SLP: Pt presents with a functional oral phase of swallowing and no overt s/s of pharyngeal deficits or aspiration. Pt does exhibit intermittent cough after eating/drink and not directly associated with swallowing. Concerns for possible esophageal dysmotility and increased reflux related to coughing, given history and current symptoms. Will f/u for further evaluation as diet advanced.Recommend: soft, ground diet with thins and no straws, strict aspiration and reflux precautions, slow rate and no straws. Consider small meals to decrease risk of reflux. No po including food, meds, drinks at least 2 hours before bedtime and keep HOB at 45 degrees when sleeping. Consider further instrumental evaluation of swallowing pe MD discretion for either MBS or esophagram.      Anticipated Discharge Disposition (SLP): assisted living facility ([facility])          SLP Swallowing Diagnosis: functional oral phase, functional pharyngeal phase, suspect chronic, esophageal dysphagia, R/O pharyngeal dysphagia (04/18/24 9998)

## 2024-04-18 NOTE — PLAN OF CARE
Goal Outcome Evaluation:  Plan of Care Reviewed With: patient           Outcome Evaluation: Physical therapy evaluation complete.  Patient performs supine to sit with min assist and sit to/from stand with min assist.  Patient performs gait x5 feet with rolling walker, CGA/min assist with cues for safety and sequencing.  Patient requires cues to remain on task and for safety with direction changes. Patient will benefit from physical therapy to address deficits in functional mobility and activity tolerance.  Patient and family report patient plans to return to assisted living facility at discharge.  Patient's step daughter states staff at assisted living facility are available to assist as needed.  Will continue to follow.      Anticipated Discharge Disposition (PT): assisted living

## 2024-04-18 NOTE — PLAN OF CARE
Goal Outcome Evaluation:  Plan of Care Reviewed With: patient        Progress: improving  Outcome Evaluation: VSS, Tele: NSR, droplet and contact isolation, MRI brain and ECHO ordered for today.

## 2024-04-18 NOTE — THERAPY EVALUATION
Acute Care - Speech Language Pathology   Swallow Initial Evaluation [facility]     Patient Name: Pawel Goldberg  : 1932  MRN: [id number]  Today's Date: 2024               Admit Date: 2024    Visit Dx:     ICD-10-CM ICD-9-CM   1. Pneumonia of left lower lobe due to infectious organism  J18.9 486   2. 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    Obstruction of carotid artery    Stenosis of carotid artery    EEG abnormality without seizure    Hereditary and idiopathic peripheral neuropathy    Mixed hyperlipidemia    Acquired hypothyroidism    Low back pain with sciatica    Lumbar canal stenosis    Degenerative arthritis of lumbar spine    Osteoarthritis of hip    History of repair of hip joint    Syncope    Temporary cerebral vascular dysfunction    History of prostate cancer    Dementia associated with other underlying disease without behavioral disturbance    Arthritis    PAD (peripheral artery disease)    Medicare annual wellness visit, subsequent    Essential hypertension    Medication monitoring encounter    Status post implantation of artificial urinary sphincter    Moderate asthma with acute exacerbation    Vascular dementia without behavioral disturbance    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    At high risk for falls    Insomnia due to medical condition    Chronic UTI    Frequent falls    Hypoxia     Past Medical History:   Diagnosis Date    Allergic     Seasonal    Allergic rhinitis     Arthritis     Asthma     Brito esophagus     Benign prostatic hyperplasia     Carotid arterial disease     Status post right CEA 14    COPD (chronic obstructive pulmonary disease)     Coronary artery disease     Dementia     Dyspnea     Erectile dysfunction     GERD (gastroesophageal reflux disease)     Hematoma of frontal scalp 2021    HL (hearing loss)     Hypothyroidism     Low  back pain     PAD (peripheral artery disease)     Prostate cancer     Status post implantation of artificial urinary sphincter         Stroke     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    Urinary tract infection      Past Surgical History:   Procedure Laterality Date    CAROTID ARTERY ANGIOPLASTY  04/01/2014    COLONOSCOPY  2009    ENDOSCOPY  2009    2001,2003,2006    EYE SURGERY      FRACTURE SURGERY      HERNIA REPAIR  2000    HERNIA REPAIR  2009    INGUINAL HERNIA REPAIR      PARTIAL HIP ARTHROPLASTY Left 07/02/2014    PROSTATECTOMY  2008    SHOULDER SURGERY  2005    SHOULDER SURGERY  2006    SHOULDER SURGERY  2008    TEAR DUCT SURGERY      URINARY SPHINCTER IMPLANT         SLP Recommendation and Plan  SLP Swallowing Diagnosis: functional oral phase, functional pharyngeal phase, suspect chronic, esophageal dysphagia, R/O pharyngeal dysphagia (04/18/24 0838)  SLP Diet Recommendation: soft to chew textures, ground, thin liquids, other (see comments) (small meals to offset s/s of reflux) (04/18/24 0838)  Recommended Precautions and Strategies: upright posture during/after eating, small bites of food and sips of liquid, no straw, general aspiration precautions, reflux precautions, fatigue precautions, 1:1 supervision, assist with feeding (04/18/24 0838)  SLP Rec. for Method of Medication Administration: meds whole, with thin liquids, as tolerated (04/18/24 0838)     Monitor for Signs of Aspiration: notify SLP if any concerns (04/18/24 0838)  Recommended Diagnostics: [place] (versus esophagram) (04/18/24 0838)  Swallow Criteria for Skilled Therapeutic Interventions Met: demonstrates skilled criteria (04/18/24 0838)  Anticipated Discharge Disposition (SLP): assisted living facility ([facility]) (04/18/24 0838)  Rehab Potential/Prognosis, Swallowing: adequate, monitor progress closely (04/18/24 0838)  Therapy Frequency (Swallow): PRN (04/18/24 0838)  Predicted Duration  Therapy Intervention (Days): 1 week, until discharge (04/18/24 0838)  Oral Care Recommendations: Oral Care before breakfast, after meals and PRN, Toothbrush, Denture Care (04/18/24 0838)  Demonstrates Need for Referral to Another Service: dedicated esophageal assessment (04/18/24 0838)               Patient/Family Concerns, Anticipated Discharge Disposition (SLP): No concerns per pt at this time. (04/18/24 0838)                     Plan of Care Reviewed With: patient, daughter (Dr. Resendiz; Candace, CARMEN)  Outcome Evaluation: SLP: Pt presents with a functional oral phase of swallowing and no overt s/s of pharyngeal deficits or aspiration. Pt does exhibit intermittent cough after eating/drink and not directly associated with swallowing. Concerns for possible esophageal dysmotility and increased reflux related to coughing, given history and current symptoms. Will f/u for further evaluation as diet advanced.Recommend: soft, ground diet with thins and no straws, strict aspiration and reflux precautions, slow rate and no straws. Consider small meals to decrease risk of reflux. No po including food, meds, drinks at least 2 hours before bedtime and keep HOB at 45 degrees when sleeping. Consider further instrumental evaluation of swallowing pe MD discretion for either MBS or esophagram.      SWALLOW EVALUATION (Last 72 Hours)       SLP Adult Swallow Evaluation       Row Name 04/18/24 0838                   Rehab Evaluation    Document Type evaluation  -AD        Subjective Information no complaints  -AD        Patient Observations alert;cooperative  -AD        Patient/Family/Caregiver Comments/Observations Pt seen upright in recliner w/daughter, Joy, present. He was alert and cooperative. Some memory deficits noted and reported. Known hx of dementia.  -AD        Patient Effort good  -AD        Symptoms Noted During/After Treatment other (see comments)  intermittent cough  -AD           General Information    Patient Profile  Reviewed yes  -AD        Pertinent History Of Current Problem Pt is a 89 y/o male referred for an evaluation due to concerns for aspiration with JARETH pneumonia noted per CT angio of the head/neck. Pt known to this therapist and seen in  September 2022 w/high suspicion for esophageal dysphagia. Known hx of Brito's esophagus and GERD. Pt states longstanding hx of reflux and use of protonix. Reports hx of difficulty swallowing meats 25 years ago. Brought to the ED yesterday due to cough, fatigue, wheezing, left ear pain and had been reported to vomit while sleeping and discovered on the morning of 4/17 w/vomit on shirt, face and in the bed. He has an elevated bed per daughter, but slides down and sleeps sideways in bed at times. Both report he lays on his left side typically when sleeping. Hx of embolic stroke in March 2024 and hx of aspiration pneumonia/pneumonitis with mucous plugging.  -AD        Current Method of Nutrition NPO  except ice chips  -AD        Precautions/Limitations, Vision WFL;for purposes of eval  -AD        Precautions/Limitations, Hearing hearing impairment, bilaterally;WFL;for purposes of eval  w/increased volume  -AD        Prior Level of Function-Communication cognitive-linguistic impairment  known dementia  -AD        Prior Level of Function-Swallowing no diet consistency restrictions;esophageal concerns  past recommendation for soft, ground with thins; small meals as reflux precaution.  -AD        Plans/Goals Discussed with patient and family;agreed upon;other (see comments)  Dr. Emilia Maria, RN  -AD        Barriers to Rehab previous functional deficit;cognitive status  esophageal dysphagia  -AD        Patient's Goals for Discharge patient did not state  -AD        Family Goals for Discharge family did not state  -AD           Pain    Additional Documentation --  no current pain reported or indicated  -AD           Oral Motor Structure and Function    Oral Lesions or Structural  Abnormalities and/or variants none  -AD        Dentition Assessment upper dentures/partial in place;missing teeth  missing most lower molars and one right lateral, lower incisor; natural teeth in fair condition; chipped upper right incisor.  -AD        Secretion Management WNL/WFL  -AD        Mucosal Quality moist, healthy  -AD        Volitional Swallow WFL  -AD        Volitional Cough WFL;other (see comments)  elicits a very strong cough response  -AD           Oral Musculature and Cranial Nerve Assessment    Oral Motor General Assessment WFL  -AD        Oral Motor, Comment No gross deficits or labial droop noted during time of assessment. Pt with functional strength and ROM for his age.  -AD           General Eating/Swallowing Observations    Respiratory Support Currently in Use nasal cannula  -AD        O2 Liters 5L  -AD        Eating/Swallowing Skills self-fed;impulsive self-feeding behaviors;requires cueing for compensatory strategies and precautions;respiratory changes during/following eating voice quality changes during/following eating  -AD        Positioning During Eating upright 90 degree;upright in chair  -AD        Utensils Used spoon;cup  -AD        Consistencies Trialed regular textures;pureed;thin liquids  -AD        Pre SpO2 (%) 92  -AD        Post SpO2 (%) 92  -AD           Respiratory    Respiratory Status other (see comments)  Intermittent coughing with strong response. Coughs and looses air w/reddish/purpleish tint to face. Spitting out clear/light yellow sputum. Last cough demonstrated blood tinge to sputum.  -AD           Clinical Swallow Eval    Oral Prep Phase WFL  -AD        Oral Transit WFL  -AD        Oral Residue WFL  -AD        Pharyngeal Phase no overt signs/symptoms of pharyngeal impairment  -AD        Esophageal Phase suspected esophageal impairment  -AD        Clinical Swallow Evaluation Summary Pt presents with a functional oral phase of swallowing and no overt s/s of pharyngeal  deficits or aspiration. Pt does exhibit intermittent cough after eating/drink and not directly associated with swallowing. Concerns for possible esophageal dysmotility and increased reflux related to coughing, given history and current symptoms. Will f/u for further evaluation as diet advanced.  -AD           Esophageal Phase Concerns    Esophageal Phase Concerns complaints of SOA;other (see comments)  coughing intermittently with increased coughing following po intake  -AD        Complaints of SOA all consistencies  -AD        Esophageal Phase Concerns, Comment Hx of esophageal dysphagia with GERD/[place]. Coughing and suspected dysmotility reflux secondary to coughing.  -AD           SLP Evaluation Clinical Impression    SLP Swallowing Diagnosis functional oral phase;functional pharyngeal phase;suspect chronic;esophageal dysphagia;R/O pharyngeal dysphagia  -AD        Functional Impact risk of aspiration/pneumonia  -AD        Rehab Potential/Prognosis, Swallowing adequate, monitor progress closely  -AD        Swallow Criteria for Skilled Therapeutic Interventions Met demonstrates skilled criteria  -AD           Recommendations    Therapy Frequency (Swallow) PRN  -AD        Predicted Duration Therapy Intervention (Days) 1 week;until discharge  -AD        SLP Diet Recommendation soft to chew textures;ground;thin liquids;other (see comments)  small meals to offset s/s of reflux  -AD        Recommended Diagnostics VFSS (MBS);with esophageal screen  versus esophagram  -AD        Recommended Precautions and Strategies upright posture during/after eating;small bites of food and sips of liquid;no straw;general aspiration precautions;reflux precautions;fatigue precautions;1:1 supervision;assist with feeding  -AD        Oral Care Recommendations Oral Care before breakfast, after meals and PRN;Toothbrush;Denture Care  -AD        SLP Rec. for Method of Medication Administration meds whole;with thin liquids;as tolerated  -AD         Monitor for Signs of Aspiration notify SLP if any concerns  -AD        Anticipated Discharge Disposition (SLP) assisted living facility ([facility])  -AD        Patient/Family Concerns, Anticipated Discharge Disposition (SLP) No concerns per pt at this time.  -AD        Demonstrates Need for Referral to Another Service dedicated esophageal assessment  -AD           Swallow Goals (SLP)    Swallow LTGs Patient will demonstrate functional swallow for  -AD        Swallow STGs diet tolerance goal selection (SLP);swallow management recall goal selection (SLP);swallow compensatory strategies goal selection (SLP)  -AD        Diet Tolerance Goal Selection (SLP) Swallow Short Term Goal 1  -AD        Swallow Management Recall Goal Selection (SLP) swallow management recall, SLP goal 1  -AD        Swallow Compensatory Strategies Goal Selection (SLP) swallow compensatory strategies, SLP goal 1  -AD           (LTG) Patient will demonstrate functional swallow for    Diet Texture (Demonstrate functional swallow) soft to chew (ground) textures  -AD        Liquid viscosity (Demonstrate functional swallow) thin liquids  -AD        [place] (Demonstrate functional swallow) independently (over 90% accuracy)  -AD        Time Frame (Demonstrate functional swallow) 1 week;by discharge  -AD        Barriers (Demonstrate functional swallow) chronic esophageal deficits; dementia  -AD        Progress/Outcomes (Demonstrate functional swallow) new goal  -AD           (STG) Swallow 1    (STG) Swallow 1 Pt sunshine demonstrate tolerance of a soft, ground diet with thins w/o evidence of clinicial s/s of aspiration w/use of compensatory strategies.  -AD        [place] (Swallow Short Term Goal 1) independently (over 90% accuracy)  -AD        Time Frame (Swallow Short Term Goal 1) 1 week;by discharge  -AD        Progress/Outcomes (Swallow Short Term Goal 1) new goal  -AD           (STG) Swallow Management Recall Goal 1 (SLP)    Activity (Swallow  Management Recall Goal 1, SLP) recall of;aspiration precautions;reflux precautions;fatigue precautions  -AD        [place]/Accuracy (Swallow Management Recall Goal 1, SLP) with minimal cues (75-90% accuracy)  -AD        Time Frame (Swallow Management Recall Goal 1, SLP) short term goal (STG);1 week  -AD        Progress/Outcomes (Swallow Management Recall Goal 1, SLP) new goal  -AD           (STG) Swallow Compensatory Strategies Goal 1 (SLP)    Activity (Swallow Compensatory Strategies/Techniques Goal 1, SLP) aspiration precautions;reflux precautions;fatigue precautions;during meal intake;small bites;small cup sips  -AD        [place]/Accuracy (Swallow Compensatory Strategies/Techniques Goal 1, SLP) with minimal cues (75-90% accuracy)  -AD        Time Frame (Swallow Compensatory Strategies/Techniques Goal 1, SLP) short term goal (STG);1 week  -AD        Progress/Outcomes (Swallow Compensatory Strategies/Techniques Goal 1, SLP) new goal  -AD                  User Key  (r) = Recorded By, (t) = Taken By, (c) = Cosigned By      Initials Name Effective Dates    AD Puja Ayala MS CCC-SLP 06/16/21 -                     EDUCATION  The patient has been educated in the following areas:   Dysphagia (Swallowing Impairment) Modified Diet Instruction reflux precautions .   Daughter verbalizes understanding. Pt needs reinforcement due to memory problems.      SLP GOALS       Row Name 04/18/24 0838             (LTG) Patient will demonstrate functional swallow for    Diet Texture (Demonstrate functional swallow) soft to chew (ground) textures  -AD      Liquid viscosity (Demonstrate functional swallow) thin liquids  -AD      [place] (Demonstrate functional swallow) independently (over 90% accuracy)  -AD      Time Frame (Demonstrate functional swallow) 1 week;by discharge  -AD      Barriers (Demonstrate functional swallow) chronic esophageal deficits; dementia  -AD      Progress/Outcomes (Demonstrate functional  swallow) new goal  -AD         (STG) Swallow 1    (STG) Swallow 1 Pt sunshine demonstrate tolerance of a soft, ground diet with thins w/o evidence of clinicial s/s of aspiration w/use of compensatory strategies.  -AD      [place] (Swallow Short Term Goal 1) independently (over 90% accuracy)  -AD      Time Frame (Swallow Short Term Goal 1) 1 week;by discharge  -AD      Progress/Outcomes (Swallow Short Term Goal 1) new goal  -AD         (STG) Swallow Management Recall Goal 1 (SLP)    Activity (Swallow Management Recall Goal 1, SLP) recall of;aspiration precautions;reflux precautions;fatigue precautions  -AD      [place]/Accuracy (Swallow Management Recall Goal 1, SLP) with minimal cues (75-90% accuracy)  -AD      Time Frame (Swallow Management Recall Goal 1, SLP) short term goal (STG);1 week  -AD      Progress/Outcomes (Swallow Management Recall Goal 1, SLP) new goal  -AD         (STG) Swallow Compensatory Strategies Goal 1 (SLP)    Activity (Swallow Compensatory Strategies/Techniques Goal 1, SLP) aspiration precautions;reflux precautions;fatigue precautions;during meal intake;small bites;small cup sips  -AD      [place]/Accuracy (Swallow Compensatory Strategies/Techniques Goal 1, SLP) with minimal cues (75-90% accuracy)  -AD      Time Frame (Swallow Compensatory Strategies/Techniques Goal 1, SLP) short term goal (STG);1 week  -AD      Progress/Outcomes (Swallow Compensatory Strategies/Techniques Goal 1, SLP) new goal  -AD                User Key  (r) = Recorded By, (t) = Taken By, (c) = Cosigned By      Initials Name Provider Type    AD Puja Ayala MS CCC-SLP Speech and Language Pathologist                       Time Calculation:    Time Calculation- SLP       Row Name 04/18/24 1300             Time Calculation- SLP    SLP Start Time 0838  -AD      SLP Stop Time 0905  -AD      SLP Time Calculation (min) 27 min  -AD      Total Timed Code Minutes- SLP 0 minute(s)  -AD      SLP Non-Billable Time (min)  0 min  -AD      SLP Received On 04/18/24  -AD         Untimed Charges    SLP Eval/Re-eval  ST Eval Oral Pharyng Swallow - 95909  -AD      [id number]-[id number] Eval Oral Pharyng Swallow Minutes 27  -AD         Total Minutes    Untimed Charges Total Minutes 27  -AD       Total Minutes 27  -AD                User Key  (r) = Recorded By, (t) = Taken By, (c) = Cosigned By      Initials Name Provider Type    AD Puja Ayala, MS CCC-SLP Speech and Language Pathologist                    Therapy Charges for Today       Code Description Service Date Service Provider Modifiers Qty    [id number]  ST EVAL ORAL PHARYNG SWALLOW 2 4/18/2024 Puja Ayala, MS CCC-SLP GN 1                 Puja Ayala MS CCC-SLP  4/18/2024

## 2024-04-18 NOTE — PROGRESS NOTES
"[facility] Clinical Pharmacy Services: Enoxaparin Consult    Pawel MORENO Ashlyn has a pharmacy consult to dose prophylactic enoxaparin per BERNARD Bonilla's request.     Indication: VTE Prophylaxis  Home Anticoagulation: N/A     Relevant clinical data and objective history reviewed:  89 y.o. male 175.3 cm (69\") 92.5 kg (203 lb 14.8 oz)   Body mass index is 30.11 kg/m².   Results from last 7 days   Lab Units 04/17/24  1555   PLATELETS 10*3/mm3 230     Estimated Creatinine Clearance: 51.5 mL/min (by C-G formula based on SCr of 1.05 mg/dL).    Assessment/Plan    Will start patient on 40mg subcutaneous every 24 hours, adjusted for renal function. Consult order will be discontinued but pharmacy will continue to follow.     Nick Keating III, [facility]  Clinical Pharmacist    "
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
## 2024-04-18 NOTE — NURSING NOTE
Rn called provider Meija to inform him that the patients CTA was normal. Provider ordered a MRI with contrast in the morning. RN notified ANNA Vital of new orders.

## 2024-04-18 NOTE — CASE MANAGEMENT/SOCIAL WORK
Discharge Planning Assessment  CORTNEY [facility]     Patient Name: Pawel Goldberg  MRN: [id number]  Today's Date: 4/18/2024    Admit Date: 4/17/2024    Plan: Return to [facility]   Discharge Needs Assessment       Row Name 04/18/24 1125       Living Environment    People in Home facility resident    Name(s) of People in Home [facility] Living    Current Living Arrangements residential facility    Potentially Unsafe Housing Conditions none    In the past 12 months has the electric, gas, oil, or water company threatened to shut off services in your home? No    Primary Care Provided by self;other (see comments)  assisted living    Provides Primary Care For no one, unable/limited ability to care for self    Family Caregiver if Needed child(sven), adult    Quality of Family Relationships involved;supportive    Able to Return to Prior Arrangements yes       Resource/Environmental Concerns    Resource/Environmental Concerns none    Transportation Concerns none       Transportation Needs    In the past 12 months, has lack of transportation kept you from medical appointments or from getting medications? no    In the past 12 months, has lack of transportation kept you from meetings, work, or from getting things needed for daily living? No       Food Insecurity    Within the past 12 months, you worried that your food would run out before you got the money to buy more. Never true    Within the past 12 months, the food you bought just didn't last and you didn't have money to get more. Never true       Transition Planning    Patient/Family Anticipates Transition to other (see comments)  return to assisted living    Patient/Family Anticipated Services at Transition none    Transportation Anticipated family or friend will provide       Discharge Needs Assessment    Readmission Within the Last 30 Days no previous admission in last 30 days    Equipment Currently Used at Home walker, rolling     Concerns to be Addressed no discharge needs identified    Anticipated Changes Related to Illness none    Equipment Needed After Discharge none    Provided Post Acute Provider List? N/A    Provided Post Acute Provider Quality & Resource List? N/A                   Discharge Plan       Row Name 04/18/24 1125       Plan    Plan Return to [facility]    Patient/Family in Agreement with Plan yes    Plan Comments Spoke with patient at bedside, permission to speak with daughter, Joy, present. Face sheet verified. Address listed on face sheet is where patient receives his mail. He has moved to [facility] in [place]. He uses a rw for mobilit and has a nebulizer. He no longer drives, family provides transportation as needed. He has used HH in the past but does not recall the agency.. He has not been to inpatient rehab. He has a living will. He sees Dr Charlie Trinh as PCP. He uses [facility] [place] and denies issues obtaining medications. There are no concerns r/t food, housing, utilities or transportation. Patient plans to return to [facility] at [place]. CM # placed on white board, will follow for [place] needs.                  Continued Care and Services - Admitted Since 4/17/2024    No active coordination exists for this encounter.       Expected Discharge Date and Time       Expected Discharge Date Expected Discharge Time    Apr 19, 2024            Demographic Summary    No documentation.                  Functional Status    No documentation.                  Psychosocial    No documentation.                  Abuse/Neglect    No documentation.                  Legal    No documentation.                  Substance Abuse    No documentation.                  Patient Forms    No documentation.                     Maxime Choi RN

## 2024-04-18 NOTE — THERAPY EVALUATION
Patient Name: Pawel Goldberg  : 1932    MRN: [id number]                              Today's Date: 2024       Admit Date: 2024    Visit Dx:     ICD-10-CM ICD-9-CM   1. Pneumonia of left lower lobe due to infectious organism  J18.9 486   2. 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    Obstruction of carotid artery    Stenosis of carotid artery    EEG abnormality without seizure    Hereditary and idiopathic peripheral neuropathy    Mixed hyperlipidemia    Acquired hypothyroidism    Low back pain with sciatica    Lumbar canal stenosis    Degenerative arthritis of lumbar spine    Osteoarthritis of hip    History of repair of hip joint    Syncope    Temporary cerebral vascular dysfunction    History of prostate cancer    Dementia associated with other underlying disease without behavioral disturbance    Arthritis    PAD (peripheral artery disease)    Medicare annual wellness visit, subsequent    Essential hypertension    Medication monitoring encounter    Status post implantation of artificial urinary sphincter    Moderate asthma with acute exacerbation    Vascular dementia without behavioral disturbance    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    At high risk for falls    Insomnia due to medical condition    Chronic UTI    Frequent falls    Hypoxia     Past Medical History:   Diagnosis Date    Allergic     Seasonal    Allergic rhinitis     Arthritis     Asthma     Brito esophagus     Benign prostatic hyperplasia     Carotid arterial disease     Status post right CEA 14    COPD (chronic obstructive pulmonary disease)     Coronary artery disease     Dementia     Dyspnea     Erectile dysfunction     GERD (gastroesophageal reflux disease)     Hematoma of frontal scalp 2021    HL (hearing loss)     Hypothyroidism     Low back pain     PAD (peripheral artery disease)     Prostate cancer  "    Status post implantation of artificial urinary sphincter         Stroke     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    Urinary tract infection      Past Surgical History:   Procedure Laterality Date    CAROTID ARTERY ANGIOPLASTY  04/01/2014    COLONOSCOPY  2009    ENDOSCOPY  2009    2001,2003,2006    EYE SURGERY      FRACTURE SURGERY      HERNIA REPAIR  2000    HERNIA REPAIR  2009    INGUINAL HERNIA REPAIR      PARTIAL HIP ARTHROPLASTY Left 07/02/2014    PROSTATECTOMY  2008    SHOULDER SURGERY  2005    SHOULDER SURGERY  2006    SHOULDER SURGERY  2008    TEAR DUCT SURGERY      URINARY SPHINCTER IMPLANT        General Information       [facility] Name 04/18/24 0815          Physical Therapy Time and Intention    Document Type evaluation  -     Mode of Treatment physical therapy  -       Row Name 04/18/24 0815          General Information    Patient Profile Reviewed yes  -     Prior Level of Function --  pt reports he is independent with mobility using rolling walker.  pt reports he receives assistance from assisted living staff \"sometimes\"  -     Existing Precautions/Restrictions fall  -     Barriers to Rehab cognitive status  -[facility] Name 04/18/24 0815          Living Environment    People in Home other (see comments)  pt lives in assisted living facility  -[facility] Name 04/18/24 0815          Home Main Entrance    Number of Stairs, Main Entrance none  -[facility] Name 04/18/24 0815          Cognition    Orientation Status (Cognition) oriented to;person  pt with history of cognitive impairment  -[facility] Name 04/18/24 0815          Safety Issues, Functional Mobility    Comment, Safety Issues/Impairments (Mobility) requires cues for sequencing and safety  -               User Key  (r) = Recorded By, (t) = Taken By, (c) = Cosigned By      Initials Name Provider Type    Kimberlyn Sanderson, PT Physical Therapist                   Mobility       Row Name 04/18/24 0815       "    Bed Mobility    Bed Mobility supine-sit  -     Supine-Sit [place] (Bed Mobility) minimum assist (75% patient effort)  -     Assistive Device (Bed Mobility) bed rails;head of bed elevated  -     Comment, (Bed Mobility) increased time and verbal cues required for sequencing  -       Row Name 04/18/24 0815          Transfers    Comment, (Transfers) cues for hand placement  -       Row Name 04/18/24 0815          Sit-Stand Transfer    Sit-Stand [place] (Transfers) minimum assist (75% patient effort);verbal cues  -     Assistive Device (Sit-Stand Transfers) walker, front-wheeled  -       Row Name 04/18/24 0815          Gait/Stairs (Locomotion)    [place] Level (Gait) contact guard;verbal cues;minimum assist (75% patient effort)  -     Assistive Device (Gait) walker, front-wheeled  -     Distance in Feet (Gait) 5  -JW     Bilateral Gait Deviations forward flexed posture  -     Comment, (Gait/Stairs) pt requires verbal cues for safety with direction changes  -               User Key  (r) = Recorded By, (t) = Taken By, (c) = Cosigned By      Initials Name Provider Type     Kimberlyn Rivera PT Physical Therapist                   Obj/Interventions       [facility] Name 04/18/24 0815          Range of Motion Comprehensive    Comment, General Range of Motion difficult to formally assess due to cognition  -JW       Row Name 04/18/24 0815          Strength Comprehensive (MMT)    Comment, General Manual Muscle Testing (MMT) Assessment difficult to formally assess due to cognition  -[facility] Name 04/18/24 0815          Balance    Comment, Balance CGA/min assist for dynamic standing balance with walker  -JW       Row Name 04/18/24 0815          Sensory Assessment (Somatosensory)    Sensory Assessment (Somatosensory) unable/difficult to assess  -               User Key  (r) = Recorded By, (t) = Taken By, (c) = Cosigned By      Initials Name Provider Type    Kimberlyn Sanderson PT Physical  Therapist                   Goals/Plan       [facility] Name 04/18/24 0815          Bed Mobility Goal 1 (PT)    Activity/Assistive Device (Bed Mobility Goal 1, PT) bed mobility activities, all  -JW     [place] Level/Cues Needed (Bed Mobility Goal 1, PT) supervision required  -JW     Time Frame (Bed Mobility Goal 1, PT) 3 days  -JW     Progress/Outcomes (Bed Mobility Goal 1, PT) new goal  -       Row Name 04/18/24 0815          Transfer Goal 1 (PT)    Activity/Assistive Device (Transfer Goal 1, PT) transfers, all  -JW     [place] Level/Cues Needed (Transfer Goal 1, PT) supervision required  -JW     Time Frame (Transfer Goal 1, PT) 3 days  -JW     Progress/Outcome (Transfer Goal 1, PT) new goal  -       Row Name 04/18/24 0815          Gait Training Goal 1 (PT)    Activity/Assistive Device (Gait Training Goal 1, PT) gait (walking locomotion);assistive device use  -JW     [place] Level (Gait Training Goal 1, PT) supervision required  -JW     Distance (Gait Training Goal 1, PT) 50  -JW     Time Frame (Gait Training Goal 1, PT) 3 days  -JW     Progress/Outcome (Gait Training Goal 1, PT) new goal  -       Row Name 04/18/24 0815          Therapy Assessment/Plan (PT)    Planned Therapy Interventions (PT) balance training;bed mobility training;gait training;home exercise program;patient/family education;strengthening;transfer training  -               User Key  (r) = Recorded By, (t) = Taken By, (c) = Cosigned By      Initials Name Provider Type    JW Kimberlyn Rivera, PT Physical Therapist                   Clinical Impression       [facility] Name 04/18/24 0815          Pain    Pretreatment Pain Rating 0/10 - no pain  -     Posttreatment Pain Rating 0/10 - no pain  -JW       Row Name 04/18/24 0815          Plan of Care Review    Plan of Care Reviewed With patient  -     Outcome Evaluation Physical therapy evaluation complete.  Patient performs supine to sit with min assist and sit to/from stand with min assist.   "Patient performs gait x5 feet with rolling walker, CGA/min assist with cues for safety and sequencing.  Patient requires cues to remain on task and for safety with direction changes. Patient will benefit from physical therapy to address deficits in functional mobility and activity tolerance.  Patient and family report patient plans to return to assisted living facility at discharge.  Patient's step daughter states staff at assisted living facility are available to assist as needed.  Will continue to follow.  -       Row Name 04/18/24 0815          Therapy Assessment/Plan (PT)    Patient/Family Therapy Goals Statement (PT) \"go home\"  -     Rehab Potential (PT) good, to achieve stated therapy goals  -     Criteria for Skilled Interventions Met (PT) yes;meets criteria  -     Therapy Frequency (PT) 6 times/wk  -     Predicted Duration of Therapy Intervention (PT) 3 days  -       Row Name 04/18/24 0815          Positioning and Restraints    Pre-Treatment Position in bed  -     Post Treatment Position chair  -JW     In Chair notified nsg;sitting;call light within reach;with nsg;with family/caregiver;encouraged to call for assist  -               User Key  (r) = Recorded By, (t) = Taken By, (c) = Cosigned By      Initials Name Provider Type    Kimberlyn Sanderson, PT Physical Therapist                   Outcome Measures       Row Name 04/18/24 0815 04/18/24 0800       How much help from another person do you currently need...    Turning from your back to your side while in flat bed without using bedrails? 3  - 3  -HB    Moving from lying on back to sitting on the side of a flat bed without bedrails? 3  - 3  -HB    Moving to and from a bed to a chair (including a wheelchair)? 3  -JW 3  -HB    Standing up from a chair using your arms (e.g., wheelchair, bedside chair)? 3  -JW 3  -HB    Climbing 3-5 steps with a railing? 3  -JW 3  -HB    To walk in hospital room? 3  -JW 3  -HB    AM-PAC 6 Clicks Score (PT) " 18  -JW 18  -HB    Highest Level of Mobility Goal 6 --> Walk 10 steps or more  - 6 --> Walk 10 steps or more  -      Row Name 04/18/24 0815          Functional Assessment    Outcome Measure Options AM-PAC 6 Clicks Basic Mobility (PT)  -               User Key  (r) = Recorded By, (t) = Taken By, (c) = Cosigned By      Initials Name Provider Type    JW Kimberlyn Rivera, PT Physical Therapist    Candace Skaggs, RN Registered Nurse                                 Physical Therapy Education       Title: PT OT SLP Therapies (Done)       Topic: Physical Therapy (Done)       Point: Mobility training (Done)       Learning Progress Summary             Patient Acceptance, E,TB, VU by  at 4/18/2024 0946                                         User Key       Initials Effective Dates Name Provider Type Discipline     06/16/21 -  Kimberlyn Rivera, KRISSY Physical Therapist PT                  PT Recommendation and Plan  Planned Therapy Interventions (PT): balance training, bed mobility training, gait training, home exercise program, patient/family education, strengthening, transfer training  Plan of Care Reviewed With: patient  Outcome Evaluation: Physical therapy evaluation complete.  Patient performs supine to sit with min assist and sit to/from stand with min assist.  Patient performs gait x5 feet with rolling walker, CGA/min assist with cues for safety and sequencing.  Patient requires cues to remain on task and for safety with direction changes. Patient will benefit from physical therapy to address deficits in functional mobility and activity tolerance.  Patient and family report patient plans to return to assisted living facility at discharge.  Patient's step daughter states staff at assisted living facility are available to assist as needed.  Will continue to follow.     Time Calculation:   PT Evaluation Complexity  History, PT Evaluation Complexity: 1-2 personal factors and/or comorbidities  Examination of Body Systems  (PT Eval Complexity): 1-2 elements  Clinical Presentation (PT Evaluation Complexity): stable  Clinical Decision Making (PT Evaluation Complexity): low complexity  Overall Complexity (PT Evaluation Complexity): low complexity     PT Charges       Row Name 04/18/24 0947             Time Calculation    Start Time 0815  -      Stop Time 0838  -      Time Calculation (min) 23 min  -      PT Received On 04/18/24  -SANDRA      PT - Next Appointment 04/19/24  -                User Key  (r) = Recorded By, (t) = Taken By, (c) = Cosigned By      Initials Name Provider Type    Kimberlyn Sanderson, PT Physical Therapist                  Therapy Charges for Today       Code Description Service Date Service Provider Modifiers Qty    [id number] HC PT EVAL LOW COMPLEXITY 2 4/18/2024 Kimberlyn Rivera, PT GP 1            PT G-Codes  Outcome Measure Options: AM-PAC 6 Clicks Basic Mobility (PT)  AM-PAC 6 Clicks Score (PT): 18  PT Discharge Summary  Anticipated Discharge Disposition (PT): assisted living    Kimberlyn Rivera, KRISSY  4/18/2024

## 2024-04-18 NOTE — PLAN OF CARE
Goal Outcome Evaluation:  Plan of Care Reviewed With: patient        Progress: improving  Outcome Evaluation: vss. O2 needs monitored. resp panel resulted for covid- steroids adjusted. cough noted with sputum production. continues to be incont. of urine.

## 2024-04-18 NOTE — PROGRESS NOTES
0035 am:  Patient speaking again, back to baseline speech but still confused, combative.  Neuro stroke provider to assess patient    0050:   Neurostroke provider Dr. Ba evaluating.  Patient is too combative when touched or awakened to tolerate CTA head/neck or MRI, therefore decision made to repeat CT head at 0430 as per discussed with Dr. Ba.  To avoid excessive sedation, will give dose of Benadryl prior and attempt to obtain adequate scan.  Patient removes all telemetry, oxygen, pulse oximetry repeatedly, staff attempting to replace oxygen and monitoring.  He remains NPO and has not had any oral medications.  Holding lovenox until cleared by neuro stroke provider    0615 am:  Staff contacted stroke provider with CTA perfusion results, requested MRI brain with contrast, orders placed. Patient back to baseline mentation, pleasant, apologetic, cooperative.    0645 am:  Step-daughter (Joy NAVARRETE) called back, informed of all events overnight. She will be here within the hour.

## 2024-04-18 NOTE — PROGRESS NOTES
"Hospitalist Team      Patient Care Team:  Charlie Trinh MD as PCP - General (Family Medicine)  Hardik Senior MD as Consulting Physician (Urology)  MAURO Peters MD as Consulting Physician (Ophthalmology)  Brant Polanco MD as Consulting Physician (Pulmonary Disease)  Charlie Goldstein DO as Consulting Physician (Neurology)        Chief Complaint:  Follow-up Agitation; Acute COVID-19    Subjective    Mr. Goldberg notes dyspnea and cough, but has no other complaints.  He reports he had an \"outburst\" overnight.  Overnight notes reviewed.    Objective    Vital Signs  Temp:  [97.7 °F (36.5 °C)-98.9 °F (37.2 °C)] 97.7 °F (36.5 °C)  Heart Rate:  [60-93] 65  Resp:  [16-28] 20  BP: (122-157)/(60-77) 145/64  Oxygen Therapy  SpO2: 92 %  Pulse Oximetry Type: Continuous  Device (Oxygen Therapy): nasal cannula, humidified  Flow (L/min): 5  Probe Placed On (Pulse Ox): hand, finger}    Flowsheet Rows      Flowsheet Row First Filed Value   Admission Height 175.3 cm (69.02\") Documented at 04/17/2024 1611   Admission Weight 89.4 kg (197 lb) Documented at 04/17/2024 1543          Physical Exam:    General: Elderly appearing male in no acute distress.  Lungs: Diminished breath sounds throughout all fields.  Respirations are non-labored.  CV: Regular rate and rhythm.  No murmur appreciated.  Radial and pedal pulses are 2+ and symmetric.  Abdomen: Soft and non-tender w/ active bowel sounds.  MSK: No C/C/E.  Neuro: CN II-XII grossly intact.  Psych: Pleasant affect.  Ox3.  Skin: No evidence of embolic phenomena.    Results Review:     I reviewed the patient's new clinical results.    Lab Results (last 24 hours)       Procedure Component Value Units Date/Time    Procalcitonin [[id number]]  (Abnormal) Collected: 04/18/24 1217    Specimen: Blood Updated: 04/18/24 1247     Procalcitonin 2.41 ng/mL     Narrative:      As a Marker for Sepsis (Non-Neonates):    1. <0.5 ng/mL represents a low risk of severe sepsis and/or septic " "shock.  2. >2 ng/mL represents a high risk of severe sepsis and/or septic shock.    As a Marker for Lower Respiratory Tract Infections that require antibiotic therapy:    PCT on Admission    Antibiotic Therapy       6-12 Hrs later    >0.5                Strongly Recommended  >0.25 - <0.5        Recommended   0.1 - 0.25          Discouraged              Remeasure/reassess PCT  <0.1                Strongly Discouraged     Remeasure/reassess PCT    As 28 day mortality risk marker: \"Change in Procalcitonin Result\" (>80% or <=80%) if Day 0 (or Day 1) and Day 4 values are available. Refer to [email or web address]-[email or web address]    Change in PCT <=80%  A decrease of PCT levels below or equal to 80% defines a positive change in PCT test result representing a higher risk for 28-day all-cause mortality of patients diagnosed with severe sepsis for septic shock.    Change in PCT >80%  A decrease of PCT levels of more than 80% defines a negative change in PCT result representing a lower risk for 28-day all-cause mortality of patients diagnosed with severe sepsis or septic shock.       Respiratory Panel PCR w/COVID-19(SARS-CoV-2) VERONA/FLEX/BRUCE/PAD/COR/NICOLE In-House, NP Swab in UTM/VTM, 2 HR TAT - Swab, Nasopharynx [[id number]]  (Abnormal) Collected: 04/17/24 2110    Specimen: Swab from Nasopharynx Updated: 04/18/24 1144     ADENOVIRUS, PCR Not Detected     Coronavirus 229E Not Detected     Coronavirus HKU1 Not Detected     Coronavirus NL63 Not Detected     Coronavirus OC43 Not Detected     COVID19 Detected     Human Metapneumovirus Not Detected     Human Rhinovirus/Enterovirus Not Detected     Influenza A PCR Not Detected     Influenza B PCR Not Detected     Parainfluenza Virus 1 Not Detected     Parainfluenza Virus 2 Not Detected     Parainfluenza Virus 3 Not Detected     Parainfluenza Virus 4 Not Detected     RSV, PCR Not Detected     Bordetella pertussis pcr Not Detected     Bordetella parapertussis PCR Not Detected     " Chlamydophila pneumoniae PCR Not Detected     Mycoplasma pneumo by PCR Not Detected    Narrative:      In the setting of a positive respiratory panel with a viral infection PLUS a negative procalcitonin without other underlying concern for bacterial infection, consider observing off antibiotics or discontinuation of antibiotics and continue supportive care. If the respiratory panel is positive for atypical bacterial infection (Bordetella pertussis, Chlamydophila pneumoniae, or Mycoplasma pneumoniae), consider antibiotic de-escalation to target atypical bacterial infection.    Urinalysis With Culture If Indicated - Urine, Clean Catch [[id number]]  (Normal) Collected: 04/18/24 0922    Specimen: Urine, Clean Catch Updated: 04/18/24 0932     Color, UA Yellow     Appearance, UA Clear     pH, UA 5.5     Specific Gravity, UA 1.015     Glucose, UA Negative     Ketones, UA Negative     Bilirubin, UA Negative     Blood, UA Negative     Protein, UA Negative     Leuk Esterase, UA Negative     Nitrite, UA Negative     Urobilinogen, UA 0.2 E.U./dL    Narrative:      In absence of clinical symptoms, the presence of pyuria, bacteria, and/or nitrites on the urinalysis result does not correlate with infection.  Urine microscopic not indicated.    Procalcitonin [[id number]]  (Abnormal) Collected: 04/18/24 0417    Specimen: Blood Updated: 04/18/24 0517     Procalcitonin 2.63 ng/mL     Narrative:      As a Marker for Sepsis (Non-Neonates):    1. <0.5 ng/mL represents a low risk of severe sepsis and/or septic shock.  2. >2 ng/mL represents a high risk of severe sepsis and/or septic shock.    As a Marker for Lower Respiratory Tract Infections that require antibiotic therapy:    PCT on Admission    Antibiotic Therapy       6-12 Hrs later    >0.5                Strongly Recommended  >0.25 - <0.5        Recommended   0.1 - 0.25          Discouraged              Remeasure/reassess PCT  <0.1                Strongly Discouraged      "Remeasure/reassess PCT    As 28 day mortality risk marker: \"Change in Procalcitonin Result\" (>80% or <=80%) if Day 0 (or Day 1) and Day 4 values are available. Refer to [email or web address]    Change in PCT <=80%  A decrease of PCT levels below or equal to 80% defines a positive change in PCT test result representing a higher risk for 28-day all-cause mortality of patients diagnosed with severe sepsis for septic shock.    Change in PCT >80%  A decrease of PCT levels of more than 80% defines a negative change in PCT result representing a lower risk for 28-day all-cause mortality of patients diagnosed with severe sepsis or septic shock.       Basic Metabolic Panel [[id number]]  (Abnormal) Collected: 04/18/24 0417    Specimen: Blood Updated: 04/18/24 0441     Glucose 165 mg/dL      BUN 25 mg/dL      Creatinine 0.90 mg/dL      Sodium 132 mmol/L      Potassium 4.5 mmol/L      Chloride 100 mmol/L      CO2 24.6 mmol/L      Calcium 8.5 mg/dL      BUN/Creatinine Ratio 27.8     Anion Gap 7.4 mmol/L      eGFR 80.6 mL/min/1.73     Narrative:      GFR Normal >60  Chronic Kidney Disease <60  Kidney Failure <15    The GFR formula is only valid for adults with stable renal function between ages 18 and 70.    Respiratory Culture - Sputum, Cough [[id number]] Collected: 04/18/24 0424    Specimen: Sputum from Cough Updated: 04/18/24 0424    CBC & Differential [[id number]]  (Abnormal) Collected: 04/18/24 0417    Specimen: Blood Updated: 04/18/24 0422    Narrative:      The following orders were created for panel order CBC & Differential.  Procedure                               Abnormality         Status                     ---------                               -----------         ------                     CBC Auto Differential[[id number]]        Abnormal            Final result                 Please view results for these tests on the individual orders.    CBC Auto Differential [[id number]]  (Abnormal) Collected: " 04/18/24 0417    Specimen: Blood Updated: 04/18/24 0422     WBC 12.07 10*3/mm3      RBC 3.64 10*6/mm3      Hemoglobin 10.5 g/dL      Hematocrit 33.5 %      MCV 92.0 fL      MCH 28.8 pg      MCHC 31.3 g/dL      RDW 15.3 %      RDW-SD 51.1 fl      MPV 10.3 fL      Platelets 195 10*3/mm3      Neutrophil % 93.7 %      Lymphocyte % 4.0 %      Monocyte % 1.5 %      Eosinophil % 0.0 %      Basophil % 0.1 %      Immature Grans % 0.7 %      Neutrophils, Absolute 11.32 10*3/mm3      Lymphocytes, Absolute 0.48 10*3/mm3      Monocytes, Absolute 0.18 10*3/mm3      Eosinophils, Absolute 0.00 10*3/mm3      Basophils, Absolute 0.01 10*3/mm3      Immature Grans, Absolute 0.08 10*3/mm3      nRBC 0.0 /100 WBC     Comprehensive Metabolic Panel [[id number]]  (Abnormal) Collected: 04/17/24 2345    Specimen: Blood Updated: 04/18/24 0116     Glucose 163 mg/dL      BUN 24 mg/dL      Creatinine 0.86 mg/dL      Sodium 133 mmol/L      Potassium 4.9 mmol/L      Comment: Slight hemolysis detected by analyzer. Result may be falsely elevated.        Chloride 99 mmol/L      CO2 24.5 mmol/L      Calcium 8.2 mg/dL      Total Protein 6.0 g/dL      Albumin 3.0 g/dL      ALT (SGPT) 19 U/L      Comment: Appended report. These results have been appended to a previously preliminary verified report.        AST (SGOT) 26 U/L      Comment: Slight hemolysis detected by analyzer. Result may be falsely elevated.  Appended report. These results have been appended to a previously preliminary verified report.        Alkaline Phosphatase 33 U/L      Total Bilirubin 0.2 mg/dL      Globulin 3.0 gm/dL      A/G Ratio 1.0 g/dL      BUN/Creatinine Ratio 27.9     Anion Gap 9.5 mmol/L      eGFR 81.7 mL/min/1.73     Narrative:      GFR Normal >60  Chronic Kidney Disease <60  Kidney Failure <15    The GFR formula is only valid for adults with stable renal function between ages 18 and 70.    High Sensitivity Troponin T [[id number]]  (Normal) Collected: 04/17/24 2345    Specimen:  Blood Updated: 04/18/24 0024     HS Troponin T 15 ng/L     Narrative:      High Sensitive Troponin T Reference Range:  <14.0 ng/L- Negative Female for AMI  <22.0 ng/L- Negative Male for AMI  >=14 - Abnormal Female indicating possible myocardial injury.  >=22 - Abnormal Male indicating possible myocardial injury.   Clinicians would have to utilize clinical acumen, EKG, Troponin, and serial changes to determine if it is an Acute Myocardial Infarction or myocardial injury due to an underlying chronic condition.         Lipid Panel [[id number]] Collected: 04/17/24 1804    Specimen: Blood from Arm, Left Updated: 04/18/24 0016     Total Cholesterol 137 mg/dL      Triglycerides 78 mg/dL      HDL Cholesterol 58 mg/dL      LDL Cholesterol  64 mg/dL      VLDL Cholesterol 15 mg/dL      LDL/HDL Ratio 1.09    Narrative:      Cholesterol Reference Ranges  (U.S. Department of Health and Human Services ATP III Classifications)    Desirable          <200 mg/dL  Borderline High    200-239 mg/dL  High Risk          >240 mg/dL      Triglyceride Reference Ranges  (U.S. Department of Health and Human Services ATP III Classifications)    Normal           <150 mg/dL  Borderline High  150-199 mg/dL  High             200-499 mg/dL  Very High        >500 mg/dL    HDL Reference Ranges  (U.S. Department of Health and Human Services ATP III Classifications)    Low     <40 mg/dl (major risk factor for CHD)  High    >60 mg/dl ('negative' risk factor for CHD)        LDL Reference Ranges  (U.S. Department of Health and Human Services ATP III Classifications)    Optimal          <100 mg/dL  Near Optimal     100-129 mg/dL  Borderline High  130-159 mg/dL  High             160-189 mg/dL  Very High        >189 mg/dL    Protime-INR [[id number]]  (Normal) Collected: 04/17/24 2345    Specimen: Blood Updated: 04/18/24 0006     Protime 14.2 Seconds      INR 1.10    Narrative:      Therapeutic Ranges for INR: 2.0-3.0 (PT 20-30)                               2.5-3.5 (PT 25-34)    CBC & Differential [[id number]]  (Abnormal) Collected: 04/17/24 2345    Specimen: Blood Updated: 04/18/24 0001    Narrative:      The following orders were created for panel order CBC & Differential.  Procedure                               Abnormality         Status                     ---------                               -----------         ------                     CBC Auto Differential[[id number]]        Abnormal            Final result                 Please view results for these tests on the individual orders.    CBC Auto Differential [[id number]]  (Abnormal) Collected: 04/17/24 2345    Specimen: Blood Updated: 04/18/24 0001     WBC 13.36 10*3/mm3      RBC 3.82 10*6/mm3      Hemoglobin 11.1 g/dL      Hematocrit 35.4 %      MCV 92.7 fL      MCH 29.1 pg      MCHC 31.4 g/dL      RDW 15.3 %      RDW-SD 51.8 fl      MPV 10.5 fL      Platelets 204 10*3/mm3      Neutrophil % 95.0 %      Lymphocyte % 3.4 %      Monocyte % 1.0 %      Eosinophil % 0.0 %      Basophil % 0.1 %      Immature Grans % 0.5 %      Neutrophils, Absolute 12.67 10*3/mm3      Lymphocytes, Absolute 0.46 10*3/mm3      Monocytes, Absolute 0.14 10*3/mm3      Eosinophils, Absolute 0.00 10*3/mm3      Basophils, Absolute 0.02 10*3/mm3      Immature Grans, Absolute 0.07 10*3/mm3      nRBC 0.0 /100 WBC     POC Glucose Once [[id number]]  (Abnormal) Collected: 04/17/24 2334    Specimen: Blood Updated: 04/17/24 2340     Glucose 168 mg/dL     TSH [[id number]]  (Normal) Collected: 04/17/24 1804    Specimen: Blood from Arm, Left Updated: 04/17/24 2104     TSH 1.940 uIU/mL     Hemoglobin A1c [[id number]]  (Abnormal) Collected: 04/17/24 1555    Specimen: Blood Updated: 04/17/24 2056     Hemoglobin A1C 6.00 %     Narrative:      Hemoglobin A1C Ranges:    Increased Risk for Diabetes  5.7% to 6.4%  Diabetes                     >= 6.5%  Diabetic Goal                < 7.0%    STAT Lactic Acid, Reflex [[id number]]  (Normal) Collected: 04/17/24 1852     Specimen: Blood from Arm, Left Updated: 04/17/24 1912     Lactate 0.9 mmol/L     High Sensitivity Troponin T 2Hr [[id number]]  (Abnormal) Collected: 04/17/24 1804    Specimen: Blood from Arm, Left Updated: 04/17/24 1836     HS Troponin T 24 ng/L      Troponin T Delta -4 ng/L     Narrative:      High Sensitive Troponin T Reference Range:  <14.0 ng/L- Negative Female for AMI  <22.0 ng/L- Negative Male for AMI  >=14 - Abnormal Female indicating possible myocardial injury.  >=22 - Abnormal Male indicating possible myocardial injury.   Clinicians would have to utilize clinical acumen, EKG, Troponin, and serial changes to determine if it is an Acute Myocardial Infarction or myocardial injury due to an underlying chronic condition.         Blood Culture - Blood, Arm, Left [[id number]] Collected: 04/17/24 1658    Specimen: Blood from Arm, Left Updated: 04/17/24 1714    Blood Culture - Blood, Arm, Left [[id number]] Collected: 04/17/24 1550    Specimen: Blood from Arm, Left Updated: 04/17/24 1713            Imaging Results (Last 24 Hours)       Procedure Component Value Units Date/Time    MRI Brain With & Without Contrast [[id number]] Collected: 04/18/24 1055     Updated: 04/18/24 1105    Narrative:      MRI BRAIN W WO CONTRAST    Date of Exam: 4/18/2024 10:00 AM EDT    Indication: Stroke suspected (Ped 0-17y).     Comparison: CT head 4/17/2024. CT perfusion 4/18/2024. CT angiogram of the head 4/18/2024.    Technique:  Routine multiplanar/multisequence sequence images of the brain were obtained before and after the uneventful administration of Multihance.      FINDINGS:  There is a small focus of abnormal diffusion restriction with associated decreased signal on ADC mapping involving the anterior aspect of the right cerebellar hemisphere. This finding measures approximately 8 mm. Corresponding FLAIR and T2 increased   signal is noted. The findings indicate a focus of likely subacute ischemic change. No definitive additional  areas of involvement are identified.    Advanced nonspecific diffuse white matter signal changes are noted indicating chronic small vessel ischemic changes or age-related changes. Associated diffuse volume loss is noted. There is no evidence for abnormal cerebral edema. There is no evidence   for abnormal focal enhancement or abnormal enhancing mass.     There is no mass effect or midline shift. The ventricular system is nondilated. The basilar cisterns are patent. The midline structures are unremarkable. An incidental prominent subarachnoid cyst is noted at the posterior margin of the posterior cranial   fossa. No significant abnormal signal is observed involving the paranasal sinuses or mastoid air cells.      Impression:      1.Small focus of likely subacute ischemic change involving the anterior aspect of the right cerebellar hemisphere.  2.No evidence for abnormal focal enhancement or abnormal enhancing mass.  3.Advanced diffuse nonspecific white matter signal changes are noted with associated diffuse volume loss. These findings are most consistent with chronic small vessel ischemic changes or age-related changes.        Electronically Signed: Zia Hurley MD    4/18/2024 11:02 AM EDT    Workstation ID: [id number]    CT Angiogram Head [[id number]] Collected: 04/18/24 0737     Updated: 04/18/24 0752    Narrative:      CT ANGIOGRAM HEAD, CT ANGIOGRAM NECK    Date of Exam: 4/18/2024 4:59 AM EDT    Indication: Stroke, follow up.    Comparison: None available.    Technique: CTA of the head and neck was performed after the uneventful intravenous administration of iodinated contrast. Reconstructed coronal and sagittal images were also obtained. In addition, a 3-D volume rendered image was created for   interpretation. Automated exposure control and iterative reconstruction methods were used.        FINDINGS:  VASCULAR FINDINGS:  The arteries of the [place] are patent without evidence for thrombosis or  occlusion. The more peripheral arteries throughout the head and neck are opacified and are symmetric bilaterally. Mild atherosclerosis is noted. There is no evidence for   focal aneurysm.    A normal aortic arch branching pattern is identified. There is mild to moderate atherosclerosis of the aortic arch. The origins of the great vessels are patent. The origins of the common carotid arteries are patent bilaterally. There is no evidence for   significant stenosis throughout the common carotid arteries bilaterally. The carotid bulbs are patent. There is mild atherosclerosis. No significant stenosis is noted involving the carotid bulbs. The origins of the internal carotid arteries are patent   bilaterally. There is no evidence for hemodynamically significant stenosis throughout the internal carotid arteries bilaterally based on NASCET criteria.    The origins of the vertebral arteries are patent bilaterally. There is no evidence for significant stenosis throughout the vertebral arteries bilaterally. A codominant vertebrobasilar system is noted.    There is no evidence for arterial dissection throughout the arteries of the neck bilaterally.    NONVASCULAR FINDINGS:  There is no evidence for intracranial abnormal focal enhancement or abnormal enhancing mass. No definitive focal ischemia is observed. There is no abnormal cerebral edema. There is no mass effect or midline shift. The ventricular system is nondilated.   The basilar cisterns are patent.    No acute abnormalities are seen throughout the neck soft tissues. There is no evidence for edema or abnormal fluid collection. There is no evidence for hemorrhage or hematoma. No acute osseous abnormalities are identified.     Extensive opacities are seen within the left upper lung. The findings may indicate left lung pneumonia.       Impression:      1.No evidence for arterial occlusion or thrombosis throughout the arteries of the head or neck.  2.No evidence for focal  aneurysm.  3.No evidence for arterial dissection throughout the arteries of the neck.  4.Extensive airspace opacities involving the left upper lung suggesting potential left lung pneumonia.          Electronically Signed: Zia Hurley MD    4/18/2024 7:49 AM EDT    Workstation ID: [id number]    CT Angiogram Neck [[id number]] Collected: 04/18/24 0737     Updated: 04/18/24 0752    Narrative:      CT ANGIOGRAM HEAD, CT ANGIOGRAM NECK    Date of Exam: 4/18/2024 4:59 AM EDT    Indication: Stroke, follow up.    Comparison: None available.    Technique: CTA of the head and neck was performed after the uneventful intravenous administration of iodinated contrast. Reconstructed coronal and sagittal images were also obtained. In addition, a 3-D volume rendered image was created for   interpretation. Automated exposure control and iterative reconstruction methods were used.        FINDINGS:  VASCULAR FINDINGS:  The arteries of the [place] are patent without evidence for thrombosis or occlusion. The more peripheral arteries throughout the head and neck are opacified and are symmetric bilaterally. Mild atherosclerosis is noted. There is no evidence for   focal aneurysm.    A normal aortic arch branching pattern is identified. There is mild to moderate atherosclerosis of the aortic arch. The origins of the great vessels are patent. The origins of the common carotid arteries are patent bilaterally. There is no evidence for   significant stenosis throughout the common carotid arteries bilaterally. The carotid bulbs are patent. There is mild atherosclerosis. No significant stenosis is noted involving the carotid bulbs. The origins of the internal carotid arteries are patent   bilaterally. There is no evidence for hemodynamically significant stenosis throughout the internal carotid arteries bilaterally based on NASCET criteria.    The origins of the vertebral arteries are patent bilaterally. There is no evidence for  significant stenosis throughout the vertebral arteries bilaterally. A codominant vertebrobasilar system is noted.    There is no evidence for arterial dissection throughout the arteries of the neck bilaterally.    NONVASCULAR FINDINGS:  There is no evidence for intracranial abnormal focal enhancement or abnormal enhancing mass. No definitive focal ischemia is observed. There is no abnormal cerebral edema. There is no mass effect or midline shift. The ventricular system is nondilated.   The basilar cisterns are patent.    No acute abnormalities are seen throughout the neck soft tissues. There is no evidence for edema or abnormal fluid collection. There is no evidence for hemorrhage or hematoma. No acute osseous abnormalities are identified.     Extensive opacities are seen within the left upper lung. The findings may indicate left lung pneumonia.       Impression:      1.No evidence for arterial occlusion or thrombosis throughout the arteries of the head or neck.  2.No evidence for focal aneurysm.  3.No evidence for arterial dissection throughout the arteries of the neck.  4.Extensive airspace opacities involving the left upper lung suggesting potential left lung pneumonia.          Electronically Signed: Zia Hurley MD    4/18/2024 7:49 AM EDT    Workstation ID: [id number]    CT CEREBRAL PERFUSION WITH & WITHOUT CONTRAST [[id number]] Collected: 04/18/24 0545     Updated: 04/18/24 0548    Narrative:      CT CEREBRAL PERFUSION W WO CONTRAST    Date of Exam: 4/18/2024 4:50 AM EDT    Indication: Neuro deficit, acute, stroke suspected.     Comparison: None available.    Technique: Axial CT images of the brain were obtained prior to and after the administration of iodinated contrast. CT Perfusion protocol was utilized. Automated post processing was performed by RAPID software and submitted to PACS for interpretation.   Automated exposure control and iterative reconstruction was utilized.      Findings:  Cerebral blood  flow, blood volume and mean transit time maps are symmetric without large perfusion defect.    CBF<30% volume: 0 mL  Tmax>6sec volume: 0 mL  Mismatch volume: 0 mL  Mismatch ratio: None.      Impression:      Impression:  No significant perfusion abnormality in the brain.        Electronically Signed: Sabino Duarte MD    4/18/2024 5:45 AM EDT    Workstation ID: [id number]    CT Head Without Contrast Stroke Protocol [[id number]] Collected: 04/18/24 0019     Updated: 04/18/24 0032    Narrative:      CT HEAD WO CONTRAST STROKE PROTOCOL    Date of Exam: 4/17/2024 11:49 PM EDT    Indication: Neuro deficit, acute, stroke suspected.    Comparison: 12/21/2022.    Technique: Axial CT images were obtained of the head without contrast administration.  Reconstructed coronal and sagittal images were also obtained. Automated exposure control and iterative construction methods were used.    Scan Time: 0004 hours.  Results discussed with the patient's nurse on the [facility] floor at 0024 hours..      Findings:  There are chronic cortical infarcts in the bilateral posterior high frontal lobes. There is a small chronic cortical infarct in the right parietal lobe. There are moderate patchy areas of white matter hypoattenuation. No new areas of hypoattenuation in   the brain. There is a stable midline posterior fossa cyst measuring up to 5.2 cm. The exam is limited by motion despite multiple scanning attempts. The visualized calvarium appears grossly intact. Orbits are unremarkable. Paranasal sinuses and visualized   mastoids appear well aerated.      Impression:      Impression:    1. Limited study demonstrates no definite acute abnormality.  2. 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        Electronically Signed: Sabino Duarte MD    4/18/2024 12:28 AM EDT    Workstation ID: [id number]            ECG/EMG Results (most recent)       Procedure Component Value Units Date/Time    ECG 12 Lead Dyspnea  [[id number]] Collected: 04/17/24 1557     Updated: 04/17/24 2039     QT Interval 366 ms      QTC Interval 441 ms     Narrative:      HEART RATE= 87  bpm  RR Interval= 688  ms  NM Interval= 203  ms  P Horizontal Axis= -33  deg  P Front Axis= 31  deg  QRSD Interval= 74  ms  QT Interval= 366  ms  QTcB= 441  ms  QRS Axis= 19  deg  T Wave Axis= 85  deg  - BORDERLINE ECG -  Sinus rhythm  Probable left atrial enlargement  No change from prior tracing  Electronically Signed By: Mana Lund ([facility]) 17-Apr-2024 20:39:31  Date and Time of Study: 2024-04-17 15:57:12    ECG 12 Lead Stroke Evaluation [[id number]] Collected: 04/17/24 2348     Updated: 04/17/24 2349     QT Interval 398 ms      QTC Interval 428 ms     Narrative:      HEART RATE= 69  bpm  RR Interval= 864  ms  NM Interval= 237  ms  P Horizontal Axis= -29  deg  P Front Axis= 35  deg  QRSD Interval= 74  ms  QT Interval= 398  ms  QTcB= 428  ms  QRS Axis= 14  deg  T Wave Axis= 53  deg  - ABNORMAL ECG -  Sinus rhythm  Prolonged NM interval  Electronically Signed By:   Date and Time of Study: 2024-04-17 23:48:27    Adult Transthoracic Echo Complete w/ Color, Spectral and Contrast if Necessary Per Protocol [[id number]] Resulted: 04/18/24 1227     Updated: 04/18/24 1232     EF(MOD-bp) 62.4 %      LVIDd 5.1 cm      LVIDs 3.5 cm      IVSd 1.20 cm      LVPWd 1.00 cm      FS 30.6 %      IVS/LVPW 1.20 cm      ESV(cubed) 44.4 ml      LV Sys Vol (BSA corrected) 23.1 cm2      EDV(cubed) 132.7 ml      LV Retana Vol (BSA corrected) 65.4 cm2      LV mass(C)d 213.9 grams      LVOT area 3.3 cm2      LVOT diam 2.04 cm      EDV(MOD-sp2) 149.0 ml      EDV(MOD-sp4) 136.0 ml      ESV(MOD-sp2) 56.0 ml      ESV(MOD-sp4) 48.0 ml      SV(MOD-sp2) 93.0 ml      SV(MOD-sp4) 88.0 ml      SI(MOD-sp2) 44.7 ml/m2      SI(MOD-sp4) 42.3 ml/m2      EF(MOD-sp2) 62.4 %      EF(MOD-sp4) 64.7 %      MV E max paris 82.7 cm/sec      MV A max paris 96.2 cm/sec      MV dec time 203 sec      MV E/A 0.86     MV A dur 0.11  sec      LA ESV Index (BP) 26.6 ml/m2      Med Peak E' Cristhian 7.2 cm/sec      Lat Peak E' Cristhian 6.4 cm/sec      Avg E/e' ratio 12.16     SV(LVOT) 77.1 ml      RV S' 10.0 cm/sec      LV V1 max 110.0 cm/sec      LV V1 max PG 4.8 mmHg      LV V1 mean PG 2.00 mmHg      LV V1 VTI 23.5 cm      Ao pk cristhian 113.0 cm/sec      Ao max PG 5.1 mmHg      Ao mean PG 3.0 mmHg      Ao V2 VTI 24.6 cm      JF(I,D) 3.1 cm2      MV max PG 4.6 mmHg      MV mean PG 1.78 mmHg      MV V2 VTI 34.5 cm      MV P1/2t 78.9 msec      MVA(P1/2t) 2.8 cm2      MVA(VTI) 2.23 cm2      MV dec slope 303.7 cm/sec2      RAP systole 3.0 mmHg      Ao root diam 3.6 cm      Dimensionless Index 1.00 (DI)     Narrative:        Left ventricular systolic function is normal. Calculated left   ventricular EF = 62.4%    Left ventricular diastolic function is consistent with (grade I)   impaired relaxation and age.    There is moderate calcification of the aortic valve.              Medication Review:   I have reviewed the patient's current medication list    Current Facility-Administered Medications:     acetaminophen (TYLENOL) tablet 650 mg, 650 mg, Oral, Q4H PRN **OR** acetaminophen (TYLENOL) 160 MG/5ML oral solution 650 mg, 650 mg, Oral, Q4H PRN **OR** acetaminophen (TYLENOL) suppository 650 mg, 650 mg, Rectal, Q4H PRN, Kimber Vital, APRN    sennosides-docusate (PERICOLACE) 8.6-50 MG per tablet 2 tablet, 2 tablet, Oral, BID PRN **AND** polyethylene glycol (MIRALAX) packet 17 g, 17 g, Oral, Daily PRN **AND** bisacodyl (DULCOLAX) EC tablet 5 mg, 5 mg, Oral, Daily PRN **AND** bisacodyl (DULCOLAX) suppository 10 mg, 10 mg, Rectal, Daily PRN, Kimber Vital, APRN    Calcium Replacement - Follow Nurse / BPA Driven Protocol, , Does not apply, PRN, Kimber Vital, BERNARD    cetirizine (zyrTEC) tablet 10 mg, 10 mg, Oral, Daily PRN, Kimber Vital APRN    citalopram (CeleXA) tablet 20 mg, 20 mg, Oral, Daily, Kimber Vital APRN, 20 mg at 04/18/24 1059     clopidogrel (PLAVIX) tablet 75 mg, 75 mg, Oral, Daily, Kimber Vital, APRN, 75 mg at 04/18/24 1056    dexAMETHasone (DECADRON) tablet 6 mg, 6 mg, Oral, Daily With Breakfast, Roni Resendiz MD    docusate sodium (COLACE) capsule 100 mg, 100 mg, Oral, Daily, Kimber Vital, APRN, 100 mg at 04/18/24 1057    Enoxaparin Sodium (LOVENOX) syringe 40 mg, 40 mg, Subcutaneous, Nightly, Kimber Vital APRN    guaiFENesin (MUCINEX) 12 hr tablet 1,200 mg, 1,200 mg, Oral, BID, Kimber Vital APRN, 1,200 mg at 04/18/24 1057    ipratropium-albuterol (DUO-NEB) nebulizer solution 3 mL, 3 mL, Nebulization, 4x Daily - RT, Kimber Vital APRN, 3 mL at 04/18/24 1547    ipratropium-albuterol (DUO-NEB) nebulizer solution 3 mL, 3 mL, Nebulization, Q4H PRN, Kimber Vital, APRN, 3 mL at 04/18/24 0719    levothyroxine (SYNTHROID, LEVOTHROID) tablet 50 mcg, 50 mcg, Oral, Q AM, Kimber Vital R, APRN    magnesium oxide (MAG-OX) tablet 400 mg, 400 mg, Oral, Nightly, Kimber Vital, APRN    Magnesium Standard Dose Replacement - Follow Nurse / BPA Driven Protocol, , Does not apply, PRN, Kimber Vital R, APRN    melatonin tablet 5 mg, 5 mg, Oral, Nightly PRN, Kimber Vital R, APRN    Multi-Shantel liquid 15 mL, 15 mL, Oral, Daily, Kimber Vital R APRN, 15 mL at 04/18/24 1057    nirmatrelvir and ritonavir (300mg/100mg)(PAXLOVID) 3 tablet pack, 3 tablet, Oral, BID, Roni Resendiz MD    nitroglycerin (NITROSTAT) SL tablet 0.4 mg, 0.4 mg, Sublingual, Q5 Min PRN, Kimber Vital, APRN    ondansetron ODT (ZOFRAN-ODT) disintegrating tablet 4 mg, 4 mg, Oral, Q6H PRN **OR** ondansetron (ZOFRAN) injection 4 mg, 4 mg, Intravenous, Q6H PRN, Kimber Vital, APRN    pantoprazole (PROTONIX) EC tablet 40 mg, 40 mg, Oral, Daily, Kimber Vital APRN, 40 mg at 04/18/24 1056    Pharmacy Consult - Pharmacy to dose, , Does not apply, Continuous PRN, Roni Resendiz MD    Pharmacy to Dose enoxaparin (LOVENOX), , Does  not apply, Continuous PRN, [place], Kimber R, APRN    Phosphorus Replacement - Follow Nurse / BPA Driven Protocol, , Does not apply, PRN, Amanuel, Kimber R, APRN    Potassium Replacement - Follow Nurse / BPA Driven Protocol, , Does not apply, PRN, Amanuel, Kimber R, APRN    rivastigmine (EXELON) 4.6 MG/24HR patch 1 patch, 1 patch, Transdermal, Daily, [place] Kimber R, APRN, 1 patch at 04/18/24 0913    sodium chloride 0.9 % flush 10 mL, 10 mL, Intravenous, PRN, Anil Nuñez MD    sodium chloride 0.9 % flush 10 mL, 10 mL, Intravenous, Q12H, Amanuel, Kimber R, APRN, 10 mL at 04/18/24 0120    sodium chloride 0.9 % flush 10 mL, 10 mL, Intravenous, PRN, [place], Kimber R, APRN    sodium chloride 0.9 % infusion 40 mL, 40 mL, Intravenous, PRN, Amanuel, Kimber R, APRN    sodium chloride 0.9 % infusion, 100 mL/hr, Intravenous, Continuous, [place], Kimber R, APRN, Last Rate: 100 mL/hr at 04/18/24 0128, 100 mL/hr at 04/18/24 0128    traZODone (DESYREL) tablet 100 mg, 100 mg, Oral, Nightly, Amanuel, Kimber R, APRN      Assessment & Plan     Acute Encephalopathy: This has resolved.  Stroke work-up negative.  I suspect this was due to his underlying infection.  Discussed w/ tele stroke.  Acute Hypoxic Respiratory Failure due to Acute COVID Infection: Started on Decadron and Paxlovid given degree of hypoxia and symptom window.  His PCT is also up, but this could be also due to COVID given it's early rise.  Will change him to PO Cefdinir to cover and will trend.  Vascular Dementia: Back to baseline after episode last evening.  Continue home regimen.  CAD: No acute issues.    Plan for disposition: Predicated on hospital course.    Roni Resendiz MD  04/18/24  17:01 EDT

## 2024-04-18 NOTE — PROGRESS NOTES
Pharmacy Consult - Paxlovid Dosing    Pawel Goldberg is a 89 y.o. male who has been consulted for Paxlovid dosing.  Relevant clinical data and objective history reviewed:  Lab Results   Component Value Date/Time    CREATININE 0.90 04/18/2024 04:17 AM    CREATININE 0.86 04/17/2024 11:45 PM    CREATININE 1.05 04/17/2024 03:55 PM    CREATININE 0.78 09/16/2022 07:08 PM    CREATININE 0.72 (L) 07/09/2021 03:33 AM    CREATININE 0.65 (L) 07/08/2021 07:15 PM    BUN 25 (H) 04/18/2024 04:17 AM    BUN 24 (H) 04/17/2024 11:45 PM    BUN 28 (H) 04/17/2024 03:55 PM    BUN 20 09/16/2022 07:08 PM    BUN 16 07/09/2021 03:33 AM    BUN 15 07/08/2021 07:15 PM     Estimated Creatinine Clearance: 60 mL/min (by C-G formula based on SCr of 0.9 mg/dL).    Lab Results   Component Value Date/Time    WBC 12.07 (H) 04/18/2024 04:17 AM    WBC 7.90 05/16/2023 04:00 AM    HGB 10.5 (L) 04/18/2024 04:17 AM    HGB 12.0 (L) 05/16/2023 04:00 AM    HCT 33.5 (L) 04/18/2024 04:17 AM    HCT 37.5 (L) 05/16/2023 04:00 AM    MCV 92.0 04/18/2024 04:17 AM    MCV 93.5 05/16/2023 04:00 AM     04/18/2024 04:17 AM     05/16/2023 04:00 AM     Temp Readings from Last 3 Encounters:   04/18/24 97.7 °F (36.5 °C) (Oral)   04/05/24 98.3 °F (36.8 °C) (Oral)   03/28/24 97.5 °F (36.4 °C)     Weight: 92.1 kg (203 lb)    Assessment/Plan  The patient will be started on Paxlovid 300/100 twice daily. Pharmacy will continue to follow the patient’s clinical progress daily.    Thank you-    Brendan La, SaadD

## 2024-04-18 NOTE — PLAN OF CARE
Problem: Adult Inpatient Plan of Care  Goal: Plan of Care Review  Recent Flowsheet Documentation  Taken 4/18/2024 1024 by Jose Luis Ayala OTR  Plan of Care Reviewed With: patient  Outcome Evaluation: Occupational therapy evaluation completed. Pt brought to ER from assisted living facility due to c/o weakness, fatigue, vomiting and left ear pain. Pt dx with pneumonia. Pt with medical status change while on m/s (facial droop, non-verbal and combative behavior) and code stroke was called. Pt's symptoms have resolved. Pt with hx of dementia. He receives support for adl's as needed from staff at assisted living facility. Pt oriented to person this am. He required cues to follow simple commands throughout evaluation. Pt managed bed mobility with min assist. He was able to don slip on shoes while sitting at EOB with min assist. Pt managed transfers and mobility with min assist using a rolling walker for support. Rec OT services to address safety with transfers and basic adl tasks. Rec pt continue to receive support for adl tasks upon return to assisted living facilty due to his cognitive status and hx of falls.

## 2024-04-19 LAB
ALBUMIN SERPL-MCNC: 3 G/DL (ref 3.5–5.2)
ALBUMIN/GLOB SERPL: 1 G/DL
ALP SERPL-CCNC: 37 U/L (ref 39–117)
ALT SERPL W P-5'-P-CCNC: 22 U/L (ref 1–41)
ANION GAP SERPL CALCULATED.3IONS-SCNC: 9.2 MMOL/L (ref 5–15)
AST SERPL-CCNC: 31 U/L (ref 1–40)
BASOPHILS # BLD AUTO: 0.02 10*3/MM3 (ref 0–0.2)
BASOPHILS NFR BLD AUTO: 0.2 % (ref 0–1.5)
BILIRUB SERPL-MCNC: 0.2 MG/DL (ref 0–1.2)
BUN SERPL-MCNC: 26 MG/DL (ref 8–23)
BUN/CREAT SERPL: 23 (ref 7–25)
CALCIUM SPEC-SCNC: 8.8 MG/DL (ref 8.2–9.6)
CHLORIDE SERPL-SCNC: 103 MMOL/L (ref 98–107)
CO2 SERPL-SCNC: 24.8 MMOL/L (ref 22–29)
CREAT SERPL-MCNC: 1.13 MG/DL (ref 0.76–1.27)
CRP SERPL-MCNC: 8.48 MG/DL (ref 0–0.5)
DEPRECATED RDW RBC AUTO: 51.2 FL (ref 37–54)
EGFRCR SERPLBLD CKD-EPI 2021: 61.4 ML/MIN/1.73
EOSINOPHIL # BLD AUTO: 0 10*3/MM3 (ref 0–0.4)
EOSINOPHIL NFR BLD AUTO: 0 % (ref 0.3–6.2)
ERYTHROCYTE [DISTWIDTH] IN BLOOD BY AUTOMATED COUNT: 15.3 % (ref 12.3–15.4)
GLOBULIN UR ELPH-MCNC: 2.9 GM/DL
GLUCOSE SERPL-MCNC: 129 MG/DL (ref 65–99)
HCT VFR BLD AUTO: 34.3 % (ref 37.5–51)
HGB BLD-MCNC: 10.7 G/DL (ref 13–17.7)
IMM GRANULOCYTES # BLD AUTO: 0.09 10*3/MM3 (ref 0–0.05)
IMM GRANULOCYTES NFR BLD AUTO: 0.7 % (ref 0–0.5)
LYMPHOCYTES # BLD AUTO: 0.92 10*3/MM3 (ref 0.7–3.1)
LYMPHOCYTES NFR BLD AUTO: 7.3 % (ref 19.6–45.3)
MCH RBC QN AUTO: 28.7 PG (ref 26.6–33)
MCHC RBC AUTO-ENTMCNC: 31.2 G/DL (ref 31.5–35.7)
MCV RBC AUTO: 92 FL (ref 79–97)
MONOCYTES # BLD AUTO: 0.87 10*3/MM3 (ref 0.1–0.9)
MONOCYTES NFR BLD AUTO: 6.9 % (ref 5–12)
NEUTROPHILS NFR BLD AUTO: 10.75 10*3/MM3 (ref 1.7–7)
NEUTROPHILS NFR BLD AUTO: 84.9 % (ref 42.7–76)
NRBC BLD AUTO-RTO: 0 /100 WBC (ref 0–0.2)
PLATELET # BLD AUTO: 216 10*3/MM3 (ref 140–450)
PMV BLD AUTO: 10.6 FL (ref 6–12)
POTASSIUM SERPL-SCNC: 4.4 MMOL/L (ref 3.5–5.2)
PROCALCITONIN SERPL-MCNC: 1.9 NG/ML (ref 0–0.25)
PROT SERPL-MCNC: 5.9 G/DL (ref 6–8.5)
QT INTERVAL: 398 MS
QTC INTERVAL: 428 MS
RBC # BLD AUTO: 3.73 10*6/MM3 (ref 4.14–5.8)
SODIUM SERPL-SCNC: 137 MMOL/L (ref 136–145)
WBC NRBC COR # BLD AUTO: 12.65 10*3/MM3 (ref 3.4–10.8)

## 2024-04-19 PROCEDURE — 80053 COMPREHEN METABOLIC PANEL: CPT | Performed by: HOSPITALIST

## 2024-04-19 PROCEDURE — 25010000002 MIDAZOLAM PER 1MG: Performed by: INTERNAL MEDICINE

## 2024-04-19 PROCEDURE — 25010000002 ENOXAPARIN PER 10 MG: Performed by: NURSE PRACTITIONER

## 2024-04-19 PROCEDURE — 97530 THERAPEUTIC ACTIVITIES: CPT

## 2024-04-19 PROCEDURE — 94761 N-INVAS EAR/PLS OXIMETRY MLT: CPT

## 2024-04-19 PROCEDURE — 97116 GAIT TRAINING THERAPY: CPT

## 2024-04-19 PROCEDURE — 94799 UNLISTED PULMONARY SVC/PX: CPT

## 2024-04-19 PROCEDURE — 86140 C-REACTIVE PROTEIN: CPT | Performed by: HOSPITALIST

## 2024-04-19 PROCEDURE — 63710000001 DEXAMETHASONE PER 0.25 MG: Performed by: HOSPITALIST

## 2024-04-19 PROCEDURE — 85025 COMPLETE CBC W/AUTO DIFF WBC: CPT | Performed by: HOSPITALIST

## 2024-04-19 PROCEDURE — 92526 ORAL FUNCTION THERAPY: CPT

## 2024-04-19 PROCEDURE — 99232 SBSQ HOSP IP/OBS MODERATE 35: CPT | Performed by: HOSPITALIST

## 2024-04-19 PROCEDURE — 84145 PROCALCITONIN (PCT): CPT | Performed by: HOSPITALIST

## 2024-04-19 RX ORDER — MIDAZOLAM HYDROCHLORIDE 2 MG/2ML
2 INJECTION, SOLUTION INTRAMUSCULAR; INTRAVENOUS
Status: DISCONTINUED | OUTPATIENT
Start: 2024-04-19 | End: 2024-04-21 | Stop reason: HOSPADM

## 2024-04-19 RX ORDER — OLANZAPINE 5 MG/1
5 TABLET ORAL
Status: DISCONTINUED | OUTPATIENT
Start: 2024-04-19 | End: 2024-04-19

## 2024-04-19 RX ORDER — BUSPIRONE HYDROCHLORIDE 5 MG/1
5 TABLET ORAL NIGHTLY
Status: DISCONTINUED | OUTPATIENT
Start: 2024-04-19 | End: 2024-04-21 | Stop reason: HOSPADM

## 2024-04-19 RX ADMIN — CITALOPRAM HYDROBROMIDE 20 MG: 20 TABLET ORAL at 11:22

## 2024-04-19 RX ADMIN — NIRMATRELVIR AND RITONAVIR 3 TABLET: KIT at 11:20

## 2024-04-19 RX ADMIN — DEXAMETHASONE 6 MG: 4 TABLET ORAL at 11:21

## 2024-04-19 RX ADMIN — PANTOPRAZOLE SODIUM 40 MG: 40 TABLET, DELAYED RELEASE ORAL at 11:21

## 2024-04-19 RX ADMIN — IPRATROPIUM BROMIDE AND ALBUTEROL SULFATE 3 ML: .5; 3 SOLUTION RESPIRATORY (INHALATION) at 15:37

## 2024-04-19 RX ADMIN — BUSPIRONE HYDROCHLORIDE 5 MG: 5 TABLET ORAL at 20:51

## 2024-04-19 RX ADMIN — GUAIFENESIN 1200 MG: 600 TABLET, EXTENDED RELEASE ORAL at 11:22

## 2024-04-19 RX ADMIN — Medication 400 MG: at 20:51

## 2024-04-19 RX ADMIN — MIDAZOLAM HYDROCHLORIDE 2 MG: 1 INJECTION, SOLUTION INTRAMUSCULAR; INTRAVENOUS at 04:54

## 2024-04-19 RX ADMIN — AMOXICILLIN AND CLAVULANATE POTASSIUM 1 TABLET: 875; 125 TABLET, FILM COATED ORAL at 11:22

## 2024-04-19 RX ADMIN — MULTIVIT AND MINERALS-FERROUS GLUCONATE 9 MG IRON/15 ML ORAL LIQUID 15 ML: at 11:24

## 2024-04-19 RX ADMIN — GUAIFENESIN 1200 MG: 600 TABLET, EXTENDED RELEASE ORAL at 20:51

## 2024-04-19 RX ADMIN — NIRMATRELVIR AND RITONAVIR 3 TABLET: KIT at 20:51

## 2024-04-19 RX ADMIN — IPRATROPIUM BROMIDE AND ALBUTEROL SULFATE 3 ML: .5; 3 SOLUTION RESPIRATORY (INHALATION) at 11:52

## 2024-04-19 RX ADMIN — Medication 5 MG: at 22:29

## 2024-04-19 RX ADMIN — ENOXAPARIN SODIUM 40 MG: 40 INJECTION SUBCUTANEOUS at 20:50

## 2024-04-19 RX ADMIN — AMOXICILLIN AND CLAVULANATE POTASSIUM 1 TABLET: 875; 125 TABLET, FILM COATED ORAL at 20:51

## 2024-04-19 RX ADMIN — IPRATROPIUM BROMIDE AND ALBUTEROL SULFATE 3 ML: .5; 3 SOLUTION RESPIRATORY (INHALATION) at 19:58

## 2024-04-19 RX ADMIN — CLOPIDOGREL BISULFATE 75 MG: 75 TABLET ORAL at 11:21

## 2024-04-19 RX ADMIN — Medication 10 ML: at 20:51

## 2024-04-19 RX ADMIN — TRAZODONE HYDROCHLORIDE 100 MG: 50 TABLET ORAL at 20:51

## 2024-04-19 RX ADMIN — LEVOTHYROXINE SODIUM 50 MCG: 0.05 TABLET ORAL at 11:22

## 2024-04-19 RX ADMIN — RIVASTIGMINE 1 PATCH: 4.6 PATCH TRANSDERMAL at 11:20

## 2024-04-19 RX ADMIN — Medication 10 ML: at 11:42

## 2024-04-19 NOTE — THERAPY TREATMENT NOTE
Acute Care - Physical Therapy Treatment Note  CORT[facility]ers     Patient Name: Pawel Goldberg  : 1932  MRN: [id number]  Today's Date: 2024      Visit Dx:     ICD-10-CM ICD-9-CM   1. Pneumonia of left lower lobe due to infectious organism  J18.9 486   2. 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    Obstruction of carotid artery    Stenosis of carotid artery    EEG abnormality without seizure    Hereditary and idiopathic peripheral neuropathy    Mixed hyperlipidemia    Acquired hypothyroidism    Low back pain with sciatica    Lumbar canal stenosis    Degenerative arthritis of lumbar spine    Osteoarthritis of hip    History of repair of hip joint    Syncope    Temporary cerebral vascular dysfunction    History of prostate cancer    Dementia associated with other underlying disease without behavioral disturbance    Arthritis    PAD (peripheral artery disease)    Medicare annual wellness visit, subsequent    Essential hypertension    Medication monitoring encounter    Status post implantation of artificial urinary sphincter    Moderate asthma with acute exacerbation    Vascular dementia without behavioral disturbance    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    At high risk for falls    Insomnia due to medical condition    Chronic UTI    Frequent falls    Hypoxia    Acute respiratory failure with hypoxia     Past Medical History:   Diagnosis Date    Allergic     Seasonal    Allergic rhinitis     Arthritis     Asthma     Brito esophagus     Benign prostatic hyperplasia     Carotid arterial disease     Status post right CEA 14    COPD (chronic obstructive pulmonary disease)     Coronary artery disease     Dementia     Dyspnea     Erectile dysfunction     GERD (gastroesophageal reflux disease)     Hematoma of frontal scalp 2021    HL (hearing loss)     Hypothyroidism     Low back pain      PAD (peripheral artery disease)     Prostate cancer     Status post implantation of artificial urinary sphincter         Stroke     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    Urinary tract infection      Past Surgical History:   Procedure Laterality Date    CAROTID ARTERY ANGIOPLASTY  04/01/2014    COLONOSCOPY  2009    ENDOSCOPY  2009    2001,2003,2006    EYE SURGERY      FRACTURE SURGERY      HERNIA REPAIR  2000    HERNIA REPAIR  2009    INGUINAL HERNIA REPAIR      PARTIAL HIP ARTHROPLASTY Left 07/02/2014    PROSTATECTOMY  2008    SHOULDER SURGERY  2005    SHOULDER SURGERY  2006    SHOULDER SURGERY  2008    TEAR DUCT SURGERY      URINARY SPHINCTER IMPLANT       PT Assessment (Last 12 Hours)       PT Evaluation and Treatment       Row Name 04/19/24 1101          Physical Therapy Time and Intention    Subjective Information no complaints  -     Document Type therapy note (daily note)  -     Mode of Treatment physical therapy  -     Patient Effort good  -[facility] Name 04/19/24 1101          General Information    Patient Observations alert;cooperative;agree to therapy  -     Patient/Family/Caregiver Comments/Observations pt supine, agrees to therapy  -     Existing Precautions/Restrictions fall  -     Benefits Reviewed patient:;improve function;increase independence;increase strength;increase balance  -     Barriers to Rehab cognitive status  -[facility] Name 04/19/24 1101          Pain    Pre/Posttreatment Pain Comment no c/o pain  -[facility] Name 04/19/24 1101          Cognition    Personal Safety Interventions gait belt;nonskid shoes/slippers when out of bed  -       Row Name 04/19/24 1101          Bed Mobility    Bed Mobility supine-sit  -     Supine-Sit [place] (Bed Mobility) standby assist  -     Assistive Device (Bed Mobility) bed rails;head of bed elevated  -[facility] Name 04/19/24 1101          Transfers    Transfers sit-stand transfer;stand-sit  transfer  -       Row Name 04/19/24 1101          Sit-Stand Transfer    Sit-Stand [place] (Transfers) contact guard;verbal cues  -     Assistive Device (Sit-Stand Transfers) walker, front-wheeled  -       Row Name 04/19/24 1101          Stand-Sit Transfer    Stand-Sit [place] (Transfers) contact guard;verbal cues  -     Assistive Device (Stand-Sit Transfers) walker, front-wheeled  -       Row Name 04/19/24 1101          Gait/Stairs (Locomotion)    [place] Level (Gait) contact guard;verbal cues  -     Assistive Device (Gait) walker, front-wheeled  -     Distance in Feet (Gait) 25  -     Bilateral Gait Deviations forward flexed posture  -     Comment, (Gait/Stairs) pt requires verbal cues for awareness of O2 line and safety with direction changes  -       Row Name 04/19/24 1101          Safety Issues, Functional Mobility    Impairments Affecting Function (Mobility) cognition  -[facility] Name 04/19/24 1101          Balance    Comment, Balance CGA for static standing with rolling walker  -[facility] Name 04/19/24 1101          Plan of Care Review    Outcome Evaluation PT: Patient agreeable to therapy.  Patient performs supine to sit with SBA and sit to/from stand with CGA.  Patient tolerates standing for 3-4 minutes, CGA with rolling walker.  Patient dependent for brief change and hygiene.   Patient performs gait with rolling walker x25 feet, CGA with cues for safety with direction changes and O2 line.  Patient requires cues for safety and direct assistance with all functional tasks.  Patient's ability to safely return to assisted living facility is largely dependent on amount of supervision/assistance that can be provided.  If patient's needs can not be managed at assisted living facility, may require SNF/ECF.  Will continue to follow.  -       Row Name 04/19/24 1101          Positioning and Restraints    Pre-Treatment Position in bed  -     Post Treatment Position chair  -      In Chair sitting;call light within reach;encouraged to call for assist;with OT;notified nsg;exit alarm on  -       Row Name 04/19/24 1101          Progress Summary (PT)    Progress Toward Functional Goals (PT) progress toward functional goals is fair  -     Barriers to Overall Progress (PT) cognition  -               User Key  (r) = Recorded By, (t) = Taken By, (c) = Cosigned By      Initials Name Provider Type    Kimberlyn Sanderson PT Physical Therapist                    Physical Therapy Education       Title: PT OT SLP Therapies (Done)       Topic: Physical Therapy (Done)       Point: Mobility training (Done)       Learning Progress Summary             Patient Acceptance, E,TB, VU,NR by  at 4/19/2024 1244    Acceptance, E,TB, VU by SANDRA at 4/18/2024 0946                                         User Key       Initials Effective Dates Name Provider Type [facility] 06/16/21 -  Kimberlyn Rivera, KRISSY Physical Therapist PT                  PT Recommendation and Plan  Anticipated Discharge Disposition (PT): other (see comments) (will continue to assess)  Planned Therapy Interventions (PT): balance training, bed mobility training, gait training, home exercise program, patient/family education, strengthening, transfer training  Therapy Frequency (PT): 6 times/wk  Progress Summary (PT)  Progress Toward Functional Goals (PT): progress toward functional goals is fair  Barriers to Overall Progress (PT): cognition  Plan of Care Reviewed With: patient  Outcome Evaluation: PT: Patient agreeable to therapy.  Patient performs supine to sit with SBA and sit to/from stand with CGA.  Patient tolerates standing for 3-4 minutes, CGA with rolling walker.  Patient dependent for brief change and hygiene.   Patient performs gait with rolling walker x25 feet, CGA with cues for safety with direction changes and O2 line.  Patient requires cues for safety and direct assistance with all functional tasks.  Patient's ability to  safely return to assisted living facility is largely dependent on amount of supervision/assistance that can be provided.  If patient's needs can not be managed at assisted living facility, may require SNF/ECF.  Will continue to follow.   Outcome Measures       Row Name 04/19/24 1100             How much help from another person do you currently need...    Turning from your back to your side while in flat bed without using bedrails? 3  -JW      Moving from lying on back to sitting on the side of a flat bed without bedrails? 3  -JW      Moving to and from a bed to a chair (including a wheelchair)? 3  -JW      Standing up from a chair using your arms (e.g., wheelchair, bedside chair)? 3  -JW      Climbing 3-5 steps with a railing? 2  -JW      To walk in hospital room? 3  -JW      AM-PAC 6 Clicks Score (PT) 17  -JW      Highest Level of Mobility Goal 5 --> Static standing  -JW         How much help from another is currently needed...    Putting on and taking off regular lower body clothing? 2  -EN      Bathing (including washing, rinsing, and drying) 2  -EN      Toileting (which includes using toilet bed pan or urinal) 2  -EN      Putting on and taking off regular upper body clothing 3  -EN      Taking care of personal grooming (such as brushing teeth) 3  -EN      Eating meals 4  -EN      AM-PAC 6 Clicks Score (OT) 16  -EN         Functional Assessment    Outcome Measure Options AM-PAC 6 Clicks Basic Mobility (PT)  -                User Key  (r) = Recorded By, (t) = Taken By, (c) = Cosigned By      Initials Name Provider Type    Kristi Forte OTR Occupational Therapist    Kimberlyn Sanderson, PT Physical Therapist                     Time Calculation:    PT Charges       Row Name 04/19/24 1245             Time Calculation    Start Time 1100  -      Stop Time 1115  -      Time Calculation (min) 15 min  -      PT Received On 04/19/24  -SANDRA      PT - Next Appointment 04/20/24  -SANDRA         Timed Charges     88542 - Gait Training Minutes  15  -JW         Total Minutes    Timed Charges Total Minutes 15  -JW       Total Minutes 15  -JW                User Key  (r) = Recorded By, (t) = Taken By, (c) = Cosigned By      Initials Name Provider Type    Kimberlyn Sanderson, PT Physical Therapist                  Therapy Charges for Today       Code Description Service Date Service Provider Modifiers Qty    89433819932 HC PT EVAL LOW COMPLEXITY 2 4/18/2024 Kimberlyn Rivera, PT GP 1    75404372919 HC GAIT TRAINING EA 15 MIN 4/19/2024 Kimberlyn Rivera, PT GP 1            PT G-Codes  Outcome Measure Options: AM-PAC 6 Clicks Basic Mobility (PT)  AM-PAC 6 Clicks Score (PT): 17  AM-PAC 6 Clicks Score (OT): 16    Kmiberlyn Rivera PT  4/19/2024

## 2024-04-19 NOTE — NURSING NOTE
Patient refusing vital signs and respiratory treatment this AM.  Attempted to take patient morning meds, and patient was sleeping. Will allow patient to rest and try again when patient awakens.

## 2024-04-19 NOTE — CASE MANAGEMENT/SOCIAL WORK
Continued Stay Note  [facility] [place]     Patient Name: Pawel Goldberg  MRN: [id number]  Today's Date: 4/19/2024    Admit Date: 4/17/2024    Plan: [facility]/ [facility]   Discharge Plan       Row Name 04/19/24 1216       Plan    Plan Return to [facility]/ [facility]    Patient/Family in Agreement with Plan yes    Plan Comments Spoke with patients daughter, Joy Hayden, via phone. IMM explained and understanding verbalized. Discussion regarding goals of care. Joy states she has spoken with her mother and sister. Patient has been refusing to wear oxygen as needed, family states they feel patient has the right to not wear oxygen if he does not want to. Joy confirms they plan for patient to return to [facility] living and [facility] . The family is open to [facility] if it is determined to be appropriate. Family hopes that patient will return to baseline level of activity once he recovers from Covid. Joy is concerned that if he continues to refuse oxygen, that he is unable to get up and do things for himself when his 02 level is low and [facility] does not have the staff to provide total care for patient. Support given. CM will continue to follow patient progress and have discussion regarding long term placement, private pay or [facility] referral if needed, Joy is agreeable to this plan of care. Candace MORALES & Dr Resendiz updated.CM will continue to follow.      Row Name 04/19/24 0964       Plan    Plan from home with wife/Current with [facility]     Plan Comments Notified per [facility] that patient is currently being followed by them. CM will continue to follow and update Aaliyah @ [facility].                   Discharge Codes    No documentation.                 Expected Discharge Date and Time       Expected Discharge Date Expected Discharge Time    Apr 19, 2024               Maxime Choi RN

## 2024-04-19 NOTE — CASE MANAGEMENT/SOCIAL WORK
Continued Stay Note  [facility] [place]     Patient Name: Pawel Goldberg  MRN: [id number]  Today's Date: 4/19/2024    Admit Date: 4/17/2024       Discharge Plan Return to [facility]/Current with [facility]       Row Name 04/19/24 0946       Plan    Plan From [facility] [place]/Current with [facility]     Plan Comments Notified per [facility] that patient is currently being followed by them.  will continue to follow and update Aaliyah @ [facility].                   Discharge Codes    No documentation.                 Expected Discharge Date and Time       Expected Discharge Date Expected Discharge Time    Apr 19, 2024               Maxime Choi RN

## 2024-04-19 NOTE — THERAPY TREATMENT NOTE
Acute Care - Speech Language Pathology   Swallow Treatment Note [facility]     Patient Name: Pawel Goldberg  : 1932  MRN: [id number]  Today's Date: 2024               Admit Date: 2024    Visit Dx:     ICD-10-CM ICD-9-CM   1. Pneumonia of left lower lobe due to infectious organism  J18.9 486   2. 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    Obstruction of carotid artery    Stenosis of carotid artery    EEG abnormality without seizure    Hereditary and idiopathic peripheral neuropathy    Mixed hyperlipidemia    Acquired hypothyroidism    Low back pain with sciatica    Lumbar canal stenosis    Degenerative arthritis of lumbar spine    Osteoarthritis of hip    History of repair of hip joint    Syncope    Temporary cerebral vascular dysfunction    History of prostate cancer    Dementia associated with other underlying disease without behavioral disturbance    Arthritis    PAD (peripheral artery disease)    Medicare annual wellness visit, subsequent    Essential hypertension    Medication monitoring encounter    Status post implantation of artificial urinary sphincter    Moderate asthma with acute exacerbation    Vascular dementia without behavioral disturbance    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    At high risk for falls    Insomnia due to medical condition    Chronic UTI    Frequent falls    Hypoxia    Acute respiratory failure with hypoxia     Past Medical History:   Diagnosis Date    Allergic     Seasonal    Allergic rhinitis     Arthritis     Asthma     Brito esophagus     Benign prostatic hyperplasia     Carotid arterial disease     Status post right CEA 14    COPD (chronic obstructive pulmonary disease)     Coronary artery disease     Dementia     Dyspnea     Erectile dysfunction     GERD (gastroesophageal reflux disease)     Hematoma of frontal scalp 2021    HL (hearing  loss)     Hypothyroidism 1992    Low back pain     PAD (peripheral artery disease)     Prostate cancer     Status post implantation of artificial urinary sphincter         Stroke     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    Urinary tract infection      Past Surgical History:   Procedure Laterality Date    CAROTID ARTERY ANGIOPLASTY  04/01/2014    COLONOSCOPY  2009    ENDOSCOPY  2009    2001,2003,2006    EYE SURGERY      FRACTURE SURGERY      HERNIA REPAIR  2000    HERNIA REPAIR  2009    INGUINAL HERNIA REPAIR      PARTIAL HIP ARTHROPLASTY Left 07/02/2014    PROSTATECTOMY  2008    SHOULDER SURGERY  2005    SHOULDER SURGERY  2006    SHOULDER SURGERY  2008    TEAR DUCT SURGERY      URINARY SPHINCTER IMPLANT         SLP Recommendation and Plan     SLP Diet Recommendation: soft to chew textures, ground, thin liquids, other (see comments) (consider small meals to offset reflux) (04/19/24 1303)  Recommended Precautions and Strategies: upright posture during/after eating, small bites of food and sips of liquid, no straw, general aspiration precautions, reflux precautions, fatigue precautions, 1:1 supervision, assist with feeding (04/19/24 1303)  SLP Rec. for Method of Medication Administration: meds whole, with puree, as tolerated (per RN, tolerates better due to large pill burden) (04/19/24 1303)     Monitor for Signs of Aspiration: notify SLP if any concerns (04/19/24 1303)        Anticipated Discharge Disposition (SLP): assisted living facility ([facility]) (04/19/24 1303)     Therapy Frequency (Swallow): PRN (04/19/24 1303)  Predicted Duration Therapy Intervention (Days): 1 week, until discharge (04/19/24 1303)  Oral Care Recommendations: Oral Care before breakfast, after meals and PRN, Toothbrush, Denture Care (04/19/24 1303)  Demonstrates Need for Referral to Another Service: dedicated esophageal assessment (04/19/24 1303)     Daily Summary of Progress (SLP): progress toward functional goals as  expected (04/19/24 1303)         Patient/Family Concerns, Anticipated Discharge Disposition (SLP): Pt does not report any concerns at this time. (04/19/24 1303)     Treatment Assessment (SLP): improved, toleration of diet, suspected (reflux; pt reports belching and hiccups result in food/drinks coming back up) (04/19/24 1303)  Treatment Assessment Comments (SLP): Pt tolerating diet of soft ground w/thins w/o evidence of oropharyngeal dysphagia. Pt with reports of esophageal symptoms for many years w/belching and hiccups resulting in spitting up of food. None noted during this session and pt tolerating food, pudding and thins by straw w/o any issues or concerns. (04/19/24 1303)  Plan for Continued Treatment (SLP): continue treatment per plan of care (04/19/24 1303)               SWALLOW EVALUATION (Last 72 Hours)       SLP Adult Swallow Evaluation       Row Name 04/19/24 1303 04/19/24 0903 04/18/24 0838             Rehab Evaluation    Document Type therapy note (daily note)  -AD -- evaluation  -AD      Subjective Information no complaints  -AD -- no complaints  -AD      Patient Observations alert;cooperative;agree to therapy  -AD -- alert;cooperative  -AD      Patient/Family/Caregiver Comments/Observations Pt seen upright in a recliner eating lunch. Much improved affect and willing to participate in therapy as compared to this morning.  -AD -- Pt seen upright in recliner w/daughter, Joy, present. He was alert and cooperative. Some memory deficits noted and reported. Known hx of dementia.  -AD      Session Not Performed -- patient/family declined treatment  -AD --      Evaluation Not Performed, Comment -- Pt refused treatment stating 'get out of here' after I entered the room. He declined any further needs when asked. Call light placed at head of bed.  -AD --      Patient Effort good  -AD -- good  -AD      Symptoms Noted During/After Treatment none  -AD -- other (see comments)  intermittent cough  -AD          General Information    Patient Profile Reviewed yes  -AD -- yes  -AD      Pertinent History Of Current Problem No changes in current history. Noted sundowning last night requiring medication for safety/care.  -AD -- Pt is a 89 y/o male referred for an evaluation due to concerns for aspiration with JARETH pneumonia noted per CT angio of the head/neck. Pt known to this therapist and seen in  September 2022 w/high suspicion for esophageal dysphagia. Known hx of Brito's esophagus and GERD. Pt states longstanding hx of reflux and use of protonix. Reports hx of difficulty swallowing meats 25 years ago. Brought to the ED yesterday due to cough, fatigue, wheezing, left ear pain and had been reported to vomit while sleeping and discovered on the morning of 4/17 w/vomit on shirt, face and in the bed. He has an elevated bed per daughter, but slides down and sleeps sideways in bed at times. Both report he lays on his left side typically when sleeping. Hx of embolic stroke in March 2024 and hx of aspiration pneumonia/pneumonitis with mucous plugging.  -AD      Current Method of Nutrition soft to chew textures;ground;thin liquids  -AD -- NPO  except ice chips  -AD      Precautions/Limitations, Vision -- -- WFL;for purposes of eval  -AD      Precautions/Limitations, Hearing -- -- hearing impairment, bilaterally;WFL;for purposes of eval  w/increased volume  -AD      Prior Level of Function-Communication -- -- cognitive-linguistic impairment  known dementia  -AD      Prior Level of Function-Swallowing -- -- no diet consistency restrictions;esophageal concerns  past recommendation for soft, ground with thins; small meals as reflux precaution.  -AD      Plans/Goals Discussed with -- -- patient and family;agreed upon;other (see comments)  Dr. Emilia Maria, RN  -AD      Barriers to Rehab cognitive status  Decreased awareness of deficits and ability to follow precautions consistently. Will need reinforcement.  -AD -- previous functional  deficit;cognitive status  esophageal dysphagia  -AD      Patient's Goals for Discharge patient did not state  -AD -- patient did not state  -AD      Family Goals for Discharge -- -- family did not state  -AD         Pain    Additional Documentation --  no current pain reported or exhibited  -AD -- --  no current pain reported or indicated  -AD         Oral Motor Structure and Function    Oral Lesions or Structural Abnormalities and/or variants -- -- none  -AD      Dentition Assessment -- -- upper dentures/partial in place;missing teeth  missing most lower molars and one right lateral, lower incisor; natural teeth in fair condition; chipped upper right incisor.  -AD      Secretion Management -- -- WNL/WFL  -AD      Mucosal Quality -- -- moist, healthy  -AD      Volitional Swallow -- -- WFL  -AD      Volitional Cough -- -- WFL;other (see comments)  elicits a very strong cough response  -AD         Oral Musculature and Cranial Nerve Assessment    Oral Motor General Assessment -- -- WFL  -AD      Oral Motor, Comment -- -- No gross deficits or labial droop noted during time of assessment. Pt with functional strength and ROM for his age.  -AD         General Eating/Swallowing Observations    Respiratory Support Currently in Use nasal cannula  -AD -- nasal cannula  -AD      O2 Liters 5L  -AD -- 5L  -AD      Eating/Swallowing Skills self-fed;appropriate self-feeding skills observed  -AD -- self-fed;impulsive self-feeding behaviors;requires cueing for compensatory strategies and precautions;respiratory changes during/following eating voice quality changes during/following eating  -AD      Positioning During Eating upright 90 degree;upright in chair  -AD -- upright 90 degree;upright in chair  -AD      Utensils Used spoon;straw  -AD -- spoon;cup  -AD      Consistencies Trialed -- -- regular textures;pureed;thin liquids  -AD      Pre SpO2 (%) -- -- 92  -AD      Post SpO2 (%) -- -- 92  -AD         Respiratory    Respiratory  Status WFL;during swallowing/eating  -AD -- other (see comments)  Intermittent coughing with strong response. Coughs and looses air w/reddish/purpleish tint to face. Spitting out clear/light yellow sputum. Last cough demonstrated blood tinge to sputum.  -AD         Clinical Swallow Eval    Oral Prep Phase -- -- WFL  -AD      Oral Transit -- -- WFL  -AD      Oral Residue -- -- WFL  -AD      Pharyngeal Phase -- -- no overt signs/symptoms of pharyngeal impairment  -AD      Esophageal Phase -- -- suspected esophageal impairment  -AD      Clinical Swallow Evaluation Summary -- -- Pt presents with a functional oral phase of swallowing and no overt s/s of pharyngeal deficits or aspiration. Pt does exhibit intermittent cough after eating/drink and not directly associated with swallowing. Concerns for possible esophageal dysmotility and increased reflux related to coughing, given history and current symptoms. Will f/u for further evaluation as diet advanced.  -AD         Esophageal Phase Concerns    Esophageal Phase Concerns -- -- complaints of SOA;other (see comments)  coughing intermittently with increased coughing following po intake  -AD      Complaints of SOA -- -- all consistencies  -AD      Esophageal Phase Concerns, Comment -- -- Hx of esophageal dysphagia with GERD/[place]. Coughing and suspected dysmotility reflux secondary to coughing.  -AD         SLP Evaluation Clinical Impression    SLP Swallowing Diagnosis -- -- functional oral phase;functional pharyngeal phase;suspect chronic;esophageal dysphagia;R/O pharyngeal dysphagia  -AD      Functional Impact -- -- risk of aspiration/pneumonia  -AD      Rehab Potential/Prognosis, Swallowing -- -- adequate, monitor progress closely  -AD      Swallow Criteria for Skilled Therapeutic Interventions Met -- -- demonstrates skilled criteria  -AD         SLP Treatment Clinical Impressions    Treatment Assessment (SLP) improved;toleration of diet;suspected  reflux; pt reports  belching and hiccups result in food/drinks coming back up  -AD -- --      Treatment Assessment Comments (SLP) Pt tolerating diet of soft ground w/thins w/o evidence of oropharyngeal dysphagia. Pt with reports of esophageal symptoms for many years w/belching and hiccups resulting in spitting up of food. None noted during this session and pt tolerating food, pudding and thins by straw w/o any issues or concerns.  -AD -- --      Daily Summary of Progress (SLP) progress toward functional goals as expected  -AD -- --      Barriers to Overall Progress (SLP) Cognitive status  -AD -- --      Plan for Continued Treatment (SLP) continue treatment per plan of care  -AD -- --      Care Plan Review care plan/treatment goals reviewed;risks/benefits reviewed;current/potential barriers reviewed;patient/other agree to care plan  -AD -- --         Recommendations    Therapy Frequency (Swallow) PRN  -AD -- PRN  -AD      Predicted Duration Therapy Intervention (Days) 1 week;until discharge  -AD -- 1 week;until discharge  -AD      SLP Diet Recommendation soft to chew textures;ground;thin liquids;other (see comments)  consider small meals to offset reflux  -AD -- soft to chew textures;ground;thin liquids;other (see comments)  small meals to offset s/s of reflux  -AD      Recommended Diagnostics -- -- VFSS (MBS);with esophageal screen  versus esophagram  -AD      Recommended Precautions and Strategies upright posture during/after eating;small bites of food and sips of liquid;no straw;general aspiration precautions;reflux precautions;fatigue precautions;1:1 supervision;assist with feeding  -AD -- upright posture during/after eating;small bites of food and sips of liquid;no straw;general aspiration precautions;reflux precautions;fatigue precautions;1:1 supervision;assist with feeding  -AD      Oral Care Recommendations Oral Care before breakfast, after meals and PRN;Toothbrush;Denture Care  -AD -- Oral Care before breakfast, after meals and  PRN;Toothbrush;Denture Care  -AD      SLP Rec. for Method of Medication Administration meds whole;with puree;as tolerated  per RN, tolerates better due to large pill burden  -AD -- meds whole;with thin liquids;as tolerated  -AD      Monitor for Signs of Aspiration notify SLP if any concerns  -AD -- notify SLP if any concerns  -AD      Anticipated Discharge Disposition (SLP) assisted living facility ([facility])  -AD -- assisted living facility ([facility])  -AD      Patient/Family Concerns, Anticipated Discharge Disposition (SLP) Pt does not report any concerns at this time.  -AD -- No concerns per pt at this time.  -AD      Demonstrates Need for Referral to Another Service dedicated esophageal assessment  -AD -- dedicated esophageal assessment  -AD         Swallow Goals (SLP)    Swallow LTGs -- -- Patient will demonstrate functional swallow for  -AD      Swallow STGs -- -- diet tolerance goal selection (SLP);swallow management recall goal selection (SLP);swallow compensatory strategies goal selection (SLP)  -AD      Diet Tolerance Goal Selection (SLP) -- -- Swallow Short Term Goal 1  -AD      Swallow Management Recall Goal Selection (SLP) -- -- swallow management recall, SLP goal 1  -AD      Swallow Compensatory Strategies Goal Selection (SLP) -- -- swallow compensatory strategies, SLP goal 1  -AD         (LTG) Patient will demonstrate functional swallow for    Diet Texture (Demonstrate functional swallow) soft to chew (ground) textures  -AD -- soft to chew (ground) textures  -AD      Liquid viscosity (Demonstrate functional swallow) thin liquids  -AD -- thin liquids  -AD      [place] (Demonstrate functional swallow) independently (over 90% accuracy)  -AD -- independently (over 90% accuracy)  -AD      Time Frame (Demonstrate functional swallow) 1 week;by discharge  -AD -- 1 week;by discharge  -AD      Barriers (Demonstrate functional swallow) chronic esophageal deficits; dementia  -AD -- chronic esophageal deficits;  dementia  -AD      Progress/Outcomes (Demonstrate functional swallow) good progress toward goal;goal ongoing  -AD -- new goal  -AD      Comment (Demonstrate functional swallow) Tolerance of current diet during meal time. No oropharyngeal s/s of dysphagia. No current esophageal s/s of dysphagia during this meal.  -AD -- --         (STG) Swallow 1    (STG) Swallow 1 Pt sunshine demonstrate tolerance of a soft, ground diet with thins w/o evidence of clinicial s/s of aspiration w/use of compensatory strategies.  -AD -- Pt sunshine demonstrate tolerance of a soft, ground diet with thins w/o evidence of clinicial s/s of aspiration w/use of compensatory strategies.  -AD      [place] (Swallow Short Term Goal 1) independently (over 90% accuracy)  -AD -- independently (over 90% accuracy)  -AD      Time Frame (Swallow Short Term Goal 1) 1 week;by discharge  -AD -- 1 week;by discharge  -AD      Progress/Outcomes (Swallow Short Term Goal 1) good progress toward goal;goal ongoing  -AD -- new goal  -AD      Comment (Swallow Short Term Goal 1) Tolerates w/use of appropriate drink and bite size during this session.  -AD -- --         (STG) Swallow Management Recall Goal 1 (SLP)    Activity (Swallow Management Recall Goal 1, SLP) recall of;aspiration precautions;reflux precautions;fatigue precautions  -AD -- recall of;aspiration precautions;reflux precautions;fatigue precautions  -AD      [place]/Accuracy (Swallow Management Recall Goal 1, SLP) with minimal cues (75-90% accuracy)  -AD -- with minimal cues (75-90% accuracy)  -AD      Time Frame (Swallow Management Recall Goal 1, SLP) short term goal (STG);1 week  -AD -- short term goal (STG);1 week  -AD      Barriers (Swallow Management Recall Goal 1, SLP) cognitive deficits  -AD -- --      Progress/Outcomes (Swallow Management Recall Goal 1, SLP) unable to make needed progress;goal ongoing  -AD -- new goal  -AD      Comment (Swallow Management Recall Goal 1, SLP) Pt unable to recall  swallow management goals despite cues.  -AD -- --         (STG) Swallow Compensatory Strategies Goal 1 (SLP)    Activity (Swallow Compensatory Strategies/Techniques Goal 1, SLP) aspiration precautions;reflux precautions;fatigue precautions;during meal intake;small bites;small cup sips  -AD -- aspiration precautions;reflux precautions;fatigue precautions;during meal intake;small bites;small cup sips  -AD      [place]/Accuracy (Swallow Compensatory Strategies/Techniques Goal 1, SLP) with minimal cues (75-90% accuracy)  -AD -- with minimal cues (75-90% accuracy)  -AD      Time Frame (Swallow Compensatory Strategies/Techniques Goal 1, SLP) short term goal (STG);1 week  -AD -- short term goal (STG);1 week  -AD      Barriers (Swallow Compensatory Strategies/Techniques Goal 1, SLP) cognitive status  -AD -- --      Progress/Outcomes (Swallow Compensatory Strategies/Techniques Goal 1, SLP) unable to make needed progress;goal ongoing  -AD -- new goal  -AD      Comment (Swallow Compensatory Strategies/Techniques Goal 1, SLP) Pt unable to recall precautions but does demonstrate ability to sit upright during po and remain upright afterwards. Continued reinforcement.  -AD -- --                User Key  (r) = Recorded By, (t) = Taken By, (c) = Cosigned By      Initials Name Effective Dates    AD Puja Ayala, MS CCC-SLP 06/16/21 -                     EDUCATION  The patient has been educated in the following areas:   Dysphagia (Swallowing Impairment) Modified Diet Instruction Reflux/Aspiration Precautions . Pt needs continued reinforcement due to memory deficits.         SLP GOALS       Row Name 04/19/24 1303 04/18/24 0838          (LTG) Patient will demonstrate functional swallow for    Diet Texture (Demonstrate functional swallow) soft to chew (ground) textures  -AD soft to chew (ground) textures  -AD     Liquid viscosity (Demonstrate functional swallow) thin liquids  -AD thin liquids  -AD     [place] (Demonstrate  functional swallow) independently (over 90% accuracy)  -AD independently (over 90% accuracy)  -AD     Time Frame (Demonstrate functional swallow) 1 week;by discharge  -AD 1 week;by discharge  -AD     Barriers (Demonstrate functional swallow) chronic esophageal deficits; dementia  -AD chronic esophageal deficits; dementia  -AD     Progress/Outcomes (Demonstrate functional swallow) good progress toward goal;goal ongoing  -AD new goal  -AD     Comment (Demonstrate functional swallow) Tolerance of current diet during meal time. No oropharyngeal s/s of dysphagia. No current esophageal s/s of dysphagia during this meal.  -AD --        (STG) Swallow 1    (STG) Swallow 1 Pt sunshine demonstrate tolerance of a soft, ground diet with thins w/o evidence of clinicial s/s of aspiration w/use of compensatory strategies.  -AD Pt sunshine demonstrate tolerance of a soft, ground diet with thins w/o evidence of clinicial s/s of aspiration w/use of compensatory strategies.  -AD     [place] (Swallow Short Term Goal 1) independently (over 90% accuracy)  -AD independently (over 90% accuracy)  -AD     Time Frame (Swallow Short Term Goal 1) 1 week;by discharge  -AD 1 week;by discharge  -AD     Progress/Outcomes (Swallow Short Term Goal 1) good progress toward goal;goal ongoing  -AD new goal  -AD     Comment (Swallow Short Term Goal 1) Tolerates w/use of appropriate drink and bite size during this session.  -AD --        (STG) Swallow Management Recall Goal 1 (SLP)    Activity (Swallow Management Recall Goal 1, SLP) recall of;aspiration precautions;reflux precautions;fatigue precautions  -AD recall of;aspiration precautions;reflux precautions;fatigue precautions  -AD     [place]/Accuracy (Swallow Management Recall Goal 1, SLP) with minimal cues (75-90% accuracy)  -AD with minimal cues (75-90% accuracy)  -AD     Time Frame (Swallow Management Recall Goal 1, SLP) short term goal (STG);1 week  -AD short term goal (STG);1 week  -AD     Barriers  (Swallow Management Recall Goal 1, SLP) cognitive deficits  -AD --     Progress/Outcomes (Swallow Management Recall Goal 1, SLP) unable to make needed progress;goal ongoing  -AD new goal  -AD     Comment (Swallow Management Recall Goal 1, SLP) Pt unable to recall swallow management goals despite cues.  -AD --        (STG) Swallow Compensatory Strategies Goal 1 (SLP)    Activity (Swallow Compensatory Strategies/Techniques Goal 1, SLP) aspiration precautions;reflux precautions;fatigue precautions;during meal intake;small bites;small cup sips  -AD aspiration precautions;reflux precautions;fatigue precautions;during meal intake;small bites;small cup sips  -AD     [place]/Accuracy (Swallow Compensatory Strategies/Techniques Goal 1, SLP) with minimal cues (75-90% accuracy)  -AD with minimal cues (75-90% accuracy)  -AD     Time Frame (Swallow Compensatory Strategies/Techniques Goal 1, SLP) short term goal (STG);1 week  -AD short term goal (STG);1 week  -AD     Barriers (Swallow Compensatory Strategies/Techniques Goal 1, SLP) cognitive status  -AD --     Progress/Outcomes (Swallow Compensatory Strategies/Techniques Goal 1, SLP) unable to make needed progress;goal ongoing  -AD new goal  -AD     Comment (Swallow Compensatory Strategies/Techniques Goal 1, SLP) Pt unable to recall precautions but does demonstrate ability to sit upright during po and remain upright afterwards. Continued reinforcement.  -AD --               User Key  (r) = Recorded By, (t) = Taken By, (c) = Cosigned By      Initials Name Provider Type    AD Puja Ayala MS CCC-SLP Speech and Language Pathologist                       Time Calculation:    Time Calculation- SLP       Row Name 04/19/24 1453             Time Calculation- SLP    SLP Start Time 1303  -AD      SLP Stop Time 1340  -AD      SLP Time Calculation (min) 37 min  -AD      Total Timed Code Minutes- SLP 0 minute(s)  -AD      SLP Non-Billable Time (min) 7 min  phone call from daughter  and wife  -AD      SLP Received On 04/19/24  -AD         Untimed Charges    [id number] Treatment Swallow Minutes 30  -AD         Total Minutes    Untimed Charges Total Minutes 30  -AD       Total Minutes 30  -AD                User Key  (r) = Recorded By, (t) = Taken By, (c) = Cosigned By      Initials Name Provider Type    AD Puja Ayala, MS CCC-SLP Speech and Language Pathologist                    Therapy Charges for Today       Code Description Service Date Service Provider Modifiers Qty    [id number] HC ST EVAL ORAL PHARYNG SWALLOW 2 4/18/2024 Puja Ayala, MS CCC-SLP GN 1    [id number] HC ST TREATMENT SWALLOW 2 4/19/2024 Puja Ayala, MS CCC-SLP GN 1                 Puja Ayala MS CCC-SLP  4/19/2024

## 2024-04-19 NOTE — PLAN OF CARE
Goal Outcome Evaluation:  Plan of Care Reviewed With: patient        Progress: no change  Outcome Evaluation: Pt vss, see MD note, after versed given, pt is now wearing o2. Pt resting at this time.

## 2024-04-19 NOTE — PROGRESS NOTES
RN has alerted me to patient refusing to wear his oxygen and becoming irate when asked repeatedly to wear his oxygen. He has known vascular dementia and appears to have some degree of sundowning nightly as per review of his history from previous night events. Nursing, CNA, and RT have all attempted to get him to wear the oxygen. He has called them all insulting names and says that he would rather die than wear the oxygen. I therefore chose to sedate him for his own safety with Versed injection. Thankfully now that patient is resting comfortably, oxygen has been reapplied and saturation is improving.     Also of note, for second straight night, family was attempted to be contacted by phone, the calls go straight to voice mail within 1 ring. Plan was to discuss goals of care with them and alert them to the fact that sedating meds would be required to keep patient safe to reapply oxygen. However, once again call could not be completed and we are awaiting return call.

## 2024-04-19 NOTE — PLAN OF CARE
Goal Outcome Evaluation:  Plan of Care Reviewed With: patient           Outcome Evaluation: PT: Patient agreeable to therapy.  Patient performs supine to sit with SBA and sit to/from stand with CGA.  Patient tolerates standing for 3-4 minutes, CGA with rolling walker.  Patient dependent for brief change and hygiene.   Patient performs gait with rolling walker x25 feet, CGA with cues for safety with direction changes and O2 line.  Patient requires cues for safety and direct assistance with all functional tasks.  Patient's ability to safely return to assisted living facility is largely dependent on amount of supervision/assistance that can be provided.  If patient's needs can not be managed at assisted living facility, may require SNF/ECF.  Will continue to follow.      Anticipated Discharge Disposition (PT): other (see comments) (will continue to assess)

## 2024-04-19 NOTE — PLAN OF CARE
Goal Outcome Evaluation:  Plan of Care Reviewed With: patient        Progress: improving  Outcome Evaluation: patient vss. patient confused and refusing care this AM, but awoke before noon and became pleasant enough to take meds and get up in chair. O2 needs monitored, encouraged patient to keep it on. IV saline locked. steroids continued

## 2024-04-19 NOTE — PROGRESS NOTES
"Hospitalist Team      Patient Care Team:  Charlie Trinh MD as PCP - General (Family Medicine)  Hardik Senior MD as Consulting Physician (Urology)  MAURO Peters MD as Consulting Physician (Ophthalmology)  Brant Polanco MD as Consulting Physician (Pulmonary Disease)  Charlie Goldstein DO as Consulting Physician (Neurology)        Chief Complaint:  Follow-up Acute Hypoxic Respiratory Failure due to COVID-19    Subjective    Events noted overnight.  Still a little agitated this morning, but more calm this afternoon.  Up in chair this afternoon and w/o complaint except for he is ready to go back to bed.    Objective    Vital Signs  Temp:  [97.8 °F (36.6 °C)-98.7 °F (37.1 °C)] 97.8 °F (36.6 °C)  Heart Rate:  [64-83] 80  Resp:  [18-20] 20  BP: (123-144)/(65-95) 123/65  Oxygen Therapy  SpO2: 96 %  Pulse Oximetry Type: Continuous  Device (Oxygen Therapy): nasal cannula  Flow (L/min): 5  Probe Placed On (Pulse Ox): hand, finger}    Flowsheet Rows      Flowsheet Row First Filed Value   Admission Height 175.3 cm (69.02\") Documented at 04/17/2024 1611   Admission Weight 89.4 kg (197 lb) Documented at 04/17/2024 1543          Physical Exam:    General: Appears stated age.  Lungs: Normal excursion.  No accessory use.  MSK: No edema of the BLE  Neuro: CN II-XII grossly intact  Psych: Normal affect    Results Review:     I reviewed the patient's new clinical results.    Lab Results (last 24 hours)       Procedure Component Value Units Date/Time    C-reactive Protein [[id number]]  (Abnormal) Collected: 04/19/24 0404    Specimen: Blood Updated: 04/19/24 1055     C-Reactive Protein 8.48 mg/dL     Respiratory Culture - Sputum, Cough [[id number]] Collected: 04/18/24 0424    Specimen: Sputum from Cough Updated: 04/19/24 1042     Respiratory Culture Light growth (2+) The culture consists of normal respiratory osbaldo. This is a preliminary report; final report to follow.     Gram Stain Few (2+) WBCs seen      Occasional " "Epithelial cells seen      Moderate (3+) Mucous strands      Few (2+) Gram positive cocci in pairs and chains      Few (2+) Gram negative bacilli    Procalcitonin [[id number]]  (Abnormal) Collected: 04/19/24 0404    Specimen: Blood Updated: 04/19/24 0518     Procalcitonin 1.90 ng/mL     Narrative:      As a Marker for Sepsis (Non-Neonates):    1. <0.5 ng/mL represents a low risk of severe sepsis and/or septic shock.  2. >2 ng/mL represents a high risk of severe sepsis and/or septic shock.    As a Marker for Lower Respiratory Tract Infections that require antibiotic therapy:    PCT on Admission    Antibiotic Therapy       6-12 Hrs later    >0.5                Strongly Recommended  >0.25 - <0.5        Recommended   0.1 - 0.25          Discouraged              Remeasure/reassess PCT  <0.1                Strongly Discouraged     Remeasure/reassess PCT    As 28 day mortality risk marker: \"Change in Procalcitonin Result\" (>80% or <=80%) if Day 0 (or Day 1) and Day 4 values are available. Refer to [email or web address]    Change in PCT <=80%  A decrease of PCT levels below or equal to 80% defines a positive change in PCT test result representing a higher risk for 28-day all-cause mortality of patients diagnosed with severe sepsis for septic shock.    Change in PCT >80%  A decrease of PCT levels of more than 80% defines a negative change in PCT result representing a lower risk for 28-day all-cause mortality of patients diagnosed with severe sepsis or septic shock.       Comprehensive Metabolic Panel [[id number]]  (Abnormal) Collected: 04/19/24 0404    Specimen: Blood Updated: 04/19/24 0442     Glucose 129 mg/dL      BUN 26 mg/dL      Creatinine 1.13 mg/dL      Sodium 137 mmol/L      Potassium 4.4 mmol/L      Chloride 103 mmol/L      CO2 24.8 mmol/L      Calcium 8.8 mg/dL      Total Protein 5.9 g/dL      Albumin 3.0 g/dL      ALT (SGPT) 22 U/L      AST (SGOT) 31 U/L      Alkaline Phosphatase 37 U/L      Total " Bilirubin 0.2 mg/dL      Globulin 2.9 gm/dL      A/G Ratio 1.0 g/dL      BUN/Creatinine Ratio 23.0     Anion Gap 9.2 mmol/L      eGFR 61.4 mL/min/1.73     Narrative:      GFR Normal >60  Chronic Kidney Disease <60  Kidney Failure <15    The GFR formula is only valid for adults with stable renal function between ages 18 and 70.    CBC & Differential [[id number]]  (Abnormal) Collected: 04/19/24 0404    Specimen: Blood Updated: 04/19/24 0425    Narrative:      The following orders were created for panel order CBC & Differential.  Procedure                               Abnormality         Status                     ---------                               -----------         ------                     CBC Auto Differential[[id number]]        Abnormal            Final result                 Please view results for these tests on the individual orders.    CBC Auto Differential [[id number]]  (Abnormal) Collected: 04/19/24 0404    Specimen: Blood Updated: 04/19/24 0425     WBC 12.65 10*3/mm3      RBC 3.73 10*6/mm3      Hemoglobin 10.7 g/dL      Hematocrit 34.3 %      MCV 92.0 fL      MCH 28.7 pg      MCHC 31.2 g/dL      RDW 15.3 %      RDW-SD 51.2 fl      MPV 10.6 fL      Platelets 216 10*3/mm3      Neutrophil % 84.9 %      Lymphocyte % 7.3 %      Monocyte % 6.9 %      Eosinophil % 0.0 %      Basophil % 0.2 %      Immature Grans % 0.7 %      Neutrophils, Absolute 10.75 10*3/mm3      Lymphocytes, Absolute 0.92 10*3/mm3      Monocytes, Absolute 0.87 10*3/mm3      Eosinophils, Absolute 0.00 10*3/mm3      Basophils, Absolute 0.02 10*3/mm3      Immature Grans, Absolute 0.09 10*3/mm3      nRBC 0.0 /100 WBC     Blood Culture - Blood, Arm, Left [[id number]]  (Normal) Collected: 04/17/24 1658    Specimen: Blood from Arm, Left Updated: 04/18/24 1715     Blood Culture No growth at 24 hours    Blood Culture - Blood, Arm, Left [[id number]]  (Normal) Collected: 04/17/24 1550    Specimen: Blood from Arm, Left Updated: 04/18/24 1715     Blood  Culture No growth at 24 hours            Imaging Results (Last 24 Hours)       ** No results found for the last 24 hours. **              Medication Review:   I have reviewed the patient's current medication list    Current Facility-Administered Medications:     acetaminophen (TYLENOL) tablet 650 mg, 650 mg, Oral, Q4H PRN **OR** acetaminophen (TYLENOL) 160 MG/5ML oral solution 650 mg, 650 mg, Oral, Q4H PRN **OR** acetaminophen (TYLENOL) suppository 650 mg, 650 mg, Rectal, Q4H PRN, Kimber Vital APRN    amoxicillin-clavulanate (AUGMENTIN) 875-125 MG per tablet 1 tablet, 1 tablet, Oral, Q12H, Roni Resendiz MD, 1 tablet at 04/19/24 1122    sennosides-docusate (PERICOLACE) 8.6-50 MG per tablet 2 tablet, 2 tablet, Oral, BID PRN **AND** polyethylene glycol (MIRALAX) packet 17 g, 17 g, Oral, Daily PRN **AND** bisacodyl (DULCOLAX) EC tablet 5 mg, 5 mg, Oral, Daily PRN **AND** bisacodyl (DULCOLAX) suppository 10 mg, 10 mg, Rectal, Daily PRN, Kimber Vital APRN    Calcium Replacement - Follow Nurse / BPA Driven Protocol, , Does not apply, PRN, Kimber Vital APRN    cetirizine (zyrTEC) tablet 10 mg, 10 mg, Oral, Daily PRN, Kimber Vital APRN    citalopram (CeleXA) tablet 20 mg, 20 mg, Oral, Daily, Kimber Vital APRN, 20 mg at 04/19/24 1122    clopidogrel (PLAVIX) tablet 75 mg, 75 mg, Oral, Daily, Kimber Vital APRN, 75 mg at 04/19/24 1121    dexAMETHasone (DECADRON) tablet 6 mg, 6 mg, Oral, Daily With Breakfast, Roni Resendiz MD, 6 mg at 04/19/24 1121    docusate sodium (COLACE) capsule 100 mg, 100 mg, Oral, Daily, Kimber Vital APRN, 100 mg at 04/18/24 1057    Enoxaparin Sodium (LOVENOX) syringe 40 mg, 40 mg, Subcutaneous, Nightly, Kimber Vital APRN, 40 mg at 04/18/24 2136    guaiFENesin (MUCINEX) 12 hr tablet 1,200 mg, 1,200 mg, Oral, BID, Kimber Vital APRN, 1,200 mg at 04/19/24 1122    ipratropium-albuterol (DUO-NEB) nebulizer solution 3 mL, 3 mL, Nebulization, 4x  Daily - RT, Kimber Vital R, APRN, 3 mL at 04/19/24 1537    ipratropium-albuterol (DUO-NEB) nebulizer solution 3 mL, 3 mL, Nebulization, Q4H PRN, Kimber Vital R, APRN, 3 mL at 04/18/24 0719    levothyroxine (SYNTHROID, LEVOTHROID) tablet 50 mcg, 50 mcg, Oral, Q AM, Kimber Vital, APRN, 50 mcg at 04/19/24 1122    magnesium oxide (MAG-OX) tablet 400 mg, 400 mg, Oral, Nightly, AmanuelKimber angel R, APRN, 400 mg at 04/18/24 2136    Magnesium Standard Dose Replacement - Follow Nurse / BPA Driven Protocol, , Does not apply, PRN, Kimber Vital R, APRN    melatonin tablet 5 mg, 5 mg, Oral, Nightly PRN, Kimber Vital R, APRN, 5 mg at 04/18/24 2136    Midazolam HCl (PF) (VERSED) injection 2 mg, 2 mg, Intravenous, Q2H PRN, Charlie Love RDO, 2 mg at 04/19/24 0454    Multi-Shantel liquid 15 mL, 15 mL, Oral, Daily, Kimber Vital APRN, 15 mL at 04/19/24 1124    nirmatrelvir and ritonavir (300mg/100mg)(PAXLOVID) 3 tablet pack, 3 tablet, Oral, BID, Roni Resendiz MD, 3 tablet at 04/19/24 1120    nitroglycerin (NITROSTAT) SL tablet 0.4 mg, 0.4 mg, Sublingual, Q5 Min PRN, Kimber Vital R, APRN    OLANZapine (zyPREXA) tablet 5 mg, 5 mg, Oral, Daily With Dinner, Roni Resendiz MD    ondansetron ODT (ZOFRAN-ODT) disintegrating tablet 4 mg, 4 mg, Oral, Q6H PRN **OR** ondansetron (ZOFRAN) injection 4 mg, 4 mg, Intravenous, Q6H PRN, Kimber Vital, APRN    pantoprazole (PROTONIX) EC tablet 40 mg, 40 mg, Oral, Daily, Kimber Vital APRN, 40 mg at 04/19/24 1121    Pharmacy Consult - Pharmacy to dose, , Does not apply, Continuous PRN, Roni Resendiz MD    Pharmacy to Dose enoxaparin (LOVENOX), , Does not apply, Continuous PRN, Kimber Vital, APRN    Phosphorus Replacement - Follow Nurse / BPA Driven Protocol, , Does not apply, PRN, Kimber Vital APRN    Potassium Replacement - Follow Nurse / BPA Driven Protocol, , Does not apply, PRN, Kimber Vital, APRN    rivastigmine (EXELON) 4.6  MG/24HR patch 1 patch, 1 patch, Transdermal, Daily, Amanuel, Kimber R, APRN, 1 patch at 04/19/24 1120    sodium chloride 0.9 % flush 10 mL, 10 mL, Intravenous, PRN, Anil Nuñez MD    sodium chloride 0.9 % flush 10 mL, 10 mL, Intravenous, Q12H, [place], Kimber R, APRN, 10 mL at 04/19/24 1142    sodium chloride 0.9 % flush 10 mL, 10 mL, Intravenous, PRN, Amanuel, Kimber R, APRN    sodium chloride 0.9 % infusion 40 mL, 40 mL, Intravenous, PRN, [place], Kimber R, APRN    traZODone (DESYREL) tablet 100 mg, 100 mg, Oral, Nightly, [place], Kimber R, APRN, 100 mg at 04/18/24 2136      Assessment & Plan     Acute Encephalopathy: This has resolved.  No further events described.  Acute Hypoxic Respiratory Failure due to Acute COVID Infection: SaO2 appear better, will ask staff to wean.  Mobilize patient.  Continue Paxlovid and Decadron.  PCT is trending down.  Continue Augmentin.  Vascular Dementia: Continue home regimen.  Will add a small dose of BuSpar at bedtime to decrease agitation overnight.  CAD: No acute issues    Plan for disposition: Predicated on hospital course.    Roni Resendiz MD  04/19/24  16:45 EDT

## 2024-04-19 NOTE — PLAN OF CARE
Problem: Adult Inpatient Plan of Care  Goal: Plan of Care Review  Recent Flowsheet Documentation  Taken 4/19/2024 1100 by Kristi Pressley OTR  Progress: improving  Plan of Care Reviewed With: patient  Outcome Evaluation: OT - Patient alert this AM and agreed to therapy. Patient performed supine to sit with SBA and sat EOB independently. Patient required mod assist to don slippers while seated EOB. Patient unaware that brief saturated in urine and required max to total assist to change brief.  Patient did demonstrate improved standing balance and was able to stand with support of FWW for 3-4 minutes without LOB. Patient performed in room mobility with FWW and CGA and participated in UE ROM exercises. Patient demonstrated improvement physically however continues to require cues for safety and assist with ADL tasks. Ability to return to long-term dependent on amount of assistance they are able to provide. Patient may be more appropriate for ECF. Will continue to assess.

## 2024-04-19 NOTE — SIGNIFICANT NOTE
04/19/24 0903   Rehab Evaluation   Session Not Performed patient/family declined treatment   Evaluation Not Performed, Comment Pt refused treatment stating 'get out of here' after I entered the room. He declined any further needs when asked. Call light placed at head of bed.

## 2024-04-19 NOTE — THERAPY TREATMENT NOTE
Acute Care - Occupational Therapy Treatment Note  [facility]     Patient Name: Pawel Goldberg  : 1932  MRN: [id number]  Today's Date: 2024             Admit Date: 2024       ICD-10-CM ICD-9-CM   1. Pneumonia of left lower lobe due to infectious organism  J18.9 486   2. 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    Obstruction of carotid artery    Stenosis of carotid artery    EEG abnormality without seizure    Hereditary and idiopathic peripheral neuropathy    Mixed hyperlipidemia    Acquired hypothyroidism    Low back pain with sciatica    Lumbar canal stenosis    Degenerative arthritis of lumbar spine    Osteoarthritis of hip    History of repair of hip joint    Syncope    Temporary cerebral vascular dysfunction    History of prostate cancer    Dementia associated with other underlying disease without behavioral disturbance    Arthritis    PAD (peripheral artery disease)    Medicare annual wellness visit, subsequent    Essential hypertension    Medication monitoring encounter    Status post implantation of artificial urinary sphincter    Moderate asthma with acute exacerbation    Vascular dementia without behavioral disturbance    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    At high risk for falls    Insomnia due to medical condition    Chronic UTI    Frequent falls    Hypoxia    Acute respiratory failure with hypoxia     Past Medical History:   Diagnosis Date    Allergic     Seasonal    Allergic rhinitis     Arthritis     Asthma     Brito esophagus     Benign prostatic hyperplasia     Carotid arterial disease     Status post right CEA 14    COPD (chronic obstructive pulmonary disease)     Coronary artery disease     Dementia     Dyspnea     Erectile dysfunction     GERD (gastroesophageal reflux disease)     Hematoma of frontal scalp 2021    HL (hearing loss)     Hypothyroidism  1992    Low back pain     PAD (peripheral artery disease)     Prostate cancer     Status post implantation of artificial urinary sphincter         Stroke     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    Urinary tract infection      Past Surgical History:   Procedure Laterality Date    CAROTID ARTERY ANGIOPLASTY  04/01/2014    COLONOSCOPY  2009    ENDOSCOPY  2009    2001,2003,2006    EYE SURGERY      FRACTURE SURGERY      HERNIA REPAIR  2000    HERNIA REPAIR  2009    INGUINAL HERNIA REPAIR      PARTIAL HIP ARTHROPLASTY Left 07/02/2014    PROSTATECTOMY  2008    SHOULDER SURGERY  2005    SHOULDER SURGERY  2006    SHOULDER SURGERY  2008    TEAR DUCT SURGERY      URINARY SPHINCTER IMPLANT           OT ASSESSMENT FLOWSHEET (Last 12 Hours)       OT Evaluation and Treatment       Row Name 04/19/24 1100                   OT Time and Intention    Subjective Information no complaints  -EN        Document Type therapy note (daily note)  -EN        Mode of Treatment occupational therapy  -EN        Patient Effort good  -EN           General Information    Patient/Family/Caregiver Comments/Observations Patient reclined in bed, agreed to therapy.  -EN        Risks Reviewed patient:;LOB  -EN        Benefits Reviewed patient:;improve function;increase independence;increase strength;increase balance  -EN        Barriers to Rehab cognitive status  -EN           Pain Assessment    Pre/Posttreatment Pain Comment no c/o pain  -EN           Cognition    Personal Safety Interventions gait belt;nonskid shoes/slippers when out of bed  -EN           Lower Body Dressing Assessment/Training    Comment, (Lower Body Dressing) dependent for brief change  -EN           Bed Mobility    Supine-Sit [place] (Bed Mobility) standby assist  -EN           Functional Mobility    Functional Mobility- Ind. Level contact guard assist  -EN        Functional Mobility- Device walker, front-wheeled  -EN        Functional  Mobility-Distance (Feet) 25  -EN           Sit-Stand Transfer    Sit-Stand [place] (Transfers) verbal cues;contact guard  -EN        Assistive Device (Sit-Stand Transfers) walker, front-wheeled  -EN           Motor Skills    Therapeutic Exercise shoulder  -EN           Shoulder (Therapeutic Exercise)    Shoulder (Therapeutic Exercise) AROM (active range of motion)  -EN        Shoulder AROM (Therapeutic Exercise) bilateral;flexion;10 repetitions  -EN           Balance    Comment, Balance stood for brief change with support of FWW and CGA, no LOB (3-4 minutes)  -EN           Plan of Care Review    Plan of Care Reviewed With patient  -EN        Progress improving  -EN        Outcome Evaluation OT - Patient alert this AM and agreed to therapy. Patient performed supine to sit with SBA and sat EOB independently. Patient required mod assist to don slippers while seated EOB. Patient unaware that brief saturated in urine and required max to total assist to change brief.  Patient did demonstrate improved standing balance and was able to stand with support of FWW for 3-4 minutes without LOB. Patient performed in room mobility with FWW and CGA and participated in UE ROM exercises. Patient demonstrated improvement physically however continues to require cues for safety and assist with ADL tasks. Ability to return to [facility] dependent on amount of assistance they are able to provide. Patient may be more appropriate for ECF. Will continue to assess.  -EN           Positioning and Restraints    Pre-Treatment Position in bed  -EN        Post Treatment Position chair  -EN        In Chair sitting;call light within reach;encouraged to call for assist;exit alarm on;with nsg  -EN           Progress Summary (OT)    Progress Toward Functional Goals (OT) progress toward functional goals is good  -EN        Daily Progress Summary (OT) improving  -EN                  User Key  (r) = Recorded By, (t) = Taken By, (c) = Cosigned By       Initials Name Effective Dates    EN Kristi Pressley OTRUI 06/16/21 -                      Occupational Therapy Education       Title: PT OT SLP Therapies (Done)       Topic: Occupational Therapy (Done)       Point: ADL training (Done)       Description:   Instruct learner(s) on proper safety adaptation and remediation techniques during self care or transfers.   Instruct in proper use of assistive devices.                  Learning Progress Summary             Patient Acceptance, E, VU by EN at 4/19/2024 1134    Comment: Educated on safety during functional transfers/mobility and UE HEP.    Acceptance, E, VU by SD at 4/18/2024 1037    Comment: Education regarding safety with bed mobilty, transfers and mobility. Rec pt has assist/support for adl's/transfers/mobility due to cognitive status and hx of falls.                                         User Key       Initials Effective Dates Name Provider Type Discipline    GILBERTO 06/16/21 -  Kristi Pressley OTRUI Occupational Therapist OT    SD 06/16/21 -  Jose Luis Ayala OTRUI Occupational Therapist OT                      OT Recommendation and Plan     Progress Toward Functional Goals (OT): progress toward functional goals is good  Plan of Care Review  Plan of Care Reviewed With: patient  Progress: improving  Outcome Evaluation: OT - Patient alert this AM and agreed to therapy. Patient performed supine to sit with SBA and sat EOB independently. Patient required mod assist to don slippers while seated EOB. Patient unaware that brief saturated in urine and required max to total assist to change brief.  Patient did demonstrate improved standing balance and was able to stand with support of FWW for 3-4 minutes without LOB. Patient performed in room mobility with FWW and CGA and participated in UE ROM exercises. Patient demonstrated improvement physically however continues to require cues for safety and assist with ADL tasks. Ability to return to [facility] dependent on amount  of assistance they are able to provide. Patient may be more appropriate for ECF. Will continue to assess.       Outcome Measures       Row Name 04/19/24 1100             How much help from another is currently needed...    Putting on and taking off regular lower body clothing? 2  -EN      Bathing (including washing, rinsing, and drying) 2  -EN      Toileting (which includes using toilet bed pan or urinal) 2  -EN      Putting on and taking off regular upper body clothing 3  -EN      Taking care of personal grooming (such as brushing teeth) 3  -EN      Eating meals 4  -EN      AM-PAC 6 Clicks Score (OT) 16  -EN                User Key  (r) = Recorded By, (t) = Taken By, (c) = Cosigned By      Initials Name Provider Type    Kristi Forte OTR Occupational Therapist                    Time Calculation:    Time Calculation- OT       Row Name 04/19/24 1154             Time Calculation- OT    OT Start Time 1100  -EN      OT Stop Time 1123  -EN      OT Time Calculation (min) 23 min  -EN         Timed Charges    49095 - OT Therapeutic Activity Minutes 23  -EN         Total Minutes    Timed Charges Total Minutes 23  -EN       Total Minutes 23  -EN                User Key  (r) = Recorded By, (t) = Taken By, (c) = Cosigned By      Initials Name Provider Type    Kristi Forte OTR Occupational Therapist                  Therapy Charges for Today       Code Description Service Date Service Provider Modifiers Qty    [id number]  OT THERAPEUTIC ACT EA 15 MIN 4/19/2024 Kristi Pressley OTR GO 2                 TONIE Cruz  4/19/2024

## 2024-04-20 LAB
ALBUMIN SERPL-MCNC: 3.1 G/DL (ref 3.5–5.2)
ALBUMIN/GLOB SERPL: 1.1 G/DL
ALP SERPL-CCNC: 32 U/L (ref 39–117)
ALT SERPL W P-5'-P-CCNC: 22 U/L (ref 1–41)
ANION GAP SERPL CALCULATED.3IONS-SCNC: 9.9 MMOL/L (ref 5–15)
AST SERPL-CCNC: 22 U/L (ref 1–40)
BACTERIA SPEC RESP CULT: NORMAL
BILIRUB SERPL-MCNC: 0.2 MG/DL (ref 0–1.2)
BUN SERPL-MCNC: 24 MG/DL (ref 8–23)
BUN/CREAT SERPL: 28.6 (ref 7–25)
CALCIUM SPEC-SCNC: 8.6 MG/DL (ref 8.2–9.6)
CHLORIDE SERPL-SCNC: 104 MMOL/L (ref 98–107)
CO2 SERPL-SCNC: 26.1 MMOL/L (ref 22–29)
CREAT SERPL-MCNC: 0.84 MG/DL (ref 0.76–1.27)
CRP SERPL-MCNC: 4.29 MG/DL (ref 0–0.5)
EGFRCR SERPLBLD CKD-EPI 2021: 82.3 ML/MIN/1.73
GLOBULIN UR ELPH-MCNC: 2.8 GM/DL
GLUCOSE SERPL-MCNC: 137 MG/DL (ref 65–99)
GRAM STN SPEC: NORMAL
POTASSIUM SERPL-SCNC: 4.4 MMOL/L (ref 3.5–5.2)
PROCALCITONIN SERPL-MCNC: 1 NG/ML (ref 0–0.25)
PROT SERPL-MCNC: 5.9 G/DL (ref 6–8.5)
SODIUM SERPL-SCNC: 140 MMOL/L (ref 136–145)

## 2024-04-20 PROCEDURE — 84145 PROCALCITONIN (PCT): CPT | Performed by: HOSPITALIST

## 2024-04-20 PROCEDURE — 94761 N-INVAS EAR/PLS OXIMETRY MLT: CPT

## 2024-04-20 PROCEDURE — 99231 SBSQ HOSP IP/OBS SF/LOW 25: CPT | Performed by: HOSPITALIST

## 2024-04-20 PROCEDURE — 25010000002 ENOXAPARIN PER 10 MG: Performed by: NURSE PRACTITIONER

## 2024-04-20 PROCEDURE — 97116 GAIT TRAINING THERAPY: CPT

## 2024-04-20 PROCEDURE — 86140 C-REACTIVE PROTEIN: CPT | Performed by: HOSPITALIST

## 2024-04-20 PROCEDURE — 80053 COMPREHEN METABOLIC PANEL: CPT | Performed by: HOSPITALIST

## 2024-04-20 PROCEDURE — 94799 UNLISTED PULMONARY SVC/PX: CPT

## 2024-04-20 PROCEDURE — 63710000001 DEXAMETHASONE PER 0.25 MG: Performed by: HOSPITALIST

## 2024-04-20 PROCEDURE — 94664 DEMO&/EVAL PT USE INHALER: CPT

## 2024-04-20 RX ADMIN — Medication 10 ML: at 20:17

## 2024-04-20 RX ADMIN — DEXAMETHASONE 6 MG: 4 TABLET ORAL at 09:05

## 2024-04-20 RX ADMIN — IPRATROPIUM BROMIDE AND ALBUTEROL SULFATE 3 ML: .5; 3 SOLUTION RESPIRATORY (INHALATION) at 15:44

## 2024-04-20 RX ADMIN — Medication 5 MG: at 20:17

## 2024-04-20 RX ADMIN — RIVASTIGMINE 1 PATCH: 4.6 PATCH TRANSDERMAL at 09:05

## 2024-04-20 RX ADMIN — IPRATROPIUM BROMIDE AND ALBUTEROL SULFATE 3 ML: .5; 3 SOLUTION RESPIRATORY (INHALATION) at 11:55

## 2024-04-20 RX ADMIN — PANTOPRAZOLE SODIUM 40 MG: 40 TABLET, DELAYED RELEASE ORAL at 09:05

## 2024-04-20 RX ADMIN — BUSPIRONE HYDROCHLORIDE 5 MG: 5 TABLET ORAL at 20:17

## 2024-04-20 RX ADMIN — DOCUSATE SODIUM 100 MG: 100 CAPSULE, LIQUID FILLED ORAL at 09:05

## 2024-04-20 RX ADMIN — NIRMATRELVIR AND RITONAVIR 3 TABLET: KIT at 09:05

## 2024-04-20 RX ADMIN — IPRATROPIUM BROMIDE AND ALBUTEROL SULFATE 3 ML: .5; 3 SOLUTION RESPIRATORY (INHALATION) at 07:38

## 2024-04-20 RX ADMIN — NIRMATRELVIR AND RITONAVIR 3 TABLET: KIT at 20:17

## 2024-04-20 RX ADMIN — CLOPIDOGREL BISULFATE 75 MG: 75 TABLET ORAL at 09:05

## 2024-04-20 RX ADMIN — TRAZODONE HYDROCHLORIDE 100 MG: 50 TABLET ORAL at 20:17

## 2024-04-20 RX ADMIN — AMOXICILLIN AND CLAVULANATE POTASSIUM 1 TABLET: 875; 125 TABLET, FILM COATED ORAL at 09:05

## 2024-04-20 RX ADMIN — AMOXICILLIN AND CLAVULANATE POTASSIUM 1 TABLET: 875; 125 TABLET, FILM COATED ORAL at 20:17

## 2024-04-20 RX ADMIN — ENOXAPARIN SODIUM 40 MG: 40 INJECTION SUBCUTANEOUS at 20:17

## 2024-04-20 RX ADMIN — CITALOPRAM HYDROBROMIDE 20 MG: 20 TABLET ORAL at 09:11

## 2024-04-20 RX ADMIN — MULTIVIT AND MINERALS-FERROUS GLUCONATE 9 MG IRON/15 ML ORAL LIQUID 15 ML: at 09:06

## 2024-04-20 RX ADMIN — Medication 400 MG: at 20:17

## 2024-04-20 RX ADMIN — Medication 10 ML: at 09:05

## 2024-04-20 RX ADMIN — IPRATROPIUM BROMIDE AND ALBUTEROL SULFATE 3 ML: .5; 3 SOLUTION RESPIRATORY (INHALATION) at 20:29

## 2024-04-20 RX ADMIN — GUAIFENESIN 1200 MG: 600 TABLET, EXTENDED RELEASE ORAL at 20:17

## 2024-04-20 RX ADMIN — GUAIFENESIN 1200 MG: 600 TABLET, EXTENDED RELEASE ORAL at 09:05

## 2024-04-20 RX ADMIN — LEVOTHYROXINE SODIUM 50 MCG: 0.05 TABLET ORAL at 06:12

## 2024-04-20 NOTE — PROGRESS NOTES
"Hospitalist Team      Patient Care Team:  Charlie Trinh MD as PCP - General (Family Medicine)  Hardik Senior MD as Consulting Physician (Urology)  MAURO Peters MD as Consulting Physician (Ophthalmology)  Brant Polanco MD as Consulting Physician (Pulmonary Disease)  Charlie Goldstein DO as Consulting Physician (Neurology)        Chief Complaint:  Follow-up Acute Hypoxic Respiratory Failure due to COVID    Subjective    No acute events overnight.  Mr. Goldberg has no complaints.  He ate his dinner w/o issue.  He wants to know when he will be discharged.    Objective    Vital Signs  Temp:  [97.4 °F (36.3 °C)-97.8 °F (36.6 °C)] 97.4 °F (36.3 °C)  Heart Rate:  [54-76] 60  Resp:  [18-22] 19  BP: (130-193)/(64-88) 162/73  Oxygen Therapy  SpO2: 93 %  Pulse Oximetry Type: Continuous  Device (Oxygen Therapy): nasal cannula, humidified  Flow (L/min): 2  Probe Placed On (Pulse Ox): hand, finger}  Flowsheet Rows      Flowsheet Row First Filed Value   Admission Height 175.3 cm (69.02\") Documented at 04/17/2024 1611   Admission Weight 89.4 kg (197 lb) Documented at 04/17/2024 1543              Physical Exam:    General: Elderly appearing male.  Lungs: No respiratory distress.  Abdomen: Obese.  Neuro: CN II-XII grossly intact  Psych: Normal affect    Results Review:     I reviewed the patient's new clinical results.    Lab Results (last 24 hours)       Procedure Component Value Units Date/Time    Blood Culture - Blood, Arm, Left [[id number]]  (Normal) Collected: 04/17/24 1658    Specimen: Blood from Arm, Left Updated: 04/20/24 1715     Blood Culture No growth at 3 days    Blood Culture - Blood, Arm, Left [[id number]]  (Normal) Collected: 04/17/24 1550    Specimen: Blood from Arm, Left Updated: 04/20/24 1715     Blood Culture No growth at 3 days    C-reactive Protein [[id number]]  (Abnormal) Collected: 04/20/24 0557    Specimen: Blood Updated: 04/20/24 1317     C-Reactive Protein 4.29 mg/dL     Respiratory Culture " - Sputum, Cough [[id number]] Collected: 04/18/24 0424    Specimen: Sputum from Cough Updated: 04/20/24 1106     Respiratory Culture Light growth (2+) Normal respiratory osbaldo. No S. aureus or Pseudomonas aeruginosa detected. Final report.     Gram Stain Few (2+) WBCs seen      Occasional Epithelial cells seen      Moderate (3+) Mucous strands      Few (2+) Gram positive cocci in pairs and chains      Few (2+) Gram negative bacilli    Comprehensive Metabolic Panel [[id number]]  (Abnormal) Collected: 04/20/24 0557    Specimen: Blood Updated: 04/20/24 0653     Glucose 137 mg/dL      BUN 24 mg/dL      Creatinine 0.84 mg/dL      Sodium 140 mmol/L      Potassium 4.4 mmol/L      Chloride 104 mmol/L      CO2 26.1 mmol/L      Calcium 8.6 mg/dL      Total Protein 5.9 g/dL      Albumin 3.1 g/dL      ALT (SGPT) 22 U/L      AST (SGOT) 22 U/L      Alkaline Phosphatase 32 U/L      Total Bilirubin 0.2 mg/dL      Globulin 2.8 gm/dL      A/G Ratio 1.1 g/dL      BUN/Creatinine Ratio 28.6     Anion Gap 9.9 mmol/L      eGFR 82.3 mL/min/1.73     Narrative:      GFR Normal >60  Chronic Kidney Disease <60  Kidney Failure <15    The GFR formula is only valid for adults with stable renal function between ages 18 and 70.    Procalcitonin [[id number]]  (Abnormal) Collected: 04/20/24 0557    Specimen: Blood Updated: 04/20/24 0652     Procalcitonin 1.00 ng/mL     Narrative:      As a Marker for Sepsis (Non-Neonates):    1. <0.5 ng/mL represents a low risk of severe sepsis and/or septic shock.  2. >2 ng/mL represents a high risk of severe sepsis and/or septic shock.    As a Marker for Lower Respiratory Tract Infections that require antibiotic therapy:    PCT on Admission    Antibiotic Therapy       6-12 Hrs later    >0.5                Strongly Recommended  >0.25 - <0.5        Recommended   0.1 - 0.25          Discouraged              Remeasure/reassess PCT  <0.1                Strongly Discouraged     Remeasure/reassess PCT    As 28 day mortality  "risk marker: \"Change in Procalcitonin Result\" (>80% or <=80%) if Day 0 (or Day 1) and Day 4 values are available. Refer to http://[email or web address]-pct-[email or web address]    Change in PCT <=80%  A decrease of PCT levels below or equal to 80% defines a positive change in PCT test result representing a higher risk for 28-day all-cause mortality of patients diagnosed with severe sepsis for septic shock.    Change in PCT >80%  A decrease of PCT levels of more than 80% defines a negative change in PCT result representing a lower risk for 28-day all-cause mortality of patients diagnosed with severe sepsis or septic shock.               Imaging Results (Last 24 Hours)       Procedure Component Value Units Date/Time    IMAGING SCANNED [[id number]] Resulted: 04/17/24     Updated: 04/20/24 1043              Medication Review:   I have reviewed the patient's current medication list    Current Facility-Administered Medications:     acetaminophen (TYLENOL) tablet 650 mg, 650 mg, Oral, Q4H PRN **OR** acetaminophen (TYLENOL) 160 MG/5ML oral solution 650 mg, 650 mg, Oral, Q4H PRN **OR** acetaminophen (TYLENOL) suppository 650 mg, 650 mg, Rectal, Q4H PRN, Kimber Vital APRN    amoxicillin-clavulanate (AUGMENTIN) 875-125 MG per tablet 1 tablet, 1 tablet, Oral, Q12H, Roni Resendiz MD, 1 tablet at 04/20/24 0905    sennosides-docusate (PERICOLACE) 8.6-50 MG per tablet 2 tablet, 2 tablet, Oral, BID PRN **AND** polyethylene glycol (MIRALAX) packet 17 g, 17 g, Oral, Daily PRN **AND** bisacodyl (DULCOLAX) EC tablet 5 mg, 5 mg, Oral, Daily PRN **AND** bisacodyl (DULCOLAX) suppository 10 mg, 10 mg, Rectal, Daily PRN, Kimber Vital APRN    busPIRone (BUSPAR) tablet 5 mg, 5 mg, Oral, Nightly, Roni Resendiz MD, 5 mg at 04/19/24 2051    Calcium Replacement - Follow Nurse / BPA Driven Protocol, , Does not apply, PRN, Kimber Vital, BERNARD    cetirizine (zyrTEC) tablet 10 mg, 10 mg, Oral, Daily PRN, Kimber Vital, BERNARD    citalopram " (CeleXA) tablet 20 mg, 20 mg, Oral, Daily, [place], Kimber R, APRN, 20 mg at 04/20/24 0911    clopidogrel (PLAVIX) tablet 75 mg, 75 mg, Oral, Daily, Amanuel, Kimber R, APRN, 75 mg at 04/20/24 0905    dexAMETHasone (DECADRON) tablet 6 mg, 6 mg, Oral, Daily With Breakfast, Roni Resendiz MD, 6 mg at 04/20/24 0905    docusate sodium (COLACE) capsule 100 mg, 100 mg, Oral, Daily, Amanuel, Kimber R, APRN, 100 mg at 04/20/24 0905    Enoxaparin Sodium (LOVENOX) syringe 40 mg, 40 mg, Subcutaneous, Nightly, [place], Kimber R, APRN, 40 mg at 04/19/24 2050    guaiFENesin (MUCINEX) 12 hr tablet 1,200 mg, 1,200 mg, Oral, BID, [place], Kimber R, APRN, 1,200 mg at 04/20/24 0905    ipratropium-albuterol (DUO-NEB) nebulizer solution 3 mL, 3 mL, Nebulization, 4x Daily - RT, [place], Kimber R, APRN, 3 mL at 04/20/24 1544    ipratropium-albuterol (DUO-NEB) nebulizer solution 3 mL, 3 mL, Nebulization, Q4H PRN, [place], Kimber R, APRN, 3 mL at 04/18/24 0719    levothyroxine (SYNTHROID, LEVOTHROID) tablet 50 mcg, 50 mcg, Oral, Q AM, [place], Kimber R, APRN, 50 mcg at 04/20/24 0612    magnesium oxide (MAG-OX) tablet 400 mg, 400 mg, Oral, Nightly, [place], Kimber R, APRN, 400 mg at 04/19/24 2051    Magnesium Standard Dose Replacement - Follow Nurse / BPA Driven Protocol, , Does not apply, PRN, Amanuel, Kimber R, APRN    melatonin tablet 5 mg, 5 mg, Oral, Nightly PRN, [place], Kimber R, APRN, 5 mg at 04/19/24 2229    Midazolam HCl (PF) (VERSED) injection 2 mg, 2 mg, Intravenous, Q2H PRN, Charlie Love DO, 2 mg at 04/19/24 0454    Multi-Shantel liquid 15 mL, 15 mL, Oral, Daily, Kimber Vital APRN, 15 mL at 04/20/24 0906    nirmatrelvir and ritonavir (300mg/100mg)(PAXLOVID) 3 tablet pack, 3 tablet, Oral, BID, Roni Resendiz MD, 3 tablet at 04/20/24 0905    nitroglycerin (NITROSTAT) SL tablet 0.4 mg, 0.4 mg, Sublingual, Q5 Min PRN, Kimber Vital APRN    ondansetron ODT (ZOFRAN-ODT) disintegrating tablet 4 mg, 4 mg,  Oral, Q6H PRN **OR** ondansetron (ZOFRAN) injection 4 mg, 4 mg, Intravenous, Q6H PRN, AmanuelRiri angelshana R, APRN    pantoprazole (PROTONIX) EC tablet 40 mg, 40 mg, Oral, Daily, Amanuel, Kimber R, APRN, 40 mg at 04/20/24 0905    Pharmacy Consult - Pharmacy to dose, , Does not apply, Continuous PRN, Roni Resendiz MD    Pharmacy to Dose enoxaparin (LOVENOX), , Does not apply, Continuous PRN, [place], Kimber R, APRN    Phosphorus Replacement - Follow Nurse / BPA Driven Protocol, , Does not apply, PRN, Amanuel, Kimber R, APRN    Potassium Replacement - Follow Nurse / BPA Driven Protocol, , Does not apply, PRN, [place], Kimber R, APRN    rivastigmine (EXELON) 4.6 MG/24HR patch 1 patch, 1 patch, Transdermal, Daily, AmanuelEz angelana R, APRN, 1 patch at 04/20/24 0905    sodium chloride 0.9 % flush 10 mL, 10 mL, Intravenous, PRN, Anil Nuñez MD    sodium chloride 0.9 % flush 10 mL, 10 mL, Intravenous, Q12H, [place] angelshana R, APRN, 10 mL at 04/20/24 0905    sodium chloride 0.9 % flush 10 mL, 10 mL, Intravenous, PRN, Amanuel, Kimber R, APRN    sodium chloride 0.9 % infusion 40 mL, 40 mL, Intravenous, PRN, Amanuel, Kimber R, APRN    traZODone (DESYREL) tablet 100 mg, 100 mg, Oral, Nightly, [place] angelshana R, APRN, 100 mg at 04/19/24 2051      Assessment & Plan     Acute Hypoxic Respiratory Failure due to Acute COVID Infection: Clinically improved.  CRP decreased by half.  Continue regimen.  Walk test in A.M.  Vascular Dementia: Nothing acute.  CAD: Nothing acute.    Plan for disposition: Likely back to Assisted Living tomorrow    Roni Resendiz MD  04/20/24  17:28 EDT

## 2024-04-20 NOTE — PLAN OF CARE
Goal Outcome Evaluation:              Outcome Evaluation: pleasant and cooperative this shift, up with assist x1, o2 at 2l titrated to maintain sats 90-95%, Covid +, enhanced contact maintained, bed/chair alarms and floor mats in place for safety, call light within reach.

## 2024-04-20 NOTE — PLAN OF CARE
Goal Outcome Evaluation:  Plan of Care Reviewed With: patient        Progress: improving  Outcome Evaluation: VSS, rested well, PRN Melatonin given per request, alarm for safety, patient stated fear of rolling out of bed, floor mats placed at bedside for protective measures, pleasant, orientation and cooperation improved from previous reports

## 2024-04-20 NOTE — THERAPY TREATMENT NOTE
Acute Care - Physical Therapy Treatment Note  [facility]     Patient Name: Pawel Goldberg  : 1932  MRN: [id number]  Today's Date: 2024      Visit Dx:     ICD-10-CM ICD-9-CM   1. Pneumonia of left lower lobe due to infectious organism  J18.9 486   2. 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    Obstruction of carotid artery    Stenosis of carotid artery    EEG abnormality without seizure    Hereditary and idiopathic peripheral neuropathy    Mixed hyperlipidemia    Acquired hypothyroidism    Low back pain with sciatica    Lumbar canal stenosis    Degenerative arthritis of lumbar spine    Osteoarthritis of hip    History of repair of hip joint    Syncope    Temporary cerebral vascular dysfunction    History of prostate cancer    Dementia associated with other underlying disease without behavioral disturbance    Arthritis    PAD (peripheral artery disease)    Medicare annual wellness visit, subsequent    Essential hypertension    Medication monitoring encounter    Status post implantation of artificial urinary sphincter    Moderate asthma with acute exacerbation    Vascular dementia without behavioral disturbance    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    At high risk for falls    Insomnia due to medical condition    Chronic UTI    Frequent falls    Hypoxia    Acute respiratory failure with hypoxia     Past Medical History:   Diagnosis Date    Allergic     Seasonal    Allergic rhinitis     Arthritis     Asthma     Brito esophagus     Benign prostatic hyperplasia     Carotid arterial disease     Status post right CEA 14    COPD (chronic obstructive pulmonary disease)     Coronary artery disease     Dementia     Dyspnea     Erectile dysfunction     GERD (gastroesophageal reflux disease)     Hematoma of frontal scalp 2021    HL (hearing loss)     Hypothyroidism     Low back pain      PAD (peripheral artery disease)     Prostate cancer     Status post implantation of artificial urinary sphincter         Stroke     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    Urinary tract infection      Past Surgical History:   Procedure Laterality Date    CAROTID ARTERY ANGIOPLASTY  04/01/2014    COLONOSCOPY  2009    ENDOSCOPY  2009    2001,2003,2006    EYE SURGERY      FRACTURE SURGERY      HERNIA REPAIR  2000    HERNIA REPAIR  2009    INGUINAL HERNIA REPAIR      PARTIAL HIP ARTHROPLASTY Left 07/02/2014    PROSTATECTOMY  2008    SHOULDER SURGERY  2005    SHOULDER SURGERY  2006    SHOULDER SURGERY  2008    TEAR DUCT SURGERY      URINARY SPHINCTER IMPLANT       PT Assessment (Last 12 Hours)       PT Evaluation and Treatment       Row Name 04/20/24 0842          Physical Therapy Time and Intention    Subjective Information complains of;weakness  -EA     Document Type therapy note (daily note)  -EA     Mode of Treatment physical therapy  -EA     Patient Effort good  -EA     Comment wearing 3.5L of oxygen via nc, PCA in room  -EA       Row Name 04/20/24 0842          General Information    Patient Profile Reviewed yes  -EA     Patient Observations alert;cooperative;agree to therapy  -EA     Existing Precautions/Restrictions fall  -EA     Barriers to Rehab cognitive status  -EA       Row Name 04/20/24 0842          Pain    Pretreatment Pain Rating 0/10 - no pain  -EA     Posttreatment Pain Rating 0/10 - no pain  -EA     Pain Intervention(s) Repositioned;Distraction  -EA       Row Name 04/20/24 0842          Bed Mobility    Bed Mobility supine-sit  -EA     Supine-Sit [place] (Bed Mobility) minimum assist (75% patient effort)  -EA     Assistive Device (Bed Mobility) bed rails;head of bed elevated  -EA     Comment, (Bed Mobility) verbal cues for proper technique  -EA       Row Name 04/20/24 0842          Transfers    Transfers sit-stand transfer;stand-sit transfer  -EA     Comment, (Transfers)  cues for hand placement, scooted towards eob well  -EA       Row Name 04/20/24 0842          Sit-Stand Transfer    Sit-Stand [place] (Transfers) contact guard;verbal cues  -EA     Assistive Device (Sit-Stand Transfers) walker, front-wheeled  -EA       Row Name 04/20/24 0842          Gait/Stairs (Locomotion)    [place] Level (Gait) contact guard;verbal cues  -EA     Assistive Device (Gait) walker, front-wheeled  -EA     Distance in Feet (Gait) 25  -EA     Bilateral Gait Deviations forward flexed posture  -EA     Comment, (Gait/Stairs) cues for safety over and around obstacles with RW , requires assistance with 02 line. patient at 90% 02 sats with 3.5L  -EA       Row Name 04/20/24 0842          Safety Issues, Functional Mobility    Impairments Affecting Function (Mobility) cognition  -EA     Cognitive Impairments, Mobility Safety/Performance insight into deficits/self-awareness;sequencing abilities  -EA       Row Name 04/20/24 0842          Motor Skills    Therapeutic Exercise knee;hip  -EA       Row Name 04/20/24 0842          Hip (Therapeutic Exercise)    Hip (Therapeutic Exercise) AROM (active range of motion);strengthening exercise  -EA     Hip Strengthening (Therapeutic Exercise) bilateral;flexion;aBduction;marching while seated;marching while standing;standing;sitting  Standing (marches only)  -EA       Row Name 04/20/24 0842          Knee (Therapeutic Exercise)    Knee (Therapeutic Exercise) strengthening exercise  -EA     Knee Strengthening (Therapeutic Exercise) bilateral;flexion;extension;LAQ (long arc quad);10 repetitions  -EA       Row Name 04/20/24 0842          Plan of Care Review    Plan of Care Reviewed With patient  -EA     Progress improving  -EA     Outcome Evaluation atient agreeable to therapy, required min A today for supine>sit transfer out of bed with rails and hob elevated. Sit to stand with CGA from elevated bed. Gait training with RW in room on 3.5L with cues for safety awareness  throughout. Strengthening in all planes in seated/standing position w/UE support required. Patient will need a RW when he discharges. he currently has a rollator there now.  -EA       Row Name 04/20/24 0842          Vital Signs    Pre SpO2 (%) 87  -EA     O2 Delivery Pre Treatment supplemental O2  -EA     Intra SpO2 (%) 3.5  -EA     Post SpO2 (%) 90  -EA       Row Name 04/20/24 0842          Positioning and Restraints    Pre-Treatment Position in bed  -EA     Post Treatment Position chair  -EA     In Chair notified nsg;sitting;call light within reach;encouraged to call for assist;patient within staff view  -EA       Row Name 04/20/24 0842          Therapy Assessment/Plan (PT)    Rehab Potential (PT) good, to achieve stated therapy goals  -EA       Row Name 04/20/24 0842          Progress Summary (PT)    Progress Toward Functional Goals (PT) progress toward functional goals is good  -EA     Daily Progress Summary (PT) --  -EA     Barriers to Overall Progress (PT) cognition  -EA       Row Name 04/20/24 0842          Therapy Plan Review/Discharge Plan (PT)    Therapy Plan Review (PT) patient;care plan/treatment goals reviewed  -               User Key  (r) = Recorded By, (t) = Taken By, (c) = Cosigned By      Initials Name Provider Type    Theresa Velasco, YUNG Physical Therapist Assistant                    Physical Therapy Education       Title: PT OT SLP Therapies (Done)       Topic: Physical Therapy (Done)       Point: Mobility training (Done)       Learning Progress Summary             Patient Acceptance, E, VU,NR by  at 4/20/2024 0934    Acceptance, E,TB, VU,NR by  at 4/19/2024 1244    Acceptance, E,TB, VU by  at 4/18/2024 0946                                         User Key       Initials Effective Dates Name Provider Type Discipline     06/16/21 -  Kimberlyn Rivera, PT Physical Therapist PT    RADHA 06/20/21 -  Theresa Corey PTA Physical Therapist Assistant PT                  PT Recommendation and  Plan  Anticipated Discharge Disposition (PT): other (see comments) (will continue to assess)  Planned Therapy Interventions (PT): balance training, bed mobility training, home exercise program, gait training, patient/family education, strengthening, transfer training  Progress Summary (PT)  Progress Toward Functional Goals (PT): progress toward functional goals is good  Barriers to Overall Progress (PT): cognition  Plan of Care Reviewed With: patient  Progress: improving  Outcome Evaluation: atient agreeable to therapy, required min A today for supine>sit transfer out of bed with rails and hob elevated. Sit to stand with CGA from elevated bed. Gait training with RW in room on 3.5L with cues for safety awareness throughout. Strengthening in all planes in seated/standing position w/UE support required. Patient will need a RW when he discharges. he currently has a rollator there now.   Outcome Measures       Row Name 04/20/24 0842 04/19/24 1100          How much help from another person do you currently need...    Turning from your back to your side while in flat bed without using bedrails? 3  -EA 3  -JW     Moving from lying on back to sitting on the side of a flat bed without bedrails? 3  -EA 3  -JW     Moving to and from a bed to a chair (including a wheelchair)? 3  -EA 3  -JW     Standing up from a chair using your arms (e.g., wheelchair, bedside chair)? 3  -EA 3  -JW     Climbing 3-5 steps with a railing? 2  -EA 2  -JW     To walk in hospital room? 3  -EA 3  -JW     AM-PAC 6 Clicks Score (PT) 17  -EA 17  -JW     Highest Level of Mobility Goal 5 --> Static standing  -EA 5 --> Static standing  -JW        How much help from another is currently needed...    Putting on and taking off regular lower body clothing? -- 2  -EN     Bathing (including washing, rinsing, and drying) -- 2  -EN     Toileting (which includes using toilet bed pan or urinal) -- 2  -EN     Putting on and taking off regular upper body clothing -- 3   -EN     Taking care of personal grooming (such as brushing teeth) -- 3  -EN     Eating meals -- 4  -EN     AM-PAC 6 Clicks Score (OT) -- 16  -EN        Functional Assessment    Outcome Measure Options AM-PAC 6 Clicks Basic Mobility (PT)  -EA AM-PAC 6 Clicks Basic Mobility (PT)  -JW               User Key  (r) = Recorded By, (t) = Taken By, (c) = Cosigned By      Initials Name Provider Type    EN Kristi Pressley, OTR Occupational Therapist    Kimberlyn Sanderson, PT Physical Therapist    Theresa Velasco, YUNG Physical Therapist Assistant                     Time Calculation:    PT Charges       Row Name 04/20/24 0937             Time Calculation    Start Time 0842  -EA      Stop Time 0901  -EA      Time Calculation (min) 19 min  -EA                User Key  (r) = Recorded By, (t) = Taken By, (c) = Cosigned By      Initials Name Provider Type    Theresa Velasco PTA Physical Therapist Assistant                  Therapy Charges for Today       Code Description Service Date Service Provider Modifiers Qty    [id number] HC GAIT TRAINING EA 15 MIN 4/20/2024 Theresa Corey PTA GP 1            PT G-Codes  Outcome Measure Options: AM-PAC 6 Clicks Basic Mobility (PT)  AM-PAC 6 Clicks Score (PT): 17  AM-PAC 6 Clicks Score (OT): 16    Theresa Corey PTA  4/20/2024

## 2024-04-20 NOTE — PLAN OF CARE
Goal Outcome Evaluation:  Plan of Care Reviewed With: patient        Progress: improving  Outcome Evaluation: Patient agreeable to therapy, required min A today for supine>sit transfer out of bed with rails and hob elevated. Sit to stand with CGA from elevated bed. Gait training with RW in room x25ft on 3.5L with cues for safety awareness throughout, CGA required. 0xygen saturation at 90% throughout. Strengthening in all planes in seated/standing position w/UE support required. Patient will need a RW when he discharges. he currently has a rollator there now.      Anticipated Discharge Disposition (PT): other (see comments) (will continue to assess)

## 2024-04-21 ENCOUNTER — READMISSION MANAGEMENT (OUTPATIENT)
Dept: CALL CENTER | Facility: HOSPITAL | Age: 89
End: 2024-04-21
Payer: MEDICARE

## 2024-04-21 VITALS
HEART RATE: 74 BPM | TEMPERATURE: 96.7 F | OXYGEN SATURATION: 90 % | DIASTOLIC BLOOD PRESSURE: 73 MMHG | BODY MASS INDEX: 30.07 KG/M2 | SYSTOLIC BLOOD PRESSURE: 153 MMHG | WEIGHT: 203 LBS | HEIGHT: 69 IN | RESPIRATION RATE: 20 BRPM

## 2024-04-21 PROBLEM — U07.1 ACUTE HYPOXEMIC RESPIRATORY FAILURE DUE TO COVID-19: Status: ACTIVE | Noted: 2024-04-21

## 2024-04-21 PROBLEM — J96.01 ACUTE HYPOXEMIC RESPIRATORY FAILURE DUE TO COVID-19: Status: ACTIVE | Noted: 2024-04-21

## 2024-04-21 PROBLEM — D89.832 CYTOKINE RELEASE SYNDROME, GRADE 2: Status: ACTIVE | Noted: 2024-04-21

## 2024-04-21 LAB
ALBUMIN SERPL-MCNC: 3 G/DL (ref 3.5–5.2)
ALBUMIN/GLOB SERPL: 1 G/DL
ALP SERPL-CCNC: 32 U/L (ref 39–117)
ALT SERPL W P-5'-P-CCNC: 21 U/L (ref 1–41)
ANION GAP SERPL CALCULATED.3IONS-SCNC: 8.2 MMOL/L (ref 5–15)
AST SERPL-CCNC: 15 U/L (ref 1–40)
BASOPHILS # BLD AUTO: 0.02 10*3/MM3 (ref 0–0.2)
BASOPHILS NFR BLD AUTO: 0.2 % (ref 0–1.5)
BILIRUB CONJ SERPL-MCNC: <0.2 MG/DL (ref 0–0.3)
BILIRUB SERPL-MCNC: 0.2 MG/DL (ref 0–1.2)
BUN SERPL-MCNC: 24 MG/DL (ref 8–23)
BUN/CREAT SERPL: 29.6 (ref 7–25)
CALCIUM SPEC-SCNC: 8.9 MG/DL (ref 8.2–9.6)
CHLORIDE SERPL-SCNC: 105 MMOL/L (ref 98–107)
CO2 SERPL-SCNC: 25.8 MMOL/L (ref 22–29)
CREAT SERPL-MCNC: 0.81 MG/DL (ref 0.76–1.27)
DEPRECATED RDW RBC AUTO: 51.1 FL (ref 37–54)
EGFRCR SERPLBLD CKD-EPI 2021: 83.2 ML/MIN/1.73
EOSINOPHIL # BLD AUTO: 0 10*3/MM3 (ref 0–0.4)
EOSINOPHIL NFR BLD AUTO: 0 % (ref 0.3–6.2)
ERYTHROCYTE [DISTWIDTH] IN BLOOD BY AUTOMATED COUNT: 15.4 % (ref 12.3–15.4)
GLOBULIN UR ELPH-MCNC: 2.9 GM/DL
GLUCOSE SERPL-MCNC: 149 MG/DL (ref 65–99)
HCT VFR BLD AUTO: 34 % (ref 37.5–51)
HGB BLD-MCNC: 10.8 G/DL (ref 13–17.7)
IMM GRANULOCYTES # BLD AUTO: 0.12 10*3/MM3 (ref 0–0.05)
IMM GRANULOCYTES NFR BLD AUTO: 1.3 % (ref 0–0.5)
LYMPHOCYTES # BLD AUTO: 0.59 10*3/MM3 (ref 0.7–3.1)
LYMPHOCYTES NFR BLD AUTO: 6.3 % (ref 19.6–45.3)
MCH RBC QN AUTO: 28.7 PG (ref 26.6–33)
MCHC RBC AUTO-ENTMCNC: 31.8 G/DL (ref 31.5–35.7)
MCV RBC AUTO: 90.4 FL (ref 79–97)
MONOCYTES # BLD AUTO: 0.34 10*3/MM3 (ref 0.1–0.9)
MONOCYTES NFR BLD AUTO: 3.6 % (ref 5–12)
NEUTROPHILS NFR BLD AUTO: 8.31 10*3/MM3 (ref 1.7–7)
NEUTROPHILS NFR BLD AUTO: 88.6 % (ref 42.7–76)
NRBC BLD AUTO-RTO: 0 /100 WBC (ref 0–0.2)
PLATELET # BLD AUTO: 250 10*3/MM3 (ref 140–450)
PMV BLD AUTO: 9.6 FL (ref 6–12)
POTASSIUM SERPL-SCNC: 4.4 MMOL/L (ref 3.5–5.2)
PROCALCITONIN SERPL-MCNC: 0.61 NG/ML (ref 0–0.25)
PROT SERPL-MCNC: 5.9 G/DL (ref 6–8.5)
RBC # BLD AUTO: 3.76 10*6/MM3 (ref 4.14–5.8)
SODIUM SERPL-SCNC: 139 MMOL/L (ref 136–145)
WBC NRBC COR # BLD AUTO: 9.38 10*3/MM3 (ref 3.4–10.8)

## 2024-04-21 PROCEDURE — 63710000001 DEXAMETHASONE PER 0.25 MG: Performed by: HOSPITALIST

## 2024-04-21 PROCEDURE — 85025 COMPLETE CBC W/AUTO DIFF WBC: CPT | Performed by: HOSPITALIST

## 2024-04-21 PROCEDURE — 94799 UNLISTED PULMONARY SVC/PX: CPT

## 2024-04-21 PROCEDURE — 99238 HOSP IP/OBS DSCHRG MGMT 30/<: CPT | Performed by: HOSPITALIST

## 2024-04-21 PROCEDURE — 94664 DEMO&/EVAL PT USE INHALER: CPT

## 2024-04-21 PROCEDURE — 82248 BILIRUBIN DIRECT: CPT | Performed by: HOSPITALIST

## 2024-04-21 PROCEDURE — 94618 PULMONARY STRESS TESTING: CPT

## 2024-04-21 PROCEDURE — 80053 COMPREHEN METABOLIC PANEL: CPT | Performed by: HOSPITALIST

## 2024-04-21 PROCEDURE — 84145 PROCALCITONIN (PCT): CPT | Performed by: HOSPITALIST

## 2024-04-21 PROCEDURE — 94761 N-INVAS EAR/PLS OXIMETRY MLT: CPT

## 2024-04-21 RX ORDER — AMOXICILLIN AND CLAVULANATE POTASSIUM 875; 125 MG/1; MG/1
1 TABLET, FILM COATED ORAL EVERY 12 HOURS SCHEDULED
Qty: 4 TABLET | Refills: 0 | Status: SHIPPED | OUTPATIENT
Start: 2024-04-21 | End: 2024-04-23

## 2024-04-21 RX ORDER — DEXAMETHASONE 6 MG/1
6 TABLET ORAL
Qty: 6 TABLET | Refills: 0 | Status: SHIPPED | OUTPATIENT
Start: 2024-04-22 | End: 2024-04-28

## 2024-04-21 RX ADMIN — PANTOPRAZOLE SODIUM 40 MG: 40 TABLET, DELAYED RELEASE ORAL at 08:36

## 2024-04-21 RX ADMIN — MULTIVIT AND MINERALS-FERROUS GLUCONATE 9 MG IRON/15 ML ORAL LIQUID 15 ML: at 08:37

## 2024-04-21 RX ADMIN — DOCUSATE SODIUM 100 MG: 100 CAPSULE, LIQUID FILLED ORAL at 08:36

## 2024-04-21 RX ADMIN — NIRMATRELVIR AND RITONAVIR 3 TABLET: KIT at 10:17

## 2024-04-21 RX ADMIN — CLOPIDOGREL BISULFATE 75 MG: 75 TABLET ORAL at 08:36

## 2024-04-21 RX ADMIN — GUAIFENESIN 1200 MG: 600 TABLET, EXTENDED RELEASE ORAL at 08:36

## 2024-04-21 RX ADMIN — Medication 10 ML: at 08:37

## 2024-04-21 RX ADMIN — DEXAMETHASONE 6 MG: 4 TABLET ORAL at 08:36

## 2024-04-21 RX ADMIN — IPRATROPIUM BROMIDE AND ALBUTEROL SULFATE 3 ML: .5; 3 SOLUTION RESPIRATORY (INHALATION) at 12:01

## 2024-04-21 RX ADMIN — CITALOPRAM HYDROBROMIDE 20 MG: 20 TABLET ORAL at 08:36

## 2024-04-21 RX ADMIN — LEVOTHYROXINE SODIUM 50 MCG: 0.05 TABLET ORAL at 08:36

## 2024-04-21 RX ADMIN — RIVASTIGMINE 1 PATCH: 4.6 PATCH TRANSDERMAL at 08:36

## 2024-04-21 RX ADMIN — AMOXICILLIN AND CLAVULANATE POTASSIUM 1 TABLET: 875; 125 TABLET, FILM COATED ORAL at 08:36

## 2024-04-21 RX ADMIN — IPRATROPIUM BROMIDE AND ALBUTEROL SULFATE 3 ML: .5; 3 SOLUTION RESPIRATORY (INHALATION) at 07:14

## 2024-04-21 NOTE — CASE MANAGEMENT/SOCIAL WORK
Case Management Discharge Note      Final Note: Home    Provided Post Acute Provider List?: N/A  Provided Post Acute Provider Quality & Resource List?: N/A    Selected Continued Care - Discharged on 4/21/2024 Admission date: 4/17/2024 - Discharge disposition: Home-Health Care Svc      Destination    No services have been selected for the patient.                Durable Medical Equipment    No services have been selected for the patient.                Dialysis/Infusion    No services have been selected for the patient.                Home Medical Care       Service Provider Selected Services Address Phone Fax Patient Preferred    [facility] [address] [facility] [address] [phone] --              Therapy    No services have been selected for the patient.                Community Resources    No services have been selected for the patient.                Community & DME    No services have been selected for the patient.                         Final Discharge Disposition Code: 01 - home or self-care

## 2024-04-21 NOTE — PLAN OF CARE
Goal Outcome Evaluation:              Outcome Evaluation: pleasant and cooperative this shift, up with assist x1, o2 at 2l titrated to maintain sats 90-95%,  ra at rest, Covid +, enhanced contact maintained, bed/chair alarms and floor mats in place for safety, call light within reach.

## 2024-04-21 NOTE — DISCHARGE SUMMARY
"Pawel Goldberg  12/31/1932  [id number]    Hospitalists Discharge Summary    Date of Admission: 4/17/2024  Date of Discharge:  4/21/2024    Primary Discharge Diagnoses:  Acute Hypoxic Respiratory Failure due to COVID-19  Acute Encephalopathy due COVID-19  Obesity Body mass index is 29.98 kg/m².    Secondary Discharge Diagnoses:  Vascular Dementia  CAD  Hypothyroidism  Chronic Normocytic Anemia  PAD  Hx/O Embolic CVA  Hx/O Artificial Sphincter    History of Present Illness (taken from H&P):  The patient is a 89-year-old male who presented from SNF to ER secondary to generalized weakness, cough, wheezing, fatigue, nausea, vomiting.  In the ER room air sats were 82% he was placed on 5 L nasal cannula with sats above 90%.  Stepdaughter at bedside who is POA provides most of history as patient is unsure of where he is or why.  He does note that he is feeling \"rough\" and stepdaughter notes that today is the first day that he was so sleepy he could not get out of bed.  She also notes that staff at facility felt that he vomited in his sleep last night with remnants of emesis in his mouth when they assessed him.     Significant diagnostics in ER include: WBC 16.13, procalcitonin 3.82, lactate 2.4> 0.9,HS troponin 28> 24     In the ER he was given dose of Rocephin, DuoNeb, Solu-Medrol, Zofran, 1 L fluid bolus and admission was requested     He has a H/O aspiration pneumonia/mucus plugging, asthma/COPD, vascular dementia, CAD, hypothyroidism, chronic anemia, artificial urinary sphincter, carotid artery disease s/p CEA 2014, hypothyroidism, embolic stroke 2014, PAD, chronic elevation left hemidiaphragm     He/step daughter otherwise deny f/c/headache/rhinorrhea/nasal congestion/lightheadedness/syncopal sensation/diarrhea/chest pain/abdominal pain/recent illness/change in bowel habits/no weight change/bloody emesis or bloody stools/change in medications or any other new concerns.    Hospital Course:  Mr. Goldberg was " admitted to the Med/Surg unit.  Sepsis was ruled out because he was not septic.  Further viral testing revealed an acute COVID-19 which clinically fit w/ his symptoms.  He did have an episode of encephalopathy that was characterized as Mr. Goldberg becoming non-verbal.  Code Stroke was called, but work-up was negative a felt likely more do to infection.  Resolved and has not returned.  Given his O2 requirement as well as age and comorbidities, I started him on Paxlovid and Decadron courses.  His procalcitonin trend was also up so I covered him for a secondary bacterial pneumonia with Augmentin.  He was liberated from oxygen, and walking oximetry at the time of discharge demonstrated only need of 2 L with exertion.    PCP  Patient Care Team:  Charlie Trinh MD as PCP - General (Family Medicine)  Hardik Senior MD as Consulting Physician (Urology)  MAURO Peters MD as Consulting Physician (Ophthalmology)  Brant Polanco MD as Consulting Physician (Pulmonary Disease)  Charlie Goldstein DO as Consulting Physician (Neurology)    Consults:   Consults       Date and Time Order Name Status Description    4/17/2024 11:38 PM Inpatient Neurology Consult Stroke              Operations and Procedures Performed:       Adult Transthoracic Echo Complete w/ Color, Spectral and Contrast if Necessary Per Protocol    Result Date: 4/18/2024  Narrative:   Left ventricular systolic function is normal. Calculated left ventricular EF = 62.4%   Left ventricular diastolic function is consistent with (grade I) impaired relaxation and age.   There is moderate calcification of the aortic valve.     MRI Brain With & Without Contrast    Result Date: 4/18/2024  Narrative: MRI BRAIN W WO CONTRAST Date of Exam: 4/18/2024 10:00 AM EDT Indication: Stroke suspected (Ped 0-17y).  Comparison: CT head 4/17/2024. CT perfusion 4/18/2024. CT angiogram of the head 4/18/2024. Technique:  Routine multiplanar/multisequence sequence images of the  brain were obtained before and after the uneventful administration of Multihance. FINDINGS: There is a small focus of abnormal diffusion restriction with associated decreased signal on ADC mapping involving the anterior aspect of the right cerebellar hemisphere. This finding measures approximately 8 mm. Corresponding FLAIR and T2 increased signal is noted. The findings indicate a focus of likely subacute ischemic change. No definitive additional areas of involvement are identified. Advanced nonspecific diffuse white matter signal changes are noted indicating chronic small vessel ischemic changes or age-related changes. Associated diffuse volume loss is noted. There is no evidence for abnormal cerebral edema. There is no evidence for abnormal focal enhancement or abnormal enhancing mass. There is no mass effect or midline shift. The ventricular system is nondilated. The basilar cisterns are patent. The midline structures are unremarkable. An incidental prominent subarachnoid cyst is noted at the posterior margin of the posterior cranial fossa. No significant abnormal signal is observed involving the paranasal sinuses or mastoid air cells.     Impression: 1.Small focus of likely subacute ischemic change involving the anterior aspect of the right cerebellar hemisphere. 2.No evidence for abnormal focal enhancement or abnormal enhancing mass. 3.Advanced diffuse nonspecific white matter signal changes are noted with associated diffuse volume loss. These findings are most consistent with chronic small vessel ischemic changes or age-related changes. Electronically Signed: Zia Hurley MD  4/18/2024 11:02 AM EDT  Workstation ID: [id number]    CT Angiogram Head    Result Date: 4/18/2024  Narrative: CT ANGIOGRAM HEAD, CT ANGIOGRAM NECK Date of Exam: 4/18/2024 4:59 AM EDT Indication: Stroke, follow up. Comparison: None available. Technique: CTA of the head and neck was performed after the uneventful intravenous administration  of iodinated contrast. Reconstructed coronal and sagittal images were also obtained. In addition, a 3-D volume rendered image was created for interpretation. Automated exposure control and iterative reconstruction methods were used. FINDINGS: VASCULAR FINDINGS: The arteries of the [place] are patent without evidence for thrombosis or occlusion. The more peripheral arteries throughout the head and neck are opacified and are symmetric bilaterally. Mild atherosclerosis is noted. There is no evidence for focal aneurysm. A normal aortic arch branching pattern is identified. There is mild to moderate atherosclerosis of the aortic arch. The origins of the great vessels are patent. The origins of the common carotid arteries are patent bilaterally. There is no evidence for significant stenosis throughout the common carotid arteries bilaterally. The carotid bulbs are patent. There is mild atherosclerosis. No significant stenosis is noted involving the carotid bulbs. The origins of the internal carotid arteries are patent bilaterally. There is no evidence for hemodynamically significant stenosis throughout the internal carotid arteries bilaterally based on NASCET criteria. The origins of the vertebral arteries are patent bilaterally. There is no evidence for significant stenosis throughout the vertebral arteries bilaterally. A codominant vertebrobasilar system is noted. There is no evidence for arterial dissection throughout the arteries of the neck bilaterally. NONVASCULAR FINDINGS: There is no evidence for intracranial abnormal focal enhancement or abnormal enhancing mass. No definitive focal ischemia is observed. There is no abnormal cerebral edema. There is no mass effect or midline shift. The ventricular system is nondilated. The basilar cisterns are patent. No acute abnormalities are seen throughout the neck soft tissues. There is no evidence for edema or abnormal fluid collection. There is no evidence for  hemorrhage or hematoma. No acute osseous abnormalities are identified. Extensive opacities are seen within the left upper lung. The findings may indicate left lung pneumonia.     Impression: 1.No evidence for arterial occlusion or thrombosis throughout the arteries of the head or neck. 2.No evidence for focal aneurysm. 3.No evidence for arterial dissection throughout the arteries of the neck. 4.Extensive airspace opacities involving the left upper lung suggesting potential left lung pneumonia. Electronically Signed: Zia Hurley MD  4/18/2024 7:49 AM EDT  Workstation ID: [id number]    CT Angiogram Neck    Result Date: 4/18/2024  Narrative: CT ANGIOGRAM HEAD, CT ANGIOGRAM NECK Date of Exam: 4/18/2024 4:59 AM EDT Indication: Stroke, follow up. Comparison: None available. Technique: CTA of the head and neck was performed after the uneventful intravenous administration of iodinated contrast. Reconstructed coronal and sagittal images were also obtained. In addition, a 3-D volume rendered image was created for interpretation. Automated exposure control and iterative reconstruction methods were used. FINDINGS: VASCULAR FINDINGS: The arteries of the [place] are patent without evidence for thrombosis or occlusion. The more peripheral arteries throughout the head and neck are opacified and are symmetric bilaterally. Mild atherosclerosis is noted. There is no evidence for focal aneurysm. A normal aortic arch branching pattern is identified. There is mild to moderate atherosclerosis of the aortic arch. The origins of the great vessels are patent. The origins of the common carotid arteries are patent bilaterally. There is no evidence for significant stenosis throughout the common carotid arteries bilaterally. The carotid bulbs are patent. There is mild atherosclerosis. No significant stenosis is noted involving the carotid bulbs. The origins of the internal carotid arteries are patent bilaterally. There is no evidence  for hemodynamically significant stenosis throughout the internal carotid arteries bilaterally based on NASCET criteria. The origins of the vertebral arteries are patent bilaterally. There is no evidence for significant stenosis throughout the vertebral arteries bilaterally. A codominant vertebrobasilar system is noted. There is no evidence for arterial dissection throughout the arteries of the neck bilaterally. NONVASCULAR FINDINGS: There is no evidence for intracranial abnormal focal enhancement or abnormal enhancing mass. No definitive focal ischemia is observed. There is no abnormal cerebral edema. There is no mass effect or midline shift. The ventricular system is nondilated. The basilar cisterns are patent. No acute abnormalities are seen throughout the neck soft tissues. There is no evidence for edema or abnormal fluid collection. There is no evidence for hemorrhage or hematoma. No acute osseous abnormalities are identified. Extensive opacities are seen within the left upper lung. The findings may indicate left lung pneumonia.     Impression: 1.No evidence for arterial occlusion or thrombosis throughout the arteries of the head or neck. 2.No evidence for focal aneurysm. 3.No evidence for arterial dissection throughout the arteries of the neck. 4.Extensive airspace opacities involving the left upper lung suggesting potential left lung pneumonia. Electronically Signed: Zia Hurley MD  4/18/2024 7:49 AM EDT  Workstation ID: [id number]    CT CEREBRAL PERFUSION WITH & WITHOUT CONTRAST    Result Date: 4/18/2024  Narrative: CT CEREBRAL PERFUSION W WO CONTRAST Date of Exam: 4/18/2024 4:50 AM EDT Indication: Neuro deficit, acute, stroke suspected.  Comparison: None available. Technique: Axial CT images of the brain were obtained prior to and after the administration of iodinated contrast. CT Perfusion protocol was utilized. Automated post processing was performed by RAPID software and submitted to PACS for  interpretation. Automated exposure control and iterative reconstruction was utilized. Findings: Cerebral blood flow, blood volume and mean transit time maps are symmetric without large perfusion defect. CBF<30% volume: 0 mL Tmax>6sec volume: 0 mL Mismatch volume: 0 mL Mismatch ratio: None.     Impression: Impression: No significant perfusion abnormality in the brain. Electronically Signed: Sabino Duarte MD  4/18/2024 5:45 AM EDT  Workstation ID: [id number]    CT Head Without Contrast Stroke Protocol    Result Date: 4/18/2024  Narrative: CT HEAD WO CONTRAST STROKE PROTOCOL Date of Exam: 4/17/2024 11:49 PM EDT Indication: Neuro deficit, acute, stroke suspected. Comparison: 12/21/2022. Technique: Axial CT images were obtained of the head without contrast administration.  Reconstructed coronal and sagittal images were also obtained. Automated exposure control and iterative construction methods were used. Scan Time: 0004 hours. Results discussed with the patient's nurse on the [facility] floor at 0024 hours.. Findings: There are chronic cortical infarcts in the bilateral posterior high frontal lobes. There is a small chronic cortical infarct in the right parietal lobe. There are moderate patchy areas of white matter hypoattenuation. No new areas of hypoattenuation in the brain. There is a stable midline posterior fossa cyst measuring up to 5.2 cm. The exam is limited by motion despite multiple scanning attempts. The visualized calvarium appears grossly intact. Orbits are unremarkable. Paranasal sinuses and visualized  mastoids appear well aerated.     Impression: Impression: 1. Limited study demonstrates no definite acute abnormality. 2. 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 Electronically Signed: Sabino Duarte MD  4/18/2024 12:28 AM EDT  Workstation ID: [id number]    XR Chest 1 View    Result Date: 4/17/2024  Narrative: XR CHEST 1 VW Date of Exam: 4/17/2024 4:40 PM EDT  Indication: dyspnea Comparison: 4/5/2024 Findings: Stable elevation of the left hemidiaphragm with some left basilar atelectasis. Stable appearance of the mediastinum. Calcification of the aortic arch. No pneumothorax or pleural effusion. The clavicles are intact. Prior left rotator cuff repair. No rib fractures.     Impression: Stable appearance of the chest. Electronically Signed: Michael Moss MD  4/17/2024 4:49 PM EDT  Workstation ID: [id number]    XR Chest 1 View    Result Date: 4/5/2024  Narrative: XR CHEST 1 VW Date of Exam: 4/5/2024 6:17 PM EDT Indication: cough Comparison: 12/18/2023 Findings: No focal consolidation. Vascular congestion. No pneumothorax or pleural effusion. Cardiac size is normal. The visualized clavicles appear intact. No displaced rib fractures. The visualized upper abdomen is normal.     Impression: Impression: Vascular congestion. Electronically Signed: Michael Moss MD  4/5/2024 6:39 PM EDT  Workstation ID: [id number]     Allergies:  is allergic to bactrim [sulfamethoxazole-trimethoprim].    Luis Manuel  reviewed    Discharge Medications:     Discharge Medications        New Medications        Instructions Start Date   amoxicillin-clavulanate 875-125 MG per tablet  Commonly known as: AUGMENTIN   1 tablet, Oral, Every 12 Hours Scheduled      dexAMETHasone 6 MG tablet  Commonly known as: DECADRON   6 mg, Oral, Daily With Breakfast   Start Date: April 22, 2024     Nirmatrelvir & Ritonavir (300mg/100mg)  Commonly known as: PAXLOVID   3 tablets, Oral, 2 Times Daily             Changes to Medications        Instructions Start Date   ipratropium-albuterol 0.5-2.5 mg/3 ml nebulizer  Commonly known as: DUO-NEB  What changed: See the new instructions.   USES 1 VIAL VIA NEBULIZER 4  TIMES DAILY FOR CHRONIC  OBSTRUCTIVE LUNG DISEASE      Trelegy Ellipta 100-62.5-25 MCG/ACT inhaler  Generic drug: Fluticasone-Umeclidin-Vilant  What changed: See the new instructions.   INHALE 1 INHALATION BY MOUTH  DAILY FOR  "CHRONIC OBSTRUCTIVE  LUNG DISEASE             Continue These Medications        Instructions Start Date   albuterol sulfate  (90 Base) MCG/ACT inhaler  Commonly known as: PROVENTIL HFA;VENTOLIN HFA;PROAIR HFA   2 puffs, Inhalation, Every 4 Hours PRN      cetirizine 10 MG tablet  Commonly known as: zyrTEC   10 mg, Oral, Daily PRN      citalopram 20 MG tablet  Commonly known as: CeleXA   20 mg, Oral, Daily      clopidogrel 75 MG tablet  Commonly known as: PLAVIX   75 mg, Oral, Daily      docusate sodium 100 MG capsule  Commonly known as: COLACE   100 mg, Oral, Daily      levothyroxine 50 MCG tablet  Commonly known as: SYNTHROID, LEVOTHROID   50 mcg, Oral, Daily      magnesium oxide 400 MG tablet  Commonly known as: MAG-OX   400 mg, Oral, Nightly      multivitamin tablet tablet   1 tablet, Oral, Nightly      pantoprazole 40 MG EC tablet  Commonly known as: PROTONIX   40 mg, Oral, Daily      rivastigmine 9.5 MG/24HR patch  Commonly known as: EXELON   1 patch, Transdermal, Daily      traZODone 100 MG tablet  Commonly known as: DESYREL   100 mg, Oral, Nightly               Last Lab Results:   Lab Results (most recent)       Procedure Component Value Units Date/Time    Procalcitonin [[id number]]  (Abnormal) Collected: 04/21/24 0502    Specimen: Blood Updated: 04/21/24 0547     Procalcitonin 0.61 ng/mL     Narrative:      As a Marker for Sepsis (Non-Neonates):    1. <0.5 ng/mL represents a low risk of severe sepsis and/or septic shock.  2. >2 ng/mL represents a high risk of severe sepsis and/or septic shock.    As a Marker for Lower Respiratory Tract Infections that require antibiotic therapy:    PCT on Admission    Antibiotic Therapy       6-12 Hrs later    >0.5                Strongly Recommended  >0.25 - <0.5        Recommended   0.1 - 0.25          Discouraged              Remeasure/reassess PCT  <0.1                Strongly Discouraged     Remeasure/reassess PCT    As 28 day mortality risk marker: \"Change in " "Procalcitonin Result\" (>80% or <=80%) if Day 0 (or Day 1) and Day 4 values are available. Refer to [email or web address]    Change in PCT <=80%  A decrease of PCT levels below or equal to 80% defines a positive change in PCT test result representing a higher risk for 28-day all-cause mortality of patients diagnosed with severe sepsis for septic shock.    Change in PCT >80%  A decrease of PCT levels of more than 80% defines a negative change in PCT result representing a lower risk for 28-day all-cause mortality of patients diagnosed with severe sepsis or septic shock.       Comprehensive Metabolic Panel [[id number]]  (Abnormal) Collected: 04/21/24 0502    Specimen: Blood Updated: 04/21/24 0523     Glucose 149 mg/dL      BUN 24 mg/dL      Creatinine 0.81 mg/dL      Sodium 139 mmol/L      Potassium 4.4 mmol/L      Chloride 105 mmol/L      CO2 25.8 mmol/L      Calcium 8.9 mg/dL      Total Protein 5.9 g/dL      Albumin 3.0 g/dL      ALT (SGPT) 21 U/L      AST (SGOT) 15 U/L      Alkaline Phosphatase 32 U/L      Total Bilirubin 0.2 mg/dL      Globulin 2.9 gm/dL      A/G Ratio 1.0 g/dL      BUN/Creatinine Ratio 29.6     Anion Gap 8.2 mmol/L      eGFR 83.2 mL/min/1.73     Narrative:      GFR Normal >60  Chronic Kidney Disease <60  Kidney Failure <15    The GFR formula is only valid for adults with stable renal function between ages 18 and 70.    Bilirubin, Direct [[id number]]  (Normal) Collected: 04/21/24 0502    Specimen: Blood Updated: 04/21/24 0523     Bilirubin, Direct <0.2 mg/dL     CBC & Differential [[id number]]  (Abnormal) Collected: 04/21/24 0502    Specimen: Blood Updated: 04/21/24 0507    Narrative:      The following orders were created for panel order CBC & Differential.  Procedure                               Abnormality         Status                     ---------                               -----------         ------                     CBC Auto Differential[[id number]]        Abnormal           "  Final result                 Please view results for these tests on the individual orders.    CBC Auto Differential [[id number]]  (Abnormal) Collected: 04/21/24 0502    Specimen: Blood Updated: 04/21/24 0507     WBC 9.38 10*3/mm3      RBC 3.76 10*6/mm3      Hemoglobin 10.8 g/dL      Hematocrit 34.0 %      MCV 90.4 fL      MCH 28.7 pg      MCHC 31.8 g/dL      RDW 15.4 %      RDW-SD 51.1 fl      MPV 9.6 fL      Platelets 250 10*3/mm3      Neutrophil % 88.6 %      Lymphocyte % 6.3 %      Monocyte % 3.6 %      Eosinophil % 0.0 %      Basophil % 0.2 %      Immature Grans % 1.3 %      Neutrophils, Absolute 8.31 10*3/mm3      Lymphocytes, Absolute 0.59 10*3/mm3      Monocytes, Absolute 0.34 10*3/mm3      Eosinophils, Absolute 0.00 10*3/mm3      Basophils, Absolute 0.02 10*3/mm3      Immature Grans, Absolute 0.12 10*3/mm3      nRBC 0.0 /100 WBC     Blood Culture - Blood, Arm, Left [[id number]]  (Normal) Collected: 04/17/24 1658    Specimen: Blood from Arm, Left Updated: 04/20/24 1715     Blood Culture No growth at 3 days    Blood Culture - Blood, Arm, Left [[id number]]  (Normal) Collected: 04/17/24 1550    Specimen: Blood from Arm, Left Updated: 04/20/24 1715     Blood Culture No growth at 3 days    C-reactive Protein [[id number]]  (Abnormal) Collected: 04/20/24 0557    Specimen: Blood Updated: 04/20/24 1317     C-Reactive Protein 4.29 mg/dL     Respiratory Culture - Sputum, Cough [[id number]] Collected: 04/18/24 0424    Specimen: Sputum from Cough Updated: 04/20/24 1106     Respiratory Culture Light growth (2+) Normal respiratory osbaldo. No S. aureus or Pseudomonas aeruginosa detected. Final report.     Gram Stain Few (2+) WBCs seen      Occasional Epithelial cells seen      Moderate (3+) Mucous strands      Few (2+) Gram positive cocci in pairs and chains      Few (2+) Gram negative bacilli    Comprehensive Metabolic Panel [[id number]]  (Abnormal) Collected: 04/20/24 0557    Specimen: Blood Updated: 04/20/24 0653     Glucose  "137 mg/dL      BUN 24 mg/dL      Creatinine 0.84 mg/dL      Sodium 140 mmol/L      Potassium 4.4 mmol/L      Chloride 104 mmol/L      CO2 26.1 mmol/L      Calcium 8.6 mg/dL      Total Protein 5.9 g/dL      Albumin 3.1 g/dL      ALT (SGPT) 22 U/L      AST (SGOT) 22 U/L      Alkaline Phosphatase 32 U/L      Total Bilirubin 0.2 mg/dL      Globulin 2.8 gm/dL      A/G Ratio 1.1 g/dL      BUN/Creatinine Ratio 28.6     Anion Gap 9.9 mmol/L      eGFR 82.3 mL/min/1.73     Narrative:      GFR Normal >60  Chronic Kidney Disease <60  Kidney Failure <15    The GFR formula is only valid for adults with stable renal function between ages 18 and 70.    Procalcitonin [[id number]]  (Abnormal) Collected: 04/20/24 0557    Specimen: Blood Updated: 04/20/24 0652     Procalcitonin 1.00 ng/mL     Narrative:      As a Marker for Sepsis (Non-Neonates):    1. <0.5 ng/mL represents a low risk of severe sepsis and/or septic shock.  2. >2 ng/mL represents a high risk of severe sepsis and/or septic shock.    As a Marker for Lower Respiratory Tract Infections that require antibiotic therapy:    PCT on Admission    Antibiotic Therapy       6-12 Hrs later    >0.5                Strongly Recommended  >0.25 - <0.5        Recommended   0.1 - 0.25          Discouraged              Remeasure/reassess PCT  <0.1                Strongly Discouraged     Remeasure/reassess PCT    As 28 day mortality risk marker: \"Change in Procalcitonin Result\" (>80% or <=80%) if Day 0 (or Day 1) and Day 4 values are available. Refer to http://[email or web address]-pct-[email or web address]    Change in PCT <=80%  A decrease of PCT levels below or equal to 80% defines a positive change in PCT test result representing a higher risk for 28-day all-cause mortality of patients diagnosed with severe sepsis for septic shock.    Change in PCT >80%  A decrease of PCT levels of more than 80% defines a negative change in PCT result representing a lower risk for 28-day all-cause mortality of patients " diagnosed with severe sepsis or septic shock.       C-reactive Protein [[id number]]  (Abnormal) Collected: 04/19/24 0404    Specimen: Blood Updated: 04/19/24 1055     C-Reactive Protein 8.48 mg/dL     CBC & Differential [[id number]]  (Abnormal) Collected: 04/19/24 0404    Specimen: Blood Updated: 04/19/24 0425    Narrative:      The following orders were created for panel order CBC & Differential.  Procedure                               Abnormality         Status                     ---------                               -----------         ------                     CBC Auto Differential[[id number]]        Abnormal            Final result                 Please view results for these tests on the individual orders.    CBC Auto Differential [[id number]]  (Abnormal) Collected: 04/19/24 0404    Specimen: Blood Updated: 04/19/24 0425     WBC 12.65 10*3/mm3      RBC 3.73 10*6/mm3      Hemoglobin 10.7 g/dL      Hematocrit 34.3 %      MCV 92.0 fL      MCH 28.7 pg      MCHC 31.2 g/dL      RDW 15.3 %      RDW-SD 51.2 fl      MPV 10.6 fL      Platelets 216 10*3/mm3      Neutrophil % 84.9 %      Lymphocyte % 7.3 %      Monocyte % 6.9 %      Eosinophil % 0.0 %      Basophil % 0.2 %      Immature Grans % 0.7 %      Neutrophils, Absolute 10.75 10*3/mm3      Lymphocytes, Absolute 0.92 10*3/mm3      Monocytes, Absolute 0.87 10*3/mm3      Eosinophils, Absolute 0.00 10*3/mm3      Basophils, Absolute 0.02 10*3/mm3      Immature Grans, Absolute 0.09 10*3/mm3      nRBC 0.0 /100 WBC     Respiratory Panel PCR w/COVID-19(SARS-CoV-2) [facility] In-House, NP Swab in [facility], 2 HR TAT - Swab, Nasopharynx [[id number]]  (Abnormal) Collected: 04/17/24 2110    Specimen: Swab from Nasopharynx Updated: 04/18/24 1144     ADENOVIRUS, PCR Not Detected     Coronavirus 229E Not Detected     Coronavirus HKU1 Not Detected     Coronavirus NL63 Not Detected     Coronavirus OC43 Not Detected     COVID19 Detected     Human Metapneumovirus Not  Detected     Human Rhinovirus/Enterovirus Not Detected     Influenza A PCR Not Detected     Influenza B PCR Not Detected     Parainfluenza Virus 1 Not Detected     Parainfluenza Virus 2 Not Detected     Parainfluenza Virus 3 Not Detected     Parainfluenza Virus 4 Not Detected     RSV, PCR Not Detected     Bordetella pertussis pcr Not Detected     Bordetella parapertussis PCR Not Detected     Chlamydophila pneumoniae PCR Not Detected     Mycoplasma pneumo by PCR Not Detected    Narrative:      In the setting of a positive respiratory panel with a viral infection PLUS a negative procalcitonin without other underlying concern for bacterial infection, consider observing off antibiotics or discontinuation of antibiotics and continue supportive care. If the respiratory panel is positive for atypical bacterial infection (Bordetella pertussis, Chlamydophila pneumoniae, or Mycoplasma pneumoniae), consider antibiotic de-escalation to target atypical bacterial infection.    Urinalysis With Culture If Indicated - Urine, Clean Catch [[id number]]  (Normal) Collected: 04/18/24 0922    Specimen: Urine, Clean Catch Updated: 04/18/24 0932     Color, UA Yellow     Appearance, UA Clear     pH, UA 5.5     Specific Gravity, UA 1.015     Glucose, UA Negative     Ketones, UA Negative     Bilirubin, UA Negative     Blood, UA Negative     Protein, UA Negative     Leuk Esterase, UA Negative     Nitrite, UA Negative     Urobilinogen, UA 0.2 E.U./dL    Narrative:      In absence of clinical symptoms, the presence of pyuria, bacteria, and/or nitrites on the urinalysis result does not correlate with infection.  Urine microscopic not indicated.    Basic Metabolic Panel [[id number]]  (Abnormal) Collected: 04/18/24 0417    Specimen: Blood Updated: 04/18/24 0441     Glucose 165 mg/dL      BUN 25 mg/dL      Creatinine 0.90 mg/dL      Sodium 132 mmol/L      Potassium 4.5 mmol/L      Chloride 100 mmol/L      CO2 24.6 mmol/L      Calcium 8.5 mg/dL       BUN/Creatinine Ratio 27.8     Anion Gap 7.4 mmol/L      eGFR 80.6 mL/min/1.73     Narrative:      GFR Normal >60  Chronic Kidney Disease <60  Kidney Failure <15    The GFR formula is only valid for adults with stable renal function between ages 18 and 70.    High Sensitivity Troponin T [[id number]]  (Normal) Collected: 04/17/24 2345    Specimen: Blood Updated: 04/18/24 0024     HS Troponin T 15 ng/L     Narrative:      High Sensitive Troponin T Reference Range:  <14.0 ng/L- Negative Female for AMI  <22.0 ng/L- Negative Male for AMI  >=14 - Abnormal Female indicating possible myocardial injury.  >=22 - Abnormal Male indicating possible myocardial injury.   Clinicians would have to utilize clinical acumen, EKG, Troponin, and serial changes to determine if it is an Acute Myocardial Infarction or myocardial injury due to an underlying chronic condition.         Lipid Panel [[id number]] Collected: 04/17/24 1804    Specimen: Blood from Arm, Left Updated: 04/18/24 0016     Total Cholesterol 137 mg/dL      Triglycerides 78 mg/dL      HDL Cholesterol 58 mg/dL      LDL Cholesterol  64 mg/dL      VLDL Cholesterol 15 mg/dL      LDL/HDL Ratio 1.09    Narrative:      Cholesterol Reference Ranges  (U.S. Department of Health and Human Services ATP III Classifications)    Desirable          <200 mg/dL  Borderline High    200-239 mg/dL  High Risk          >240 mg/dL      Triglyceride Reference Ranges  (U.S. Department of Health and Human Services ATP III Classifications)    Normal           <150 mg/dL  Borderline High  150-199 mg/dL  High             200-499 mg/dL  Very High        >500 mg/dL    HDL Reference Ranges  (U.S. Department of Health and Human Services ATP III Classifications)    Low     <40 mg/dl (major risk factor for CHD)  High    >60 mg/dl ('negative' risk factor for CHD)        LDL Reference Ranges  (U.S. Department of Health and Human Services ATP III Classifications)    Optimal          <100 mg/dL  Near Optimal      100-129 mg/dL  Borderline High  130-159 mg/dL  High             160-189 mg/dL  Very High        >189 mg/dL    Protime-INR [[id number]]  (Normal) Collected: 04/17/24 2345    Specimen: Blood Updated: 04/18/24 0006     Protime 14.2 Seconds      INR 1.10    Narrative:      Therapeutic Ranges for INR: 2.0-3.0 (PT 20-30)                              2.5-3.5 (PT 25-34)    POC Glucose Once [[id number]]  (Abnormal) Collected: 04/17/24 2334    Specimen: Blood Updated: 04/17/24 2340     Glucose 168 mg/dL     TSH [[id number]]  (Normal) Collected: 04/17/24 1804    Specimen: Blood from Arm, Left Updated: 04/17/24 2104     TSH 1.940 uIU/mL     Hemoglobin A1c [[id number]]  (Abnormal) Collected: 04/17/24 1555    Specimen: Blood Updated: 04/17/24 2056     Hemoglobin A1C 6.00 %     Narrative:      Hemoglobin A1C Ranges:    Increased Risk for Diabetes  5.7% to 6.4%  Diabetes                     >= 6.5%  Diabetic Goal                < 7.0%    STAT Lactic Acid, Reflex [[id number]]  (Normal) Collected: 04/17/24 1852    Specimen: Blood from Arm, Left Updated: 04/17/24 1912     Lactate 0.9 mmol/L     High Sensitivity Troponin T 2Hr [[id number]]  (Abnormal) Collected: 04/17/24 1804    Specimen: Blood from Arm, Left Updated: 04/17/24 1836     HS Troponin T 24 ng/L      Troponin T Delta -4 ng/L     Narrative:      High Sensitive Troponin T Reference Range:  <14.0 ng/L- Negative Female for AMI  <22.0 ng/L- Negative Male for AMI  >=14 - Abnormal Female indicating possible myocardial injury.  >=22 - Abnormal Male indicating possible myocardial injury.   Clinicians would have to utilize clinical acumen, EKG, Troponin, and serial changes to determine if it is an Acute Myocardial Infarction or myocardial injury due to an underlying chronic condition.         BNP [[id number]]  (Normal) Collected: 04/17/24 1555    Specimen: Blood Updated: 04/17/24 1633     proBNP 901.3 pg/mL     Narrative:      This assay is used as an aid in the diagnosis of individuals  suspected of having heart failure. It can be used as an aid in the diagnosis of acute decompensated heart failure (ADHF) in patients presenting with signs and symptoms of ADHF to the emergency department (ED). In addition, NT-proBNP of <300 pg/mL indicates ADHF is not likely.    Age Range Result Interpretation  NT-proBNP Concentration (pg/mL:      <50             Positive            >450                   Gray                 300-450                    Negative             <300    50-75           Positive            >900                  Gray                300-900                  Negative            <300      >75             Positive            >1800                  Gray                300-1800                  Negative            <300    COVID-19 and FLU A/B PCR, 1 HR TAT - Swab, Nasopharynx [[id number]]  (Normal) Collected: 04/17/24 1605    Specimen: Swab from Nasopharynx Updated: 04/17/24 1628     COVID19 Not Detected     Influenza A PCR Not Detected     Influenza B PCR Not Detected    Narrative:      Fact sheet for providers: https://www.fda.gov/media/848236/download    Fact sheet for patients: https://www.fda.gov/media/471183/download    Test performed by PCR.    High Sensitivity Troponin T [[id number]]  (Abnormal) Collected: 04/17/24 1555    Specimen: Blood Updated: 04/17/24 1625     HS Troponin T 28 ng/L     Narrative:      High Sensitive Troponin T Reference Range:  <14.0 ng/L- Negative Female for AMI  <22.0 ng/L- Negative Male for AMI  >=14 - Abnormal Female indicating possible myocardial injury.  >=22 - Abnormal Male indicating possible myocardial injury.   Clinicians would have to utilize clinical acumen, EKG, Troponin, and serial changes to determine if it is an Acute Myocardial Infarction or myocardial injury due to an underlying chronic condition.         Lactic Acid, Plasma [[id number]]  (Abnormal) Collected: 04/17/24 1555    Specimen: Blood Updated: 04/17/24 1622     Lactate 2.4 mmol/L            Imaging Results (Most Recent)       Procedure Component Value Units Date/Time    IMAGING SCANNED [[id number]] Resulted: 04/17/24     Updated: 04/20/24 1043    MRI Brain With & Without Contrast [[id number]] Collected: 04/18/24 1055     Updated: 04/18/24 1105    Narrative:      MRI BRAIN W WO CONTRAST    Date of Exam: 4/18/2024 10:00 AM EDT    Indication: Stroke suspected (Ped 0-17y).     Comparison: CT head 4/17/2024. CT perfusion 4/18/2024. CT angiogram of the head 4/18/2024.    Technique:  Routine multiplanar/multisequence sequence images of the brain were obtained before and after the uneventful administration of Multihance.      FINDINGS:  There is a small focus of abnormal diffusion restriction with associated decreased signal on ADC mapping involving the anterior aspect of the right cerebellar hemisphere. This finding measures approximately 8 mm. Corresponding FLAIR and T2 increased   signal is noted. The findings indicate a focus of likely subacute ischemic change. No definitive additional areas of involvement are identified.    Advanced nonspecific diffuse white matter signal changes are noted indicating chronic small vessel ischemic changes or age-related changes. Associated diffuse volume loss is noted. There is no evidence for abnormal cerebral edema. There is no evidence   for abnormal focal enhancement or abnormal enhancing mass.     There is no mass effect or midline shift. The ventricular system is nondilated. The basilar cisterns are patent. The midline structures are unremarkable. An incidental prominent subarachnoid cyst is noted at the posterior margin of the posterior cranial   fossa. No significant abnormal signal is observed involving the paranasal sinuses or mastoid air cells.      Impression:      1.Small focus of likely subacute ischemic change involving the anterior aspect of the right cerebellar hemisphere.  2.No evidence for abnormal focal enhancement or abnormal enhancing  mass.  3.Advanced diffuse nonspecific white matter signal changes are noted with associated diffuse volume loss. These findings are most consistent with chronic small vessel ischemic changes or age-related changes.        Electronically Signed: Zia Hurley MD    4/18/2024 11:02 AM EDT    Workstation ID: [id number]    CT Angiogram Head [[id number]] Collected: 04/18/24 0737     Updated: 04/18/24 0752    Narrative:      CT ANGIOGRAM HEAD, CT ANGIOGRAM NECK    Date of Exam: 4/18/2024 4:59 AM EDT    Indication: Stroke, follow up.    Comparison: None available.    Technique: CTA of the head and neck was performed after the uneventful intravenous administration of iodinated contrast. Reconstructed coronal and sagittal images were also obtained. In addition, a 3-D volume rendered image was created for   interpretation. Automated exposure control and iterative reconstruction methods were used.        FINDINGS:  VASCULAR FINDINGS:  The arteries of the [place] are patent without evidence for thrombosis or occlusion. The more peripheral arteries throughout the head and neck are opacified and are symmetric bilaterally. Mild atherosclerosis is noted. There is no evidence for   focal aneurysm.    A normal aortic arch branching pattern is identified. There is mild to moderate atherosclerosis of the aortic arch. The origins of the great vessels are patent. The origins of the common carotid arteries are patent bilaterally. There is no evidence for   significant stenosis throughout the common carotid arteries bilaterally. The carotid bulbs are patent. There is mild atherosclerosis. No significant stenosis is noted involving the carotid bulbs. The origins of the internal carotid arteries are patent   bilaterally. There is no evidence for hemodynamically significant stenosis throughout the internal carotid arteries bilaterally based on NASCET criteria.    The origins of the vertebral arteries are patent bilaterally. There is  no evidence for significant stenosis throughout the vertebral arteries bilaterally. A codominant vertebrobasilar system is noted.    There is no evidence for arterial dissection throughout the arteries of the neck bilaterally.    NONVASCULAR FINDINGS:  There is no evidence for intracranial abnormal focal enhancement or abnormal enhancing mass. No definitive focal ischemia is observed. There is no abnormal cerebral edema. There is no mass effect or midline shift. The ventricular system is nondilated.   The basilar cisterns are patent.    No acute abnormalities are seen throughout the neck soft tissues. There is no evidence for edema or abnormal fluid collection. There is no evidence for hemorrhage or hematoma. No acute osseous abnormalities are identified.     Extensive opacities are seen within the left upper lung. The findings may indicate left lung pneumonia.       Impression:      1.No evidence for arterial occlusion or thrombosis throughout the arteries of the head or neck.  2.No evidence for focal aneurysm.  3.No evidence for arterial dissection throughout the arteries of the neck.  4.Extensive airspace opacities involving the left upper lung suggesting potential left lung pneumonia.          Electronically Signed: Zia Hurley MD    4/18/2024 7:49 AM EDT    Workstation ID: [id number]    CT Angiogram Neck [[id number]] Collected: 04/18/24 0737     Updated: 04/18/24 0752    Narrative:      CT ANGIOGRAM HEAD, CT ANGIOGRAM NECK    Date of Exam: 4/18/2024 4:59 AM EDT    Indication: Stroke, follow up.    Comparison: None available.    Technique: CTA of the head and neck was performed after the uneventful intravenous administration of iodinated contrast. Reconstructed coronal and sagittal images were also obtained. In addition, a 3-D volume rendered image was created for   interpretation. Automated exposure control and iterative reconstruction methods were used.        FINDINGS:  VASCULAR FINDINGS:  The arteries of  the [place] are patent without evidence for thrombosis or occlusion. The more peripheral arteries throughout the head and neck are opacified and are symmetric bilaterally. Mild atherosclerosis is noted. There is no evidence for   focal aneurysm.    A normal aortic arch branching pattern is identified. There is mild to moderate atherosclerosis of the aortic arch. The origins of the great vessels are patent. The origins of the common carotid arteries are patent bilaterally. There is no evidence for   significant stenosis throughout the common carotid arteries bilaterally. The carotid bulbs are patent. There is mild atherosclerosis. No significant stenosis is noted involving the carotid bulbs. The origins of the internal carotid arteries are patent   bilaterally. There is no evidence for hemodynamically significant stenosis throughout the internal carotid arteries bilaterally based on NASCET criteria.    The origins of the vertebral arteries are patent bilaterally. There is no evidence for significant stenosis throughout the vertebral arteries bilaterally. A codominant vertebrobasilar system is noted.    There is no evidence for arterial dissection throughout the arteries of the neck bilaterally.    NONVASCULAR FINDINGS:  There is no evidence for intracranial abnormal focal enhancement or abnormal enhancing mass. No definitive focal ischemia is observed. There is no abnormal cerebral edema. There is no mass effect or midline shift. The ventricular system is nondilated.   The basilar cisterns are patent.    No acute abnormalities are seen throughout the neck soft tissues. There is no evidence for edema or abnormal fluid collection. There is no evidence for hemorrhage or hematoma. No acute osseous abnormalities are identified.     Extensive opacities are seen within the left upper lung. The findings may indicate left lung pneumonia.       Impression:      1.No evidence for arterial occlusion or thrombosis  throughout the arteries of the head or neck.  2.No evidence for focal aneurysm.  3.No evidence for arterial dissection throughout the arteries of the neck.  4.Extensive airspace opacities involving the left upper lung suggesting potential left lung pneumonia.          Electronically Signed: Zia Hurley MD    4/18/2024 7:49 AM EDT    Workstation ID: [id number]    CT CEREBRAL PERFUSION WITH & WITHOUT CONTRAST [[id number]] Collected: 04/18/24 0545     Updated: 04/18/24 0548    Narrative:      CT CEREBRAL PERFUSION W WO CONTRAST    Date of Exam: 4/18/2024 4:50 AM EDT    Indication: Neuro deficit, acute, stroke suspected.     Comparison: None available.    Technique: Axial CT images of the brain were obtained prior to and after the administration of iodinated contrast. CT Perfusion protocol was utilized. Automated post processing was performed by RAPID software and submitted to PACS for interpretation.   Automated exposure control and iterative reconstruction was utilized.      Findings:  Cerebral blood flow, blood volume and mean transit time maps are symmetric without large perfusion defect.    CBF<30% volume: 0 mL  Tmax>6sec volume: 0 mL  Mismatch volume: 0 mL  Mismatch ratio: None.      Impression:      Impression:  No significant perfusion abnormality in the brain.        Electronically Signed: Sabino Duarte MD    4/18/2024 5:45 AM EDT    Workstation ID: [id number]    CT Head Without Contrast Stroke Protocol [[id number]] Collected: 04/18/24 0019     Updated: 04/18/24 0032    Narrative:      CT HEAD WO CONTRAST STROKE PROTOCOL    Date of Exam: 4/17/2024 11:49 PM EDT    Indication: Neuro deficit, acute, stroke suspected.    Comparison: 12/21/2022.    Technique: Axial CT images were obtained of the head without contrast administration.  Reconstructed coronal and sagittal images were also obtained. Automated exposure control and iterative construction methods were used.    Scan Time: 0004 hours.  Results discussed with  the patient's nurse on the [facility] floor at 0024 hours..      Findings:  There are chronic cortical infarcts in the bilateral posterior high frontal lobes. There is a small chronic cortical infarct in the right parietal lobe. There are moderate patchy areas of white matter hypoattenuation. No new areas of hypoattenuation in   the brain. There is a stable midline posterior fossa cyst measuring up to 5.2 cm. The exam is limited by motion despite multiple scanning attempts. The visualized calvarium appears grossly intact. Orbits are unremarkable. Paranasal sinuses and visualized   mastoids appear well aerated.      Impression:      Impression:    1. Limited study demonstrates no definite acute abnormality.  2. 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        Electronically Signed: Sabino Duarte MD    4/18/2024 12:28 AM EDT    Workstation ID: [id number]    XR Chest 1 View [[id number]] Collected: 04/17/24 1648     Updated: 04/17/24 1652    Narrative:      XR CHEST 1 VW    Date of Exam: 4/17/2024 4:40 PM EDT    Indication: dyspnea    Comparison: 4/5/2024    Findings: Stable elevation of the left hemidiaphragm with some left basilar atelectasis. Stable appearance of the mediastinum. Calcification of the aortic arch. No pneumothorax or pleural effusion. The clavicles are intact. Prior left rotator cuff   repair. No rib fractures.      Impression:      Stable appearance of the chest.      Electronically Signed: Michael Moss MD    4/17/2024 4:49 PM EDT    Workstation ID: [id number]            PROCEDURES      Condition on Discharge:  Stable    Physical Exam at Discharge (utilizing full PPE)  Vital Signs  Temp:  [96.7 °F (35.9 °C)-98 °F (36.7 °C)] 96.7 °F (35.9 °C)  Heart Rate:  [60-74] 74  Resp:  [18-22] 20  BP: (153-193)/(73-88) 153/73    Physical Exam:  Physical Exam   Constitutional: Patient appears in no acute distress   Cardiovascular: Regular rate, regular rhythm, S1 normal and S2  normal.  Exam reveals no gallop and no friction rub.  No murmur heard.  Pulmonary/Chest: Lungs are clear to auscultation bilaterally. No respiratory distress. No wheezes. No rhonchi. No rales.   Abdominal: Soft. Bowel sounds are normal. No distension and no mass. There is no hepatosplenomegaly. There is no tenderness.   Musculoskeletal: Normal Muscle tone  Extremities: No edema.   Neurological: Patient is alert and oriented to person, place, and time. Cranial nerves II-XII are grossly intact with no focal deficits.  Skin: Skin is warm. No rash noted. Nails show no clubbing.  No cyanosis or erythema.  No evidence of embolic phenomena.    Discharge Disposition  Assisted Living    Visiting Nurse:    No     Home PT/OT:  No     Home Safety Evaluation:  No     DME  O2    Discharge Diet:      Dietary Orders (From admission, onward)       Start     Ordered    04/18/24 1032  Diet: Cardiac; Healthy Heart (2-3 Na+); Texture: Soft to Chew (NDD 3); Soft to Chew: Ground Meat; Fluid Consistency: Thin (IDDSI 0)  Diet Effective Now        References:    Diet Order [place]   Question Answer Comment   Diets: Cardiac    Cardiac Diet: Healthy Heart (2-3 Na+)    Texture: Soft to Chew (NDD 3)    Soft to Chew: Ground Meat    Fluid Consistency: Thin (IDDSI 0)        04/18/24 1031                    Activity at Discharge:  As tolerated    Follow-up Appointments  Future Appointments   Date Time Provider Department Center   5/29/2024 10:30 AM Charlie Trinh MD [facility]  [facility]     Additional Instructions for the Follow-ups that You Need to Schedule       Discharge Follow-up with PCP   As directed       Currently Documented PCP:    Charlie Trinh MD    PCP Phone Number:    [phone]     Follow Up Details: 1 week                Test Results Pending at Discharge  Pending Labs       Order Current Status    Blood Culture - Blood, Arm, Left Preliminary result    Blood Culture - Blood, Arm, Left Preliminary result             Roni T  MD Martín  04/21/24  13:32 EDT    Time: <30 minutes

## 2024-04-21 NOTE — PLAN OF CARE
Goal Outcome Evaluation:  Plan of Care Reviewed With: patient        Progress: improving  Outcome Evaluation: VSS, tolerated 2L NC over night, rested well, pleasant

## 2024-04-21 NOTE — CASE MANAGEMENT/SOCIAL WORK
Continued Stay Note  [facility] [place]     Patient Name: Pawel Goldberg  MRN: [id number]  Today's Date: 4/21/2024    Admit Date: 4/17/2024    Plan: DC  to assisted living w/O2   Discharge Plan       Row Name 04/21/24 1512       Plan    Plan DC  to assisted living w/O2    Plan Comments CCP rec'd O2 order for pt.  New O2 requirements with exertion.  I spoke to pt about who he wanted O2 from and said just use local, [facility].  I faxed orders, walking O2 sat, face sheet to [facility], fax confirmed.  I called [facility], spoke to Nico, gave him the referral, pt name and address. I asked him to call crystal Hamilton on her cell as pt cannot hear.  Nico knew where [facility] was and could deliver when pt arrived.  I gave pts Joy rojas # to call/option 1 and tell Nico when she arrived at [facility].  I also gave her my # to call if she does not hear from [facility] by 5pm today. She voiced understanding.  Pt did not want the rolling walker, he has one he likes  better.  I called Jen at facility and told her pt is returning.  She just needs the DC summary sent back with pt.  Daniele, owner from facility called and asked for PT notes on pt. I emailed him the PT notes. Pt stated he really wants to go back to [facility].  I told him he has to get out the chair by himself and walk across the floor with his walker.  Pt complied. Dgt Joy says he is a little weaker but is at his baseline.   Dgt drove pt to [facility]. I called Emiliana with [facility], left vm with pt dc/return to [facility].  CCP will follow. Thanks, Shelby CATES    Final Discharge Disposition Code 01 - home or self-care    Final Note Home                   Discharge Codes    No documentation.                 Expected Discharge Date and Time       Expected Discharge Date Expected Discharge Time    Apr 21, 2024               Shelby Finch

## 2024-04-21 NOTE — OUTREACH NOTE
Prep Survey      Flowsheet Row Responses   [facility] patient discharged from? [place]   Is LACE score < 7 ? No   Eligibility [facility]   Date of Admission 04/17/24   Date of Discharge 04/21/24   Discharge Disposition Home or Self Care   Discharge diagnosis Acute Hypoxic Respiratory Failure due to COVID-19   Does the patient have one of the following disease processes/diagnoses(primary or secondary)? Other   Does the patient have Home health ordered? Yes   [facility]   Is there a DME ordered? No   Prep survey completed? Yes            RODRÍGUEZ CATES - Registered Nurse

## 2024-04-21 NOTE — PROCEDURES
Exercise Oximetry    Patient Name:Pawel Goldberg   MRN: [id number]   Date: 04/21/24             ROOM AIR Baseline at rest   SpO2%  90   Heart Rate  64   Blood Pressure  153/73     Oxygen Baseline at rest   Oxygen (LPM)    SpO2%     Heart Rate     Blood Pressure       EXERCISE  SpO2% HR Supplemental Oxygen, LPM   1 MINUTE 86 74 Placed on O2 2 lpm   2 MINUTES 91 76 2 lpm   3 MINUTES      4 MINUTES      5 MINUTES      6 MINUTES        Recovery       Time to Baseline/Recovery (minutes) 2   SpO2 % 92   HR 64   Oxygen  2     Distance Walked (meters) 0       Pre Post   Dyspnea (John Scale)     Fatigue (John Scale) 5 5     Comments: Pt unable to do a 6 min walk test at this time. Pt states he is too sleepy and tired. Pt was in the chair and scooted to the edge of the seat and dropped to 86% on RA.

## 2024-04-22 ENCOUNTER — TELEPHONE (OUTPATIENT)
Dept: FAMILY MEDICINE CLINIC | Facility: CLINIC | Age: 89
End: 2024-04-22
Payer: MEDICARE

## 2024-04-22 ENCOUNTER — TRANSITIONAL CARE MANAGEMENT TELEPHONE ENCOUNTER (OUTPATIENT)
Dept: CALL CENTER | Facility: HOSPITAL | Age: 89
End: 2024-04-22
Payer: MEDICARE

## 2024-04-22 LAB
BACTERIA SPEC AEROBE CULT: NORMAL
BACTERIA SPEC AEROBE CULT: NORMAL

## 2024-04-22 NOTE — OUTREACH NOTE
Call Center TCM Note      Flowsheet Row Responses   [facility] patient discharged from? [place]   Does the patient have one of the following disease processes/diagnoses(primary or secondary)? Other   TCM attempt successful? Yes   Call start time 1514   Call end time 1521   Is patient permission given to speak with other caregiver? Yes   Person spoke with today (if not patient) and relationship Joy-daughter.   Meds reviewed with patient/caregiver? Yes   Is the patient having any side effects they believe may be caused by any medication additions or changes? No   Does the patient have all medications ordered at discharge? Yes   Is the patient taking all medications as directed (includes completed medication regime)? Yes   Comments Daughter wished to keep previously scheduled PCP appt for 05/29/24. States will call for earlier appt if needed or if  recommends.   Does the patient have an appointment with their PCP within 7-14 days of discharge? No   Nursing Interventions Patient declined scheduling/rescheduling appointment at this time, Routed TCM call to PCP office   Has home health visited the patient within 72 hours of discharge? Unsure   Home health comments PCP has given orders to [facility] for HH to resume. Daughter states will contact HH if has not been contacted within next 2 days.   DME comments Daughter states patient wearing home O2 at 2L as needed.   Psychosocial issues? Yes   Psychosocial comments Patient lives in assisted living facility-dx with white matter disease.   Did the patient receive a copy of their discharge instructions? Yes   Nursing interventions Reviewed instructions with patient   What is the patient's perception of their health status since discharge? Improving   Is the patient/caregiver able to teach back signs and symptoms related to disease process for when to call PCP? Yes   Is the patient/caregiver able to teach back signs and symptoms related to disease process for when  to call 911? Yes   Is the patient/caregiver able to teach back the hierarchy of who to call/visit for symptoms/problems? PCP, Specialist, Home health nurse, Urgent Care, ED, 911 Yes   If the patient is a current smoker, are they able to teach back resources for cessation? Not a smoker   TCM call completed? Yes   Wrap up additional comments Daughter states patient is improving. States rarely needing his home O2, and getting stronger. Denies any needs or concerns today. TCM complete.   Call end time 1521   Would this patient benefit from a Referral to Amb Social Work? No   Is the patient interested in additional calls from an ambulatory ? No            Tiffanie De La Cruz RN    4/22/2024, 15:22 EDT

## 2024-04-22 NOTE — TELEPHONE ENCOUNTER
Caro with [facility] called and they need to know what caused the vascular dementia. She advised that she could not send just vascular dementia through to the Insurance as they would not cover it.

## 2024-04-22 NOTE — CASE MANAGEMENT/SOCIAL WORK
Case Management Discharge Note      Final Note: dc home with [facility]     Provided Post Acute Provider List?: N/A  Provided Post Acute Provider Quality & Resource List?: N/A    Selected Continued Care - Discharged on 4/21/2024 Admission date: 4/17/2024 - Discharge disposition: Home-Health Care Svc      Destination    No services have been selected for the patient.                Durable Medical Equipment    No services have been selected for the patient.                Dialysis/Infusion    No services have been selected for the patient.                Home Medical Care       Service Provider Selected Services Address Phone Fax Patient Preferred    [facility] [address] [place] [place] [facility] [id number] [phone] --              Therapy    No services have been selected for the patient.                Community Resources    No services have been selected for the patient.                Community & DME    No services have been selected for the patient.                         Final Discharge Disposition Code: 06 - home with home health care

## 2024-04-22 NOTE — TELEPHONE ENCOUNTER
Mallika with [facility] called requesting resumption of care orders.  Mallika advised that patient has been in the hospital and has been discharged back to assisted living facility.    Phone number -- [phone]

## 2024-04-24 ENCOUNTER — TELEPHONE (OUTPATIENT)
Dept: FAMILY MEDICINE CLINIC | Facility: CLINIC | Age: 89
End: 2024-04-24
Payer: MEDICARE

## 2024-05-08 ENCOUNTER — TELEPHONE (OUTPATIENT)
Dept: FAMILY MEDICINE CLINIC | Facility: CLINIC | Age: 89
End: 2024-05-08
Payer: MEDICARE

## 2024-05-29 ENCOUNTER — OFFICE VISIT (OUTPATIENT)
Dept: FAMILY MEDICINE CLINIC | Facility: CLINIC | Age: 89
End: 2024-05-29
Payer: MEDICARE

## 2024-05-29 VITALS
OXYGEN SATURATION: 96 % | TEMPERATURE: 97.7 F | SYSTOLIC BLOOD PRESSURE: 130 MMHG | BODY MASS INDEX: 27.4 KG/M2 | HEART RATE: 74 BPM | WEIGHT: 185 LBS | DIASTOLIC BLOOD PRESSURE: 80 MMHG | HEIGHT: 69 IN

## 2024-05-29 DIAGNOSIS — F01.50 VASCULAR DEMENTIA WITHOUT BEHAVIORAL DISTURBANCE: Primary | ICD-10-CM

## 2024-05-29 PROCEDURE — 1160F RVW MEDS BY RX/DR IN RCRD: CPT | Performed by: FAMILY MEDICINE

## 2024-05-29 PROCEDURE — 1159F MED LIST DOCD IN RCRD: CPT | Performed by: FAMILY MEDICINE

## 2024-05-29 PROCEDURE — 1126F AMNT PAIN NOTED NONE PRSNT: CPT | Performed by: FAMILY MEDICINE

## 2024-05-29 PROCEDURE — 99214 OFFICE O/P EST MOD 30 MIN: CPT | Performed by: FAMILY MEDICINE

## 2024-05-29 RX ORDER — CITALOPRAM 20 MG/1
20 TABLET ORAL DAILY
Qty: 90 TABLET | Refills: 3 | Status: SHIPPED | OUTPATIENT
Start: 2024-05-29

## 2024-05-29 RX ORDER — TRAZODONE HYDROCHLORIDE 100 MG/1
100 TABLET ORAL NIGHTLY
Qty: 90 TABLET | Refills: 3 | Status: SHIPPED | OUTPATIENT
Start: 2024-05-29

## 2024-05-29 NOTE — PROGRESS NOTES
"  Subjective   Pawel Goldberg is a 89 y.o. male who is here for   Chief Complaint   Patient presents with    Hypothyroidism    Emphysema    Anxiety     Would like rx sent to mail order and for 90 days if possible.    .     History of Present Illness     Pawel brought in by his daughter today.  Things are relatively stable.  [facility]  Eating well  Sleeping okay on trazodone  Incontinent of urine  Dementia is advancing  Remains a DNR      The following portions of the patient's history were reviewed and updated as appropriate: allergies, current medications, past medical history, past social history, past surgical history, and problem list.    Review of Systems    Objective   Vitals:    05/29/24 1240   BP: 130/80   BP Location: Left arm   Patient Position: Sitting   Cuff Size: Adult   Pulse: 74   Temp: 97.7 °F (36.5 °C)   SpO2: 96%   Weight: 83.9 kg (185 lb)   Height: 175.3 cm (69\")      Physical Exam  Elderly  Needs assistance to stand up.  But once up can walk with a 3 wheeled walker      Assessment & Plan   Diagnoses and all orders for this visit:    1. Vascular dementia without behavioral disturbance (Primary)  -     citalopram (CeleXA) 20 MG tablet; Take 1 tablet by mouth Daily.  Dispense: 90 tablet; Refill: 3  -     traZODone (DESYREL) 100 MG tablet; Take 1 tablet by mouth Every Night. Indications: Trouble Sleeping  Dispense: 90 tablet; Refill: 3      There are no Patient Instructions on file for this visit.    Medications Discontinued During This Encounter   Medication Reason    magnesium oxide (MAG-OX) 400 MG tablet *Therapy completed    citalopram (CeleXA) 20 MG tablet Reorder    traZODone (DESYREL) 100 MG tablet Reorder        No follow-ups on file.    Dr. Charlie Trinh  [facility]  [place], [place]    "

## 2024-05-31 ENCOUNTER — TELEPHONE (OUTPATIENT)
Dept: FAMILY MEDICINE CLINIC | Facility: CLINIC | Age: 89
End: 2024-05-31
Payer: MEDICARE

## 2024-06-07 ENCOUNTER — HOSPITAL ENCOUNTER (EMERGENCY)
Facility: HOSPITAL | Age: 89
Discharge: NURSING, REHAB OR LONG-TERM CARE | End: 2024-06-07
Attending: EMERGENCY MEDICINE | Admitting: EMERGENCY MEDICINE
Payer: MEDICARE

## 2024-06-07 ENCOUNTER — APPOINTMENT (OUTPATIENT)
Dept: GENERAL RADIOLOGY | Facility: HOSPITAL | Age: 89
End: 2024-06-07
Payer: MEDICARE

## 2024-06-07 VITALS
WEIGHT: 185 LBS | BODY MASS INDEX: 27.4 KG/M2 | TEMPERATURE: 98.6 F | SYSTOLIC BLOOD PRESSURE: 158 MMHG | HEIGHT: 69 IN | RESPIRATION RATE: 18 BRPM | HEART RATE: 75 BPM | DIASTOLIC BLOOD PRESSURE: 76 MMHG | OXYGEN SATURATION: 94 %

## 2024-06-07 DIAGNOSIS — R53.1 GENERALIZED WEAKNESS: ICD-10-CM

## 2024-06-07 DIAGNOSIS — N39.0 URINARY TRACT INFECTION WITHOUT HEMATURIA, SITE UNSPECIFIED: Primary | ICD-10-CM

## 2024-06-07 LAB
ALBUMIN SERPL-MCNC: 3.6 G/DL (ref 3.5–5.2)
ALBUMIN/GLOB SERPL: 1.2 G/DL
ALP SERPL-CCNC: 41 U/L (ref 39–117)
ALT SERPL W P-5'-P-CCNC: 8 U/L (ref 1–41)
ANION GAP SERPL CALCULATED.3IONS-SCNC: 9.6 MMOL/L (ref 5–15)
AST SERPL-CCNC: 26 U/L (ref 1–40)
BACTERIA UR QL AUTO: NORMAL /HPF
BASOPHILS # BLD AUTO: 0.04 10*3/MM3 (ref 0–0.2)
BASOPHILS NFR BLD AUTO: 0.5 % (ref 0–1.5)
BILIRUB SERPL-MCNC: 0.4 MG/DL (ref 0–1.2)
BILIRUB UR QL STRIP: NEGATIVE
BUN SERPL-MCNC: 20 MG/DL (ref 8–23)
BUN/CREAT SERPL: 24.1 (ref 7–25)
CALCIUM SPEC-SCNC: 9.5 MG/DL (ref 8.2–9.6)
CHLORIDE SERPL-SCNC: 99 MMOL/L (ref 98–107)
CLARITY UR: CLEAR
CO2 SERPL-SCNC: 26.4 MMOL/L (ref 22–29)
COLOR UR: YELLOW
CREAT SERPL-MCNC: 0.83 MG/DL (ref 0.76–1.27)
DEPRECATED RDW RBC AUTO: 51.3 FL (ref 37–54)
EGFRCR SERPLBLD CKD-EPI 2021: 82.6 ML/MIN/1.73
EOSINOPHIL # BLD AUTO: 0.35 10*3/MM3 (ref 0–0.4)
EOSINOPHIL NFR BLD AUTO: 4.7 % (ref 0.3–6.2)
ERYTHROCYTE [DISTWIDTH] IN BLOOD BY AUTOMATED COUNT: 15.6 % (ref 12.3–15.4)
GLOBULIN UR ELPH-MCNC: 3.1 GM/DL
GLUCOSE SERPL-MCNC: 90 MG/DL (ref 65–99)
GLUCOSE UR STRIP-MCNC: NEGATIVE MG/DL
HCT VFR BLD AUTO: 41 % (ref 37.5–51)
HGB BLD-MCNC: 13.2 G/DL (ref 13–17.7)
HGB UR QL STRIP.AUTO: ABNORMAL
HYALINE CASTS UR QL AUTO: NORMAL /LPF
IMM GRANULOCYTES # BLD AUTO: 0.02 10*3/MM3 (ref 0–0.05)
IMM GRANULOCYTES NFR BLD AUTO: 0.3 % (ref 0–0.5)
KETONES UR QL STRIP: ABNORMAL
LEUKOCYTE ESTERASE UR QL STRIP.AUTO: ABNORMAL
LYMPHOCYTES # BLD AUTO: 1.29 10*3/MM3 (ref 0.7–3.1)
LYMPHOCYTES NFR BLD AUTO: 17.5 % (ref 19.6–45.3)
MAGNESIUM SERPL-MCNC: 2.2 MG/DL (ref 1.7–2.3)
MCH RBC QN AUTO: 29.1 PG (ref 26.6–33)
MCHC RBC AUTO-ENTMCNC: 32.2 G/DL (ref 31.5–35.7)
MCV RBC AUTO: 90.5 FL (ref 79–97)
MONOCYTES # BLD AUTO: 0.63 10*3/MM3 (ref 0.1–0.9)
MONOCYTES NFR BLD AUTO: 8.5 % (ref 5–12)
NEUTROPHILS NFR BLD AUTO: 5.05 10*3/MM3 (ref 1.7–7)
NEUTROPHILS NFR BLD AUTO: 68.5 % (ref 42.7–76)
NITRITE UR QL STRIP: NEGATIVE
NRBC BLD AUTO-RTO: 0 /100 WBC (ref 0–0.2)
PH UR STRIP.AUTO: 8 [PH] (ref 4.5–8)
PHOSPHATE SERPL-MCNC: 3.3 MG/DL (ref 2.5–4.5)
PLATELET # BLD AUTO: 232 10*3/MM3 (ref 140–450)
PMV BLD AUTO: 10.7 FL (ref 6–12)
POTASSIUM SERPL-SCNC: 4.7 MMOL/L (ref 3.5–5.2)
PROT SERPL-MCNC: 6.7 G/DL (ref 6–8.5)
PROT UR QL STRIP: NEGATIVE
RBC # BLD AUTO: 4.53 10*6/MM3 (ref 4.14–5.8)
RBC # UR STRIP: NORMAL /HPF
REF LAB TEST METHOD: NORMAL
SODIUM SERPL-SCNC: 135 MMOL/L (ref 136–145)
SP GR UR STRIP: 1.02 (ref 1–1.03)
SQUAMOUS #/AREA URNS HPF: NORMAL /HPF
TSH SERPL DL<=0.05 MIU/L-ACNC: 4.49 UIU/ML (ref 0.27–4.2)
UROBILINOGEN UR QL STRIP: ABNORMAL
WBC # UR STRIP: NORMAL /HPF
WBC NRBC COR # BLD AUTO: 7.38 10*3/MM3 (ref 3.4–10.8)

## 2024-06-07 PROCEDURE — 25810000003 LACTATED RINGERS SOLUTION: Performed by: EMERGENCY MEDICINE

## 2024-06-07 PROCEDURE — 99283 EMERGENCY DEPT VISIT LOW MDM: CPT

## 2024-06-07 PROCEDURE — 85025 COMPLETE CBC W/AUTO DIFF WBC: CPT | Performed by: EMERGENCY MEDICINE

## 2024-06-07 PROCEDURE — 71045 X-RAY EXAM CHEST 1 VIEW: CPT

## 2024-06-07 PROCEDURE — 84100 ASSAY OF PHOSPHORUS: CPT | Performed by: EMERGENCY MEDICINE

## 2024-06-07 PROCEDURE — 80053 COMPREHEN METABOLIC PANEL: CPT | Performed by: EMERGENCY MEDICINE

## 2024-06-07 PROCEDURE — 83735 ASSAY OF MAGNESIUM: CPT | Performed by: EMERGENCY MEDICINE

## 2024-06-07 PROCEDURE — 84443 ASSAY THYROID STIM HORMONE: CPT | Performed by: EMERGENCY MEDICINE

## 2024-06-07 PROCEDURE — 81001 URINALYSIS AUTO W/SCOPE: CPT | Performed by: EMERGENCY MEDICINE

## 2024-06-07 RX ORDER — CEFUROXIME AXETIL 500 MG/1
500 TABLET ORAL 2 TIMES DAILY
Qty: 20 TABLET | Refills: 0 | Status: SHIPPED | OUTPATIENT
Start: 2024-06-07 | End: 2024-06-17

## 2024-06-07 RX ORDER — SODIUM CHLORIDE 0.9 % (FLUSH) 0.9 %
10 SYRINGE (ML) INJECTION AS NEEDED
Status: DISCONTINUED | OUTPATIENT
Start: 2024-06-07 | End: 2024-06-07 | Stop reason: HOSPADM

## 2024-06-07 RX ORDER — CEFUROXIME AXETIL 250 MG/1
500 TABLET ORAL ONCE
Status: COMPLETED | OUTPATIENT
Start: 2024-06-07 | End: 2024-06-07

## 2024-06-07 RX ADMIN — CEFUROXIME AXETIL 500 MG: 250 TABLET, FILM COATED ORAL at 15:58

## 2024-06-07 RX ADMIN — SODIUM CHLORIDE, POTASSIUM CHLORIDE, SODIUM LACTATE AND CALCIUM CHLORIDE 1000 ML: 600; 310; 30; 20 INJECTION, SOLUTION INTRAVENOUS at 13:56

## 2024-06-07 NOTE — ED PROVIDER NOTES
Subjective   History of Present Illness  Patient presents today with complaint of bilateral leg weakness.  Patient is in assisted living and when staff got him up to take a shower patient felt like he could not stand because his legs were too weak.  Patient denies any headache, sensory changes, or weakness in his arms or legs but says he has no energy to bear weight.  Staff is also concerned about a strong urine smell and they were concerned about a UTI.  Patient is otherwise been at his baseline health and no recent travel, sick contact, bad food exposure, or trauma.  No recent medication changes.  Patient is otherwise been healthy and pleasant.  Cooperative with exam.      Review of Systems   All other systems reviewed and are negative.      Past Medical History:   Diagnosis Date    Allergic     Seasonal    Allergic rhinitis     Arthritis     Asthma     Brito esophagus     Benign prostatic hyperplasia     Carotid arterial disease     Status post right CEA 4/1/14    COPD (chronic obstructive pulmonary disease)     Coronary artery disease     Dementia     Dyspnea     Erectile dysfunction     GERD (gastroesophageal reflux disease)     Hematoma of frontal scalp 07/08/2021    HL (hearing loss)     Hypothyroidism 1992    Low back pain     PAD (peripheral artery disease)     Prostate cancer     Status post implantation of artificial urinary sphincter         Stroke     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    Urinary tract infection        Allergies   Allergen Reactions    Bactrim [Sulfamethoxazole-Trimethoprim] Rash       Past Surgical History:   Procedure Laterality Date    CAROTID ARTERY ANGIOPLASTY  04/01/2014    COLONOSCOPY  2009    ENDOSCOPY  2009    2001,2003,2006    EYE SURGERY      FRACTURE SURGERY      HERNIA REPAIR  2000    HERNIA REPAIR  2009    INGUINAL HERNIA REPAIR      PARTIAL HIP ARTHROPLASTY Left 07/02/2014    PROSTATECTOMY  2008    SHOULDER SURGERY  2005    SHOULDER SURGERY   2006    SHOULDER SURGERY  2008    TEAR DUCT SURGERY      URINARY SPHINCTER IMPLANT         Family History   Problem Relation Age of Onset    Hypertension Sister     Thyroid disease Sister     Stroke Sister     Depression Sister     Heart failure Sister     Hypertension Brother     Lung cancer Brother     COPD Brother     Alcohol abuse Brother     Coronary artery disease Son         Son  from MI at age 59        Social History     Socioeconomic History    Marital status:     Number of children: 3   Tobacco Use    Smoking status: Former     Current packs/day: 0.00     Types: Cigarettes     Start date: 1947     Quit date: 1962     Years since quittin.4    Smokeless tobacco: Never    Tobacco comments:     Quit    1 ppd for 15 years     Vaping Use    Vaping status: Never Used   Substance and Sexual Activity    Alcohol use: Never    Drug use: Never    Sexual activity: Not Currently     Partners: Female     Birth control/protection: None           Objective   Physical Exam  Vitals and nursing note reviewed.   HENT:      Head: Normocephalic.      Right Ear: External ear normal.      Left Ear: External ear normal.      Nose: Nose normal.      Mouth/Throat:      Pharynx: Oropharynx is clear.   Eyes:      Conjunctiva/sclera: Conjunctivae normal.   Cardiovascular:      Rate and Rhythm: Normal rate and regular rhythm.   Pulmonary:      Effort: Pulmonary effort is normal.      Breath sounds: Normal breath sounds.   Abdominal:      General: Abdomen is flat. Bowel sounds are normal. There is no distension.      Palpations: Abdomen is soft.      Tenderness: There is no abdominal tenderness. There is no right CVA tenderness or left CVA tenderness.   Musculoskeletal:         General: No swelling or tenderness.      Cervical back: Neck supple.   Skin:     General: Skin is warm.      Capillary Refill: Capillary refill takes 2 to 3 seconds.   Neurological:      Mental Status: He is alert and oriented  to person, place, and time.      Comments:  strength equal bilaterally.  Patient is able to flex and extend at bilateral hips, knees, and ankles.  Patient does have decreased strength with resistance in bilateral legs.  Sensory is grossly intact in bilateral arms and legs.   Psychiatric:         Mood and Affect: Mood normal.         Behavior: Behavior normal.         Procedures           ED Course  ED Course as of 06/09/24 0158   Fri Jun 07, 2024   1536 Patient presented for generalized weakness per report from Dr. Sharma, TSH is slightly elevated though patient has history of hypothyroidism and is on levothyroxine.  UA does show some trace leuk esterase, may have a developing UTI.  Will give initial antibiotic here and discharged with prescription for antibiotics and PCP follow-up.  Chest x-ray showed no acute process.  Patient understands and agrees to plan of care.  All questions answered. [JF]      ED Course User Index  [JF] Anil Nuñez MD                                             Medical Decision Making  Ddx uti, rebel abn, dehydration, ddd    XR Chest 1 View    Result Date: 6/7/2024  Chronic changes are noted which appear stable. There is no definitive evidence for acute cardiopulmonary process. Electronically Signed: Zia Hurley MD  6/7/2024 2:11 PM EDT  Workstation ID: [id number]    Labs Reviewed  URINALYSIS W/ CULTURE IF INDICATED - Abnormal; Notable for the following components:     Ketones, UA                   Trace (*)               Blood, UA                     Trace (*)               Leuk Esterase, UA             Trace (*)            All other components within normal limits         Narrative: In absence of clinical symptoms, the presence of pyuria, bacteria, and/or nitrites on the urinalysis result does not correlate with infection.  COMPREHENSIVE METABOLIC PANEL - Abnormal; Notable for the following components:     Sodium                        135 (*)             All other components  within normal limits         Narrative: GFR Normal >60                  Chronic Kidney Disease <60                  Kidney Failure <15                                    The GFR formula is only valid for adults with stable renal function between ages 18 and 70.  TSH - Abnormal; Notable for the following components:     TSH                           4.490 (*)            All other components within normal limits  CBC WITH AUTO DIFFERENTIAL - Abnormal; Notable for the following components:     RDW                           15.6 (*)               Lymphocyte %                  17.5 (*)            All other components within normal limits  MAGNESIUM - Normal  PHOSPHORUS - Normal  URINALYSIS, MICROSCOPIC ONLY  CBC AND DIFFERENTIAL      Problems Addressed:  Generalized weakness: complicated acute illness or injury  Urinary tract infection without hematuria, site unspecified: complicated acute illness or injury    Amount and/or Complexity of Data Reviewed  Labs: ordered.  Radiology: ordered.    Risk  Prescription drug management.        Final diagnoses:   Urinary tract infection without hematuria, site unspecified   Generalized weakness       ED Disposition  ED Disposition       ED Disposition   Discharge    Condition   Stable    Comment   --               Charlie Trinh MD  [address] [id number] [phone]    Schedule an appointment as soon as possible for a visit in 2 days  For re-evaluation         Medication List        New Prescriptions      cefuroxime 500 MG tablet  Commonly known as: CEFTIN  Take 1 tablet by mouth 2 (Two) Times a Day for 10 days.            Changed      ipratropium-albuterol 0.5-2.5 mg/3 ml nebulizer  Commonly known as: DUO-NEB  USES 1 VIAL VIA NEBULIZER 4  TIMES DAILY FOR CHRONIC  OBSTRUCTIVE LUNG DISEASE  What changed: See the new instructions.     Trelegy Ellipta 100-62.5-25 MCG/ACT inhaler  Generic drug: Fluticasone-Umeclidin-Vilant  INHALE 1 INHALATION BY MOUTH   DAILY FOR CHRONIC OBSTRUCTIVE  LUNG DISEASE  What changed: See the new instructions.               Where to Get Your Medications        These medications were sent to [id number] [place] - [address] - [phone]  - [phone]   [address], [place] [address]      Phone: [phone]   cefuroxime 500 MG tablet            Luke Sharma MD  06/07/24 0922       Luke Sharma MD  06/09/24 0159

## 2024-06-07 NOTE — ED PROVIDER NOTES
Subjective   History of Present Illness  Per Dr. Sharma note  Review of Systems    Past Medical History:   Diagnosis Date    Allergic     Seasonal    Allergic rhinitis     Arthritis     Asthma     Brito esophagus     Benign prostatic hyperplasia     Carotid arterial disease     Status post right CEA 14    COPD (chronic obstructive pulmonary disease)     Coronary artery disease     Dementia     Dyspnea     Erectile dysfunction     GERD (gastroesophageal reflux disease)     Hematoma of frontal scalp 2021    HL (hearing loss)     Hypothyroidism     Low back pain     PAD (peripheral artery disease)     Prostate cancer     Status post implantation of artificial urinary sphincter         Stroke     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    Urinary tract infection        Allergies   Allergen Reactions    Bactrim [Sulfamethoxazole-Trimethoprim] Rash       Past Surgical History:   Procedure Laterality Date    CAROTID ARTERY ANGIOPLASTY  2014    COLONOSCOPY  2009    ENDOSCOPY  2009    ,,2006    EYE SURGERY      FRACTURE SURGERY      HERNIA REPAIR      HERNIA REPAIR      INGUINAL HERNIA REPAIR      PARTIAL HIP ARTHROPLASTY Left 2014    PROSTATECTOMY  2008    SHOULDER SURGERY  2005    SHOULDER SURGERY  2006    SHOULDER SURGERY  2008    TEAR DUCT SURGERY      URINARY SPHINCTER IMPLANT         Family History   Problem Relation Age of Onset    Hypertension Sister     Thyroid disease Sister     Stroke Sister     Depression Sister     Heart failure Sister     Hypertension Brother     Lung cancer Brother     COPD Brother     Alcohol abuse Brother     Coronary artery disease Son         Son  from MI at age 59        Social History     Socioeconomic History    Marital status:     Number of children: 3   Tobacco Use    Smoking status: Former     Current packs/day: 0.00     Types: Cigarettes     Start date: 1947     Quit date: 1962     Years  since quittin.4    Smokeless tobacco: Never    Tobacco comments:     Quit 1960's   1 ppd for 15 years     Vaping Use    Vaping status: Never Used   Substance and Sexual Activity    Alcohol use: Never    Drug use: Never    Sexual activity: Not Currently     Partners: Female     Birth control/protection: None           Objective   Physical Exam  Per Dr. Sharma's note  Procedures           ED Course  ED Course as of 24 1540      1536 Patient presented for generalized weakness per report from Dr. Sharma, TSH is slightly elevated though patient has history of hypothyroidism and is on levothyroxine.  UA does show some trace leuk esterase, may have a developing UTI.  Will give initial antibiotic here and discharged with prescription for antibiotics and PCP follow-up.  Chest x-ray showed no acute process.  Patient understands and agrees to plan of care.  All questions answered. [JF]      ED Course User Index  [JF] Anil Nuñez MD                                             Medical Decision Making  Problems Addressed:  Generalized weakness: complicated acute illness or injury  Urinary tract infection without hematuria, site unspecified: complicated acute illness or injury    Amount and/or Complexity of Data Reviewed  Labs: ordered.     Details: Labwork shows mild TSH elevation which is expected with patient's history of hypothyroidism, UA shows possible developing UTI with positive leuk esterase.  Radiology: ordered.     Details: Chest x-ray shows no acute pulmonary process based on my interpretation.    Risk  Prescription drug management.        Final diagnoses:   Urinary tract infection without hematuria, site unspecified   Generalized weakness       ED Disposition  ED Disposition       ED Disposition   Discharge    Condition   Stable    Comment   --               Charlie Trinh MD  [address] [facility] [address]  [phone]    Schedule an appointment as soon as  possible for a visit in 2 days  For re-evaluation         Medication List        New Prescriptions      cefuroxime 500 MG tablet  Commonly known as: CEFTIN  Take 1 tablet by mouth 2 (Two) Times a Day for 10 days.            Changed      ipratropium-albuterol 0.5-2.5 mg/3 ml nebulizer  Commonly known as: DUO-NEB  USES 1 VIAL VIA NEBULIZER 4  TIMES DAILY FOR CHRONIC  OBSTRUCTIVE LUNG DISEASE  What changed: See the new instructions.     Trelegy Ellipta 100-62.5-25 MCG/ACT inhaler  Generic drug: Fluticasone-Umeclidin-Vilant  INHALE 1 INHALATION BY MOUTH  DAILY FOR CHRONIC OBSTRUCTIVE  LUNG DISEASE  What changed: See the new instructions.               Where to Get Your Medications        These medications were sent to [id number] - [place] - [address]  [phone]  - [phone]   [address], [place] [address]      Phone: [phone]   cefuroxime 500 MG tablet            Anil Nuñez MD  06/07/24 5161

## 2024-06-11 ENCOUNTER — TELEPHONE (OUTPATIENT)
Dept: FAMILY MEDICINE CLINIC | Facility: CLINIC | Age: 89
End: 2024-06-11
Payer: MEDICARE

## 2024-06-11 NOTE — TELEPHONE ENCOUNTER
Theresa physical therapist with [facility] called to report patient fell out of bed on 6/4/24. Has bruising to left arm but no further injury.

## 2024-07-01 ENCOUNTER — HOSPITAL ENCOUNTER (EMERGENCY)
Facility: HOSPITAL | Age: 89
Discharge: HOME | End: 2024-07-01
Attending: EMERGENCY MEDICINE | Admitting: EMERGENCY MEDICINE
Payer: MEDICARE

## 2024-07-01 ENCOUNTER — APPOINTMENT (OUTPATIENT)
Dept: GENERAL RADIOLOGY | Facility: HOSPITAL | Age: 89
End: 2024-07-01
Payer: MEDICARE

## 2024-07-01 VITALS
WEIGHT: 181.7 LBS | SYSTOLIC BLOOD PRESSURE: 125 MMHG | BODY MASS INDEX: 26.91 KG/M2 | RESPIRATION RATE: 20 BRPM | OXYGEN SATURATION: 91 % | TEMPERATURE: 98 F | DIASTOLIC BLOOD PRESSURE: 98 MMHG | HEIGHT: 69 IN | HEART RATE: 67 BPM

## 2024-07-01 DIAGNOSIS — J44.1 COPD WITH EXACERBATION: Primary | ICD-10-CM

## 2024-07-01 PROCEDURE — 94799 UNLISTED PULMONARY SVC/PX: CPT

## 2024-07-01 PROCEDURE — 87636 SARSCOV2 & INF A&B AMP PRB: CPT | Performed by: EMERGENCY MEDICINE

## 2024-07-01 PROCEDURE — 94640 AIRWAY INHALATION TREATMENT: CPT

## 2024-07-01 PROCEDURE — 99283 EMERGENCY DEPT VISIT LOW MDM: CPT

## 2024-07-01 PROCEDURE — 94761 N-INVAS EAR/PLS OXIMETRY MLT: CPT

## 2024-07-01 PROCEDURE — 71045 X-RAY EXAM CHEST 1 VIEW: CPT

## 2024-07-01 RX ORDER — IPRATROPIUM BROMIDE AND ALBUTEROL SULFATE 2.5; .5 MG/3ML; MG/3ML
3 SOLUTION RESPIRATORY (INHALATION) ONCE
Status: COMPLETED | OUTPATIENT
Start: 2024-07-01 | End: 2024-07-01

## 2024-07-01 RX ORDER — METHYLPREDNISOLONE 4 MG/1
TABLET ORAL
Qty: 21 TABLET | Refills: 0 | Status: SHIPPED | OUTPATIENT
Start: 2024-07-01

## 2024-07-01 RX ORDER — ALBUTEROL SULFATE 2.5 MG/3ML
2.5 SOLUTION RESPIRATORY (INHALATION) ONCE
Status: COMPLETED | OUTPATIENT
Start: 2024-07-01 | End: 2024-07-01

## 2024-07-01 RX ADMIN — IPRATROPIUM BROMIDE AND ALBUTEROL SULFATE 3 ML: .5; 3 SOLUTION RESPIRATORY (INHALATION) at 22:26

## 2024-07-01 RX ADMIN — ALBUTEROL SULFATE 2.5 MG: 2.5 SOLUTION RESPIRATORY (INHALATION) at 22:19

## 2024-07-02 NOTE — ED PROVIDER NOTES
"Subjective     History provided by:  EMS personnel and patient  History limited by: Patient is demented.    History of Present Illness    Chief complaint: Cough, congestion and fever    Location: Upper respiratory tract    Quality/Severity: The patient reportedly has a cough, congestion and a low-grade fever per the assisted living facility.    Timing/Onset: Unknown    Modifying Factors: Patient has a history of COPD.    Associated symptoms: None reported    Narrative: The patient is a 89-year-old white male brought in from assisted living for a history of cough and chest congestion and a low-grade fever.  The patient has dementia, and states he does not know why he is here.  He states he does not know if he is coughing or had congestion or fever.  He does not know if he has had COPD.  EMS noted the patient to be wheezing and administered Solu-Medrol 125 mg IV and an albuterol nebulizer treatment.    Review of Systems  Past Medical History:   Diagnosis Date    Allergic     Seasonal    Allergic rhinitis     Arthritis     Asthma     Brito esophagus     Benign prostatic hyperplasia     Carotid arterial disease     Status post right CEA 4/1/14    COPD (chronic obstructive pulmonary disease)     Coronary artery disease     Dementia     Dyspnea     Erectile dysfunction     GERD (gastroesophageal reflux disease)     Hematoma of frontal scalp 07/08/2021    HL (hearing loss)     Hypothyroidism 1992    Low back pain     PAD (peripheral artery disease)     Prostate cancer     Status post implantation of artificial urinary sphincter         Stroke     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    Urinary tract infection      /71   Pulse 57   Temp 98 °F (36.7 °C) (Oral)   Resp 22   Ht 175.3 cm (69\")   Wt 82.4 kg (181 lb 11.2 oz)   SpO2 99%   BMI 26.83 kg/m²     Past Medical History:   Diagnosis Date    Allergic     Seasonal    Allergic rhinitis     Arthritis     Asthma     Brito esophagus  "    Benign prostatic hyperplasia     Carotid arterial disease     Status post right CEA 14    COPD (chronic obstructive pulmonary disease)     Coronary artery disease     Dementia     Dyspnea     Erectile dysfunction     GERD (gastroesophageal reflux disease)     Hematoma of frontal scalp 2021    HL (hearing loss)     Hypothyroidism 1992    Low back pain     PAD (peripheral artery disease)     Prostate cancer     Status post implantation of artificial urinary sphincter         Stroke     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    Urinary tract infection        Allergies   Allergen Reactions    Bactrim [Sulfamethoxazole-Trimethoprim] Rash       Past Surgical History:   Procedure Laterality Date    CAROTID ARTERY ANGIOPLASTY  2014    COLONOSCOPY  2009    ENDOSCOPY  2009    ,,    EYE SURGERY      FRACTURE SURGERY      HERNIA REPAIR      HERNIA REPAIR      INGUINAL HERNIA REPAIR      PARTIAL HIP ARTHROPLASTY Left 2014    PROSTATECTOMY  2008    SHOULDER SURGERY      SHOULDER SURGERY  2006    SHOULDER SURGERY      TEAR DUCT SURGERY      URINARY SPHINCTER IMPLANT         Family History   Problem Relation Age of Onset    Hypertension Sister     Thyroid disease Sister     Stroke Sister     Depression Sister     Heart failure Sister     Hypertension Brother     Lung cancer Brother     COPD Brother     Alcohol abuse Brother     Coronary artery disease Son         Son  from MI at age 59        Social History     Socioeconomic History    Marital status:     Number of children: 3   Tobacco Use    Smoking status: Former     Current packs/day: 0.00     Types: Cigarettes     Start date: 1947     Quit date: 1962     Years since quittin.5    Smokeless tobacco: Never    Tobacco comments:     Quit 1960's   1 ppd for 15 years     Vaping Use    Vaping status: Never Used   Substance and Sexual Activity    Alcohol use: Never    Drug use:  Never    Sexual activity: Not Currently     Partners: Female     Birth control/protection: None           Objective   Physical Exam  Vitals and nursing note reviewed.   Constitutional:       General: He is not in acute distress.     Appearance: Normal appearance. He is well-developed and normal weight. He is not ill-appearing, toxic-appearing or diaphoretic.      Comments: Patient appears healthy in no acute distress.  Review of his vital signs: He is afebrile with a temperature 98, slightly tachypnea with a respiratory rate of 20 with a diminished oxygen saturation of 91% on room air, heart rate 63, blood pressure 127/71.   HENT:      Head: Normocephalic and atraumatic.      Nose: Nose normal. No congestion or rhinorrhea.      Mouth/Throat:      Mouth: Mucous membranes are moist.      Pharynx: Oropharynx is clear.   Eyes:      General: No scleral icterus.        Right eye: No discharge.         Left eye: No discharge.      Conjunctiva/sclera: Conjunctivae normal.      Pupils: Pupils are equal, round, and reactive to light.   Neck:      Thyroid: No thyromegaly.      Vascular: No JVD.   Cardiovascular:      Rate and Rhythm: Normal rate and regular rhythm.      Heart sounds: Normal heart sounds. No murmur heard.  Pulmonary:      Effort: Pulmonary effort is normal.      Breath sounds: No stridor. Wheezing (Diffuse expiratory phase) present. No rhonchi or rales.   Chest:      Chest wall: No tenderness.   Abdominal:      General: Bowel sounds are normal. There is no distension.      Palpations: Abdomen is soft.      Tenderness: There is no abdominal tenderness. There is no guarding.   Musculoskeletal:         General: No tenderness or deformity. Normal range of motion.      Cervical back: Normal range of motion and neck supple. No tenderness.   Lymphadenopathy:      Cervical: No cervical adenopathy.   Skin:     General: Skin is warm and dry.      Capillary Refill: Capillary refill takes less than 2 seconds.       Coloration: Skin is not pale.      Findings: No erythema or rash.   Neurological:      Mental Status: He is alert.      Cranial Nerves: No cranial nerve deficit.      Coordination: Coordination normal.      Comments: No focal motor sensory deficit.  Patient oriented to person.   Psychiatric:         Mood and Affect: Mood normal.      Comments: Mentation consistent with advanced dementia.         Procedures           ED Course  ED Course as of 07/01/24 2258 Mon Jul 01, 2024 2240 The patient's COVID and flu PCR's are negative. [TP]   2240 My interpretation of the patient's chest x-ray is background COPD without consolidated infiltrate. [TP]   2241 The patient was administered a DuoNeb followed by an albuterol nebulizer treatment in the ER.  His room air oxygen saturation improved to 99%. [TP]   2241 The patient already has nebulizers and albuterol inhalers at home.  I will prescribe him a Medrol Dosepak. [TP]      ED Course User Index  [TP] Baldomero Barnes MD                                             Medical Decision Making  My differential diagnosis for cough includes but is not limited to:  Upper respiratory infection, bronchitis, pneumonia, COPD exacerbation, cough variant asthma, cardiac asthma, coronary artery disease, congestive heart failure, bacterial, viral or lung infections, lung cancer, aspiration pneumonitis, aspiration of foreign body and Covid -19           Problems Addressed:  COPD with exacerbation: complicated acute illness or injury    Amount and/or Complexity of Data Reviewed  Labs: ordered. Decision-making details documented in ED Course.  Radiology: ordered. Decision-making details documented in ED Course.    Risk  Prescription drug management.        Final diagnoses:   COPD with exacerbation       ED Disposition  ED Disposition       ED Disposition   Discharge    Condition   Stable    Comment   --               Charlie Trinh MD  [address] [facility]  [address]  [phone]    Schedule an appointment as soon as possible for a visit   next available         Medication List        New Prescriptions      methylPREDNISolone 4 MG dose pack  Commonly known as: MEDROL  follow package directions            Changed      ipratropium-albuterol 0.5-2.5 mg/3 ml nebulizer  Commonly known as: DUO-NEB  USES 1 VIAL VIA NEBULIZER 4  TIMES DAILY FOR CHRONIC  OBSTRUCTIVE LUNG DISEASE  What changed: See the new instructions.     Trelegy Ellipta 100-62.5-25 MCG/ACT inhaler  Generic drug: Fluticasone-Umeclidin-Vilant  INHALE 1 INHALATION BY MOUTH  DAILY FOR CHRONIC OBSTRUCTIVE  LUNG DISEASE  What changed: See the new instructions.               Where to Get Your Medications        These medications were sent to [id number] - [place], [place] - [address] - [phone]  - [phone]   [address] [facility] [place] [address]      Phone: [phone]   methylPREDNISolone 4 MG dose pack           Labs Reviewed   COVID-19 AND FLU A/B, NP SWAB IN TRANSPORT MEDIA 1 HR TAT - Normal    Narrative:     Fact sheet for providers: https://www.fda.gov/media/620457/download    Fact sheet for patients: https://www.fda.gov/media/687587/download    Test performed by PCR.     XR Chest 1 View   Final Result   Impression:   1. Low lung volumes with elevated left hemidiaphragm secondary to gaseous distention of the stomach.   2. No definite acute infiltrate.         Electronically Signed: Evelio Rios     7/1/2024 10:53 PM EDT     Workstation ID: [id number]             Medication List        New Prescriptions      methylPREDNISolone 4 MG dose pack  Commonly known as: MEDROL  follow package directions            Changed      ipratropium-albuterol 0.5-2.5 mg/3 ml nebulizer  Commonly known as: DUO-NEB  USES 1 VIAL VIA NEBULIZER 4  TIMES DAILY FOR CHRONIC  OBSTRUCTIVE LUNG DISEASE  What changed: See the new instructions.     Trelegy Ellipta 100-62.5-25 MCG/ACT inhaler  Generic drug:  Fluticasone-Umeclidin-Vilant  INHALE 1 INHALATION BY MOUTH  DAILY FOR CHRONIC OBSTRUCTIVE  LUNG DISEASE  What changed: See the new instructions.               Where to Get Your Medications        These medications were sent to [id number] - [place] - [address] - [phone]  - [phone]   [address], [place] [address]      Phone: [phone]   methylPREDNISolone 4 MG dose pack              Baldomero Barnes MD  07/01/24 2658

## 2024-08-04 ENCOUNTER — APPOINTMENT (OUTPATIENT)
Dept: GENERAL RADIOLOGY | Facility: HOSPITAL | Age: 89
End: 2024-08-04
Payer: MEDICARE

## 2024-08-04 ENCOUNTER — HOSPITAL ENCOUNTER (EMERGENCY)
Facility: HOSPITAL | Age: 89
Discharge: HOME | End: 2024-08-04
Attending: STUDENT IN AN ORGANIZED HEALTH CARE EDUCATION/TRAINING PROGRAM | Admitting: STUDENT IN AN ORGANIZED HEALTH CARE EDUCATION/TRAINING PROGRAM
Payer: MEDICARE

## 2024-08-04 VITALS
BODY MASS INDEX: 26.6 KG/M2 | RESPIRATION RATE: 18 BRPM | TEMPERATURE: 98.3 F | WEIGHT: 190 LBS | HEIGHT: 71 IN | HEART RATE: 81 BPM | OXYGEN SATURATION: 90 % | DIASTOLIC BLOOD PRESSURE: 89 MMHG | SYSTOLIC BLOOD PRESSURE: 142 MMHG

## 2024-08-04 DIAGNOSIS — J44.1 COPD WITH ACUTE EXACERBATION: Primary | ICD-10-CM

## 2024-08-04 LAB
QT INTERVAL: 360 MS
QTC INTERVAL: 447 MS

## 2024-08-04 PROCEDURE — 99283 EMERGENCY DEPT VISIT LOW MDM: CPT

## 2024-08-04 PROCEDURE — 71045 X-RAY EXAM CHEST 1 VIEW: CPT

## 2024-08-04 PROCEDURE — 93005 ELECTROCARDIOGRAM TRACING: CPT

## 2024-08-04 PROCEDURE — 94640 AIRWAY INHALATION TREATMENT: CPT

## 2024-08-04 PROCEDURE — 94799 UNLISTED PULMONARY SVC/PX: CPT

## 2024-08-04 PROCEDURE — 93005 ELECTROCARDIOGRAM TRACING: CPT | Performed by: STUDENT IN AN ORGANIZED HEALTH CARE EDUCATION/TRAINING PROGRAM

## 2024-08-04 PROCEDURE — 25810000003 SODIUM CHLORIDE 0.9 % SOLUTION: Performed by: STUDENT IN AN ORGANIZED HEALTH CARE EDUCATION/TRAINING PROGRAM

## 2024-08-04 RX ORDER — IPRATROPIUM BROMIDE AND ALBUTEROL SULFATE 2.5; .5 MG/3ML; MG/3ML
3 SOLUTION RESPIRATORY (INHALATION) ONCE
Status: COMPLETED | OUTPATIENT
Start: 2024-08-04 | End: 2024-08-04

## 2024-08-04 RX ORDER — IPRATROPIUM BROMIDE AND ALBUTEROL SULFATE 2.5; .5 MG/3ML; MG/3ML
SOLUTION RESPIRATORY (INHALATION)
Status: COMPLETED
Start: 2024-08-04 | End: 2024-08-04

## 2024-08-04 RX ADMIN — IPRATROPIUM BROMIDE AND ALBUTEROL SULFATE 3 ML: .5; 3 SOLUTION RESPIRATORY (INHALATION) at 12:31

## 2024-08-04 RX ADMIN — SODIUM CHLORIDE 1000 ML: 9 INJECTION, SOLUTION INTRAVENOUS at 12:28

## 2024-08-04 RX ADMIN — IPRATROPIUM BROMIDE AND ALBUTEROL SULFATE 3 ML: .5; 3 SOLUTION RESPIRATORY (INHALATION) at 12:30

## 2024-08-04 NOTE — DISCHARGE INSTRUCTIONS

## 2024-08-04 NOTE — ED PROVIDER NOTES
Subjective   History of Present Illness  This is a 89-year-old who presents to the emergency department with cough and COPD.  The patient lives in a nursing care facility and has been having hard time breathing over the last month.  He does have a longstanding history of COPD and states that this feels the same.  EMS gave 125 mg of Solu-Medrol as well as a DuoNeb prior to arrival with some mild to moderate improvement.  The patient states on arrival that he still has some wheezing and a cough that is nonproductive.  Patient denies any chest pressure, chest pain, back pain, fever, abdominal pain.      Review of Systems   Constitutional:  Negative for activity change, chills, diaphoresis and fever.   HENT: Negative.     Respiratory: Negative.     Cardiovascular: Negative.    Skin: Negative.    Neurological: Negative.    All other systems reviewed and are negative.      Past Medical History:   Diagnosis Date    Allergic     Seasonal    Allergic rhinitis     Arthritis     Asthma     Brito esophagus     Benign prostatic hyperplasia     Carotid arterial disease     Status post right CEA 4/1/14    COPD (chronic obstructive pulmonary disease)     Coronary artery disease     Dementia     Dyspnea     Erectile dysfunction     GERD (gastroesophageal reflux disease)     Hematoma of frontal scalp 07/08/2021    HL (hearing loss)     Hypothyroidism 1992    Low back pain     PAD (peripheral artery disease)     Prostate cancer     Status post implantation of artificial urinary sphincter         Stroke     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    Urinary tract infection        Allergies   Allergen Reactions    Bactrim [Sulfamethoxazole-Trimethoprim] Rash       Past Surgical History:   Procedure Laterality Date    CAROTID ARTERY ANGIOPLASTY  04/01/2014    COLONOSCOPY  2009    ENDOSCOPY  2009    2001,2003,2006    EYE SURGERY      FRACTURE SURGERY      HERNIA REPAIR  2000    HERNIA REPAIR  2009    INGUINAL  HERNIA REPAIR      PARTIAL HIP ARTHROPLASTY Left 2014    PROSTATECTOMY  2008    SHOULDER SURGERY  2005    SHOULDER SURGERY  2006    SHOULDER SURGERY  2008    TEAR DUCT SURGERY      URINARY SPHINCTER IMPLANT         Family History   Problem Relation Age of Onset    Hypertension Sister     Thyroid disease Sister     Stroke Sister     Depression Sister     Heart failure Sister     Hypertension Brother     Lung cancer Brother     COPD Brother     Alcohol abuse Brother     Coronary artery disease Son         Son  from MI at age 59        Social History     Socioeconomic History    Marital status:     Number of children: 3   Tobacco Use    Smoking status: Former     Current packs/day: 0.00     Types: Cigarettes     Start date: 1947     Quit date: 1962     Years since quittin.6    Smokeless tobacco: Never    Tobacco comments:     Quit    1 ppd for 15 years     Vaping Use    Vaping status: Never Used   Substance and Sexual Activity    Alcohol use: Never    Drug use: Never    Sexual activity: Not Currently     Partners: Female     Birth control/protection: None           Objective   Physical Exam  Vitals and nursing note reviewed.   Constitutional:       Appearance: Normal appearance. He is normal weight. He is not ill-appearing, toxic-appearing or diaphoretic.   HENT:      Head: Normocephalic and atraumatic.      Right Ear: External ear normal.      Left Ear: External ear normal.      Nose: Nose normal. No congestion or rhinorrhea.      Mouth/Throat:      Pharynx: No oropharyngeal exudate or posterior oropharyngeal erythema.   Eyes:      Extraocular Movements: Extraocular movements intact.      Conjunctiva/sclera: Conjunctivae normal.      Pupils: Pupils are equal, round, and reactive to light.   Cardiovascular:      Rate and Rhythm: Normal rate and regular rhythm.      Pulses: Normal pulses.   Pulmonary:      Effort: Pulmonary effort is normal.      Breath sounds: Normal breath  sounds. No stridor. Wheezing (Inspiratory and expiratory) and rhonchi (Mild diffusely) present. No rales.   Chest:      Chest wall: No tenderness.   Abdominal:      General: There is no distension.      Palpations: Abdomen is soft. There is no mass.   Musculoskeletal:         General: No swelling, tenderness or signs of injury. Normal range of motion.      Cervical back: Normal range of motion. No rigidity or tenderness.   Skin:     General: Skin is warm.      Capillary Refill: Capillary refill takes less than 2 seconds.      Coloration: Skin is not jaundiced or pale.      Findings: No bruising.   Neurological:      General: No focal deficit present.      Mental Status: He is alert and oriented to person, place, and time. Mental status is at baseline.      Motor: No weakness.         Procedures           ED Course                                             Medical Decision Making    MDM:    Escalation of care including admission/observation considered    - Discussions of management with other providers:  None    - Discussed/reviewed with Radiology regarding test interpretation    - Independent interpretation: XR    - Additional patient history obtained from: EMS    - Review of external non-ED record (if available):  Prior Inpt record, Office record, Outpt record, Prior Outpt labs, PCP record, Outside ED record, Other    - Chronic conditions affecting care: See HPI and medical Hx.    - Social Determinants of health significantly affecting care:  None        Medical Decision Making Discussion:    89-year-old male presents with cough, and a longstanding history of COPD.  He has no congestion, rectal sputum or fever.  X-rays obtained out of precaution.  There is no obvious evidence of pneumonia.  After second DuoNeb, 1 en route via EMS and 1 here in the ED, the patient has improved fairly significantly stating that he feels much better.  He is not hypoxic with an SpO2 of 95% on room air on reevaluation.  Patient does  not require any supplemental oxygenation and will be discharged at this time    The patient has been given very strict return precautions to return to the emergency department should there be any acute change or worsening of their condition.  I have explained my findings and the patient has expressed understanding to me.  I explained that the work-up performed in the ED has been based on the specific complaint and concern, as the nature of emergency medicine is complaint driven and they understand that new symptoms may arise.  I have told them that, should there be any new symptoms, worsening or changing symptoms, a new work-up may be indicated that they are encouraged to return to the emergency department or promptly contact their primary care physician. We have employed a shared decision-making process as the discussion of their disposition.  The patient has been educated as to the nature of the visit, the tests and work-up performed and the findings from today's visit. At this time, there does not appear to be any acute emergent process that necessitates admission to the hospital, however, the patient understands that this can change unexpectedly. At this time, the patient is stable for discharge home and agrees to follow-up with her primary care physician in the next 24 to 48 hours or earlier should they be able to obtain an appointment.    The patient was counseled regarding diagnostic results and treatment plan and patient has indicated understanding of these instructions.      Amount and/or Complexity of Data Reviewed  Radiology: ordered.    Risk  Prescription drug management.        Final diagnoses:   COPD with acute exacerbation       ED Disposition  ED Disposition       ED Disposition   Discharge    Condition   Stable    Comment   --               Charlie Trinh MD  [address] [facility] [address]  [phone]               Medication List        Changed      ipratropium-albuterol  0.5-2.5 mg/3 ml nebulizer  Commonly known as: DUO-NEB  USES 1 VIAL VIA NEBULIZER 4  TIMES DAILY FOR CHRONIC  OBSTRUCTIVE LUNG DISEASE  What changed: See the new instructions.     Elysia Ellipta 100-62.5-25 MCG/ACT inhaler  Generic drug: Fluticasone-Umeclidin-Vilant  INHALE 1 INHALATION BY MOUTH  DAILY FOR CHRONIC OBSTRUCTIVE  LUNG DISEASE  What changed: See the new instructions.                 Anil Conklin, DO  08/04/24 1301

## 2024-09-15 DIAGNOSIS — E03.9 ACQUIRED HYPOTHYROIDISM: ICD-10-CM

## 2024-09-15 RX ORDER — LEVOTHYROXINE SODIUM 50 UG/1
50 TABLET ORAL DAILY
Qty: 90 TABLET | Refills: 0 | Status: SHIPPED | OUTPATIENT
Start: 2024-09-15

## 2024-10-03 RX ORDER — CLOPIDOGREL BISULFATE 75 MG/1
75 TABLET ORAL DAILY
Qty: 90 TABLET | Refills: 3 | Status: SHIPPED | OUTPATIENT
Start: 2024-10-03

## 2024-11-26 ENCOUNTER — TELEPHONE (OUTPATIENT)
Dept: FAMILY MEDICINE CLINIC | Facility: CLINIC | Age: 89
End: 2024-11-26

## 2024-11-26 NOTE — TELEPHONE ENCOUNTER
Patient's daughter reports that Pawel is now with hospice at the assisted living facility.  they will be taking over most of his care.    Domingo Trinh MD

## 2025-01-01 ENCOUNTER — APPOINTMENT (OUTPATIENT)
Dept: CT IMAGING | Facility: HOSPITAL | Age: OVER 89
End: 2025-01-01
Payer: COMMERCIAL

## 2025-01-01 ENCOUNTER — APPOINTMENT (OUTPATIENT)
Dept: GENERAL RADIOLOGY | Facility: HOSPITAL | Age: OVER 89
End: 2025-01-01
Payer: COMMERCIAL

## 2025-01-01 ENCOUNTER — HOSPITAL ENCOUNTER (INPATIENT)
Facility: HOSPITAL | Age: OVER 89
LOS: 5 days | DRG: 951 | End: 2025-05-04
Attending: HOSPITALIST | Admitting: HOSPITALIST
Payer: COMMERCIAL

## 2025-01-01 ENCOUNTER — HOSPITAL ENCOUNTER (INPATIENT)
Facility: HOSPITAL | Age: OVER 89
LOS: 3 days | End: 2025-04-29
Attending: STUDENT IN AN ORGANIZED HEALTH CARE EDUCATION/TRAINING PROGRAM | Admitting: INTERNAL MEDICINE
Payer: COMMERCIAL

## 2025-01-01 VITALS
TEMPERATURE: 97.1 F | WEIGHT: 166.89 LBS | DIASTOLIC BLOOD PRESSURE: 75 MMHG | HEIGHT: 71 IN | SYSTOLIC BLOOD PRESSURE: 99 MMHG | BODY MASS INDEX: 23.36 KG/M2 | OXYGEN SATURATION: 90 % | RESPIRATION RATE: 15 BRPM | HEART RATE: 60 BPM

## 2025-01-01 VITALS — BODY MASS INDEX: 23.24 KG/M2 | HEIGHT: 71 IN | WEIGHT: 166 LBS

## 2025-01-01 DIAGNOSIS — R41.82 ALTERED MENTAL STATUS, UNSPECIFIED ALTERED MENTAL STATUS TYPE: ICD-10-CM

## 2025-01-01 DIAGNOSIS — N30.00 ACUTE CYSTITIS WITHOUT HEMATURIA: ICD-10-CM

## 2025-01-01 DIAGNOSIS — J44.1 COPD EXACERBATION: Primary | ICD-10-CM

## 2025-01-01 LAB
ALBUMIN SERPL-MCNC: 3.5 G/DL (ref 3.5–5.2)
ALBUMIN/GLOB SERPL: 1.3 G/DL
ALP SERPL-CCNC: 46 U/L (ref 39–117)
ALT SERPL W P-5'-P-CCNC: 20 U/L (ref 1–41)
ANION GAP SERPL CALCULATED.3IONS-SCNC: 8.9 MMOL/L (ref 5–15)
ANION GAP SERPL CALCULATED.3IONS-SCNC: 9.2 MMOL/L (ref 5–15)
AST SERPL-CCNC: 22 U/L (ref 1–40)
BACTERIA UR QL AUTO: ABNORMAL /HPF
BASOPHILS # BLD AUTO: 0.03 10*3/MM3 (ref 0–0.2)
BASOPHILS NFR BLD AUTO: 0.4 % (ref 0–1.5)
BILIRUB SERPL-MCNC: 0.4 MG/DL (ref 0–1.2)
BILIRUB UR QL STRIP: NEGATIVE
BUN SERPL-MCNC: 16 MG/DL (ref 8–23)
BUN SERPL-MCNC: 17 MG/DL (ref 8–23)
BUN/CREAT SERPL: 20.8 (ref 7–25)
BUN/CREAT SERPL: 21 (ref 7–25)
CALCIUM SPEC-SCNC: 8.7 MG/DL (ref 8.2–9.6)
CALCIUM SPEC-SCNC: 9.3 MG/DL (ref 8.2–9.6)
CHLORIDE SERPL-SCNC: 101 MMOL/L (ref 98–107)
CHLORIDE SERPL-SCNC: 105 MMOL/L (ref 98–107)
CLARITY UR: CLEAR
CO2 SERPL-SCNC: 25.8 MMOL/L (ref 22–29)
CO2 SERPL-SCNC: 28.1 MMOL/L (ref 22–29)
COLOR UR: YELLOW
CREAT SERPL-MCNC: 0.77 MG/DL (ref 0.76–1.27)
CREAT SERPL-MCNC: 0.81 MG/DL (ref 0.76–1.27)
DEPRECATED RDW RBC AUTO: 48.8 FL (ref 37–54)
DEPRECATED RDW RBC AUTO: 49.1 FL (ref 37–54)
EGFRCR SERPLBLD CKD-EPI 2021: 82.7 ML/MIN/1.73
EGFRCR SERPLBLD CKD-EPI 2021: 84 ML/MIN/1.73
EOSINOPHIL # BLD AUTO: 0.34 10*3/MM3 (ref 0–0.4)
EOSINOPHIL NFR BLD AUTO: 4.4 % (ref 0.3–6.2)
ERYTHROCYTE [DISTWIDTH] IN BLOOD BY AUTOMATED COUNT: 14.3 % (ref 12.3–15.4)
ERYTHROCYTE [DISTWIDTH] IN BLOOD BY AUTOMATED COUNT: 14.4 % (ref 12.3–15.4)
GEN 5 1HR TROPONIN T REFLEX: 24 NG/L
GLOBULIN UR ELPH-MCNC: 2.8 GM/DL
GLUCOSE SERPL-MCNC: 90 MG/DL (ref 65–99)
GLUCOSE SERPL-MCNC: 99 MG/DL (ref 65–99)
GLUCOSE UR STRIP-MCNC: NEGATIVE MG/DL
HCT VFR BLD AUTO: 33.8 % (ref 37.5–51)
HCT VFR BLD AUTO: 38.6 % (ref 37.5–51)
HGB BLD-MCNC: 11 G/DL (ref 13–17.7)
HGB BLD-MCNC: 12.4 G/DL (ref 13–17.7)
HGB UR QL STRIP.AUTO: ABNORMAL
HYALINE CASTS UR QL AUTO: ABNORMAL /LPF
IMM GRANULOCYTES # BLD AUTO: 0.02 10*3/MM3 (ref 0–0.05)
IMM GRANULOCYTES NFR BLD AUTO: 0.3 % (ref 0–0.5)
KETONES UR QL STRIP: NEGATIVE
LEUKOCYTE ESTERASE UR QL STRIP.AUTO: ABNORMAL
LIPASE SERPL-CCNC: 28 U/L (ref 13–60)
LYMPHOCYTES # BLD AUTO: 1.86 10*3/MM3 (ref 0.7–3.1)
LYMPHOCYTES NFR BLD AUTO: 23.8 % (ref 19.6–45.3)
MCH RBC QN AUTO: 30.1 PG (ref 26.6–33)
MCH RBC QN AUTO: 30.5 PG (ref 26.6–33)
MCHC RBC AUTO-ENTMCNC: 32.1 G/DL (ref 31.5–35.7)
MCHC RBC AUTO-ENTMCNC: 32.5 G/DL (ref 31.5–35.7)
MCV RBC AUTO: 93.6 FL (ref 79–97)
MCV RBC AUTO: 93.7 FL (ref 79–97)
MONOCYTES # BLD AUTO: 0.65 10*3/MM3 (ref 0.1–0.9)
MONOCYTES NFR BLD AUTO: 8.3 % (ref 5–12)
NEUTROPHILS NFR BLD AUTO: 4.9 10*3/MM3 (ref 1.7–7)
NEUTROPHILS NFR BLD AUTO: 62.8 % (ref 42.7–76)
NITRITE UR QL STRIP: POSITIVE
NRBC BLD AUTO-RTO: 0 /100 WBC (ref 0–0.2)
PH UR STRIP.AUTO: 5.5 [PH] (ref 4.5–8)
PLATELET # BLD AUTO: 210 10*3/MM3 (ref 140–450)
PLATELET # BLD AUTO: 225 10*3/MM3 (ref 140–450)
PMV BLD AUTO: 10.6 FL (ref 6–12)
PMV BLD AUTO: 10.8 FL (ref 6–12)
POTASSIUM SERPL-SCNC: 3.9 MMOL/L (ref 3.5–5.2)
POTASSIUM SERPL-SCNC: 4.2 MMOL/L (ref 3.5–5.2)
PROT SERPL-MCNC: 6.3 G/DL (ref 6–8.5)
PROT UR QL STRIP: ABNORMAL
QT INTERVAL: 429 MS
QTC INTERVAL: 421 MS
RBC # BLD AUTO: 3.61 10*6/MM3 (ref 4.14–5.8)
RBC # BLD AUTO: 4.12 10*6/MM3 (ref 4.14–5.8)
RBC # UR STRIP: ABNORMAL /HPF
REF LAB TEST METHOD: ABNORMAL
SODIUM SERPL-SCNC: 138 MMOL/L (ref 136–145)
SODIUM SERPL-SCNC: 140 MMOL/L (ref 136–145)
SP GR UR STRIP: 1.02 (ref 1–1.03)
SQUAMOUS #/AREA URNS HPF: ABNORMAL /HPF
T4 FREE SERPL-MCNC: 1.03 NG/DL (ref 0.93–1.7)
TROPONIN T % DELTA: -14
TROPONIN T NUMERIC DELTA: -4 NG/L
TROPONIN T SERPL HS-MCNC: 28 NG/L
TSH SERPL DL<=0.05 MIU/L-ACNC: 7.01 UIU/ML (ref 0.27–4.2)
UROBILINOGEN UR QL STRIP: ABNORMAL
WBC # UR STRIP: ABNORMAL /HPF
WBC NRBC COR # BLD AUTO: 7.25 10*3/MM3 (ref 3.4–10.8)
WBC NRBC COR # BLD AUTO: 7.8 10*3/MM3 (ref 3.4–10.8)

## 2025-01-01 PROCEDURE — 94640 AIRWAY INHALATION TREATMENT: CPT

## 2025-01-01 PROCEDURE — 94799 UNLISTED PULMONARY SVC/PX: CPT

## 2025-01-01 PROCEDURE — 25010000002 LORAZEPAM PER 2 MG: Performed by: INTERNAL MEDICINE

## 2025-01-01 PROCEDURE — 84439 ASSAY OF FREE THYROXINE: CPT | Performed by: STUDENT IN AN ORGANIZED HEALTH CARE EDUCATION/TRAINING PROGRAM

## 2025-01-01 PROCEDURE — 93010 ELECTROCARDIOGRAM REPORT: CPT | Performed by: INTERNAL MEDICINE

## 2025-01-01 PROCEDURE — 63710000001 REVEFENACIN 175 MCG/3ML SOLUTION: Performed by: INTERNAL MEDICINE

## 2025-01-01 PROCEDURE — 25010000002 MORPHINE PER 10 MG: Performed by: HOSPITALIST

## 2025-01-01 PROCEDURE — 25010000002 MORPHINE PER 10 MG: Performed by: FAMILY MEDICINE

## 2025-01-01 PROCEDURE — 25010000002 MORPHINE PER 10 MG: Performed by: INTERNAL MEDICINE

## 2025-01-01 PROCEDURE — 25010000002 GLYCOPYRROLATE 0.2 MG/ML SOLUTION: Performed by: NURSE PRACTITIONER

## 2025-01-01 PROCEDURE — 25010000002 LORAZEPAM PER 2 MG: Performed by: FAMILY MEDICINE

## 2025-01-01 PROCEDURE — 25010000002 METHYLPREDNISOLONE PER 40 MG: Performed by: STUDENT IN AN ORGANIZED HEALTH CARE EDUCATION/TRAINING PROGRAM

## 2025-01-01 PROCEDURE — 25010000002 MORPHINE PER 10 MG: Performed by: NURSE PRACTITIONER

## 2025-01-01 PROCEDURE — 99232 SBSQ HOSP IP/OBS MODERATE 35: CPT | Performed by: FAMILY MEDICINE

## 2025-01-01 PROCEDURE — 85025 COMPLETE CBC W/AUTO DIFF WBC: CPT | Performed by: STUDENT IN AN ORGANIZED HEALTH CARE EDUCATION/TRAINING PROGRAM

## 2025-01-01 PROCEDURE — 25010000002 LORAZEPAM PER 2 MG: Performed by: NURSE PRACTITIONER

## 2025-01-01 PROCEDURE — G0378 HOSPITAL OBSERVATION PER HR: HCPCS

## 2025-01-01 PROCEDURE — 99231 SBSQ HOSP IP/OBS SF/LOW 25: CPT | Performed by: HOSPITALIST

## 2025-01-01 PROCEDURE — 99222 1ST HOSP IP/OBS MODERATE 55: CPT | Performed by: NURSE PRACTITIONER

## 2025-01-01 PROCEDURE — 84484 ASSAY OF TROPONIN QUANT: CPT | Performed by: STUDENT IN AN ORGANIZED HEALTH CARE EDUCATION/TRAINING PROGRAM

## 2025-01-01 PROCEDURE — 93005 ELECTROCARDIOGRAM TRACING: CPT | Performed by: STUDENT IN AN ORGANIZED HEALTH CARE EDUCATION/TRAINING PROGRAM

## 2025-01-01 PROCEDURE — 80053 COMPREHEN METABOLIC PANEL: CPT | Performed by: STUDENT IN AN ORGANIZED HEALTH CARE EDUCATION/TRAINING PROGRAM

## 2025-01-01 PROCEDURE — 99238 HOSP IP/OBS DSCHRG MGMT 30/<: CPT | Performed by: NURSE PRACTITIONER

## 2025-01-01 PROCEDURE — 71045 X-RAY EXAM CHEST 1 VIEW: CPT

## 2025-01-01 PROCEDURE — 94664 DEMO&/EVAL PT USE INHALER: CPT

## 2025-01-01 PROCEDURE — 81001 URINALYSIS AUTO W/SCOPE: CPT | Performed by: STUDENT IN AN ORGANIZED HEALTH CARE EDUCATION/TRAINING PROGRAM

## 2025-01-01 PROCEDURE — 25010000002 LORAZEPAM PER 2 MG: Performed by: STUDENT IN AN ORGANIZED HEALTH CARE EDUCATION/TRAINING PROGRAM

## 2025-01-01 PROCEDURE — 25010000002 LORAZEPAM PER 2 MG: Performed by: HOSPITALIST

## 2025-01-01 PROCEDURE — 80048 BASIC METABOLIC PNL TOTAL CA: CPT | Performed by: INTERNAL MEDICINE

## 2025-01-01 PROCEDURE — 72170 X-RAY EXAM OF PELVIS: CPT

## 2025-01-01 PROCEDURE — 25810000003 SODIUM CHLORIDE 0.9 % SOLUTION: Performed by: STUDENT IN AN ORGANIZED HEALTH CARE EDUCATION/TRAINING PROGRAM

## 2025-01-01 PROCEDURE — 70450 CT HEAD/BRAIN W/O DYE: CPT

## 2025-01-01 PROCEDURE — 25010000002 CEFTRIAXONE PER 250 MG: Performed by: STUDENT IN AN ORGANIZED HEALTH CARE EDUCATION/TRAINING PROGRAM

## 2025-01-01 PROCEDURE — 83690 ASSAY OF LIPASE: CPT | Performed by: STUDENT IN AN ORGANIZED HEALTH CARE EDUCATION/TRAINING PROGRAM

## 2025-01-01 PROCEDURE — 99232 SBSQ HOSP IP/OBS MODERATE 35: CPT | Performed by: INTERNAL MEDICINE

## 2025-01-01 PROCEDURE — 84443 ASSAY THYROID STIM HORMONE: CPT | Performed by: STUDENT IN AN ORGANIZED HEALTH CARE EDUCATION/TRAINING PROGRAM

## 2025-01-01 PROCEDURE — 99222 1ST HOSP IP/OBS MODERATE 55: CPT | Performed by: INTERNAL MEDICINE

## 2025-01-01 PROCEDURE — 25010000002 GLYCOPYRROLATE 0.2 MG/ML SOLUTION: Performed by: FAMILY MEDICINE

## 2025-01-01 PROCEDURE — 85027 COMPLETE CBC AUTOMATED: CPT | Performed by: INTERNAL MEDICINE

## 2025-01-01 PROCEDURE — 99285 EMERGENCY DEPT VISIT HI MDM: CPT | Performed by: STUDENT IN AN ORGANIZED HEALTH CARE EDUCATION/TRAINING PROGRAM

## 2025-01-01 RX ORDER — LORAZEPAM 2 MG/ML
1 INJECTION INTRAMUSCULAR EVERY 4 HOURS PRN
Status: DISCONTINUED | OUTPATIENT
Start: 2025-01-01 | End: 2025-01-01

## 2025-01-01 RX ORDER — MORPHINE SULFATE 2 MG/ML
2 INJECTION, SOLUTION INTRAMUSCULAR; INTRAVENOUS
Status: DISCONTINUED | OUTPATIENT
Start: 2025-01-01 | End: 2025-01-01

## 2025-01-01 RX ORDER — GLYCOPYRROLATE 0.2 MG/ML
0.4 INJECTION INTRAMUSCULAR; INTRAVENOUS
Status: CANCELLED | OUTPATIENT
Start: 2025-01-01

## 2025-01-01 RX ORDER — RIVASTIGMINE 4.6 MG/24H
1 PATCH, EXTENDED RELEASE TRANSDERMAL DAILY
Status: DISCONTINUED | OUTPATIENT
Start: 2025-01-01 | End: 2025-01-01

## 2025-01-01 RX ORDER — SODIUM CHLORIDE 9 MG/ML
40 INJECTION, SOLUTION INTRAVENOUS AS NEEDED
Status: CANCELLED | OUTPATIENT
Start: 2025-01-01

## 2025-01-01 RX ORDER — ARFORMOTEROL TARTRATE 15 UG/2ML
15 SOLUTION RESPIRATORY (INHALATION)
Status: DISCONTINUED | OUTPATIENT
Start: 2025-01-01 | End: 2025-01-01

## 2025-01-01 RX ORDER — METHYLPREDNISOLONE SODIUM SUCCINATE 40 MG/ML
80 INJECTION, POWDER, LYOPHILIZED, FOR SOLUTION INTRAMUSCULAR; INTRAVENOUS ONCE
Status: COMPLETED | OUTPATIENT
Start: 2025-01-01 | End: 2025-01-01

## 2025-01-01 RX ORDER — AMOXICILLIN 250 MG
2 CAPSULE ORAL 2 TIMES DAILY PRN
Status: DISCONTINUED | OUTPATIENT
Start: 2025-01-01 | End: 2025-01-01 | Stop reason: HOSPADM

## 2025-01-01 RX ORDER — GUAIFENESIN 600 MG/1
600 TABLET, EXTENDED RELEASE ORAL DAILY
COMMUNITY

## 2025-01-01 RX ORDER — ONDANSETRON 4 MG/1
4 TABLET, ORALLY DISINTEGRATING ORAL EVERY 6 HOURS PRN
Status: DISCONTINUED | OUTPATIENT
Start: 2025-01-01 | End: 2025-01-01 | Stop reason: HOSPADM

## 2025-01-01 RX ORDER — LORAZEPAM 2 MG/ML
0.5 INJECTION INTRAMUSCULAR EVERY 6 HOURS PRN
Status: DISCONTINUED | OUTPATIENT
Start: 2025-01-01 | End: 2025-01-01

## 2025-01-01 RX ORDER — BISACODYL 5 MG/1
5 TABLET, DELAYED RELEASE ORAL DAILY PRN
Status: DISCONTINUED | OUTPATIENT
Start: 2025-01-01 | End: 2025-01-01 | Stop reason: HOSPADM

## 2025-01-01 RX ORDER — ONDANSETRON 2 MG/ML
4 INJECTION INTRAMUSCULAR; INTRAVENOUS EVERY 6 HOURS PRN
Status: CANCELLED | OUTPATIENT
Start: 2025-01-01

## 2025-01-01 RX ORDER — LORAZEPAM 2 MG/ML
1 INJECTION INTRAMUSCULAR EVERY 8 HOURS
Status: DISCONTINUED | OUTPATIENT
Start: 2025-01-01 | End: 2025-01-01

## 2025-01-01 RX ORDER — DOCUSATE SODIUM 100 MG/1
100 CAPSULE, LIQUID FILLED ORAL DAILY
Status: DISCONTINUED | OUTPATIENT
Start: 2025-01-01 | End: 2025-01-01

## 2025-01-01 RX ORDER — SODIUM CHLORIDE 0.9 % (FLUSH) 0.9 %
10 SYRINGE (ML) INJECTION AS NEEDED
Status: CANCELLED | OUTPATIENT
Start: 2025-01-01

## 2025-01-01 RX ORDER — TRAZODONE HYDROCHLORIDE 50 MG/1
100 TABLET ORAL NIGHTLY
Status: DISCONTINUED | OUTPATIENT
Start: 2025-01-01 | End: 2025-01-01

## 2025-01-01 RX ORDER — ACETAMINOPHEN 650 MG/1
650 SUPPOSITORY RECTAL EVERY 4 HOURS PRN
Status: DISCONTINUED | OUTPATIENT
Start: 2025-01-01 | End: 2025-01-01 | Stop reason: HOSPADM

## 2025-01-01 RX ORDER — LORAZEPAM 2 MG/ML
2 INJECTION INTRAMUSCULAR
Status: DISCONTINUED | OUTPATIENT
Start: 2025-01-01 | End: 2025-01-01 | Stop reason: HOSPADM

## 2025-01-01 RX ORDER — MORPHINE SULFATE 20 MG/ML
5 SOLUTION ORAL EVERY 4 HOURS PRN
Refills: 0 | Status: DISCONTINUED | OUTPATIENT
Start: 2025-01-01 | End: 2025-01-01

## 2025-01-01 RX ORDER — ACETAMINOPHEN 160 MG/5ML
650 SOLUTION ORAL EVERY 4 HOURS PRN
Status: DISCONTINUED | OUTPATIENT
Start: 2025-01-01 | End: 2025-01-01 | Stop reason: HOSPADM

## 2025-01-01 RX ORDER — ACETAMINOPHEN 325 MG/1
650 TABLET ORAL EVERY 4 HOURS PRN
Status: CANCELLED | OUTPATIENT
Start: 2025-01-01

## 2025-01-01 RX ORDER — CITALOPRAM HYDROBROMIDE 20 MG/1
20 TABLET ORAL DAILY
Status: DISCONTINUED | OUTPATIENT
Start: 2025-01-01 | End: 2025-01-01

## 2025-01-01 RX ORDER — GLYCOPYRROLATE 0.2 MG/ML
0.4 INJECTION INTRAMUSCULAR; INTRAVENOUS
Status: DISCONTINUED | OUTPATIENT
Start: 2025-01-01 | End: 2025-01-01 | Stop reason: HOSPADM

## 2025-01-01 RX ORDER — RIVASTIGMINE 4.6 MG/24H
1 PATCH, EXTENDED RELEASE TRANSDERMAL DAILY
Status: DISCONTINUED | OUTPATIENT
Start: 2025-01-01 | End: 2025-01-01 | Stop reason: HOSPADM

## 2025-01-01 RX ORDER — MORPHINE SULFATE 2 MG/ML
2 INJECTION, SOLUTION INTRAMUSCULAR; INTRAVENOUS
Status: CANCELLED | OUTPATIENT
Start: 2025-01-01

## 2025-01-01 RX ORDER — LORAZEPAM 2 MG/ML
1 INJECTION INTRAMUSCULAR EVERY 4 HOURS
Status: DISCONTINUED | OUTPATIENT
Start: 2025-01-01 | End: 2025-01-01

## 2025-01-01 RX ORDER — ACETAMINOPHEN 325 MG/1
650 TABLET ORAL EVERY 4 HOURS PRN
Status: DISCONTINUED | OUTPATIENT
Start: 2025-01-01 | End: 2025-01-01

## 2025-01-01 RX ORDER — LORAZEPAM 2 MG/ML
2 INJECTION INTRAMUSCULAR EVERY 4 HOURS
Status: COMPLETED | OUTPATIENT
Start: 2025-01-01 | End: 2025-01-01

## 2025-01-01 RX ORDER — MORPHINE SULFATE 2 MG/ML
2 INJECTION, SOLUTION INTRAMUSCULAR; INTRAVENOUS EVERY 4 HOURS PRN
Status: DISCONTINUED | OUTPATIENT
Start: 2025-01-01 | End: 2025-01-01

## 2025-01-01 RX ORDER — MORPHINE SULFATE 2 MG/ML
2 INJECTION, SOLUTION INTRAMUSCULAR; INTRAVENOUS EVERY 4 HOURS
Status: DISCONTINUED | OUTPATIENT
Start: 2025-01-01 | End: 2025-01-01

## 2025-01-01 RX ORDER — LORAZEPAM 2 MG/ML
1 INJECTION INTRAMUSCULAR EVERY 4 HOURS
Status: DISCONTINUED | OUTPATIENT
Start: 2025-01-01 | End: 2025-01-01 | Stop reason: HOSPADM

## 2025-01-01 RX ORDER — POLYETHYLENE GLYCOL 3350 17 G/17G
17 POWDER, FOR SOLUTION ORAL DAILY PRN
Status: DISCONTINUED | OUTPATIENT
Start: 2025-01-01 | End: 2025-01-01 | Stop reason: HOSPADM

## 2025-01-01 RX ORDER — LORAZEPAM 2 MG/ML
1 INJECTION INTRAMUSCULAR EVERY 4 HOURS
Status: CANCELLED | OUTPATIENT
Start: 2025-01-01 | End: 2025-01-01

## 2025-01-01 RX ORDER — ACETAMINOPHEN 650 MG/1
650 SUPPOSITORY RECTAL EVERY 4 HOURS PRN
Status: CANCELLED | OUTPATIENT
Start: 2025-01-01

## 2025-01-01 RX ORDER — SODIUM CHLORIDE 0.9 % (FLUSH) 0.9 %
10 SYRINGE (ML) INJECTION EVERY 12 HOURS SCHEDULED
Status: DISCONTINUED | OUTPATIENT
Start: 2025-01-01 | End: 2025-01-01

## 2025-01-01 RX ORDER — IPRATROPIUM BROMIDE AND ALBUTEROL SULFATE 2.5; .5 MG/3ML; MG/3ML
3 SOLUTION RESPIRATORY (INHALATION)
Status: DISCONTINUED | OUTPATIENT
Start: 2025-01-01 | End: 2025-01-01

## 2025-01-01 RX ORDER — ALBUTEROL SULFATE 0.83 MG/ML
2.5 SOLUTION RESPIRATORY (INHALATION) EVERY 4 HOURS PRN
Status: DISCONTINUED | OUTPATIENT
Start: 2025-01-01 | End: 2025-01-01 | Stop reason: HOSPADM

## 2025-01-01 RX ORDER — LORAZEPAM 2 MG/ML
1 INJECTION INTRAMUSCULAR
Status: DISCONTINUED | OUTPATIENT
Start: 2025-01-01 | End: 2025-01-01

## 2025-01-01 RX ORDER — MORPHINE SULFATE 20 MG/ML
5 SOLUTION ORAL EVERY 8 HOURS PRN
Refills: 0 | Status: DISCONTINUED | OUTPATIENT
Start: 2025-01-01 | End: 2025-01-01

## 2025-01-01 RX ORDER — SODIUM CHLORIDE 9 MG/ML
40 INJECTION, SOLUTION INTRAVENOUS AS NEEDED
Status: DISCONTINUED | OUTPATIENT
Start: 2025-01-01 | End: 2025-01-01 | Stop reason: HOSPADM

## 2025-01-01 RX ORDER — MORPHINE SULFATE 20 MG/ML
5 SOLUTION ORAL DAILY PRN
COMMUNITY

## 2025-01-01 RX ORDER — ALUMINA, MAGNESIA, AND SIMETHICONE 2400; 2400; 240 MG/30ML; MG/30ML; MG/30ML
15 SUSPENSION ORAL EVERY 6 HOURS PRN
Status: DISCONTINUED | OUTPATIENT
Start: 2025-01-01 | End: 2025-01-01 | Stop reason: HOSPADM

## 2025-01-01 RX ORDER — LORAZEPAM 0.5 MG/1
0.5 TABLET ORAL EVERY 12 HOURS PRN
Status: DISCONTINUED | OUTPATIENT
Start: 2025-01-01 | End: 2025-01-01

## 2025-01-01 RX ORDER — LORAZEPAM 2 MG/ML
0.5 INJECTION INTRAMUSCULAR
Status: DISCONTINUED | OUTPATIENT
Start: 2025-01-01 | End: 2025-01-01

## 2025-01-01 RX ORDER — OLANZAPINE 5 MG/1
2.5 TABLET, FILM COATED ORAL 2 TIMES DAILY
Status: DISCONTINUED | OUTPATIENT
Start: 2025-01-01 | End: 2025-01-01

## 2025-01-01 RX ORDER — GUAIFENESIN 600 MG/1
600 TABLET, EXTENDED RELEASE ORAL DAILY
Status: DISCONTINUED | OUTPATIENT
Start: 2025-01-01 | End: 2025-01-01

## 2025-01-01 RX ORDER — AMOXICILLIN 250 MG
1 CAPSULE ORAL DAILY
COMMUNITY

## 2025-01-01 RX ORDER — ACETAMINOPHEN 160 MG/5ML
650 SOLUTION ORAL EVERY 4 HOURS PRN
Status: DISCONTINUED | OUTPATIENT
Start: 2025-01-01 | End: 2025-01-01

## 2025-01-01 RX ORDER — SODIUM CHLORIDE 0.9 % (FLUSH) 0.9 %
10 SYRINGE (ML) INJECTION AS NEEDED
Status: DISCONTINUED | OUTPATIENT
Start: 2025-01-01 | End: 2025-01-01 | Stop reason: HOSPADM

## 2025-01-01 RX ORDER — RIVASTIGMINE 4.6 MG/24H
1 PATCH, EXTENDED RELEASE TRANSDERMAL DAILY
Status: CANCELLED | OUTPATIENT
Start: 2025-01-01

## 2025-01-01 RX ORDER — IPRATROPIUM BROMIDE AND ALBUTEROL SULFATE 2.5; .5 MG/3ML; MG/3ML
SOLUTION RESPIRATORY (INHALATION)
Status: ACTIVE
Start: 2025-01-01 | End: 2025-01-01

## 2025-01-01 RX ORDER — LORAZEPAM 2 MG/ML
1 INJECTION INTRAMUSCULAR
Status: DISCONTINUED | OUTPATIENT
Start: 2025-01-01 | End: 2025-01-01 | Stop reason: HOSPADM

## 2025-01-01 RX ORDER — LEVOTHYROXINE SODIUM 50 UG/1
50 TABLET ORAL DAILY
Status: DISCONTINUED | OUTPATIENT
Start: 2025-01-01 | End: 2025-01-01

## 2025-01-01 RX ORDER — ACETAMINOPHEN 160 MG/5ML
650 SOLUTION ORAL EVERY 4 HOURS PRN
Status: CANCELLED | OUTPATIENT
Start: 2025-01-01

## 2025-01-01 RX ORDER — MORPHINE SULFATE 2 MG/ML
2 INJECTION, SOLUTION INTRAMUSCULAR; INTRAVENOUS
Status: DISCONTINUED | OUTPATIENT
Start: 2025-01-01 | End: 2025-01-01 | Stop reason: HOSPADM

## 2025-01-01 RX ORDER — ONDANSETRON 2 MG/ML
4 INJECTION INTRAMUSCULAR; INTRAVENOUS EVERY 6 HOURS PRN
Status: DISCONTINUED | OUTPATIENT
Start: 2025-01-01 | End: 2025-01-01 | Stop reason: HOSPADM

## 2025-01-01 RX ORDER — BUDESONIDE 0.5 MG/2ML
0.5 INHALANT ORAL
Status: DISCONTINUED | OUTPATIENT
Start: 2025-01-01 | End: 2025-01-01

## 2025-01-01 RX ORDER — IPRATROPIUM BROMIDE AND ALBUTEROL SULFATE 2.5; .5 MG/3ML; MG/3ML
6 SOLUTION RESPIRATORY (INHALATION) ONCE
Status: COMPLETED | OUTPATIENT
Start: 2025-01-01 | End: 2025-01-01

## 2025-01-01 RX ORDER — ACETAMINOPHEN 325 MG/1
650 TABLET ORAL EVERY 4 HOURS PRN
Status: DISCONTINUED | OUTPATIENT
Start: 2025-01-01 | End: 2025-01-01 | Stop reason: HOSPADM

## 2025-01-01 RX ORDER — ONDANSETRON 4 MG/1
4 TABLET, ORALLY DISINTEGRATING ORAL EVERY 6 HOURS PRN
Status: DISCONTINUED | OUTPATIENT
Start: 2025-01-01 | End: 2025-01-01

## 2025-01-01 RX ORDER — LORAZEPAM 2 MG/ML
1 INJECTION INTRAMUSCULAR
Status: CANCELLED | OUTPATIENT
Start: 2025-01-01

## 2025-01-01 RX ORDER — ONDANSETRON 4 MG/1
4 TABLET, ORALLY DISINTEGRATING ORAL EVERY 6 HOURS PRN
Status: CANCELLED | OUTPATIENT
Start: 2025-01-01

## 2025-01-01 RX ORDER — CLOPIDOGREL BISULFATE 75 MG/1
75 TABLET ORAL DAILY
Status: DISCONTINUED | OUTPATIENT
Start: 2025-01-01 | End: 2025-01-01

## 2025-01-01 RX ORDER — BISACODYL 10 MG
10 SUPPOSITORY, RECTAL RECTAL DAILY PRN
Status: DISCONTINUED | OUTPATIENT
Start: 2025-01-01 | End: 2025-01-01 | Stop reason: HOSPADM

## 2025-01-01 RX ORDER — PANTOPRAZOLE SODIUM 40 MG/1
40 TABLET, DELAYED RELEASE ORAL DAILY
Status: DISCONTINUED | OUTPATIENT
Start: 2025-01-01 | End: 2025-01-01

## 2025-01-01 RX ORDER — LORAZEPAM 0.5 MG/1
0.5 TABLET ORAL DAILY PRN
COMMUNITY

## 2025-01-01 RX ADMIN — LORAZEPAM 1 MG: 2 INJECTION INTRAMUSCULAR; INTRAVENOUS at 00:46

## 2025-01-01 RX ADMIN — ACETAMINOPHEN 650 MG: 650 SUPPOSITORY RECTAL at 10:53

## 2025-01-01 RX ADMIN — LORAZEPAM 1 MG: 2 INJECTION INTRAMUSCULAR; INTRAVENOUS at 12:00

## 2025-01-01 RX ADMIN — LORAZEPAM 1 MG: 2 INJECTION INTRAMUSCULAR; INTRAVENOUS at 04:34

## 2025-01-01 RX ADMIN — LORAZEPAM 1 MG: 2 INJECTION INTRAMUSCULAR; INTRAVENOUS at 20:56

## 2025-01-01 RX ADMIN — GLYCOPYRROLATE 0.4 MG: 0.2 INJECTION INTRAMUSCULAR; INTRAVENOUS at 09:08

## 2025-01-01 RX ADMIN — LORAZEPAM 0.5 MG: 2 INJECTION INTRAMUSCULAR; INTRAVENOUS at 01:15

## 2025-01-01 RX ADMIN — MORPHINE SULFATE 3 MG: 4 INJECTION, SOLUTION INTRAMUSCULAR; INTRAVENOUS at 20:56

## 2025-01-01 RX ADMIN — LEVOTHYROXINE SODIUM 50 MCG: 0.05 TABLET ORAL at 16:25

## 2025-01-01 RX ADMIN — LORAZEPAM 1 MG: 2 INJECTION INTRAMUSCULAR; INTRAVENOUS at 13:27

## 2025-01-01 RX ADMIN — MORPHINE SULFATE 4 MG: 4 INJECTION, SOLUTION INTRAMUSCULAR; INTRAVENOUS at 11:25

## 2025-01-01 RX ADMIN — CEFTRIAXONE SODIUM 1000 MG: 1 INJECTION, POWDER, FOR SOLUTION INTRAMUSCULAR; INTRAVENOUS at 10:49

## 2025-01-01 RX ADMIN — LORAZEPAM 1 MG: 2 INJECTION INTRAMUSCULAR; INTRAVENOUS at 21:14

## 2025-01-01 RX ADMIN — LORAZEPAM 1 MG: 2 INJECTION INTRAMUSCULAR; INTRAVENOUS at 17:55

## 2025-01-01 RX ADMIN — LEVOTHYROXINE SODIUM 50 MCG: 0.05 TABLET ORAL at 08:43

## 2025-01-01 RX ADMIN — LORAZEPAM 0.5 MG: 0.5 TABLET ORAL at 14:03

## 2025-01-01 RX ADMIN — LORAZEPAM 2 MG: 2 INJECTION INTRAMUSCULAR; INTRAVENOUS at 18:51

## 2025-01-01 RX ADMIN — LORAZEPAM 2 MG: 2 INJECTION INTRAMUSCULAR; INTRAVENOUS at 05:26

## 2025-01-01 RX ADMIN — LORAZEPAM 1 MG: 2 INJECTION INTRAMUSCULAR; INTRAVENOUS at 20:25

## 2025-01-01 RX ADMIN — LORAZEPAM 2 MG: 2 INJECTION INTRAMUSCULAR; INTRAVENOUS at 01:55

## 2025-01-01 RX ADMIN — MORPHINE SULFATE 2 MG: 2 INJECTION, SOLUTION INTRAMUSCULAR; INTRAVENOUS at 17:35

## 2025-01-01 RX ADMIN — GLYCOPYRROLATE 0.4 MG: 0.2 INJECTION INTRAMUSCULAR; INTRAVENOUS at 15:09

## 2025-01-01 RX ADMIN — MORPHINE SULFATE 4 MG: 4 INJECTION, SOLUTION INTRAMUSCULAR; INTRAVENOUS at 09:07

## 2025-01-01 RX ADMIN — LORAZEPAM 0.5 MG: 2 INJECTION INTRAMUSCULAR; INTRAVENOUS at 05:44

## 2025-01-01 RX ADMIN — GLYCOPYRROLATE 0.4 MG: 0.2 INJECTION INTRAMUSCULAR; INTRAVENOUS at 01:43

## 2025-01-01 RX ADMIN — MORPHINE SULFATE 3 MG: 4 INJECTION, SOLUTION INTRAMUSCULAR; INTRAVENOUS at 17:55

## 2025-01-01 RX ADMIN — LORAZEPAM 1 MG: 2 INJECTION INTRAMUSCULAR; INTRAVENOUS at 14:20

## 2025-01-01 RX ADMIN — MORPHINE SULFATE 4 MG: 4 INJECTION, SOLUTION INTRAMUSCULAR; INTRAVENOUS at 20:53

## 2025-01-01 RX ADMIN — GLYCOPYRROLATE 0.4 MG: 0.2 INJECTION INTRAMUSCULAR; INTRAVENOUS at 08:15

## 2025-01-01 RX ADMIN — MORPHINE SULFATE 3 MG: 4 INJECTION, SOLUTION INTRAMUSCULAR; INTRAVENOUS at 17:49

## 2025-01-01 RX ADMIN — LORAZEPAM 2 MG: 2 INJECTION INTRAMUSCULAR; INTRAVENOUS at 16:38

## 2025-01-01 RX ADMIN — LORAZEPAM 1 MG: 2 INJECTION INTRAMUSCULAR; INTRAVENOUS at 20:38

## 2025-01-01 RX ADMIN — LORAZEPAM 1 MG: 2 INJECTION INTRAMUSCULAR; INTRAVENOUS at 15:29

## 2025-01-01 RX ADMIN — MORPHINE SULFATE 3 MG: 4 INJECTION, SOLUTION INTRAMUSCULAR; INTRAVENOUS at 09:24

## 2025-01-01 RX ADMIN — MORPHINE SULFATE 2 MG: 2 INJECTION, SOLUTION INTRAMUSCULAR; INTRAVENOUS at 01:42

## 2025-01-01 RX ADMIN — MORPHINE SULFATE 3 MG: 4 INJECTION, SOLUTION INTRAMUSCULAR; INTRAVENOUS at 21:14

## 2025-01-01 RX ADMIN — BUDESONIDE 0.5 MG: 0.5 INHALANT RESPIRATORY (INHALATION) at 07:05

## 2025-01-01 RX ADMIN — LORAZEPAM 2 MG: 2 INJECTION INTRAMUSCULAR; INTRAVENOUS at 15:09

## 2025-01-01 RX ADMIN — MORPHINE SULFATE 2 MG: 2 INJECTION, SOLUTION INTRAMUSCULAR; INTRAVENOUS at 13:00

## 2025-01-01 RX ADMIN — LORAZEPAM 2 MG: 2 INJECTION INTRAMUSCULAR; INTRAVENOUS at 20:52

## 2025-01-01 RX ADMIN — GLYCOPYRROLATE 0.4 MG: 0.2 INJECTION INTRAMUSCULAR; INTRAVENOUS at 02:13

## 2025-01-01 RX ADMIN — GLYCOPYRROLATE 0.4 MG: 0.2 INJECTION INTRAMUSCULAR; INTRAVENOUS at 23:14

## 2025-01-01 RX ADMIN — MORPHINE SULFATE 3 MG: 4 INJECTION, SOLUTION INTRAMUSCULAR; INTRAVENOUS at 14:20

## 2025-01-01 RX ADMIN — LORAZEPAM 1 MG: 2 INJECTION INTRAMUSCULAR; INTRAVENOUS at 13:06

## 2025-01-01 RX ADMIN — GLYCOPYRROLATE 0.4 MG: 0.2 INJECTION INTRAMUSCULAR; INTRAVENOUS at 08:10

## 2025-01-01 RX ADMIN — LORAZEPAM 1 MG: 2 INJECTION INTRAMUSCULAR; INTRAVENOUS at 10:39

## 2025-01-01 RX ADMIN — LORAZEPAM 1 MG: 2 INJECTION INTRAMUSCULAR; INTRAVENOUS at 05:03

## 2025-01-01 RX ADMIN — MORPHINE SULFATE 4 MG: 4 INJECTION, SOLUTION INTRAMUSCULAR; INTRAVENOUS at 05:16

## 2025-01-01 RX ADMIN — MORPHINE SULFATE 2 MG: 2 INJECTION, SOLUTION INTRAMUSCULAR; INTRAVENOUS at 15:40

## 2025-01-01 RX ADMIN — LORAZEPAM 1 MG: 2 INJECTION INTRAMUSCULAR; INTRAVENOUS at 20:49

## 2025-01-01 RX ADMIN — BUDESONIDE 0.5 MG: 0.5 INHALANT RESPIRATORY (INHALATION) at 20:36

## 2025-01-01 RX ADMIN — LORAZEPAM 1 MG: 2 INJECTION INTRAMUSCULAR; INTRAVENOUS at 09:59

## 2025-01-01 RX ADMIN — LORAZEPAM 2 MG: 2 INJECTION INTRAMUSCULAR; INTRAVENOUS at 03:26

## 2025-01-01 RX ADMIN — PANTOPRAZOLE SODIUM 40 MG: 40 TABLET, DELAYED RELEASE ORAL at 08:43

## 2025-01-01 RX ADMIN — IPRATROPIUM BROMIDE AND ALBUTEROL SULFATE 6 ML: .5; 3 SOLUTION RESPIRATORY (INHALATION) at 07:32

## 2025-01-01 RX ADMIN — LORAZEPAM 1 MG: 2 INJECTION INTRAMUSCULAR; INTRAVENOUS at 18:06

## 2025-01-01 RX ADMIN — METHYLPREDNISOLONE SODIUM SUCCINATE 80 MG: 40 INJECTION INTRAMUSCULAR; INTRAVENOUS at 07:44

## 2025-01-01 RX ADMIN — LORAZEPAM 2 MG: 2 INJECTION INTRAMUSCULAR; INTRAVENOUS at 11:26

## 2025-01-01 RX ADMIN — LORAZEPAM 2 MG: 2 INJECTION INTRAMUSCULAR; INTRAVENOUS at 09:08

## 2025-01-01 RX ADMIN — MORPHINE SULFATE 4 MG: 4 INJECTION, SOLUTION INTRAMUSCULAR; INTRAVENOUS at 03:26

## 2025-01-01 RX ADMIN — MORPHINE SULFATE 3 MG: 4 INJECTION, SOLUTION INTRAMUSCULAR; INTRAVENOUS at 01:02

## 2025-01-01 RX ADMIN — LORAZEPAM 2 MG: 2 INJECTION INTRAMUSCULAR; INTRAVENOUS at 10:00

## 2025-01-01 RX ADMIN — MORPHINE SULFATE 2 MG: 2 INJECTION, SOLUTION INTRAMUSCULAR; INTRAVENOUS at 08:39

## 2025-01-01 RX ADMIN — MORPHINE SULFATE 4 MG: 4 INJECTION, SOLUTION INTRAMUSCULAR; INTRAVENOUS at 18:51

## 2025-01-01 RX ADMIN — MORPHINE SULFATE 2 MG: 2 INJECTION, SOLUTION INTRAMUSCULAR; INTRAVENOUS at 22:45

## 2025-01-01 RX ADMIN — LORAZEPAM 1 MG: 2 INJECTION INTRAMUSCULAR; INTRAVENOUS at 04:59

## 2025-01-01 RX ADMIN — MORPHINE SULFATE 3 MG: 4 INJECTION, SOLUTION INTRAMUSCULAR; INTRAVENOUS at 09:59

## 2025-01-01 RX ADMIN — LORAZEPAM 1 MG: 2 INJECTION INTRAMUSCULAR; INTRAVENOUS at 04:45

## 2025-01-01 RX ADMIN — LORAZEPAM 1 MG: 2 INJECTION INTRAMUSCULAR; INTRAVENOUS at 17:34

## 2025-01-01 RX ADMIN — CLOPIDOGREL BISULFATE 75 MG: 75 TABLET, FILM COATED ORAL at 16:25

## 2025-01-01 RX ADMIN — GLYCOPYRROLATE 0.4 MG: 0.2 INJECTION INTRAMUSCULAR; INTRAVENOUS at 03:26

## 2025-01-01 RX ADMIN — BUDESONIDE 0.5 MG: 0.5 INHALANT RESPIRATORY (INHALATION) at 07:57

## 2025-01-01 RX ADMIN — ARFORMOTEROL TARTRATE 15 MCG: 15 SOLUTION RESPIRATORY (INHALATION) at 07:50

## 2025-01-01 RX ADMIN — Medication 10 ML: at 20:28

## 2025-01-01 RX ADMIN — MORPHINE SULFATE 4 MG: 4 INJECTION, SOLUTION INTRAMUSCULAR; INTRAVENOUS at 08:15

## 2025-01-01 RX ADMIN — Medication 10 ML: at 12:00

## 2025-01-01 RX ADMIN — LORAZEPAM 1 MG: 2 INJECTION INTRAMUSCULAR; INTRAVENOUS at 00:49

## 2025-01-01 RX ADMIN — ARFORMOTEROL TARTRATE 15 MCG: 15 SOLUTION RESPIRATORY (INHALATION) at 07:00

## 2025-01-01 RX ADMIN — MORPHINE SULFATE 3 MG: 4 INJECTION, SOLUTION INTRAMUSCULAR; INTRAVENOUS at 04:34

## 2025-01-01 RX ADMIN — MORPHINE SULFATE 3 MG: 4 INJECTION, SOLUTION INTRAMUSCULAR; INTRAVENOUS at 13:27

## 2025-01-01 RX ADMIN — MORPHINE SULFATE 2 MG: 2 INJECTION, SOLUTION INTRAMUSCULAR; INTRAVENOUS at 06:00

## 2025-01-01 RX ADMIN — GLYCOPYRROLATE 0.4 MG: 0.2 INJECTION INTRAMUSCULAR; INTRAVENOUS at 16:38

## 2025-01-01 RX ADMIN — LORAZEPAM 1 MG: 2 INJECTION INTRAMUSCULAR; INTRAVENOUS at 17:51

## 2025-01-01 RX ADMIN — MORPHINE SULFATE 2 MG: 2 INJECTION, SOLUTION INTRAMUSCULAR; INTRAVENOUS at 22:25

## 2025-01-01 RX ADMIN — LORAZEPAM 2 MG: 2 INJECTION INTRAMUSCULAR; INTRAVENOUS at 21:29

## 2025-01-01 RX ADMIN — GLYCOPYRROLATE 0.4 MG: 0.2 INJECTION INTRAMUSCULAR; INTRAVENOUS at 11:25

## 2025-01-01 RX ADMIN — DOCUSATE SODIUM 100 MG: 100 CAPSULE, LIQUID FILLED ORAL at 08:43

## 2025-01-01 RX ADMIN — GLYCOPYRROLATE 0.4 MG: 0.2 INJECTION INTRAMUSCULAR; INTRAVENOUS at 20:53

## 2025-01-01 RX ADMIN — LORAZEPAM 0.5 MG: 2 INJECTION INTRAMUSCULAR; INTRAVENOUS at 23:12

## 2025-01-01 RX ADMIN — LORAZEPAM 2 MG: 2 INJECTION INTRAMUSCULAR; INTRAVENOUS at 05:17

## 2025-01-01 RX ADMIN — MORPHINE SULFATE 2 MG: 2 INJECTION, SOLUTION INTRAMUSCULAR; INTRAVENOUS at 10:51

## 2025-01-01 RX ADMIN — LORAZEPAM 1 MG: 2 INJECTION INTRAMUSCULAR; INTRAVENOUS at 09:24

## 2025-01-01 RX ADMIN — MORPHINE SULFATE 2 MG: 2 INJECTION, SOLUTION INTRAMUSCULAR; INTRAVENOUS at 14:03

## 2025-01-01 RX ADMIN — MORPHINE SULFATE 4 MG: 4 INJECTION, SOLUTION INTRAMUSCULAR; INTRAVENOUS at 05:26

## 2025-01-01 RX ADMIN — GLYCOPYRROLATE 0.4 MG: 0.2 INJECTION INTRAMUSCULAR; INTRAVENOUS at 10:00

## 2025-01-01 RX ADMIN — MORPHINE SULFATE 4 MG: 4 INJECTION, SOLUTION INTRAMUSCULAR; INTRAVENOUS at 21:29

## 2025-01-01 RX ADMIN — GLYCOPYRROLATE 0.4 MG: 0.2 INJECTION INTRAMUSCULAR; INTRAVENOUS at 18:15

## 2025-01-01 RX ADMIN — GUAIFENESIN 600 MG: 600 TABLET, EXTENDED RELEASE ORAL at 08:43

## 2025-01-01 RX ADMIN — LORAZEPAM 2 MG: 2 INJECTION INTRAMUSCULAR; INTRAVENOUS at 18:15

## 2025-01-01 RX ADMIN — CITALOPRAM HYDROBROMIDE 20 MG: 20 TABLET ORAL at 16:25

## 2025-01-01 RX ADMIN — DOCUSATE SODIUM 100 MG: 100 CAPSULE, LIQUID FILLED ORAL at 16:25

## 2025-01-01 RX ADMIN — GUAIFENESIN 600 MG: 600 TABLET, EXTENDED RELEASE ORAL at 16:25

## 2025-01-01 RX ADMIN — MORPHINE SULFATE 3 MG: 4 INJECTION, SOLUTION INTRAMUSCULAR; INTRAVENOUS at 02:13

## 2025-01-01 RX ADMIN — PANTOPRAZOLE SODIUM 40 MG: 40 TABLET, DELAYED RELEASE ORAL at 16:25

## 2025-01-01 RX ADMIN — LORAZEPAM 2 MG: 2 INJECTION INTRAMUSCULAR; INTRAVENOUS at 23:15

## 2025-01-01 RX ADMIN — GLYCOPYRROLATE 0.4 MG: 0.2 INJECTION INTRAMUSCULAR; INTRAVENOUS at 13:12

## 2025-01-01 RX ADMIN — LORAZEPAM 0.5 MG: 2 INJECTION INTRAMUSCULAR; INTRAVENOUS at 08:42

## 2025-01-01 RX ADMIN — MORPHINE SULFATE 4 MG: 4 INJECTION, SOLUTION INTRAMUSCULAR; INTRAVENOUS at 01:55

## 2025-01-01 RX ADMIN — GLYCOPYRROLATE 0.4 MG: 0.2 INJECTION INTRAMUSCULAR; INTRAVENOUS at 13:00

## 2025-01-01 RX ADMIN — GLYCOPYRROLATE 0.4 MG: 0.2 INJECTION INTRAMUSCULAR; INTRAVENOUS at 01:17

## 2025-01-01 RX ADMIN — MORPHINE SULFATE 3 MG: 4 INJECTION, SOLUTION INTRAMUSCULAR; INTRAVENOUS at 10:39

## 2025-01-01 RX ADMIN — MORPHINE SULFATE 2 MG: 2 INJECTION, SOLUTION INTRAMUSCULAR; INTRAVENOUS at 08:10

## 2025-01-01 RX ADMIN — MORPHINE SULFATE 3 MG: 4 INJECTION, SOLUTION INTRAMUSCULAR; INTRAVENOUS at 05:03

## 2025-01-01 RX ADMIN — LORAZEPAM 1 MG: 2 INJECTION INTRAMUSCULAR; INTRAVENOUS at 11:49

## 2025-01-01 RX ADMIN — MORPHINE SULFATE 3 MG: 4 INJECTION, SOLUTION INTRAMUSCULAR; INTRAVENOUS at 04:58

## 2025-01-01 RX ADMIN — MORPHINE SULFATE 2 MG: 2 INJECTION, SOLUTION INTRAMUSCULAR; INTRAVENOUS at 02:15

## 2025-01-01 RX ADMIN — MORPHINE SULFATE 3 MG: 4 INJECTION, SOLUTION INTRAMUSCULAR; INTRAVENOUS at 16:11

## 2025-01-01 RX ADMIN — MORPHINE SULFATE 4 MG: 4 INJECTION, SOLUTION INTRAMUSCULAR; INTRAVENOUS at 18:15

## 2025-01-01 RX ADMIN — MORPHINE SULFATE 2 MG: 2 INJECTION, SOLUTION INTRAMUSCULAR; INTRAVENOUS at 08:42

## 2025-01-01 RX ADMIN — MORPHINE SULFATE 4 MG: 4 INJECTION, SOLUTION INTRAMUSCULAR; INTRAVENOUS at 01:17

## 2025-01-01 RX ADMIN — RIVASTIGMINE 1 PATCH: 4.6 PATCH, EXTENDED RELEASE TRANSDERMAL at 08:43

## 2025-01-01 RX ADMIN — ARFORMOTEROL TARTRATE 15 MCG: 15 SOLUTION RESPIRATORY (INHALATION) at 20:36

## 2025-01-01 RX ADMIN — LORAZEPAM 0.5 MG: 2 INJECTION INTRAMUSCULAR; INTRAVENOUS at 14:03

## 2025-01-01 RX ADMIN — MORPHINE SULFATE 4 MG: 4 INJECTION, SOLUTION INTRAMUSCULAR; INTRAVENOUS at 16:39

## 2025-01-01 RX ADMIN — CITALOPRAM HYDROBROMIDE 20 MG: 20 TABLET ORAL at 08:43

## 2025-01-01 RX ADMIN — GLYCOPYRROLATE 0.4 MG: 0.2 INJECTION INTRAMUSCULAR; INTRAVENOUS at 03:36

## 2025-01-01 RX ADMIN — MORPHINE SULFATE 4 MG: 4 INJECTION, SOLUTION INTRAMUSCULAR; INTRAVENOUS at 13:12

## 2025-01-01 RX ADMIN — ARFORMOTEROL TARTRATE 15 MCG: 15 SOLUTION RESPIRATORY (INHALATION) at 20:15

## 2025-01-01 RX ADMIN — REVEFENACIN 175 MCG: 175 SOLUTION RESPIRATORY (INHALATION) at 07:08

## 2025-01-01 RX ADMIN — MORPHINE SULFATE 2 MG: 2 INJECTION, SOLUTION INTRAMUSCULAR; INTRAVENOUS at 18:49

## 2025-01-01 RX ADMIN — RIVASTIGMINE 1 PATCH: 4.6 PATCH, EXTENDED RELEASE TRANSDERMAL at 18:01

## 2025-01-01 RX ADMIN — LORAZEPAM 1 MG: 2 INJECTION INTRAMUSCULAR; INTRAVENOUS at 13:32

## 2025-01-01 RX ADMIN — LORAZEPAM 2 MG: 2 INJECTION INTRAMUSCULAR; INTRAVENOUS at 10:53

## 2025-01-01 RX ADMIN — MORPHINE SULFATE 3 MG: 4 INJECTION, SOLUTION INTRAMUSCULAR; INTRAVENOUS at 20:48

## 2025-01-01 RX ADMIN — MORPHINE SULFATE 2 MG: 2 INJECTION, SOLUTION INTRAMUSCULAR; INTRAVENOUS at 16:12

## 2025-01-01 RX ADMIN — Medication 10 ML: at 08:42

## 2025-01-01 RX ADMIN — LORAZEPAM 2 MG: 2 INJECTION INTRAMUSCULAR; INTRAVENOUS at 01:17

## 2025-01-01 RX ADMIN — LORAZEPAM 2 MG: 2 INJECTION INTRAMUSCULAR; INTRAVENOUS at 08:15

## 2025-01-01 RX ADMIN — OLANZAPINE 2.5 MG: 5 TABLET, FILM COATED ORAL at 08:43

## 2025-01-01 RX ADMIN — MORPHINE SULFATE 4 MG: 4 INJECTION, SOLUTION INTRAMUSCULAR; INTRAVENOUS at 13:41

## 2025-01-01 RX ADMIN — MORPHINE SULFATE 3 MG: 4 INJECTION, SOLUTION INTRAMUSCULAR; INTRAVENOUS at 00:45

## 2025-01-01 RX ADMIN — LORAZEPAM 1 MG: 2 INJECTION INTRAMUSCULAR; INTRAVENOUS at 04:21

## 2025-01-01 RX ADMIN — MORPHINE SULFATE 4 MG: 4 INJECTION, SOLUTION INTRAMUSCULAR; INTRAVENOUS at 15:09

## 2025-01-01 RX ADMIN — MORPHINE SULFATE 4 MG: 4 INJECTION, SOLUTION INTRAMUSCULAR; INTRAVENOUS at 10:53

## 2025-01-01 RX ADMIN — MORPHINE SULFATE 3 MG: 4 INJECTION, SOLUTION INTRAMUSCULAR; INTRAVENOUS at 13:32

## 2025-01-01 RX ADMIN — MORPHINE SULFATE 4 MG: 4 INJECTION, SOLUTION INTRAMUSCULAR; INTRAVENOUS at 09:59

## 2025-01-01 RX ADMIN — MORPHINE SULFATE 3 MG: 4 INJECTION, SOLUTION INTRAMUSCULAR; INTRAVENOUS at 00:49

## 2025-01-01 RX ADMIN — MORPHINE SULFATE 2 MG: 2 INJECTION, SOLUTION INTRAMUSCULAR; INTRAVENOUS at 15:29

## 2025-01-01 RX ADMIN — BUDESONIDE 0.5 MG: 0.5 INHALANT RESPIRATORY (INHALATION) at 20:15

## 2025-01-01 RX ADMIN — MORPHINE SULFATE 4 MG: 4 INJECTION, SOLUTION INTRAMUSCULAR; INTRAVENOUS at 23:15

## 2025-01-01 RX ADMIN — Medication 10 ML: at 08:44

## 2025-01-01 RX ADMIN — LORAZEPAM 2 MG: 2 INJECTION INTRAMUSCULAR; INTRAVENOUS at 13:41

## 2025-01-01 RX ADMIN — SODIUM CHLORIDE 1000 ML: 9 INJECTION, SOLUTION INTRAVENOUS at 09:07

## 2025-01-01 RX ADMIN — MORPHINE SULFATE 3 MG: 4 INJECTION, SOLUTION INTRAMUSCULAR; INTRAVENOUS at 18:06

## 2025-01-01 RX ADMIN — GLYCOPYRROLATE 0.4 MG: 0.2 INJECTION INTRAMUSCULAR; INTRAVENOUS at 05:17

## 2025-01-01 RX ADMIN — LORAZEPAM 1 MG: 2 INJECTION INTRAMUSCULAR; INTRAVENOUS at 09:29

## 2025-01-01 RX ADMIN — MORPHINE SULFATE 2 MG: 2 INJECTION, SOLUTION INTRAMUSCULAR; INTRAVENOUS at 06:47

## 2025-01-01 RX ADMIN — LORAZEPAM 1 MG: 2 INJECTION INTRAMUSCULAR; INTRAVENOUS at 00:39

## 2025-01-01 RX ADMIN — MORPHINE SULFATE 2 MG: 2 INJECTION, SOLUTION INTRAMUSCULAR; INTRAVENOUS at 11:49

## 2025-01-01 RX ADMIN — CLOPIDOGREL BISULFATE 75 MG: 75 TABLET, FILM COATED ORAL at 08:43

## 2025-01-01 RX ADMIN — LORAZEPAM 2 MG: 2 INJECTION INTRAMUSCULAR; INTRAVENOUS at 13:12

## 2025-01-01 RX ADMIN — LORAZEPAM 1 MG: 2 INJECTION INTRAMUSCULAR; INTRAVENOUS at 01:02

## 2025-04-26 PROBLEM — R41.82 AMS (ALTERED MENTAL STATUS): Status: ACTIVE | Noted: 2025-01-01

## 2025-04-26 NOTE — ED PROVIDER NOTES
Subjective   History of Present Illness  89-year-old male history of COPD on hospice for it, mild dementia presents to the emergency room w/ cc of AMS. Per ems, pt was found down in his [facility] with AMS. Upon arrival, initial saturation in 70s. EMS is familiar with this patient and states his normal mental status is alert and oriented; EMS states they also have concern that his current placement (an [facility]) is inappropriate given this is the third time they have found the patient on the floor and smelling of urine, and they have since filed a report with adult protective services. EMS delivered 1 duoneb en route. HX/ROS limited 2/2 to AMS. Vitals are O2 sat 88% on 2 L NC (baseline O2). Trace wheeze and decreased airmovement auscultated, no peripheral edema, no evidence of eccymosis or ttp or body, no peripheral edema.   Review of Systems    Past Medical History:   Diagnosis Date    Allergic     Seasonal    Allergic rhinitis     Arthritis     Asthma     Brito esophagus     Benign prostatic hyperplasia     Carotid arterial disease     Status post right CEA 4/1/14    COPD (chronic obstructive pulmonary disease)     Coronary artery disease     Dementia     Dyspnea     Erectile dysfunction     GERD (gastroesophageal reflux disease)     Hematoma of frontal scalp 07/08/2021    HL (hearing loss)     Hypothyroidism 1992    Low back pain     PAD (peripheral artery disease)     Prostate cancer     Status post implantation of artificial urinary sphincter         Stroke     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    Urinary tract infection        Allergies   Allergen Reactions    Bactrim [Sulfamethoxazole-Trimethoprim] Rash       Past Surgical History:   Procedure Laterality Date    CAROTID ARTERY ANGIOPLASTY  04/01/2014    COLONOSCOPY  2009    ENDOSCOPY  2009    2001,2003,2006    EYE SURGERY      FRACTURE SURGERY      HERNIA REPAIR  2000    HERNIA REPAIR  2009    INGUINAL HERNIA REPAIR      PARTIAL HIP  ARTHROPLASTY Left 2014    PROSTATECTOMY  2008    SHOULDER SURGERY  2005    SHOULDER SURGERY  2006    SHOULDER SURGERY  2008    TEAR DUCT SURGERY      URINARY SPHINCTER IMPLANT         Family History   Problem Relation Age of Onset    Hypertension Sister     Thyroid disease Sister     Stroke Sister     Depression Sister     Heart failure Sister     Hypertension Brother     Lung cancer Brother     COPD Brother     Alcohol abuse Brother     Coronary artery disease Son         Son  from MI at age 59        Social History     Socioeconomic History    Marital status:     Number of children: 3   Tobacco Use    Smoking status: Former     Current packs/day: 0.00     Types: Cigarettes     Start date: 1947     Quit date: 1962     Years since quittin.3    Smokeless tobacco: Never    Tobacco comments:     Quit    1 ppd for 15 years     Vaping Use    Vaping status: Never Used   Substance and Sexual Activity    Alcohol use: Never    Drug use: Never    Sexual activity: Not Currently     Partners: Female     Birth control/protection: None           Objective   Physical Exam    Procedures       EKG interpretation  Interpreted by: Taras Pineda MD   Date: 2025  Time:  717  Rate: 58 BPM  Rhythm: Sinus rhythm  Impression: Sinus Bradycardia. -STEMI    ED Course                                                       Medical Decision Making  Problems Addressed:  Altered mental status, unspecified altered mental status type: complicated acute illness or injury  COPD exacerbation: complicated acute illness or injury    Amount and/or Complexity of Data Reviewed  Labs: ordered.  Radiology: ordered.  ECG/medicine tests: ordered.    Risk  Prescription drug management.  Decision regarding hospitalization.    89-year-old male history of COPD on hospice for it, mild dementia presents to the emergency room w/ cc of AMS. Per ems, pt was found down in his [facility] with AMS. Upon arrival, initial saturation  in 70s. EMS is familiar with this patient and states his normal mental status is alert and oriented; EMS states they also have concern that his current placement (an [facility]) is inappropriate given this is the third time they have found the patient on the floor and smelling of urine, and they have since filed a report with adult protective services. EMS delivered 1 duoneb en route. HX/ROS limited 2/2 to AMS. Vitals are O2 sat 88% on 2 L NC (baseline O2). Trace wheeze and decreased airmovement auscultated, no peripheral edema, no evidence of eccymosis or ttp or body, no peripheral edema.     Concern for ams likely toxic metabolic, but since found on floor, consideration ICH; eval with abd/card labs, EKG, cxr pelvis x-ray, ctb, UA. Tx w/ duonebx2, methylpred iv.    9 am pt well-appearing, sat 91% on home oxygen; cxr shows chronic hemidiaphragm elevation and associated atelectasis. Cbc/cmp nl. EKG is sinus bradycardia, trop 28 => 24    CTB shows encephalomalacia from old strokes, no acute findings.    I performed a bedside us which showed a decompressed bladder and a balloon that is filled with fluid, indicating that the artificial urethral sphincter should be allowing urine flow. It should deflate after 3 minutes, but it hasn't in over 1 hr, indicating it is no longer functional.    Will deliver 1 L NS to promote urine formation.    10:11 am urine sample obtained, cover ppx with ceftriaxone iv for uti.    Discussed case with daughter at bedside, who states pt is unsafe at [facility] with only 1 staff member at night, he is constantly sliding off bed, left soiled, which she feels like is staffing issue and pt requires higher level of supervision to prevent unintentional self harm during ambulation and cleanslines.    Admit to medicine, Dr. Cantu, hospitalist, who agreed w/ plan for social work consult.    Hst, ems 2/2 to patient condition  Dsp, med surg    Final diagnoses:   COPD exacerbation   Altered mental status, unspecified  altered mental status type       ED Disposition  ED Disposition       ED Disposition   Decision to Admit    Condition   --    Comment   Level of Care: Med/Surg [1]   Diagnosis: AMS (altered mental status) [[id number]]   Admitting Physician: ISADORA CELIS [[id number]]   Attending Physician: ISADORA CELIS [[id number]]                 No follow-up provider specified.       Medication List      No changes were made to your prescriptions during this visit.            Sae Solomon MD  04/26/25 2787

## 2025-04-26 NOTE — ED NOTES
From [facility] facility by EMS. They were called for lift assist. The patient was found to be hypoxic with oxygen saturations in the 80's, with old urine soaked clothing, is supposed to be on oxygen but did not have it.     He apparently told EMS he is having generalized pain.    Normally alert, oriented x 4, in no distress.

## 2025-04-26 NOTE — NURSING NOTE
Patient displaying agitated and paranoid behavior- prn ativan given. Patient also ripped out his IV, creating a small skin tear on RUE in the AC area. New IV placed and wrapped

## 2025-04-26 NOTE — H&P
[facility] HOSPITALIST   HISTORY AND PHYSICAL    Provider, No Known    Subjective       CHIEF COMPLAINT:     confusion    HISTORY OF PRESENT ILLNESS:    89-year-old male PMH as noted below who presents to the ER from his [facility] with AMS.  He is a poor historian no family in room at time of exam so history taken mostly from chart.  Apparently this morning he was found lying on the floor in his [facility] smelling of urine and had low oxygen saturations.  He normally wears 2 L nasal cannula continuously.  EMS was called found that his oxygen saturation was 70% but they placed him on his home 2 L after that.  He seemed to be altered so he was brought to the emergency room.  In the emergency room he had workup but per the family he was remaining altered and given this admission was requested.      REVIEW OF SYSTEMS:  Please see the above history of present illness for pertinent positives and negatives.      Personal History       Past Medical History:   Diagnosis Date    Allergic     Seasonal    Allergic rhinitis     Arthritis     Asthma     Brito esophagus     Benign prostatic hyperplasia     Carotid arterial disease     Status post right CEA 4/1/14    COPD (chronic obstructive pulmonary disease)     Coronary artery disease     Dementia     Dyspnea     Erectile dysfunction     GERD (gastroesophageal reflux disease)     Hematoma of frontal scalp 07/08/2021    HL (hearing loss)     Hypothyroidism 1992    Low back pain     PAD (peripheral artery disease)     Prostate cancer     Status post implantation of artificial urinary sphincter         Stroke     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    Urinary tract infection      Past Surgical History:   Procedure Laterality Date    CAROTID ARTERY ANGIOPLASTY  04/01/2014    COLONOSCOPY  2009    ENDOSCOPY  2009 2001,2003,2006    EYE SURGERY      FRACTURE SURGERY      HERNIA REPAIR  2000    HERNIA REPAIR  2009    INGUINAL HERNIA REPAIR       PARTIAL HIP ARTHROPLASTY Left 2014    PROSTATECTOMY  2008    SHOULDER SURGERY  2005    SHOULDER SURGERY  2006    SHOULDER SURGERY  2008    TEAR DUCT SURGERY      URINARY SPHINCTER IMPLANT       Family History   Problem Relation Age of Onset    Hypertension Sister     Thyroid disease Sister     Stroke Sister     Depression Sister     Heart failure Sister     Hypertension Brother     Lung cancer Brother     COPD Brother     Alcohol abuse Brother     Coronary artery disease Son         Son  from MI at age 59      Social History     Tobacco Use    Smoking status: Former     Current packs/day: 0.00     Types: Cigarettes     Start date: 1947     Quit date: 1962     Years since quittin.3    Smokeless tobacco: Never    Tobacco comments:     Quit    1 ppd for 15 years     Vaping Use    Vaping status: Never Used   Substance Use Topics    Alcohol use: Never    Drug use: Never     Medications Prior to Admission   Medication Sig Dispense Refill Last Dose/Taking    citalopram (CeleXA) 20 MG tablet Take 1 tablet by mouth Daily. 90 tablet 3 Morning    levothyroxine (SYNTHROID, LEVOTHROID) 50 MCG tablet TAKE 1 TABLET BY MOUTH DAILY FOR THYROID 90 tablet 0 Morning    morphine 20 MG/ML concentrated solution 20mg/ml Take 0.25 mL by mouth Daily As Needed (prn for pain).   2025    pantoprazole (PROTONIX) 40 MG EC tablet TAKE 1 TABLET BY MOUTH DAILY 90 tablet 3 Morning    albuterol sulfate  (90 Base) MCG/ACT inhaler Inhale 2 puffs Every 4 (Four) Hours As Needed for Wheezing or Shortness of Air. 8 g 0 Unknown    cetirizine (zyrTEC) 10 MG tablet Take 1 tablet by mouth Daily As Needed for Allergies.   Morning    clopidogrel (PLAVIX) 75 MG tablet TAKE 1 TABLET BY MOUTH DAILY 90 tablet 3 Unknown    docusate sodium (COLACE) 100 MG capsule Take 1 capsule by mouth Daily.   Unknown    guaiFENesin (MUCINEX) 600 MG 12 hr tablet Take 1 tablet by mouth Daily.   Morning    ipratropium-albuterol (DUO-NEB)  "0.5-2.5 mg/3 ml nebulizer USES 1 VIAL VIA NEBULIZER 4  TIMES DAILY FOR CHRONIC  OBSTRUCTIVE LUNG DISEASE (Patient taking differently: 3 mL 4 (Four) Times a Day.) 1080 mL 3 Unknown    LORazepam (ATIVAN) 0.5 MG tablet Take 1 tablet by mouth Daily As Needed for Anxiety.       multivitamin (MULTI VITAMIN DAILY PO) Take 1 tablet by mouth Every Night.   Unknown    rivastigmine (EXELON) 9.5 MG/24HR patch Place 1 patch on the skin as directed by provider Daily.   Unknown    sennosides-docusate (Senexon-S) 8.6-50 MG per tablet Take 1 tablet by mouth Daily.   Morning    traZODone (DESYREL) 100 MG tablet Take 1 tablet by mouth Every Night. Indications: Trouble Sleeping 90 tablet 3 Unknown    Trelegy Ellipta 100-62.5-25 MCG/ACT inhaler INHALE 1 INHALATION BY MOUTH  DAILY FOR CHRONIC OBSTRUCTIVE  LUNG DISEASE (Patient taking differently: 1 puff Daily.) 180 each 3 Unknown     Allergies:  Bactrim [sulfamethoxazole-trimethoprim]    Immunization History   Administered Date(s) Administered    COVID-19 (PFIZER) 12YRS+ (COMIRNATY) 11/16/2023    COVID-19 (PFIZER) Purple Cap Monovalent 02/01/2021, 02/22/2021, 09/25/2021    Covid-19 (Pfizer) Gray Cap Monovalent 06/01/2022    FLUAD TRI 65YR+ 10/16/2019    Fluad Quad 65+ 10/16/2020    Fluzone High-Dose 65+YRS 10/07/2015, 10/18/2016, 10/06/2017, 10/17/2018, 10/16/2019    Fluzone High-Dose 65+yrs 10/14/2021    Pneumococcal Conjugate 13-Valent (PCV13) 10/18/2016    Pneumococcal Polysaccharide (PPSV23) 10/13/2003, 01/01/2008, 05/11/2008    Shingrix 08/23/2022    Td (TDVAX) 10/13/2003    Tdap 07/20/2010, 07/08/2021    Zostavax 07/20/2010, 05/11/2016       Objective       Objective     Vital Signs  Temp:  [97.6 °F (36.4 °C)-97.9 °F (36.6 °C)] 97.9 °F (36.6 °C)  Heart Rate:  [57-81] 81  Resp:  [16-20] 18  BP: (106-137)/(59-81) 110/59    Flowsheet Rows      Flowsheet Row First Filed Value   Admission Height 180.3 cm (71\") Documented at 04/26/2025 0705   Admission Weight 75.7 kg (166 lb 14.2 oz) " Documented at 04/26/2025 1136             Physical Exam:  Physical Exam  Vitals reviewed.   Constitutional:       Comments: Chronically ill-appearing   Cardiovascular:      Rate and Rhythm: Normal rate.   Pulmonary:      Effort: No respiratory distress.      Comments: Some minimal wheezing throughout  Abdominal:      Palpations: Abdomen is soft.      Tenderness: There is no abdominal tenderness.   Musculoskeletal:      Right lower leg: No edema.      Left lower leg: No edema.   Skin:     Comments: Chronic sacral decubitus  Chronic pressure injury to bilateral ankles   Neurological:      Mental Status: He is alert. He is disoriented.      Comments: Alert to person   Psychiatric:         Behavior: Behavior normal.         Emotional Behavior:       Mood and Affect:         Depression  none               Anxiety  mild    Debilities:      Agitation  none    Results Review:   I have reviewed these results below from the time of this admission to 4/26/2025 13:11 EDT :  [x]  Laboratory  []  Microbiology  [x]  Radiology  []  EKG/Telemetry   []  Cardiology/Vascular   []  Pathology  [x]  Old records  []  Other:  Most notable findings include:         Assessment / Plan     Assessment & Plan     Active Hospital Problems:  Active Hospital Problems    Diagnosis     **AMS (altered mental status)      Plan:     Delirium on chronic dementia  -CT head 4/26: Encephalomalacia in the frontal lobes which are stable no acute intracranial abnormality  - Treat underlying issues   -continue reorientation strategies    Generalized weakness  - Consult PT OT    End-stage COPD, chronic hypoxic respiratory failure  - No exacerbation  - Continue nebulizers  - Continue pulmonary toilet    Chronic dementia  - See above  - Luis Manuel reviewed and he is on as needed Ativan continue    --- Full home med list pending will reconcile once verified      CODE STATUS: DNR/DNI per prior notes and living will  DVT ppx-SCDs      I discussed the patient's findings  and my recommendations with the nursing staff and discussed with the ER physician about admission    Disposition: Per the notes patient possibly on hospice for COPD?.  Consult case management to work with family about disposition    Electronically signed by Cal Cantu DO, 04/26/25, 13:11 EDT.        Note disclaimer: At [facility], we believe that sharing information builds trust and better relationships. You are receiving this note because you recently visited [facility]. It is possible you will see health information before a provider has talked with you about it. This kind of information can be easy to misunderstand. To help you fully understand what it means for your health, we urge you to discuss this note with your provider.

## 2025-04-26 NOTE — PLAN OF CARE
Goal Outcome Evaluation:  Plan of Care Reviewed With: patient        Progress: no change   Outcome Evaluation: Pt VSS, continues confusion, chronic dementia, frequent reorientation strategies utilized.  IV rocephin.  Prn ativan see emar, flowsheets.  O2@2L, RT titrating, see RT charting.  Wound consult r/t PTA wound, zinc and protective mepilexs applied.  Falls precautions, per report, pt hx of frequent falls at facility.  Plan for PT, OT, SLP, CM.

## 2025-04-27 PROBLEM — Z51.5 END OF LIFE CARE: Status: ACTIVE | Noted: 2025-01-01

## 2025-04-27 NOTE — SIGNIFICANT NOTE
04/27/25 0948   Rehab Evaluation   Session Not Performed unable to evaluate, medical status change;other (see comments)   Evaluation Not Performed, Comment Hold today due to current medical status and agitation. [facility] nurse currently in to see pt and follow up. She states he is a curent [facility] pt at his assisted living facility.

## 2025-04-27 NOTE — SIGNIFICANT NOTE
04/27/25 0838   OTHER   Discipline occupational therapist   Rehab Time/Intention   Session Not Performed unable to evaluate, medical status change;other (see comments)  (Hold today per RN)   Recommendation   OT - Next Appointment 04/27/25

## 2025-04-27 NOTE — SIGNIFICANT NOTE
04/27/25 1010   OTHER   Discipline physical therapist   Rehab Time/Intention   Session Not Performed unable to evaluate, medical status change  (Hold therapy today per RN)

## 2025-04-27 NOTE — DISCHARGE PLACEMENT REQUEST
"Taras Goldberg JOSH (89 y.o. Male)       Date of Birth   12/31/1932    Social Security Number       Address   [address] [place] [address]    Home Phone   [phone]    MRN   [id number]       Orthodoxy   Roman Catholic    Marital Status                               Admission Date   4/26/2025    Admission Type   Emergency    Admitting Provider   Cal Cantu DO    Attending Provider   Cal Cantu DO    Department, Room/Bed   [facility] MED SURG, 1408/1       Discharge Date       Discharge Disposition       Discharge Destination                                 Attending Provider: Cal Cantu DO    Allergies: Bactrim [Sulfamethoxazole-trimethoprim]    Isolation: None   Infection: None   Code Status: No CPR    Ht: 180 cm (70.87\")   Wt: 75.7 kg (166 lb 14.2 oz)    Admission Cmt: None   Principal Problem: AMS (altered mental status) [R41.82]                   Active Insurance as of 4/26/2025       Primary Coverage       Payor Plan Insurance Group Employer/Plan Group    [employer] MEDICARE REPLACEMENT [employer]HC MEDICARE ADVANTAGE GROUP [id number]       Payor Plan Address Payor Plan Phone Number Payor Plan Fax Number Effective Dates    [address] [address]   7/1/2023 - None Entered    [facility] [id number]         Subscriber Name Subscriber Birth Date Member ID       TARAS GOLDBERG 12/31/1932 [id number]                     Emergency Contacts        (Rel.) Home Phone Work Phone Mobile Phone    Joy Hayden (Daughter) [phone] -- [phone]    TyreeJyotsna (Daughter) [phone] -- [phone]                "

## 2025-04-27 NOTE — PLAN OF CARE
Goal Outcome Evaluation:  Plan of Care Reviewed With: patient        Progress: no change  Outcome Evaluation: Restless through the night, VSS, assist with ADLs, refused PO meds, Ativan given, effective

## 2025-04-27 NOTE — NURSING NOTE
hospitals Visit Report    Pawel Goldberg  [id number]  4/27/2025    Admission R/T [facility] Dx: yes, STAY IS RELATED TO HIS HOSPICE DX, COVERED BY THE HOSPICE BENEFIT AND MEETS GIP PER DR SWIFT WITH HOSPICE, EFFECTIVE 4/26/25.       Reason for [facility] Admission:    Review of Visit: Patient is a 89 y.o. male with a primary [facility] diagnosis of COPD. Admitted to Aleksandra Victor for GIP for symptom management of restlessness, anxiety. No active isolations     PPS: 20%    VS: Temp:  [97.4 °F (36.3 °C)-98.8 °F (37.1 °C)] 97.5 °F (36.4 °C)  Heart Rate:  [48-81] 50  Resp:  [18-24] 20  BP: (103-144)/(55-99) 144/99    Medications in 24 hours:  PRN LORAZEPAM 0.5MG IV X2    Recommendations:  Continue to monitor for signs of decline and provide comfort measures. Contact [facility] at [phone] with any questions or concerns and at TOD.   STOP ALL PO MEDS  START IV LORAZEPAM 1MG Q8H AND Q1H PRN    Assessment:  Patient is AWAKE BUT CONFUSED AND AGITATED. PICKING AT HIS GOWN AND GRABBING AT THE AIR. HE IS UNABLE TO ANSWER QUESTIONS. LUNGS ARE DIMINISHED BILAT, ON ROOM AIR. SKIN IS WARM TO TOUCH. EXTERNAL CATHETER IN PLACE AND PER DTR AT BEDSIDE, PT HAS A VALVE/PENIAL DEVICE AND FC WOULD BE NOT FEASIBLE. PO INTAKE YESTERDAY PER FAMILY BUT NO PO INTAKE TODAY PER DTR AND STAFF.     Collaboration:  Spoke with pts family at the bedside with condition update and support provided. Training provided regarding assessment findings, disease progression, symptom management, recommendations and expected length of stay. Family v/u of pts condition and all questions were answered. Collaborated with Aleksandra Vcitor RN and CCP about pts condition and recommendations. EPIC MESSAGE SENT TO ATTENDING MD, STAFF RN AND CM RE THIS PT'S POC, ALL VU.     Disposition:  Patient meets GIP criteria, requiring frequent administration and continued titration of parenteral medications to achieve and maintain symptom management. Patient appears  to be unsafe for transport at this time, requiring increasing symptom management and frequent RN assessment. If patient were to stabilize he would require LTC placement due to increased daily care needs.  [facility] to monitor for changes, assess needs and provide support.       Linda Corey, RN  [facility] Health Visit Nurse  Scattered Bed Team    [phone]

## 2025-04-27 NOTE — PLAN OF CARE
Goal Outcome Evaluation:  Plan of Care Reviewed With: patient        Progress: no change  Outcome Evaluation: Pt VSS, Continues confusion, chronic dementia, frequent reorientation strategies utilized.  Hospice saw today, [facility]. Pain, agitation, anxiety improved with prn meds, see emar, flowsheets.  continues wound consult r/t PTA wound, zinc and protective mepilexs applied.  Continues falls precautions, fall mat in place.Discharge Plan for SNF with Hospice, see CM note.

## 2025-04-27 NOTE — CASE MANAGEMENT/SOCIAL WORK
Discharge Planning Assessment   Mike     Patient Name: Pawel Goldberg  MRN: [id number]  Today's Date: 4/27/2025    Admit Date: 4/26/2025    Plan: [facility] pending for [facility]   Discharge Needs Assessment       Row Name 04/27/25 1107       Living Environment    People in Home facility resident    Current Living Arrangements assisted living facility       Transition Planning    Patient/Family Anticipates Transition to long-term care facility    Patient/Family Anticipated Services at Transition hospice care                   Discharge Plan       Row Name 04/27/25 1057       Plan    Plan Referral pending for [facility] with [facility] services    Patient/Family in Agreement with Plan yes    Plan Comments Pt resting peacefully, met with pt's daughter Joy at [facility], introduced self and explained role. Pt is current with Qu[facility] (ID #[id number]) at [facility], however they do not want pt to return there and would like to look into transferring pt to [facility]. Call to [facility] and spoke with Namrata, she said a hospice RN will be visiting with pt/family today to discuss. Referral to [facility] and left VM with Laurie/Linda regarding referral. CCP will follow. Addendum: spoke with Radha/Quirino and pt has been changed to [facility] Scattered Bed.                  Continued Care and Services - Admitted Since 4/26/2025       Destination       Service Provider Request Status Services Address Phone Fax Patient Preferred    [facility] Considering -- [address] [place] [address] [phone] --              Community Resources       Service Provider Request Status Services Address Phone Fax Patient Preferred    [facility] Pending - Request Sent -- [address] [facility] [address] [phone] --                     Demographic Summary       Row Name 04/27/25  1106       General Information    Admission Type observation    Arrived From emergency department    Required Notices Provided Observation Status Notice    Referral Source admission list    Reason for Consult discharge planning    Preferred Language English       Contact Information    Permission Granted to Share Info With                    Functional Status    No documentation.                  Psychosocial    No documentation.                  Abuse/Neglect    No documentation.                  Legal    No documentation.                  Substance Abuse    No documentation.                  Patient Forms    No documentation.                     Jerome Giraldo RN

## 2025-04-27 NOTE — PROGRESS NOTES
"Hospital Medicine Team    LOS 0 days      Patient Care Team:  Provider, No Known as PCP - General  Hardik Senior MD as Consulting Physician (Urology)  MAURO Peters MD as Consulting Physician (Ophthalmology)  Brant Polanco MD as Consulting Physician (Pulmonary Disease)  Charlie Goldstein DO as Consulting Physician (Neurology)  Rowdy Moore MD as Consulting Physician (Cardiology)  Liliam De La O PT as Physical Therapist (Physical Therapy)      Subjective       Chief Complaint:  f/u confusion    Subjective    He remains confused today HPI limited from patient.  Nurse also reported some continued confusion overnight    Objective       Vital Signs  Temp:  [97.4 °F (36.3 °C)-98.8 °F (37.1 °C)] 97.5 °F (36.4 °C)  Heart Rate:  [48-81] 50  Resp:  [18-24] 20  BP: (103-144)/(55-99) 144/99  Oxygen Therapy  SpO2: 92 %  Pulse Oximetry Type: Intermittent  Device (Oxygen Therapy): nasal cannula  Flow (L/min) (Oxygen Therapy): 1  Flowsheet Rows      Flowsheet Row First Filed Value   Admission Height 180.3 cm (71\") Documented at 04/26/2025 0705   Admission Weight 75.7 kg (166 lb 14.2 oz) Documented at 04/26/2025 1136                Physical Exam:  Physical Exam  Vitals reviewed.   Constitutional:       Comments: Chronically ill-appearing   Cardiovascular:      Rate and Rhythm: Normal rate.   Pulmonary:      Effort: No respiratory distress.   Neurological:      Mental Status: He is disoriented.   Psychiatric:         Behavior: Behavior normal.           Results Review:    I reviewed the patient's new clinical results.    [x]  Laboratory  []  Microbiology  []  Radiology  []  EKG/Telemetry   []  Cardiology/Vascular   []  Pathology  []  Old records  []  Other:      X-rays, labs reviewed personally by physician.    Medication Review:   I have reviewed the patient's current medication list    Scheduled Meds  arformoterol, 15 mcg, Nebulization, BID - RT   And  budesonide, 0.5 mg, Nebulization, BID - RT  citalopram, 20 " mg, Oral, Daily  clopidogrel, 75 mg, Oral, Daily  docusate sodium, 100 mg, Oral, Daily  guaiFENesin, 600 mg, Oral, Daily  levothyroxine, 50 mcg, Oral, Daily  LORazepam, 1 mg, Intravenous, Q8H  OLANZapine, 2.5 mg, Oral, BID  pantoprazole, 40 mg, Oral, Daily  rivastigmine, 1 patch, Transdermal, Daily  sodium chloride, 10 mL, Intravenous, Q12H  traZODone, 100 mg, Oral, Nightly        Meds Infusions       Meds PRN    acetaminophen **OR** acetaminophen **OR** acetaminophen    albuterol    aluminum-magnesium hydroxide-simethicone    senna-docusate sodium **AND** polyethylene glycol **AND** bisacodyl **AND** bisacodyl    Calcium Replacement - Follow Nurse / BPA Driven Protocol    LORazepam    Magnesium Low Dose Replacement - Follow Nurse / BPA Driven Protocol    morphine    ondansetron ODT **OR** ondansetron    Phosphorus Replacement - Follow Nurse / BPA Driven Protocol    Potassium Replacement - Follow Nurse / BPA Driven Protocol    sodium chloride    sodium chloride        Assessment / Plan       Active Hospital Problems:  Active Hospital Problems    Diagnosis  POA    **AMS (altered mental status) [R41.82]  Yes      Resolved Hospital Problems   No resolved problems to display.         Delirium on chronic dementia  -CT head 4/26: Encephalomalacia in the frontal lobes which are stable no acute intracranial abnormality  - Could be a progression of his dementia as well  -Continue Ativan and comfort measures as directed by hospice      Generalized weakness  - See above     End-stage COPD, chronic hypoxic respiratory failure  - No exacerbation  - Continue nebulizers  - Continue pulmonary toilet     Chronic dementia  - See above    CODE STATUS: DNR/DNI comfort measures, currently on hospice care  DVT ppx-SCDs    Plan for disposition: Hospice is following while here.   following looks like plan may be SNF with hospice at discharge    Electronically signed by Cal Cantu DO, 04/27/25, 11:53 EDT.        Note  disclaimer: At [facility], we believe that sharing information builds trust and better relationships. You are receiving this note because you recently visited [facility]. It is possible you will see health information before a provider has talked with you about it. This kind of information can be easy to misunderstand. To help you fully understand what it means for your health, we urge you to discuss this note with your provider.

## 2025-04-28 NOTE — PROGRESS NOTES
"SERVICE: [facility] HOSPITALIST    CONSULTANTS:  Hospice     CHIEF COMPLAINT: confusion     SUBJECTIVE: Patient seen and examined at bedside. He is currently obtunded and unable to answer questions.  Hospice RN confirmed with me that he meets criteria to stay under Hospice scattered bed and that I do not have to do a discharge readmit.       OBJECTIVE:    Physical exam is largely unchanged from previous exam, except where documented below, examination is accurate as of 4/28/2025    Physical Exam:  General: Patient is lying in bed; obtunded  HENT: Head is atraumatic, normocephalic  Cardiovascular: RRR, S1-S2   Respiratory: Shallow breathing with diminished breath sounds at bases   Abdominal/GI: Soft, nontender, bowel sounds present. No rebound or guarding present.   Extremities: No peripheral edema noted.       BP (!) 85/53 (BP Location: Right arm, Patient Position: Lying) Comment: comfort care only  Pulse 66   Temp 96.8 °F (36 °C) (Temporal)   Resp 16   Ht 180 cm (70.87\")   Wt 75.7 kg (166 lb 14.2 oz)   SpO2 92%   BMI 23.36 kg/m²     MEDS/LABS REVIEWED AND ORDERED    LORazepam, 1 mg, Intravenous, Q4H  Morphine, 2 mg, Intravenous, Q4H  rivastigmine, 1 patch, Transdermal, Daily          LAB/DIAGNOSTICS:    Lab Results (last 24 hours)       ** No results found for the last 24 hours. **          ECG 12 Lead Dyspnea   Final Result   HEART RATE=58  bpm   RR Oxitvasi=7244  ms   DE Pidlktvw=064  ms   P Horizontal Axis=26  deg   P Front Axis=33  deg   QRSD Interval=85  ms   QT Xpdaqzar=194  ms   SEdH=086  ms   QRS Axis=21  deg   T Wave Axis=20  deg   - NORMAL ECG -   Sinus rhythm   PACs have resolved   Electronically Signed By: Mana Lund ([facility]) 2025-04-26 17:12:40   Date and Time of Study:2025-04-26 07:17:39        Results for orders placed during the hospital encounter of 04/17/24    Adult Transthoracic Echo Complete w/ Color, Spectral and Contrast if Necessary Per Protocol    Interpretation " "Summary    Left ventricular systolic function is normal. Calculated left ventricular EF = 62.4%    Left ventricular diastolic function is consistent with (grade I) impaired relaxation and age.    There is moderate calcification of the aortic valve.    No radiology results for the last day      ASSESSMENT/PLAN:    Delirium on chronic dementia  -CT head 4/26: Encephalomalacia in the frontal lobes which are stable no acute intracranial abnormality  - Could be a progression of his dementia as well  -patient was followed by Hospice outpatient and now they have seen him here, talked to family, and agreed to accept him under scattered bed her at [place]  -I will started scheduled IV morphine and Ativan q 4 hours with prn doses q 1 hour  -add prn IV Robinul for secretions       Generalized weakness  - See above     End-stage COPD, chronic hypoxic respiratory failure  - No exacerbation  - stop nebs and treatment  - oxygen prn for comfort      Chronic dementia  - See above     CODE STATUS: DNR/DNI comfort measures, currently on hospice care  DVT ppx-SCDs    Patient is now admitted to Hospice scattered bed at [facility].  No lab draws; limit vitals, NPO        Please note portions of this assessment/plan may have been copied and pasted, but I have personally seen this patient and reviewed each line of this assessment and plan for accuracy and made updates to reflect my necessary changes on 4/28/2025        Taz Crowder,   04/28/25  12:31 EDT    At [facility], we believe that sharing information builds trust and better relationships. You are receiving this note because you recently visited [facility]. It is possible you will see health information before a provider has talked with you about it. This kind of information can be easy to misunderstand. To help you fully understand what it means for your health, we urge you to discuss this note with your provider.    \"Dictated utilizing Dragon dictation\"  "

## 2025-04-28 NOTE — SIGNIFICANT NOTE
04/28/25 0836   Rehab Evaluation   Session Not Performed other (see comments)   Evaluation Not Performed, Comment Family wishes for [facility] care and currently Hospice inpt bed with transfer to LTC for end of life care when stable. No further evaluation warranted at this time. Comfort care. Will complete order at this time.

## 2025-04-28 NOTE — PLAN OF CARE
Goal Outcome Evaluation:  Plan of Care Reviewed With: patient        Progress: declining  Outcome Evaluation: Pt continues Hospice Comfort Care. pain, anxiety improved with current regimen, see emar, flowsheets.  Family at bedside throughout day.  Continues mepilex to coccyx, see wound; protective mepilex to heels.  Pt resting at this time.

## 2025-04-28 NOTE — PLAN OF CARE
Goal Outcome Evaluation:              Outcome Evaluation: Pt is still very confused, chronic dementia, PRN and scheduled meds given as ordered. Pt rested well through the night.

## 2025-04-28 NOTE — CONSULTS
[facility] Visit Report     Pawel Goldberg  [id number]  4/28/2025     [facility] Dx: yes, this stay is related and covered     Reason for [facility] Admission:    Patient is a 89 y.o. male with a primary [facility] diagnosis of COPD. Admitted to [facility] for [facility] for symptom management of restlessness, anxiety. No active isolations      PPS: 10%        Medications in 24 hours:  -IV ativan 0.5MG PRN x3  -IV ativan 1mg TID routine  -IV morphine 2mg PRN x7     Recommendations:  Continue to monitor for signs of decline and provide comfort measures. Contact [facility] at [phone] with any questions or concerns and at TOD.     Call to DR Lemus for recommendaitons  -IV ativan 1mg q4h routine and q1h PRN  -IV morphine 2mg q4h routine and q1h PRN     Discussed with Romy pt dtr at bedside the medication regimen as pt has required multiple PRNs to achieve this level of comfort. She was in agreement with scheduling medications.     Secure message to attending and also discussed with facility RN in person     Assessment:  Pt is in bed minimally responsive to movement. He was in bed with eyes closed open mouth breathing. He has declined overnight and is now 10% PPS. HE is no longer eating/drinking and is relying on IV medications for s/m. He was warm to touch with palpable peripheral pulses. Lungs clear on 2L n/c open mouth breathing with unlabored respirations. F/c patent with concentrated UOP and hypoactive b/s. Pt appeared comfortable woith no non-verbal s/s of pain/distress/soa.   Discussed with Romy pt dtr at bedside the medication regimen as pt has required multiple PRNs to achieve this level of comfort. She was in agreement with scheduling medications.     Collaboration:  Spoke with pts dtr Romy at the bedside with condition update and support provided. Training provided regarding assessment findings, disease progression, symptom management, recommendations and expected length of stay.  Family v/u of pts condition and all questions were answered. Collaborated with Aleksandra Victor RN, Hospital attending and [facility] MD about pts condition and recommendations.       Disposition:  Patient meets GIP criteria, requiring frequent administration and continued titration of parenteral medications to achieve and maintain symptom management. Patient appears to be unsafe for transport at this time, requiring increasing symptom management and frequent RN assessment. If patient were to stabilize he would require LTC placement due to increased daily care needs.  Will continue [facility] RN visits to monitor for changes, assess needs and provide support.         Radha Salazar, RN  [facility] Bed Team  [phone]

## 2025-04-28 NOTE — PROGRESS NOTES
Nutrition Services    Patient Name:  Pawel Goldberg  YOB: 1932  MRN: [id number]  Admit Date:  4/26/2025    Consult MST    Pt noted to be comfort measures, hospice.    Dx: dementia, end stage COPD  Diet: Soft, Chopped  PO: 0-50%    BMI WNL  Meds reviewed  Labs: reviewed  Skin: coccyx    Will remain available.    Electronically signed by:  Tierney Hastings RD  04/28/25 10:45 EDT

## 2025-04-29 NOTE — NURSING NOTE
[facility] Visit Report     Pawel Goldberg  [id number]  4/29/2025     Admission R/T [facility] Dx: yes, this stay is related and covered     Reason for [facility] Admission:    Patient is a 89 y.o. male with a primary [facility] diagnosis of COPD. [facility] for symptom management of restlessness, anxiety, congestion. No active isolations      PPS: 10%        Medications in 24 hours:  -IV ativan 1mg q4h routine and q1h PRN x1  -IV morphine 2mg q4h routine and q1h PRN x2  -IV robinul 0.4mg PRN x1     Recommendations:  Continue to monitor for signs of decline and provide comfort measures. Contact [facility] at [phone] with any questions or concerns and at TOD.     -IV morphine 3mg q4h routine and q1h PRN     Dr Harper called as pt appears under medicated on current regimen. Discussed with family at bedside and secure message sent to attending and discussed with facility RN in person    -Also turns every 4 hours with medications for more comfort focused care.      Assessment:  Pt is in bed showing facial grimacing with any touch or movement. Per dtr at bedside when pt is being turned he is moaning and showing s/s of unmanaged pain. She also reported that about the 3 hour maya pt begins to arouse and become restless. During my visit ( 2 hours post meds) he showed facial grimacing, movement in bed with any touch. Lungs clear on ra after 02 was weaned off. No s/s of soa/trouble breathing. Pt was warm to touch with palpable peripheral pulses. Feet are cooler to touch with mottling beginning to all toes. Purewick in place, abd soft with +b/s.   Discussed GOC with dtr at bedside and she was in agreement with increasing medications.     Collaboration:  Spoke with pts dtr Romy at the bedside with condition update and support provided. Training provided regarding assessment findings, disease progression, symptom management, recommendations and expected length of stay. Family v/u of pts  condition and all questions were answered. Collaborated with Aleksandra Victor RN, Hospital attending and [facility] MD about pts condition and recommendations.       Disposition:  Patient meets GIP criteria, requiring frequent administration and continued titration of parenteral medications to achieve and maintain symptom management. Patient appears to be unsafe for transport at this time, requiring increasing symptom management and frequent RN assessment. If patient were to stabilize he would require LTC placement due to increased daily care needs.  Will continue [facility] RN visits to monitor for changes, assess needs and provide support.         Radha Salazar RN  [facility] Bed Team  [phone]

## 2025-04-29 NOTE — PLAN OF CARE
Goal Outcome Evaluation:  Plan of Care Reviewed With: patient, child        Progress: declining  Outcome Evaluation: Pt continues Hospice Comfort Care, meds for pain, anxiety as ordered, see emar, flowsheets.  Family at bedside throughout day.  Continues mepilex to coccyx, see wound; protective mepilex to heels.  Pt resting at this time.

## 2025-04-29 NOTE — PROGRESS NOTES
"SERVICE: [facility] HOSPITALIST    CONSULTANTS:  Hospice     CHIEF COMPLAINT: confusion     SUBJECTIVE: Patient seen and examined at bedside. He is currently obtunded and unable to answer questions. Daughter at bedside says he is resting comfortably       OBJECTIVE:    Physical exam is largely unchanged from previous exam, except where documented below, examination is accurate as of 4/29/2025    Physical Exam:  General: Patient is lying in bed; sleeping; obtunded  HENT: Head is atraumatic, normocephalic  Cardiovascular: RRR, S1-S2   Respiratory: Slow, shallow breathing with diminished breath sounds bilateral bases   Abdominal/GI: Soft, nontender, bowel sounds present. No rebound or guarding present.   Extremities: No peripheral edema noted.       BP 99/75 (BP Location: Right arm, Patient Position: Lying)   Pulse 62   Temp 97 °F (36.1 °C) (Temporal)   Resp 16   Ht 180 cm (70.87\")   Wt 75.7 kg (166 lb 14.2 oz)   SpO2 92%   BMI 23.36 kg/m²     MEDS/LABS REVIEWED AND ORDERED    LORazepam, 1 mg, Intravenous, Q4H  Morphine, 2 mg, Intravenous, Q4H  rivastigmine, 1 patch, Transdermal, Daily          LAB/DIAGNOSTICS:    Lab Results (last 24 hours)       ** No results found for the last 24 hours. **          ECG 12 Lead Dyspnea   Final Result   HEART RATE=58  bpm   RR Bnyvgtrw=1178  ms   NH Ttjhvczs=391  ms   P Horizontal Axis=26  deg   P Front Axis=33  deg   QRSD Interval=85  ms   QT Ncgnqqqn=473  ms   CIhK=140  ms   QRS Axis=21  deg   T Wave Axis=20  deg   - NORMAL ECG -   Sinus rhythm   PACs have resolved   Electronically Signed By: Mana Lund ([facility]) 2025-04-26 17:12:40   Date and Time of Study:2025-04-26 07:17:39        Results for orders placed during the hospital encounter of 04/17/24    Adult Transthoracic Echo Complete w/ Color, Spectral and Contrast if Necessary Per Protocol    Interpretation Summary    Left ventricular systolic function is normal. Calculated left ventricular EF = 62.4%   " " Left ventricular diastolic function is consistent with (grade I) impaired relaxation and age.    There is moderate calcification of the aortic valve.    No radiology results for the last day      ASSESSMENT/PLAN:    Delirium on chronic dementia  -CT head 4/26: Encephalomalacia in the frontal lobes which are stable no acute intracranial abnormality  - likely a progression of his dementia as well  -patient was followed by Hospice outpatient and now they have seen him here, talked to family, and agreed to accept him under scattered bed her at [place]  -continue scheduled IV morphine and Ativan q 4 hours with prn doses q 1 hour  - prn IV Robinul for secretions       Generalized weakness  - as above      End-stage COPD, chronic hypoxic respiratory failure  - No exacerbation  - stop nebs and treatment  - oxygen prn for comfort      Chronic dementia  - See above     CODE STATUS: DNR/DNI comfort measures, currently on hospice care  DVT ppx-SCDs    Patient is now admitted to Hospice scattered bed at [facility].  No lab draws; limit vitals, NPO        I updated daughter at bedside about our current treatment plan     Please note portions of this assessment/plan may have been copied and pasted, but I have personally seen this patient and reviewed each line of this assessment and plan for accuracy and made updates to reflect my necessary changes on 4/29/2025        Taz Crowder, DO  04/29/25  12:31 EDT    At [facility], we believe that sharing information builds trust and better relationships. You are receiving this note because you recently visited [facility]. It is possible you will see health information before a provider has talked with you about it. This kind of information can be easy to misunderstand. To help you fully understand what it means for your health, we urge you to discuss this note with your provider.    \"Dictated utilizing Dragon dictation\"  "

## 2025-04-29 NOTE — PLAN OF CARE
Goal Outcome Evaluation:  Plan of Care Reviewed With: patient        Progress: declining  Outcome Evaluation: Patient continues with Hospice comfort. Tolerating Morphine and Ativan. Resting peacefully. Remains lethargic, rouses to voice and pain. Mepilex remains to coccyx and heels.

## 2025-04-30 PROBLEM — Z51.5 HOSPICE CARE PATIENT: Status: ACTIVE | Noted: 2025-01-01

## 2025-04-30 NOTE — H&P
[facility] HOSPITALIST     Provider, No Known    CHIEF COMPLAINT: Admission for end-of-life care    HISTORY OF PRESENT ILLNESS:  The patient originally presented 4/26/2025 with altered mental status.  Family noted that they had found him on the floor smelling of urine without his oxygen on and saturations were in the 70s.  They were concerned about the level of his altered mental status and brought him to the ER.  After failing to improve over several days, decision was made to consult hospice they deemed patient appropriate for hospice scatter bed.    At time of my exam, patient is unresponsive but appears comfortable    No other concerns currently    He has a history of end-stage COPD, CAD, dementia, hypothyroidism, stroke, prostate cancer, PAD, GERD, carotid arterial disease s/p right CEA 4/1/2014    Past Medical History:   Diagnosis Date    Allergic     Seasonal    Allergic rhinitis     Arthritis     Asthma     Brito esophagus     Benign prostatic hyperplasia     Carotid arterial disease     Status post right CEA 4/1/14    COPD (chronic obstructive pulmonary disease)     Coronary artery disease     Dementia     Dyspnea     Erectile dysfunction     GERD (gastroesophageal reflux disease)     Hematoma of frontal scalp 07/08/2021    HL (hearing loss)     Hypothyroidism 1992    Low back pain     PAD (peripheral artery disease)     Prostate cancer     Status post implantation of artificial urinary sphincter         Stroke     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    Urinary tract infection      Past Surgical History:   Procedure Laterality Date    CAROTID ARTERY ANGIOPLASTY  04/01/2014    COLONOSCOPY  2009    ENDOSCOPY  2009    2001,2003,2006    EYE SURGERY      FRACTURE SURGERY      HERNIA REPAIR  2000    HERNIA REPAIR  2009    INGUINAL HERNIA REPAIR      PARTIAL HIP ARTHROPLASTY Left 07/02/2014    PROSTATECTOMY  2008    SHOULDER SURGERY  2005    SHOULDER SURGERY  2006     SHOULDER SURGERY      TEAR DUCT SURGERY      URINARY SPHINCTER IMPLANT       Family History   Problem Relation Age of Onset    Hypertension Sister     Thyroid disease Sister     Stroke Sister     Depression Sister     Heart failure Sister     Hypertension Brother     Lung cancer Brother     COPD Brother     Alcohol abuse Brother     Coronary artery disease Son         Son  from MI at age 59      Social History     Tobacco Use    Smoking status: Former     Current packs/day: 0.00     Types: Cigarettes     Quit date: 1962     Years since quittin.3    Smokeless tobacco: Never    Tobacco comments:     Quit 's   1 ppd for 15 years     Vaping Use    Vaping status: Never Used   Substance Use Topics    Alcohol use: Never    Drug use: Never     Medications Prior to Admission   Medication Sig Dispense Refill Last Dose/Taking    albuterol sulfate  (90 Base) MCG/ACT inhaler Inhale 2 puffs Every 4 (Four) Hours As Needed for Wheezing or Shortness of Air. 8 g 0     cetirizine (zyrTEC) 10 MG tablet Take 1 tablet by mouth Daily As Needed for Allergies.       citalopram (CeleXA) 20 MG tablet Take 1 tablet by mouth Daily. 90 tablet 3     clopidogrel (PLAVIX) 75 MG tablet TAKE 1 TABLET BY MOUTH DAILY 90 tablet 3     docusate sodium (COLACE) 100 MG capsule Take 1 capsule by mouth Daily.       guaiFENesin (MUCINEX) 600 MG 12 hr tablet Take 1 tablet by mouth Daily.       ipratropium-albuterol (DUO-NEB) 0.5-2.5 mg/3 ml nebulizer USES 1 VIAL VIA NEBULIZER 4  TIMES DAILY FOR CHRONIC  OBSTRUCTIVE LUNG DISEASE (Patient taking differently: 3 mL 4 (Four) Times a Day.) 1080 mL 3     levothyroxine (SYNTHROID, LEVOTHROID) 50 MCG tablet TAKE 1 TABLET BY MOUTH DAILY FOR THYROID 90 tablet 0     LORazepam (ATIVAN) 0.5 MG tablet Take 1 tablet by mouth Daily As Needed for Anxiety.       morphine 20 MG/ML concentrated solution 20mg/ml Take 0.25 mL by mouth Daily As Needed (prn for pain).       multivitamin (MULTI VITAMIN  DAILY PO) Take 1 tablet by mouth Every Night.       pantoprazole (PROTONIX) 40 MG EC tablet TAKE 1 TABLET BY MOUTH DAILY 90 tablet 3     rivastigmine (EXELON) 9.5 MG/24HR patch Place 1 patch on the skin as directed by provider Daily.       sennosides-docusate (Senexon-S) 8.6-50 MG per tablet Take 1 tablet by mouth Daily.       traZODone (DESYREL) 100 MG tablet Take 1 tablet by mouth Every Night. Indications: Trouble Sleeping 90 tablet 3     Trelegy Ellipta 100-62.5-25 MCG/ACT inhaler INHALE 1 INHALATION BY MOUTH  DAILY FOR CHRONIC OBSTRUCTIVE  LUNG DISEASE (Patient taking differently: 1 puff Daily.) 180 each 3      Allergies:  Bactrim [sulfamethoxazole-trimethoprim]    Immunization History   Administered Date(s) Administered    COVID-19 (PFIZER) 12YRS+ (COMIRNATY) 11/16/2023    COVID-19 (PFIZER) Purple Cap Monovalent 02/01/2021, 02/22/2021, 09/25/2021    Covid-19 (Pfizer) Gray Cap Monovalent 06/01/2022    FLUAD TRI 65YR+ 10/16/2019    Fluad Quad 65+ 10/16/2020    Fluzone High-Dose 65+YRS 10/07/2015, 10/18/2016, 10/06/2017, 10/17/2018, 10/16/2019    Fluzone High-Dose 65+yrs 10/14/2021    Influenza Seasonal Injectable 10/01/2017, 10/01/2018    Pneumococcal Conjugate 13-Valent (PCV13) 10/18/2016    Pneumococcal Polysaccharide (PPSV23) 10/13/2003, 01/01/2008, 05/11/2008    Shingrix 08/23/2022    Td (TDVAX) 10/13/2003    Tdap 07/20/2010, 07/08/2021    Zostavax 07/20/2010, 05/11/2016       REVIEW OF SYSTEMS:  Please see the above history of present illness for pertinent positives and negatives.  The remainder of the patient's systems have been reviewed and are negative.     Vital Signs  N/A    Physical Exam  Vitals reviewed.   Constitutional:       General: He is not in acute distress.     Comments: Unresponsive to tactile or verbal stimuli, elderly, frail appearance   HENT:      Head: Normocephalic and atraumatic.   Cardiovascular:      Rate and Rhythm: Normal rate and regular rhythm.      Comments: Grade 2/6 systolic  "murmur  Pulmonary:      Effort: Pulmonary effort is normal. No respiratory distress.      Breath sounds: Normal breath sounds. No wheezing or rales.   Abdominal:      General: Abdomen is flat. Bowel sounds are normal. There is no distension.      Palpations: Abdomen is soft.      Tenderness: There is no abdominal tenderness. There is no guarding.   Musculoskeletal:         General: No swelling.   Skin:     General: Skin is warm and dry.      Comments: See notes for chronic wounds on ankles, feet, buttock   Neurological:      Comments: Unable to determine, unresponsive   Psychiatric:      Comments: Unresponsive       Emotional Behavior:    Judgment and Insight: Unable to determine   Mental Status:  Alertness unresponsive   Memory: Unable to determine   Mood and Affect:         Depression unable to determine               Anxiety unable to determine    Debilities:   Physical Weakness unable to determine   Handicaps unable to determine   Disabilities unable to determine   Agitation none     Results Review:    I reviewed the patient's new clinical results.  Lab Results (most recent)       None            Imaging Results (Most Recent)       None          N/A    ECG/EMG Results (most recent)       None          N/A    Assessment & Plan   End-of-life care secondary to end-stage COPD and end-stage dementia  Palliative orders in place, hospice is following  Scatter bed status  DNR/DNI    History of:  CAD, dementia, hypothyroidism, stroke, prostate cancer, PAD, GERD, carotid arterial disease s/p right CEA 4/1/2014  No acute issues    I discussed the patient's findings and my recommendations with staff.     Kimber Vital, APRN  04/29/25  22:13 EDT     \"Dictated utilizing Dragon dictation\"    Note disclaimer: At [facility], we believe that sharing information builds trust and better relationships. You are receiving this note because you recently visited [facility]. It is possible you will see health information " before a provider has talked with you about it. This kind of information can be easy to misunderstand. To help you fully understand what it means for your health, we urge you to discuss this note with your provider.

## 2025-04-30 NOTE — PLAN OF CARE
Goal Outcome Evaluation:  Plan of Care Reviewed With: patient        Progress: declining  Outcome Evaluation: Patient is hospice scatter bed. Rested well during the night. Tolerating routine Morphine and Ativan. Turned throughout the night. Patient appears to be calm and relaxed.

## 2025-04-30 NOTE — PLAN OF CARE
Goal Outcome Evaluation:  Plan of Care Reviewed With: patient           Outcome Evaluation: Pt under hospice scatter bed care, Started day very tense and rigid, gave Morphine/Ativan and became more relaxed.Turned and elevated feet. Family memeber's at bedside. Pt hands and feet mottling. Pt calm, and relaxed.

## 2025-04-30 NOTE — CONSULTS
Nurse referral for spiritual care /end-of-life consultation with patient's family. Daughter requests I follow-up to offer prayer at a later time.

## 2025-04-30 NOTE — PROGRESS NOTES
SERVICE: [facility] HOSPITALIST    CONSULTANTS:  Hospice     CHIEF COMPLAINT: Confusion    SUBJECTIVE: Patient seen and examined at bedside. Obtunded; unable to answer any questions; daughter at bedside says he appears comfortable   OBJECTIVE:    Physical exam is largely unchanged from previous exam, except where documented below, examination is accurate as of 4/30/2025    Physical Exam:  General: Patient is lying in bed; obtunded  HENT: Head is atraumatic, normocephalic  Cardiovascular: RRR, S1-S2   Respiratory: Shallow breathing with diminished breath sounds at bases      There were no vitals taken for this visit.    MEDS/LABS REVIEWED AND ORDERED    LORazepam, 1 mg, Intravenous, Q4H  Morphine, 3 mg, Intravenous, Q4H          LAB/DIAGNOSTICS:    Lab Results (last 24 hours)       ** No results found for the last 24 hours. **          No orders to display     Results for orders placed during the hospital encounter of 04/17/24    Adult Transthoracic Echo Complete w/ Color, Spectral and Contrast if Necessary Per Protocol    Interpretation Summary    Left ventricular systolic function is normal. Calculated left ventricular EF = 62.4%    Left ventricular diastolic function is consistent with (grade I) impaired relaxation and age.    There is moderate calcification of the aortic valve.    No radiology results for the last day      ASSESSMENT/PLAN:    End-of-life care secondary to end-stage COPD and end-stage dementia  Palliative orders in place, hospice is following  Continue scheduled Morphine and Ativan q 4 hours with prn IV doses of each q1h  Prn IV robinul   Scatter bed status  DNR/DNI     History of:  CAD, dementia, hypothyroidism, stroke, prostate cancer, PAD, GERD, carotid arterial disease s/p right CEA 4/1/2014  No acute issues    I updated daughter at bedside.      Taz Crowder DO  04/30/25  10:25 EDT    At [facility], we believe that sharing information builds trust and better  "relationships. You are receiving this note because you recently visited [facility]. It is possible you will see health information before a provider has talked with you about it. This kind of information can be easy to misunderstand. To help you fully understand what it means for your health, we urge you to discuss this note with your provider.    \"Dictated utilizing Dragon dictation\"  "

## 2025-04-30 NOTE — DISCHARGE SUMMARY
"Pawel Goldberg  12/31/1932  [id number]    Hospitalists Discharge Summary    Date of Admission: 4/26/2025  Date of Discharge:  4/29/2025    History of Present Illness from [facility] on admit:   \"89-year-old male PMH as noted below who presents to the ER from his longterm with AMS. He is a poor historian no family in room at time of exam so history taken mostly from chart. Apparently this morning he was found lying on the floor in his [place] smelling of urine and had low oxygen saturations. He normally wears 2 L nasal cannula continuously. EMS was called found that his oxygen saturation was 70% but they placed him on his home 2 L after that. He seemed to be altered so he was brought to the emergency room. In the emergency room he had workup but per the family he was remaining altered and given this admission was requested.\"     Primary Discharge diagnoses:  End of life care secondary to end-stage COPD  End-stage dementia    Secondary Discharge Diagnoses:   Generalized weakness  Chronic decubitus ulcers POA  Chronic pressure injury bilateral ankles POA  History of:  CAD, hypothyroidism, stroke, prostate cancer, GERD, PAD, carotid arterial disease s/p right CEA    Hospital Course Summary:   The patient remained confused with altered mental status.  Mental status remained altered and family opted for end-of-life care.  Hospice was consulted and patient was deemed appropriate for scatter bed status which has been initiated.  He is requiring high doses of comfort medications at this time.    PCP  Patient Care Team:  Provider, No Known as PCP - Hardik Kemp MD as Consulting Physician (Urology)  MAURO Peters MD as Consulting Physician (Ophthalmology)  Brant Polanco MD as Consulting Physician (Pulmonary Disease)  Charlie Goldstein DO as Consulting Physician (Neurology)  Rowdy Moore MD as Consulting Physician (Cardiology)  Liliam De La O PT as Physical Therapist (Physical Therapy)    Consults: "   Consults       No orders found from 3/28/2025 to 4/27/2025.          Operations and Procedures Performed:     CT Head Without Contrast  Result Date: 4/26/2025  Narrative: CT HEAD WO CONTRAST Date of Exam: 4/26/2025 8:05 AM EDT Indication: found down, ams. Comparison: CT head 4/17/2024 Technique: Axial CT images were obtained of the head without contrast administration.  Coronal reconstructions were performed.  Automated exposure control and iterative reconstruction methods were used. Findings: The ventricles, sulci, and cerebellar folia are moderately and diffusely prominent consistent with atrophy. Ill-defined diminished density in cerebral white matter is consistent with moderate gliosis and/or numerous old lacunar infarcts. Ill-defined areas of encephalomalacia in the frontal lobes are consistent with old infarcts. There is no CT evidence of acute intracranial hemorrhage, mass, or mass effect. No abnormality of the orbits is evident. The paranasal sinuses, middle ears, and mastoid air cells are well aerated. No fractures are seen on bone window images.     Impression: Impression: CT scan of the head without IV contrast demonstrating diffuse atrophy and white matter changes. There is encephalomalacia in the frontal lobes are consistent with old infarcts, and appear stable. No acute intracranial abnormality is seen. Electronically Signed: Stephen Mckinley MD  4/26/2025 8:29 AM EDT  Workstation ID: [id number]    XR Chest 1 View  Result Date: 4/26/2025  Narrative: XR CHEST 1 VW, XR PELVIS 1 OR 2 VW Date of Exam: 4/26/2025 8:05 AM EDT Indication: sob Comparison: 8/4/2024 Findings: Chest: There is widened appearance of the mediastinum as compared to prior exam. There are low lung volumes with elevated left hemidiaphragm. No definite focal airspace consolidation, pleural effusion, or pneumothorax. No acute bone abnormality. Pelvis: No evidence of acute fracture. Left hip arthroplasty without evidence of complication.  There is moderate right hip arthritis. Lumbar spondylosis. Mild bilateral SI joint degenerative change. Surgical material overlies the pelvis. Atherosclerotic calcifications.     Impression: Impression: 1.Widened appearance of the mediastinum as compared to prior exam. This is likely due to low lung volumes and technique. If there is clinical concern for aortic pathology, CTA of the chest should be considered. 2.No evidence of acute pelvic fracture. Electronically Signed: Sabino Raza MD  4/26/2025 8:25 AM EDT  Workstation ID: [id number]    XR Pelvis 1 or 2 View  Result Date: 4/26/2025  Narrative: XR CHEST 1 VW, XR PELVIS 1 OR 2 VW Date of Exam: 4/26/2025 8:05 AM EDT Indication: sob Comparison: 8/4/2024 Findings: Chest: There is widened appearance of the mediastinum as compared to prior exam. There are low lung volumes with elevated left hemidiaphragm. No definite focal airspace consolidation, pleural effusion, or pneumothorax. No acute bone abnormality. Pelvis: No evidence of acute fracture. Left hip arthroplasty without evidence of complication. There is moderate right hip arthritis. Lumbar spondylosis. Mild bilateral SI joint degenerative change. Surgical material overlies the pelvis. Atherosclerotic calcifications.     Impression: Impression: 1.Widened appearance of the mediastinum as compared to prior exam. This is likely due to low lung volumes and technique. If there is clinical concern for aortic pathology, CTA of the chest should be considered. 2.No evidence of acute pelvic fracture. Electronically Signed: Sabino Raza MD  4/26/2025 8:25 AM EDT  Workstation ID: [id number]    Allergies:  is allergic to bactrim [sulfamethoxazole-trimethoprim].    Luis Manuel  reviewed    Discharge Medications: PLEASE SEE DISCHARGE READMIT MEDICATIONS UPON RELEASE BY RN AT [facility] FACILITY    Last Lab Results:   Lab Results (most recent)       Procedure Component Value Units Date/Time    Basic Metabolic Panel [[id number]]  (Normal)  Collected: 04/27/25 0453    Specimen: Blood Updated: 04/27/25 0545     Glucose 90 mg/dL      BUN 16 mg/dL      Creatinine 0.77 mg/dL      Sodium 140 mmol/L      Potassium 3.9 mmol/L      Chloride 105 mmol/L      CO2 25.8 mmol/L      Calcium 8.7 mg/dL      BUN/Creatinine Ratio 20.8     Anion Gap 9.2 mmol/L      eGFR 84.0 mL/min/1.73     Narrative:      GFR Categories in Chronic Kidney Disease (CKD)      GFR Category          GFR (mL/min/1.73)    Interpretation  G1                     90 or greater         Normal or high (1)  G2                      60-89                Mild decrease (1)  G3a                   45-59                Mild to moderate decrease  G3b                   30-44                Moderate to severe decrease  G4                    15-29                Severe decrease  G5                    14 or less           Kidney failure          (1)In the absence of evidence of kidney disease, neither GFR category G1 or G2 fulfill the criteria for CKD.    eGFR calculation 2021 CKD-EPI creatinine equation, which does not include race as a factor    CBC (No Diff) [[id number]]  (Abnormal) Collected: 04/27/25 0453    Specimen: Blood Updated: 04/27/25 0532     WBC 7.25 10*3/mm3      RBC 3.61 10*6/mm3      Hemoglobin 11.0 g/dL      Hematocrit 33.8 %      MCV 93.6 fL      MCH 30.5 pg      MCHC 32.5 g/dL      RDW 14.4 %      RDW-SD 49.1 fl      MPV 10.8 fL      Platelets 210 10*3/mm3     Urinalysis, Microscopic Only - Urine, Random Void [[id number]]  (Abnormal) Collected: 04/26/25 1010    Specimen: Urine, Random Void Updated: 04/26/25 1124     RBC, UA 3-5 /HPF      WBC, UA 3-5 /HPF      Comment: Urine culture not indicated.        Bacteria, UA Trace /HPF      Squamous Epithelial Cells, UA 0-2 /HPF      Hyaline Casts, UA None Seen /LPF      Methodology Manual Light Microscopy    Urinalysis With Culture If Indicated - Urine, Random Void [[id number]]  (Abnormal) Collected: 04/26/25 1010    Specimen: Urine, Random Void  Updated: 04/26/25 1055     Color, UA Yellow     Appearance, UA Clear     pH, UA 5.5     Specific Gravity, UA 1.025     Glucose, UA Negative     Ketones, UA Negative     Bilirubin, UA Negative     Blood, UA Large (3+)     Protein, UA Trace     Leuk Esterase, UA Trace     Nitrite, UA Positive     Urobilinogen, UA 0.2 E.U./dL    Narrative:      In absence of clinical symptoms, the presence of pyuria, bacteria, and/or nitrites on the urinalysis result does not correlate with infection.    High Sensitivity Troponin T 1Hr [[id number]]  (Abnormal) Collected: 04/26/25 0833    Specimen: Blood Updated: 04/26/25 0907     HS Troponin T 24 ng/L      Troponin T Numeric Delta -4 ng/L      Troponin T % Delta -14    Narrative:      High Sensitive Troponin T Reference Range:  <14.0 ng/L- Negative Female for AMI  <22.0 ng/L- Negative Male for AMI  >=14 - Abnormal Female indicating possible myocardial injury.  >=22 - Abnormal Male indicating possible myocardial injury.   Clinicians would have to utilize clinical acumen, EKG, Troponin, and serial changes to determine if it is an Acute Myocardial Infarction or myocardial injury due to an underlying chronic condition.         T4, Free [[id number]]  (Normal) Collected: 04/26/25 0730    Specimen: Blood Updated: 04/26/25 0848     Free T4 1.03 ng/dL     High Sensitivity Troponin T [[id number]]  (Abnormal) Collected: 04/26/25 0730    Specimen: Blood Updated: 04/26/25 0817     HS Troponin T 28 ng/L     Narrative:      High Sensitive Troponin T Reference Range:  <14.0 ng/L- Negative Female for AMI  <22.0 ng/L- Negative Male for AMI  >=14 - Abnormal Female indicating possible myocardial injury.  >=22 - Abnormal Male indicating possible myocardial injury.   Clinicians would have to utilize clinical acumen, EKG, Troponin, and serial changes to determine if it is an Acute Myocardial Infarction or myocardial injury due to an underlying chronic condition.         Comprehensive Metabolic Panel [[id number]]  Collected: 04/26/25 0730    Specimen: Blood Updated: 04/26/25 0817     Glucose 99 mg/dL      BUN 17 mg/dL      Creatinine 0.81 mg/dL      Sodium 138 mmol/L      Potassium 4.2 mmol/L      Chloride 101 mmol/L      CO2 28.1 mmol/L      Calcium 9.3 mg/dL      Total Protein 6.3 g/dL      Albumin 3.5 g/dL      ALT (SGPT) 20 U/L      AST (SGOT) 22 U/L      Alkaline Phosphatase 46 U/L      Total Bilirubin 0.4 mg/dL      Globulin 2.8 gm/dL      A/G Ratio 1.3 g/dL      BUN/Creatinine Ratio 21.0     Anion Gap 8.9 mmol/L      eGFR 82.7 mL/min/1.73     Narrative:      GFR Categories in Chronic Kidney Disease (CKD)      GFR Category          GFR (mL/min/1.73)    Interpretation  G1                     90 or greater         Normal or high (1)  G2                      60-89                Mild decrease (1)  G3a                   45-59                Mild to moderate decrease  G3b                   30-44                Moderate to severe decrease  G4                    15-29                Severe decrease  G5                    14 or less           Kidney failure          (1)In the absence of evidence of kidney disease, neither GFR category G1 or G2 fulfill the criteria for CKD.    eGFR calculation 2021 CKD-EPI creatinine equation, which does not include race as a factor    TSH Rfx On Abnormal To Free T4 [[id number]]  (Abnormal) Collected: 04/26/25 0730    Specimen: Blood Updated: 04/26/25 0817     TSH 7.010 uIU/mL     Lipase [[id number]]  (Normal) Collected: 04/26/25 0730    Specimen: Blood Updated: 04/26/25 0817     Lipase 28 U/L     CBC & Differential [[id number]]  (Abnormal) Collected: 04/26/25 0730    Specimen: Blood Updated: 04/26/25 0758    Narrative:      The following orders were created for panel order CBC & Differential.  Procedure                               Abnormality         Status                     ---------                               -----------         ------                     CBC Auto Differential[[id number]]         Abnormal            Final result                 Please view results for these tests on the individual orders.    CBC Auto Differential [[id number]]  (Abnormal) Collected: 04/26/25 0730    Specimen: Blood Updated: 04/26/25 0758     WBC 7.80 10*3/mm3      RBC 4.12 10*6/mm3      Hemoglobin 12.4 g/dL      Hematocrit 38.6 %      MCV 93.7 fL      MCH 30.1 pg      MCHC 32.1 g/dL      RDW 14.3 %      RDW-SD 48.8 fl      MPV 10.6 fL      Platelets 225 10*3/mm3      Neutrophil % 62.8 %      Lymphocyte % 23.8 %      Monocyte % 8.3 %      Eosinophil % 4.4 %      Basophil % 0.4 %      Immature Grans % 0.3 %      Neutrophils, Absolute 4.90 10*3/mm3      Lymphocytes, Absolute 1.86 10*3/mm3      Monocytes, Absolute 0.65 10*3/mm3      Eosinophils, Absolute 0.34 10*3/mm3      Basophils, Absolute 0.03 10*3/mm3      Immature Grans, Absolute 0.02 10*3/mm3      nRBC 0.0 /100 WBC           Imaging Results (Most Recent)       Procedure Component Value Units Date/Time    CT Head Without Contrast [[id number]] Collected: 04/26/25 0821     Updated: 04/26/25 0832    Narrative:      CT HEAD WO CONTRAST    Date of Exam: 4/26/2025 8:05 AM EDT    Indication: found down, ams.    Comparison: CT head 4/17/2024    Technique: Axial CT images were obtained of the head without contrast administration.  Coronal reconstructions were performed.  Automated exposure control and iterative reconstruction methods were used.      Findings:  The ventricles, sulci, and cerebellar folia are moderately and diffusely prominent consistent with atrophy.    Ill-defined diminished density in cerebral white matter is consistent with moderate gliosis and/or numerous old lacunar infarcts.    Ill-defined areas of encephalomalacia in the frontal lobes are consistent with old infarcts.    There is no CT evidence of acute intracranial hemorrhage, mass, or mass effect.    No abnormality of the orbits is evident.    The paranasal sinuses, middle ears, and mastoid air cells are  well aerated.    No fractures are seen on bone window images.      Impression:      Impression:  CT scan of the head without IV contrast demonstrating diffuse atrophy and white matter changes.    There is encephalomalacia in the frontal lobes are consistent with old infarcts, and appear stable.    No acute intracranial abnormality is seen.      Electronically Signed: Stephen Mckinley MD    4/26/2025 8:29 AM EDT    Workstation ID: [id number]    XR Chest 1 View [[id number]] Collected: 04/26/25 0822     Updated: 04/26/25 0828    Narrative:      XR CHEST 1 VW, XR PELVIS 1 OR 2 VW    Date of Exam: 4/26/2025 8:05 AM EDT    Indication: sob    Comparison: 8/4/2024    Findings:  Chest:  There is widened appearance of the mediastinum as compared to prior exam. There are low lung volumes with elevated left hemidiaphragm. No definite focal airspace consolidation, pleural effusion, or pneumothorax. No acute bone abnormality.    Pelvis:  No evidence of acute fracture. Left hip arthroplasty without evidence of complication. There is moderate right hip arthritis. Lumbar spondylosis. Mild bilateral SI joint degenerative change. Surgical material overlies the pelvis. Atherosclerotic   calcifications.      Impression:      Impression:  1.Widened appearance of the mediastinum as compared to prior exam. This is likely due to low lung volumes and technique. If there is clinical concern for aortic pathology, CTA of the chest should be considered.  2.No evidence of acute pelvic fracture.        Electronically Signed: Sabino Raza MD    4/26/2025 8:25 AM EDT    Workstation ID: [id number]    XR Pelvis 1 or 2 View [[id number]] Collected: 04/26/25 0822     Updated: 04/26/25 0828    Narrative:      XR CHEST 1 VW, XR PELVIS 1 OR 2 VW    Date of Exam: 4/26/2025 8:05 AM EDT    Indication: sob    Comparison: 8/4/2024    Findings:  Chest:  There is widened appearance of the mediastinum as compared to prior exam. There are low lung volumes with elevated left  "hemidiaphragm. No definite focal airspace consolidation, pleural effusion, or pneumothorax. No acute bone abnormality.    Pelvis:  No evidence of acute fracture. Left hip arthroplasty without evidence of complication. There is moderate right hip arthritis. Lumbar spondylosis. Mild bilateral SI joint degenerative change. Surgical material overlies the pelvis. Atherosclerotic   calcifications.      Impression:      Impression:  1.Widened appearance of the mediastinum as compared to prior exam. This is likely due to low lung volumes and technique. If there is clinical concern for aortic pathology, CTA of the chest should be considered.  2.No evidence of acute pelvic fracture.        Electronically Signed: Sabino Raza MD    4/26/2025 8:25 AM EDT    Workstation ID: [id number]          PROCEDURES: none    Condition on Discharge:  end of life care    Physical Exam at Discharge  Vital Signs  Temp:  [97.1 °F (36.2 °C)] 97.1 °F (36.2 °C)  Heart Rate:  [60] 60  Resp:  [15-16] 15  Body mass index is 23.36 kg/m².    Physical Exam  Constitutional:       Comments: Unresponsive, appears comfortable, elderly   HENT:      Head: Normocephalic and atraumatic.   Cardiovascular:      Rate and Rhythm: Normal rate and regular rhythm.      Comments: Grade 2/6 systolic murmur  Pulmonary:      Effort: Pulmonary effort is normal.      Breath sounds: Normal breath sounds.   Abdominal:      General: Abdomen is flat.      Palpations: Abdomen is soft.   Musculoskeletal:         General: No swelling.   Skin:     General: Skin is warm and dry.      Comments: See notes in epic regarding chronic wounds   Neurological:      Comments: Unresponsive   Psychiatric:      Comments: Unresponsive       Discharge Disposition  Scatterbed status    Discharge Diet: NPO     Kimber Vital, APRN  04/29/25  21:31 EDT    Time: Discharge 30 min (if over 30 minutes give explanation as to why it took greater than 30 minutes)    \"Dictated utilizing Dragon " "dictation\"      "

## 2025-04-30 NOTE — NURSING NOTE
\[facility]\"" Visit Report     Pawel Goldberg  [id number]  4/30/2025     Admission R/T \[facility]\"" Dx: yes, this stay is related and covered     Reason for \[facility]\"" Admission:    Patient is a 89 y.o. male with a primary [facility] diagnosis of COPD. Admitted to Aleksandra Victor for GIP for symptom management of restlessness, anxiety, congestion. No active isolations      PPS: 10%        Medications in 24 hours:  -IV ativan 1mg q4h routine and q1h PRN x0  -IV morphine 3mg q4h routine and q1h PRN x0  -IV robinul 0.4mg PRN x0     Recommendations:  Continue to monitor for signs of decline and provide comfort measures. Contact [facility] at [phone] with any questions or concerns and at TOD.           Assessment:  Pt is in bed showing with a furrowed brow. HE appeared much more comfortable today and did well with the assessment. He was slightly stiff with his neck hyperextended. He was warm to touch with palpable peripheral pulses. Lungs clear with deep unlabored respirations on ra with no congestion noted today. Pt remains incontinent of bowel and bladder. This nurse did ask facility RN to give some medication after the visit due to the pt being very stiff and showed a slight grimace     Collaboration:  Spoke with pts camacho Hamilton at the bedside with condition update and support provided. Training provided regarding assessment findings, disease progression, symptom management, recommendations and expected length of stay. Family v/u of pts condition and all questions were answered. Collaborated with Aleksandra Victor RN, Hospital attending and \[facility]\"" MD about pts condition and recommendations.       Disposition:  Patient meets GIP criteria, requiring frequent administration and continued titration of parenteral medications to achieve and maintain symptom management. Patient appears to be unsafe for transport at this time, requiring increasing symptom management and frequent RN assessment. If patient were to  stabilize he would require LTC placement due to increased daily care needs.  [facility] for changes, assess needs and provide support.         Radha Salazar, RN  [facility] Bed Team  [phone]

## 2025-05-01 NOTE — PROGRESS NOTES
Hospitalist Team      Patient Care Team:  Provider, No Known as PCP - Hardik Kemp MD as Consulting Physician (Urology)  MAURO Peters MD as Consulting Physician (Ophthalmology)  Brant Polanco MD as Consulting Physician (Pulmonary Disease)  Charlie Goldstein DO as Consulting Physician (Neurology)  Rowdy Moore MD as Consulting Physician (Cardiology)  Liliam De La O PT as Physical Therapist (Physical Therapy)        Chief Complaint:  Follow-up End-of-Life Care    Subjective    Resting comfortably.    Objective      Physical Exam:    General: No acute distress.  Lungs: Breath sounds are diminished.  CV: Regular rate and rhythm.  Abdomen: Soft w/ diminished breath sounds.    Results Review:     I reviewed the patient's new clinical results.    Lab Results (last 24 hours)       ** No results found for the last 24 hours. **            Imaging Results (Last 24 Hours)       ** No results found for the last 24 hours. **              Medication Review:   I have reviewed the patient's current medication list    Current Facility-Administered Medications:     [DISCONTINUED] acetaminophen (TYLENOL) tablet 650 mg, 650 mg, Oral, Q4H PRN **OR** [DISCONTINUED] acetaminophen (TYLENOL) 160 MG/5ML oral solution 650 mg, 650 mg, Oral, Q4H PRN **OR** acetaminophen (TYLENOL) suppository 650 mg, 650 mg, Rectal, Q4H PRN, [place], Kimber R, APRN    glycopyrrolate (ROBINUL) injection 0.4 mg, 0.4 mg, Intravenous, Q1H PRN, [place], Kimber R, APRN, 0.4 mg at 05/01/25 0810    LORazepam (ATIVAN) injection 1 mg, 1 mg, Intravenous, Q4H, [place], Kimber R, APRN, 1 mg at 05/01/25 1806    LORazepam (ATIVAN) injection 1 mg, 1 mg, Intravenous, Q1H PRN, [place], Kimber R, APRN    morphine injection 3 mg, 3 mg, Intravenous, Q4H, [place], Kimber R, APRN, 3 mg at 05/01/25 1806    morphine injection 3 mg, 3 mg, Intravenous, Q1H PRN, Roni Resendiz MD, 3 mg at 05/01/25 1611    [DISCONTINUED] ondansetron ODT (ZOFRAN-ODT)  disintegrating tablet 4 mg, 4 mg, Oral, Q6H PRN **OR** ondansetron (ZOFRAN) injection 4 mg, 4 mg, Intravenous, Q6H PRN, Kimber Vital R, APRN    sodium chloride 0.9 % flush 10 mL, 10 mL, Intravenous, PRN, Kimber Vital R, APRN    sodium chloride 0.9 % infusion 40 mL, 40 mL, Intravenous, PRN, Kimber Vital R, APRN      Assessment & Plan     End-of-Life Care: Continue Hospice order set.      Roni Resendiz MD  05/01/25  19:30 EDT

## 2025-05-01 NOTE — PLAN OF CARE
Goal Outcome Evaluation:           Progress: declining  Outcome Evaluation: Stuperous, RA, hospice scatter bed, PRN and scheduled meds given, Q2 turn, male external catheter in place.

## 2025-05-01 NOTE — PROGRESS NOTES
Nutrition Services    Patient Name:  Pawel Goldberg  YOB: 1932  MRN: [id number]  Admit Date:  4/29/2025    Chart reviewed to Mst score    Pt remains as scatter hospice bed for end of life care    Will remain available.     Electronically signed by:  Tierney Hastings RD  05/01/25 10:24 EDT

## 2025-05-01 NOTE — CASE MANAGEMENT/SOCIAL WORK
Case Management Discharge Note      Final Note: dc [facility] scatter bed         Selected Continued Care - Discharged on 4/29/2025 Admission date: 4/26/2025 - Discharge disposition: Hospice/Medical Facility (DCR - [facility])      Destination    No services have been selected for the patient.                Durable Medical Equipment    No services have been selected for the patient.                Dialysis/Infusion    No services have been selected for the patient.                Home Medical Care    No services have been selected for the patient.                Therapy    No services have been selected for the patient.                Community Resources    No services have been selected for the patient.                Community & DME    No services have been selected for the patient.                         Final Discharge Disposition Code: 51 - hospice medical facility

## 2025-05-01 NOTE — PLAN OF CARE
Goal Outcome Evaluation:  Plan of Care Reviewed With: patient, family        Progress: declining  Outcome Evaluation: Continue comfort care. Room air. Family at bedside. Pt is nonverbal at this time.

## 2025-05-01 NOTE — NURSING NOTE
\[facility]\"" Visit Report     Pawel Goldberg  [id number]  5/1/2025     Admission R/T \[facility]\"" Dx: yes, this stay is related and covered     Reason for \[facility]\"" Admission:    Patient is a 89 y.o. male with a primary [facility] diagnosis of COPD. Admitted to [facility] for [facility]IP for symptom management of restlessness, anxiety, congestion. No active isolations      PPS: 10%        Medications in 24 hours:  -IV ativan 1mg q4h routine and q1h PRN x0  -IV morphine 3mg q4h routine  -IV morphine 2mg PRN x2  -IV robinul 0.4mg PRN x2     Recommendations:  Continue to monitor for signs of decline and provide comfort measures. Contact [facility] at [phone] with any questions or concerns and at TOD.       Secure message sent to attending and discussed with facility RN in person.The PRN morphine is still at 2mg can you adjust this to match the scheduled MP at 3mg      Assessment:  Pt in bed with dtr at bedside. Pt was on his back with and remained unresponsive for my visit. He remains warm to touch with slowed cap refill and dusky nail beds to BLE. Lungs clear on ra with unlabored respirations. Pt was having some congestion earlier per family and robinul has been effective. Pt remains incontinent of bowel and bladder. Current medication regimen effective this time.      Collaboration:  Spoke with pts dtr Romy at the bedside with condition update and support provided. Training provided regarding assessment findings, disease progression, symptom management, recommendations and expected length of stay. Family v/u of pts condition and all questions were answered. Collaborated with [facility] RN, [facility] attending [facility]ts condition and recommendations.       Disposition:  Patient meets GIP criteria, requiring frequent administration and continued titration of parenteral medications to achieve and maintain symptom management. Patient appears to be unsafe for transport at this time, requiring  increasing symptom management and frequent RN assessment. If patient were to stabilize he would require LTC placement due to increased daily care needs.  Will continue [facility] RN visits to monitor for changes, assess needs and provide support.         Radha Salazar RN  [facility] Bed Team  [phone]

## 2025-05-02 NOTE — PROGRESS NOTES
Hospitalist Team      Patient Care Team:  Provider, No Known as PCP - Hardik Kemp MD as Consulting Physician (Urology)  MAURO Peters MD as Consulting Physician (Ophthalmology)  Brant Polanco MD as Consulting Physician (Pulmonary Disease)  Charlie Goldstein DO as Consulting Physician (Neurology)  Rowdy Moore MD as Consulting Physician (Cardiology)  Liliam De La O PT as Physical Therapist (Physical Therapy)        Chief Complaint:  Follow-up End-of-Life Care    Subjective    Resting comfortably.    Objective      Physical Exam:    General: No acute distress.  Lungs: Clear.  No accessory use.  CV: RRR  Abdomen: Soft w/ diminished bowel sounds.  MSK: No edema.    Results Review:     I reviewed the patient's new clinical results.    Lab Results (last 24 hours)       ** No results found for the last 24 hours. **            Imaging Results (Last 24 Hours)       ** No results found for the last 24 hours. **              Medication Review:   I have reviewed the patient's current medication list    Current Facility-Administered Medications:     [DISCONTINUED] acetaminophen (TYLENOL) tablet 650 mg, 650 mg, Oral, Q4H PRN **OR** [DISCONTINUED] acetaminophen (TYLENOL) 160 MG/5ML oral solution 650 mg, 650 mg, Oral, Q4H PRN **OR** acetaminophen (TYLENOL) suppository 650 mg, 650 mg, Rectal, Q4H PRN, Kimber Vital, APRN    glycopyrrolate (ROBINUL) injection 0.4 mg, 0.4 mg, Intravenous, Q1H PRN, Kimber Vital APRN, 0.4 mg at 05/02/25 1638    LORazepam (ATIVAN) injection 2 mg, 2 mg, Intravenous, Q4H, Roni Resendiz MD, 2 mg at 05/02/25 1341    LORazepam (ATIVAN) injection 2 mg, 2 mg, Intravenous, Q1H PRN, Roni Resendiz MD, 2 mg at 05/02/25 1638    morphine injection 4 mg, 4 mg, Intravenous, Q4H, Roni Resendiz MD, 4 mg at 05/02/25 1341    morphine injection 4 mg, 4 mg, Intravenous, Q1H PRN, Roni Resendiz MD, 4 mg at 05/02/25 1810    [DISCONTINUED] ondansetron ODT (ZOFRAN-ODT)  disintegrating tablet 4 mg, 4 mg, Oral, Q6H PRN **OR** ondansetron (ZOFRAN) injection 4 mg, 4 mg, Intravenous, Q6H PRN, Kimber Vital R, APRN    sodium chloride 0.9 % flush 10 mL, 10 mL, Intravenous, PRN, Kimber Vital R, APRN    sodium chloride 0.9 % infusion 40 mL, 40 mL, Intravenous, PRN, Kimber Vital R, APRN      Assessment & Plan     End-of-Life Care: Discussed changes w/ [facility] nurse.  Continue order set.      Roni Resendiz MD  05/02/25  18:47 EDT

## 2025-05-02 NOTE — NURSING NOTE
[facility] Visit Report     Pawel Goldberg  [id number]  5/2/2025     Admission R/T [facility] Dx: yes, this stay is related and covered     Reason for [facility] Admission:    Patient is a 89 y.o. male with a primary [facility] diagnosis of COPD. Admitted to [facility] for GIP for symptom management of restlessness, anxiety, congestion. No active isolations      PPS: 10%        Medications in 24 hours:  -IV ativan 1mg q4h routine and q1h PRN x0  -IV morphine 3mg q4h routine  -IV morphine 3mg PRN x2  -IV robinul 0.4mg PRN x1     Recommendations:  Continue to monitor for signs of decline and provide comfort measures. Contact [facility] at [phone] with any questions or concerns and at TOD.      Recommendation per Dr Lemus,   -IV morphine 4mg q4h routine and q1h PRN    Secure message sent to attending and discussed with facility RN in person     Assessment:  Pt in bed with dtr at bedside. Pt was on his back with and remained unresponsive for my visit. HOB was elevated as pt was laying mostly flat. Educated staff and family to keep HOB elevated. Pt remains warm to touch, sweaty in places, again might be related to his increased work breathing. He is using abd muscles and is having more audible breathing. Lungs clear with diminished bases. Hands are purple but not cold, slowed cap refill all over. Decreased UOP and hypoactive b/s. Palpable peripheral pulses    Collaboration:  Spoke with pts dtr Joy at the bedside with condition update and support provided. Training provided regarding assessment findings, disease progression, symptom management, recommendations and expected length of stay. Family v/u of pts condition and all questions were answered. Collaborated with [facility] RN, Hospital attending and [facility] about pts condition and recommendations.       Disposition:  Patient meets GIP criteria, requiring frequent administration and continued titration of parenteral medications to achieve  and maintain symptom management. Patient appears to be unsafe for transport at this time, requiring increasing symptom management and frequent RN assessment. If patient were to stabilize he would require LTC placement due to increased daily care needs.  Will continue [facility] RN visits to monitor for changes, assess needs and provide support.         Radha Salazar RN  [facility] Bed Team  [phone]

## 2025-05-02 NOTE — PLAN OF CARE
Goal Outcome Evaluation:           Progress: declining                              Comfort medications increased, continuing comfort measures.

## 2025-05-02 NOTE — PLAN OF CARE
Goal Outcome Evaluation:  Plan of Care Reviewed With: patient        Progress: declining  Outcome Evaluation: Patient continues with comfort care. Tolerating room air. Patient is non-verbal.

## 2025-05-03 NOTE — PROGRESS NOTES
"Hospitalist Team      Patient Care Team:  Provider, No Known as PCP - General  Hardik Senior MD as Consulting Physician (Urology)  MAURO Peters MD as Consulting Physician (Ophthalmology)  Brant Polanco MD as Consulting Physician (Pulmonary Disease)  Charlie Goldstein DO as Consulting Physician (Neurology)  Rowdy Moore MD as Consulting Physician (Cardiology)  Liliam De La O PT as Physical Therapist (Physical Therapy)      Chief Complaint:  Follow-up End-of-Life Care    Subjective    Resting.  Family at bedside.    Objective    Vital Signs     Oxygen Therapy  Device (Oxygen Therapy): room air}  Flowsheet Rows      Flowsheet Row First Filed Value   Admission Height 180 cm (70.87\") Documented at 04/30/2025 1100   Admission Weight 75.3 kg (166 lb) Documented at 04/30/2025 1100              Physical Exam:    General: NAD  Lungs: Clear  CV: Regular rate and rhythm.  Abdomen: Bowel sounds are diminished.    Results Review:     I reviewed the patient's new clinical results.    Lab Results (last 24 hours)       ** No results found for the last 24 hours. **            Imaging Results (Last 24 Hours)       ** No results found for the last 24 hours. **            Medication Review:   I have reviewed the patient's current medication list    Current Facility-Administered Medications:     [DISCONTINUED] acetaminophen (TYLENOL) tablet 650 mg, 650 mg, Oral, Q4H PRN **OR** [DISCONTINUED] acetaminophen (TYLENOL) 160 MG/5ML oral solution 650 mg, 650 mg, Oral, Q4H PRN **OR** acetaminophen (TYLENOL) suppository 650 mg, 650 mg, Rectal, Q4H PRN, Kimber Vital R, APRN    glycopyrrolate (ROBINUL) injection 0.4 mg, 0.4 mg, Intravenous, Q1H PRN, Kimber Vital, APRN, 0.4 mg at 05/03/25 1312    LORazepam (ATIVAN) injection 2 mg, 2 mg, Intravenous, Q1H PRN, Roni Resendiz MD, 2 mg at 05/03/25 1312    morphine injection 4 mg, 4 mg, Intravenous, Q1H PRN, Roni Resendiz MD, 4 mg at 05/03/25 0959    [DISCONTINUED] " ondansetron ODT (ZOFRAN-ODT) disintegrating tablet 4 mg, 4 mg, Oral, Q6H PRN **OR** ondansetron (ZOFRAN) injection 4 mg, 4 mg, Intravenous, Q6H PRN, Kimber Vital R, APRN    sodium chloride 0.9 % flush 10 mL, 10 mL, Intravenous, PRN, Kimber Vital R, APRN    sodium chloride 0.9 % infusion 40 mL, 40 mL, Intravenous, PRN, Kimber Vital R, APRN      Assessment & Plan     End-of-Life Care: Continue current orders.      Roni Resendiz MD  05/03/25  14:34 EDT

## 2025-05-03 NOTE — NURSING NOTE
Review of visit: patient is a 89 YO M with a primary diagnosis of COPD. Admitted to [facility] for GIP for mgmt. of pain, SOA, anxiety/agitation, congestion & EOL & symptom mgmt. care.   Isolation: N/A  PPS: 10%   Medications in last 24 hours: IV Morphine 4mg Q4H & PRN x3, IV Ativan 2mg Q4H & PRN x3, IV Robinul 0.4mg PRN x5  Recommendations:   Continue to monitor for signs of decline & provide comfort measures. Contact [facility] with any questions/concerns & at TOD.   Recommend side to side turns  Assessment:   Patient is lying in bed on back with HOB elevated approx. 20 degrees, patient is on RA, RR irregular with periods of tachypnea & periods of apnea lasting approx. 40-45 seconds, family reports apnea was approx. 20 seconds this AM when they arrived and the length episodes has increased throughout the day. Lung sounds are coarse/diminished, BS absent, pedals palpable, no edema, skin is pale with scattered bruising, warm to touch, nailbeds pale, mottling noted on soles of feet. Male purewick in place with small amount of bradley urine noted in cannister. No S/S of discomfort noted. Discussed placing patient in recovery position with family at bedside, family agreeable. This RN assisted staff in repositioning patient & patient tolerated well. Patient is actively dying, appears imminent, discussed with family at bedside.   Collaboration:   Spoke with family at bedside, condition update provided including disease progression, symptom mgmt. & patient's condition, training provided. Family V/U of patient's condition and all questions answered. Updated staff CARMEN Rust, recommendations provided as appropriate.   Disposition:  Patient meets GIP criteria d/t frequent administration & continued titration of IV meds to achieve/maintain symptom mgmt.. Patient appears unstable for transport, requiring increasing symptom mgmt. & frequent nursing interventions. If patient stabilizes & needs to transition to a lower level of care,  placement may be needed due to increased daily care needs. Will continue [facility] RN visits to monitor for changes, assess needs & provide support.   Please reach out with any questions/concerns. Thank you for allowing us the opportunity to participate in patient's care.     Kristi Cristina RN, BSN  Scatter Bed Team  [facility]

## 2025-05-03 NOTE — PLAN OF CARE
Goal Outcome Evaluation:  Plan of Care Reviewed With: patient           Outcome Evaluation: Hospice care, PRN medications given almost every 2 hours. Noisy breathing, apenic breathing. Currently in recovery position on left side.

## 2025-05-04 NOTE — NURSING NOTE
Family called nursing in to check on patient. Patient has no respirations or heart sounds, verified with 2nd RN, MD notifed.

## 2025-05-04 NOTE — PROGRESS NOTES
"Hospitalist Team    5/4  Patient noted comfortable, family at bedside.    Patient Care Team:  Provider, No Known as PCP - General  Hardik Senior MD as Consulting Physician (Urology)  MARUO Peters MD as Consulting Physician (Ophthalmology)  Brant Polanco MD as Consulting Physician (Pulmonary Disease)  Charlie Goldstein DO as Consulting Physician (Neurology)  Rowdy Moore MD as Consulting Physician (Cardiology)  Liliam De La O PT as Physical Therapist (Physical Therapy)      Chief Complaint:  Follow-up End-of-Life Care    Subjective    Resting.  Family at bedside.    Objective    Vital Signs     Oxygen Therapy  Device (Oxygen Therapy): room air}  Flowsheet Rows      Flowsheet Row First Filed Value   Admission Height 180 cm (70.87\") Documented at 04/30/2025 1100   Admission Weight 75.3 kg (166 lb) Documented at 04/30/2025 1100              Physical Exam:    General: NAD  Lungs: Clear  CV: Regular rate and rhythm.  Abdomen: Bowel sounds are diminished.    Results Review:     I reviewed the patient's new clinical results.    Lab Results (last 24 hours)       ** No results found for the last 24 hours. **            Imaging Results (Last 24 Hours)       ** No results found for the last 24 hours. **            Medication Review:   I have reviewed the patient's current medication list    Current Facility-Administered Medications:     [DISCONTINUED] acetaminophen (TYLENOL) tablet 650 mg, 650 mg, Oral, Q4H PRN **OR** [DISCONTINUED] acetaminophen (TYLENOL) 160 MG/5ML oral solution 650 mg, 650 mg, Oral, Q4H PRN **OR** acetaminophen (TYLENOL) suppository 650 mg, 650 mg, Rectal, Q4H PRN, Kimber Vital R, APRN    glycopyrrolate (ROBINUL) injection 0.4 mg, 0.4 mg, Intravenous, Q1H PRN, Kimber Vital APRN, 0.4 mg at 05/04/25 0517    LORazepam (ATIVAN) injection 2 mg, 2 mg, Intravenous, Q1H PRN, Roni Resendiz MD, 2 mg at 05/04/25 0517    morphine injection 4 mg, 4 mg, Intravenous, Q1H PRN, Roni Resendiz " MD LOAN, 4 mg at 05/04/25 0516    [DISCONTINUED] ondansetron ODT (ZOFRAN-ODT) disintegrating tablet 4 mg, 4 mg, Oral, Q6H PRN **OR** ondansetron (ZOFRAN) injection 4 mg, 4 mg, Intravenous, Q6H PRN, Kimber Vital R, APRN    sodium chloride 0.9 % flush 10 mL, 10 mL, Intravenous, PRN, Kimber Vital R, APRN    sodium chloride 0.9 % infusion 40 mL, 40 mL, Intravenous, PRN, Kimber Vital R, APRN      Assessment & Plan     End-of-Life Care: Continue with DNR comfort care      Ra Narayanan MD  05/04/25  06:31 EDT

## 2025-05-04 NOTE — NURSING NOTE
Daughters Jyotsna Clements & Joy Hartman at bedside with patient. Lisajoanne Casper from [facility] picked up body @ 1335.

## 2025-05-04 NOTE — CASE MANAGEMENT/SOCIAL WORK
Case Management Discharge Note      Final Note: Pt .         Selected Continued Care - Discharged on 2025 Admission date: 2025 - Discharge disposition:       Destination    No services have been selected for the patient.                Durable Medical Equipment    No services have been selected for the patient.                Dialysis/Infusion    No services have been selected for the patient.                Home Medical Care    No services have been selected for the patient.                Therapy    No services have been selected for the patient.                Community Resources    No services have been selected for the patient.                Community & DME    No services have been selected for the patient.                         Final Discharge Disposition Code: 20 -    none

## 2025-05-18 NOTE — DISCHARGE SUMMARY
Pawel Goldberg  1932  [id number]    Hospitalists Discharge Summary    Date of Admission: 2025  Date of Death:  2025    Cause of Death: End-stage Dementia    History of Present Illness (taken from H&P):  The patient originally presented 2025 with altered mental status.  Family noted that they had found him on the floor smelling of urine without his oxygen on and saturations were in the 70s.  They were concerned about the level of his altered mental status and brought him to the ER.  After failing to improve over several days, decision was made to consult hospice they deemed patient appropriate for hospice scatter bed.     At time of my exam, patient is unresponsive but appears comfortable     No other concerns currently     He has a history of end-stage COPD, CAD, dementia, hypothyroidism, stroke, prostate cancer, PAD, GERD, carotid arterial disease s/p right CEA 2014    Hospital Course:  Mr. Goldberg admitted to a hospice scatter bed where he peacefully .         Roni Resendiz MD  25  14:33 EDT